# Patient Record
Sex: FEMALE | Race: BLACK OR AFRICAN AMERICAN | NOT HISPANIC OR LATINO | Employment: FULL TIME | URBAN - METROPOLITAN AREA
[De-identification: names, ages, dates, MRNs, and addresses within clinical notes are randomized per-mention and may not be internally consistent; named-entity substitution may affect disease eponyms.]

---

## 2018-10-04 ENCOUNTER — APPOINTMENT (EMERGENCY)
Dept: CT IMAGING | Facility: HOSPITAL | Age: 53
End: 2018-10-04
Payer: MEDICARE

## 2018-10-04 ENCOUNTER — HOSPITAL ENCOUNTER (EMERGENCY)
Facility: HOSPITAL | Age: 53
Discharge: HOME | End: 2018-10-04
Attending: EMERGENCY MEDICINE | Admitting: EMERGENCY MEDICINE
Payer: MEDICARE

## 2018-10-04 VITALS
OXYGEN SATURATION: 98 % | HEART RATE: 68 BPM | BODY MASS INDEX: 33.24 KG/M2 | DIASTOLIC BLOOD PRESSURE: 70 MMHG | SYSTOLIC BLOOD PRESSURE: 136 MMHG | HEIGHT: 59 IN | WEIGHT: 164.9 LBS | RESPIRATION RATE: 18 BRPM | TEMPERATURE: 97.8 F

## 2018-10-04 DIAGNOSIS — D86.9 SARCOIDOSIS: Primary | ICD-10-CM

## 2018-10-04 LAB
ALBUMIN SERPL BCP-MCNC: 3.6 G/DL (ref 3.5–5)
ALP SERPL-CCNC: 127 U/L (ref 46–116)
ALT SERPL W P-5'-P-CCNC: 53 U/L (ref 12–78)
ANION GAP SERPL CALCULATED.3IONS-SCNC: 6 MMOL/L (ref 4–13)
AST SERPL W P-5'-P-CCNC: 47 U/L (ref 5–45)
ATRIAL RATE: 65 BPM
BASOPHILS # BLD AUTO: 0.02 THOUSANDS/ΜL (ref 0–0.1)
BASOPHILS NFR BLD AUTO: 0 % (ref 0–1)
BILIRUB SERPL-MCNC: 0.4 MG/DL (ref 0.2–1)
BUN SERPL-MCNC: 13 MG/DL (ref 5–25)
CALCIUM SERPL-MCNC: 9.3 MG/DL (ref 8.3–10.1)
CHLORIDE SERPL-SCNC: 105 MMOL/L (ref 100–108)
CO2 SERPL-SCNC: 28 MMOL/L (ref 21–32)
CREAT SERPL-MCNC: 0.83 MG/DL (ref 0.6–1.3)
EOSINOPHIL # BLD AUTO: 0.26 THOUSAND/ΜL (ref 0–0.61)
EOSINOPHIL NFR BLD AUTO: 5 % (ref 0–6)
ERYTHROCYTE [DISTWIDTH] IN BLOOD BY AUTOMATED COUNT: 14.9 % (ref 11.6–15.1)
GFR SERPL CREATININE-BSD FRML MDRD: 93 ML/MIN/1.73SQ M
GLUCOSE SERPL-MCNC: 102 MG/DL (ref 65–140)
HCT VFR BLD AUTO: 37.8 % (ref 34.8–46.1)
HGB BLD-MCNC: 12.1 G/DL (ref 11.5–15.4)
IMM GRANULOCYTES # BLD AUTO: 0.01 THOUSAND/UL (ref 0–0.2)
IMM GRANULOCYTES NFR BLD AUTO: 0 % (ref 0–2)
LYMPHOCYTES # BLD AUTO: 1.78 THOUSANDS/ΜL (ref 0.6–4.47)
LYMPHOCYTES NFR BLD AUTO: 32 % (ref 14–44)
MCH RBC QN AUTO: 29.9 PG (ref 26.8–34.3)
MCHC RBC AUTO-ENTMCNC: 32 G/DL (ref 31.4–37.4)
MCV RBC AUTO: 93 FL (ref 82–98)
MONOCYTES # BLD AUTO: 0.55 THOUSAND/ΜL (ref 0.17–1.22)
MONOCYTES NFR BLD AUTO: 10 % (ref 4–12)
NEUTROPHILS # BLD AUTO: 2.9 THOUSANDS/ΜL (ref 1.85–7.62)
NEUTS SEG NFR BLD AUTO: 53 % (ref 43–75)
NRBC BLD AUTO-RTO: 0 /100 WBCS
P AXIS: 67 DEGREES
PLATELET # BLD AUTO: 314 THOUSANDS/UL (ref 149–390)
PMV BLD AUTO: 10.1 FL (ref 8.9–12.7)
POTASSIUM SERPL-SCNC: 4 MMOL/L (ref 3.5–5.3)
PR INTERVAL: 158 MS
PROT SERPL-MCNC: 7.7 G/DL (ref 6.4–8.2)
QRS AXIS: 49 DEGREES
QRSD INTERVAL: 76 MS
QT INTERVAL: 414 MS
QTC INTERVAL: 430 MS
RBC # BLD AUTO: 4.05 MILLION/UL (ref 3.81–5.12)
SODIUM SERPL-SCNC: 139 MMOL/L (ref 136–145)
T WAVE AXIS: 44 DEGREES
TROPONIN I SERPL-MCNC: <0.02 NG/ML
VENTRICULAR RATE: 65 BPM
WBC # BLD AUTO: 5.52 THOUSAND/UL (ref 4.31–10.16)

## 2018-10-04 PROCEDURE — 71260 CT THORAX DX C+: CPT

## 2018-10-04 PROCEDURE — 85025 COMPLETE CBC W/AUTO DIFF WBC: CPT | Performed by: EMERGENCY MEDICINE

## 2018-10-04 PROCEDURE — 74177 CT ABD & PELVIS W/CONTRAST: CPT

## 2018-10-04 PROCEDURE — 84484 ASSAY OF TROPONIN QUANT: CPT | Performed by: EMERGENCY MEDICINE

## 2018-10-04 PROCEDURE — 80053 COMPREHEN METABOLIC PANEL: CPT | Performed by: EMERGENCY MEDICINE

## 2018-10-04 PROCEDURE — 93005 ELECTROCARDIOGRAM TRACING: CPT

## 2018-10-04 PROCEDURE — 99285 EMERGENCY DEPT VISIT HI MDM: CPT

## 2018-10-04 PROCEDURE — 36415 COLL VENOUS BLD VENIPUNCTURE: CPT | Performed by: EMERGENCY MEDICINE

## 2018-10-04 PROCEDURE — 93010 ELECTROCARDIOGRAM REPORT: CPT | Performed by: INTERNAL MEDICINE

## 2018-10-04 RX ORDER — CETIRIZINE HYDROCHLORIDE 10 MG/1
10 TABLET ORAL DAILY
COMMUNITY
End: 2020-06-02 | Stop reason: SDUPTHER

## 2018-10-04 RX ORDER — ALBUTEROL SULFATE 90 UG/1
2 AEROSOL, METERED RESPIRATORY (INHALATION) DAILY
COMMUNITY
End: 2020-04-09

## 2018-10-04 RX ADMIN — IOHEXOL 100 ML: 350 INJECTION, SOLUTION INTRAVENOUS at 05:41

## 2018-10-04 NOTE — ED PROVIDER NOTES
Pt Name: Madelin Stevenson  MRN: [id number]  [place] 1965  Age/Sex: 48 y o  female  Date of evaluation: 10/4/2018  PCP: No primary care provider on file  CHIEF COMPLAINT    Chief Complaint   Patient presents with    Chest Pain     Pt states she has scarcoidsis  Pt states she was being treated at [facility] but since the tx stopped her complications have flared up  Pt c/o increased chest pain and yellow sclera  HPI    Stefanie Paulson presents to the Emergency Department complaining of chest pain and swollen lymph nodes  She was recently in [facility] and during that time was seeing a doctor for her sarcoid  She had been on prednisone several times to control her symptoms  Now she has not been able to see a PCP or a rheumatologist and feels that she is getting worse  HPI      Past Medical and Surgical History    Past Medical History:   Diagnosis Date    Sarcoidosis     Vertigo        History reviewed  No pertinent surgical history  History reviewed  No pertinent family history  Social History   Substance Use Topics    Smoking status: Never Smoker    Smokeless tobacco: Never Used    Alcohol use Yes      Comment: social              Allergies    Allergies   Allergen Reactions    Tegretol [Carbamazepine] Rash       Home Medications    Prior to Admission medications    Medication Sig Start Date End Date Taking? Authorizing Provider   albuterol (PROVENTIL HFA,VENTOLIN HFA) 90 mcg/act inhaler Inhale 2 puffs daily   Yes Historical Provider, MD   cetirizine (ZyrTEC) 10 mg tablet Take 10 mg by mouth daily   Yes Historical Provider, MD   MECLIZINE HCL PO Take 1 tablet by mouth daily   Yes Historical Provider, MD           Review of Systems    Review of Systems   Constitutional: Negative for activity change, appetite change, chills, diaphoresis, fatigue and fever  HENT: Negative for congestion, postnasal drip, rhinorrhea, sinus pressure, sneezing and sore throat      Eyes: Negative for pain and visual disturbance  Respiratory: Negative for cough, chest tightness and shortness of breath  Cardiovascular: Negative for chest pain, palpitations and leg swelling  Gastrointestinal: Negative for abdominal distention, abdominal pain, constipation, diarrhea, nausea and vomiting  Endocrine: Negative for polydipsia, polyphagia and polyuria  Genitourinary: Negative for decreased urine volume, difficulty urinating, dysuria, flank pain, frequency and hematuria  Musculoskeletal: Negative for arthralgias, gait problem, joint swelling and neck pain  Skin: Negative for pallor and rash  Allergic/Immunologic: Negative for immunocompromised state  Neurological: Negative for syncope, speech difficulty, weakness, light-headedness, numbness and headaches  All other systems reviewed and are negative  Physical Exam      ED Triage Vitals [10/04/18 0427]   Temperature Pulse Respirations Blood Pressure SpO2   97 8 °F (36 6 °C) 69 18 163/83 100 %      Temp Source Heart Rate Source Patient Position - Orthostatic VS BP Location FiO2 (%)   Oral Monitor Sitting Right arm --      Pain Score       Worst Possible Pain               Physical Exam   Constitutional: She is oriented to person, place, and time  She appears well-developed and well-nourished  No distress  HENT:   Head: Normocephalic and atraumatic  Nose: Nose normal    Mouth/Throat: Oropharynx is clear and moist    Eyes: Pupils are equal, round, and reactive to light  Conjunctivae, EOM and lids are normal    Neck: Normal range of motion  Neck supple  Cardiovascular: Normal rate, regular rhythm and normal heart sounds  Exam reveals no gallop and no friction rub  No murmur heard  Pulmonary/Chest: Effort normal and breath sounds normal  No accessory muscle usage  No respiratory distress  She has no wheezes  She has no rales  Abdominal: Soft  She exhibits no distension  There is no tenderness  There is no rebound and no guarding     Neurological: She is alert and oriented to person, place, and time  No cranial nerve deficit or sensory deficit  Skin: Skin is warm and dry  No rash noted  She is not diaphoretic  No erythema  Psychiatric: She has a normal mood and affect  Her speech is normal and behavior is normal  Judgment and thought content normal    Nursing note and vitals reviewed  Assessment and Plan    Alfred Gutierrez is a 48 y o  female who presents with concerns about her sarcoid  Physical examination unremarkable  Plan will be to perform diagnostic testing and treat symptomatically        MDM    Diagnostic Results        Labs:    Results for orders placed or performed during the hospital encounter of 10/04/18   CBC and differential   Result Value Ref Range    WBC 5 52 4 31 - 10 16 Thousand/uL    RBC 4 05 3 81 - 5 12 Million/uL    Hemoglobin 12 1 11 5 - 15 4 g/dL    Hematocrit 37 8 34 8 - 46 1 %    MCV 93 82 - 98 fL    MCH 29 9 26 8 - 34 3 pg    MCHC 32 0 31 4 - 37 4 g/dL    RDW 14 9 11 6 - 15 1 %    MPV 10 1 8 9 - 12 7 fL    Platelets 150 468 - 290 Thousands/uL    nRBC 0 /100 WBCs    Neutrophils Relative 53 43 - 75 %    Immat GRANS % 0 0 - 2 %    Lymphocytes Relative 32 14 - 44 %    Monocytes Relative 10 4 - 12 %    Eosinophils Relative 5 0 - 6 %    Basophils Relative 0 0 - 1 %    Neutrophils Absolute 2 90 1 85 - 7 62 Thousands/µL    Immature Grans Absolute 0 01 0 00 - 0 20 Thousand/uL    Lymphocytes Absolute 1 78 0 60 - 4 47 Thousands/µL    Monocytes Absolute 0 55 0 17 - 1 22 Thousand/µL    Eosinophils Absolute 0 26 0 00 - 0 61 Thousand/µL    Basophils Absolute 0 02 0 00 - 0 10 Thousands/µL   Comprehensive metabolic panel   Result Value Ref Range    Sodium 139 136 - 145 mmol/L    Potassium 4 0 3 5 - 5 3 mmol/L    Chloride 105 100 - 108 mmol/L    CO2 28 21 - 32 mmol/L    ANION GAP 6 4 - 13 mmol/L    BUN 13 5 - 25 mg/dL    Creatinine 0 83 0 60 - 1 30 mg/dL    Glucose 102 65 - 140 mg/dL    Calcium 9 3 8 3 - 10 1 mg/dL    AST 47 (H) 5 - 45 U/L    ALT 53 12 - 78 U/L    Alkaline Phosphatase 127 (H) 46 - 116 U/L    Total Protein 7 7 6 4 - 8 2 g/dL    Albumin 3 6 3 5 - 5 0 g/dL    Total Bilirubin 0 40 0 20 - 1 00 mg/dL    eGFR 93 ml/min/1 73sq m   Troponin I   Result Value Ref Range    Troponin I <0 02 <=0 04 ng/mL       All labs reviewed and utilized in the medical decision making process    Radiology:    CT chest abdomen pelvis w contrast   Final Result         1  Bilateral hilar adenopathy and subcarinal adenopathy compatible with the submitted diagnosis of sarcoidosis  2   Subpleural blebs and emphysematous changes  3   Minimal atelectasis posteriorly in the upper lobes  4   No acute findings in the abdomen or pelvis  Workstation performed: [id number]             All radiology studies independently viewed by me and interpreted by the radiologist     Procedure    Procedures    CritCare Time      ED Course of Care and Re-Assessments      Medications   iohexol (OMNIPAQUE) 350 MG/ML injection (MULTI-DOSE) 100 mL (100 mL Intravenous Given 10/4/18 0541)           FINAL IMPRESSION    Final diagnoses:   Sarcoidosis         DISPOSITION/PLAN    Time reflects when diagnosis was documented in both MDM as applicable and the Disposition within this note     Time User Action Codes Description Comment    10/4/2018  6:41 AM Bushra Thomas Add [D86 9] Sarcoidosis       ED Disposition     ED Disposition Condition Comment    Discharge  Sharon Flores discharge to home/self care      Condition at discharge: Good        Follow-up Information     Follow up With Specialties Details Why Contact Info Additional [address] Emergency Department Emergency Medicine Go to If symptoms worsen [address] [facility] [address],  [address], [place], [place] [address]    [phone]               PATIENT REFERRED TO:    [facility] [address] [facility]  [phone]  Go to  If symptoms worsen    [employer]  [phone]            DISCHARGE MEDICATIONS:    Discharge Medication List as of 10/4/2018  6:41 AM      CONTINUE these medications which have NOT CHANGED    Details   albuterol (PROVENTIL HFA,VENTOLIN HFA) 90 mcg/act inhaler Inhale 2 puffs daily, Historical Med      cetirizine (ZyrTEC) 10 mg tablet Take 10 mg by mouth daily, Historical Med      MECLIZINE HCL PO Take 1 tablet by mouth daily, Historical Med             No discharge procedures on file           Nicole Cardenas, [address] [place] [address],   10/04/18 7317

## 2018-10-04 NOTE — DISCHARGE INSTRUCTIONS
Sarcoidosis   WHAT YOU NEED TO KNOW:   What is sarcoidosis? Sarcoidosis is a condition in which inflammatory cells collect in tissues and organs  These cells form granulomas (lumps) in the lungs, skin, lymph nodes, or eyes  What increases my risk for sarcoidosis? The cause of sarcoidosis is not known  Sarcoidosis may be caused by an autoimmune disease  An autoimmune disease happens when immune cells produce antibodies that attack your own body's cells  The following may increase your risk of sarcoidosis:  · Being female    · Age 21to 36years old    · Frequent exposure to chemicals, metals, and substances like caitlin, pine tree, pollen, or aluminum  · Germs such as bacteria, viruses, and fungi  · Have family members or close relatives who have sarcoidosis or autoimmune diseases  What are the signs and symptoms of sarcoidosis? Signs and symptoms include weight loss, weakness, fever, and fatigue  Granulomas may cause other signs and symptoms when they appear in any of the following:  · Brain:  You may have problems thinking, remembering things, or controlling your feelings and actions  You may also have trouble hearing, headaches, and seizures  · Eyes:  Granulomas in the eyes can cause pain, swelling, and vision changes  · Heart: You may have chest pain, abnormal heartbeats, or your heart may stop beating  · Lungs: You may have a cough, trouble breathing, and hemoptysis (coughing up blood)  · Muscles, bones, and joints:  Your joints, muscles, and bones may be painful, swollen, red, and warm  · Skin:  Granulomas may appear as flat or raised lumps in your skin  You may also get red lumps on the front of your legs (erythema nodosum)  · Other organs:  Granulomas in the liver may cause your skin to turn yellow and may cause kidney stones when found in the kidneys  Granulomas in your intestines may block the passage of food or blood and cause pain and bleeding   You may have swollen, painful lymph nodes in your neck, armpits, or groin  How is sarcoidosis diagnosed? Your healthcare provider will examine you and ask you about other health conditions you may have  He may ask if you have family members with sarcoidosis or autoimmune diseases  He may also do an eye exam  You may have any of the following tests:  · Blood tests: These are done to look for signs of inflammation  They may also check for liver and kidney function  · Urine test:  These are done to check for blood in your urine  This is a sign of the condition affecting your kidneys  · Diascopy: Your healthcare provider will press on skin lesions using a small glass plate  This allows your healthcare provider to examine any color changes of the lesion  · Chest x-ray: This is a picture of your lungs and heart  It may show granulomas, fluid, and other problems  · CT scan: This test is also called a CAT scan  An x-ray machine uses a computer to take pictures of your brain, lungs, muscles, and bones  You may be given a dye before the pictures are taken to help healthcare providers see the pictures better  Tell the healthcare provider if you have ever had an allergic reaction to contrast dye     · MRI:  This scan uses powerful magnets and a computer to take pictures of your brain, lungs, muscles, and bones  You may be given dye to help the pictures show up better  Tell the healthcare provider if you have ever had an allergic reaction to contrast dye  Do not enter the MRI room with anything metal  Metal can cause serious injury  Tell the healthcare provider if you have any metal in or on your body  · Biopsy:  A small amount of tissue will be removed from your lungs or other affected areas and tested  This will show if the granulomas are caused by sarcoidosis  How is sarcoidosis treated? Many people with sarcoidosis get better without treatment  You may also have any of the following:  · Medicines:      ¨ Steroids:   This medicine is given to help slow down your immune system and reduce the symptoms of sarcoidosis  ¨ Cytotoxic medicines: These decrease redness, pain, and swelling, and help slow down your immune system  ¨ NSAIDs:  These medicines decrease swelling and pain  You can buy NSAIDs without a doctor's order  Ask your healthcare provider which medicine is right for you and how much to take  Take as directed  NSAIDs can cause stomach bleeding or kidney problems if not taken correctly  ¨ Acetaminophen: This medicine decreases pain and fever  You can buy acetaminophen without a doctor's order  Ask how much to take and how often to take it  Follow directions  Acetaminophen can cause liver damage if not taken correctly  · Surgery: You may need surgery to remove granulomas that cause severe signs and symptoms  Healthcare providers may use lasers (light beams) or dermabrasion to remove or smooth skin lesions  What are the risks of sarcoidosis? Surgery may cause bleeding or an infection  If not treated, granulomas may cause further damage to your lungs  You may have trouble breathing and feel very tired most of the time  Granulomas in your brain may cause problems thinking, remembering things, and controlling your actions and feelings  You may have loss of vision or hearing or seizures  Your heart may be affected and this can be life-threatening  How can I manage my symptoms? · Eat a variety of healthy foods:  Healthy foods include fruits, vegetables, whole-grain breads, low-fat dairy products, beans, lean meats, and fish  Ask if you need to be on a special diet  · Get plenty of exercise:  Ask your healthcare provider about the best exercise plan for you  Exercise may help decrease fatigue and improve your symptoms  · Do not smoke: If you smoke, it is never too late to quit  Smoking increases your risk of further lung problems  Ask your healthcare provider for information if you need help quitting    When should I contact my healthcare provider? · You have a fever  · You have a severe headache and pain in your neck  · You have chills, a cough, or feel weak and achy  · You have pain, redness, and swelling in your muscles and joints  · Your skin is itchy, swollen, or has a rash  · You have questions or concerns about your condition or care  When should I seek immediate care or call 911? · You cannot feel your arms or legs, or they become weak  · You have seizures  · You have sudden trouble breathing  · You have trouble thinking and remembering things  · You have severe chest pain  CARE AGREEMENT:   You have the right to help plan your care  Learn about your health condition and how it may be treated  Discuss treatment options with your caregivers to decide what care you want to receive  You always have the right to refuse treatment  The above information is an  only  It is not intended as medical advice for individual conditions or treatments  Talk to your doctor, nurse or pharmacist before following any medical regimen to see if it is safe and effective for you  © 2017 [employer] Information is for End User's use only and may not be sold, redistributed or otherwise used for commercial purposes  All illustrations and images included in CareNotes® are the copyrighted property of [employer]  or [employer]

## 2020-04-08 ENCOUNTER — TELEPHONE (OUTPATIENT)
Dept: FAMILY MEDICINE CLINIC | Facility: CLINIC | Age: 55
End: 2020-04-08

## 2020-04-08 DIAGNOSIS — J45.20 MILD INTERMITTENT ASTHMA, UNSPECIFIED WHETHER COMPLICATED: Primary | ICD-10-CM

## 2020-04-09 RX ORDER — BUDESONIDE AND FORMOTEROL FUMARATE DIHYDRATE 160; 4.5 UG/1; UG/1
AEROSOL RESPIRATORY (INHALATION)
COMMUNITY
Start: 2020-04-08 | End: 2020-06-02 | Stop reason: SDUPTHER

## 2020-04-09 RX ORDER — CROMOLYN SODIUM 40 MG/ML
SOLUTION/ DROPS OPHTHALMIC
COMMUNITY
Start: 2020-03-27 | End: 2020-06-02 | Stop reason: SDUPTHER

## 2020-04-09 RX ORDER — FLUTICASONE PROPIONATE 50 MCG
SPRAY, SUSPENSION (ML) NASAL
COMMUNITY
Start: 2020-04-07 | End: 2020-06-02 | Stop reason: SDUPTHER

## 2020-04-09 RX ORDER — ALBUTEROL SULFATE 90 UG/1
2 AEROSOL, METERED RESPIRATORY (INHALATION) EVERY 6 HOURS PRN
Qty: 1 INHALER | Refills: 3 | Status: SHIPPED | OUTPATIENT
Start: 2020-04-09 | End: 2020-05-09

## 2020-04-09 RX ORDER — ATORVASTATIN CALCIUM 40 MG/1
TABLET, FILM COATED ORAL
COMMUNITY
Start: 2020-04-07 | End: 2020-06-02 | Stop reason: SDUPTHER

## 2020-04-09 RX ORDER — MONTELUKAST SODIUM 10 MG/1
TABLET ORAL
COMMUNITY
Start: 2020-04-07 | End: 2020-06-02 | Stop reason: SDUPTHER

## 2020-04-24 ENCOUNTER — TELEMEDICINE (OUTPATIENT)
Dept: FAMILY MEDICINE CLINIC | Facility: CLINIC | Age: 55
End: 2020-04-24
Payer: MEDICARE

## 2020-04-24 DIAGNOSIS — J45.40 MODERATE PERSISTENT ASTHMA WITHOUT COMPLICATION: ICD-10-CM

## 2020-04-24 DIAGNOSIS — J30.2 SEASONAL ALLERGIES: ICD-10-CM

## 2020-04-24 DIAGNOSIS — E78.00 HYPERCHOLESTEROLEMIA: ICD-10-CM

## 2020-04-24 DIAGNOSIS — D50.8 OTHER IRON DEFICIENCY ANEMIA: Primary | ICD-10-CM

## 2020-04-24 PROBLEM — D86.9 SARCOIDOSIS: Status: ACTIVE | Noted: 2020-04-24

## 2020-04-24 PROBLEM — F91.3: Status: ACTIVE | Noted: 2020-04-24

## 2020-04-24 PROBLEM — D50.9 IRON DEFICIENCY ANEMIA: Status: ACTIVE | Noted: 2020-04-24

## 2020-04-24 PROCEDURE — 99214 OFFICE O/P EST MOD 30 MIN: CPT | Performed by: NURSE PRACTITIONER

## 2020-04-24 RX ORDER — FERROUS SULFATE TAB EC 324 MG (65 MG FE EQUIVALENT) 324 (65 FE) MG
324 TABLET DELAYED RESPONSE ORAL
Qty: 180 TABLET | Refills: 1 | Status: SHIPPED | OUTPATIENT
Start: 2020-04-24 | End: 2021-06-04 | Stop reason: SDUPTHER

## 2020-04-28 ENCOUNTER — TELEMEDICINE (OUTPATIENT)
Dept: FAMILY MEDICINE CLINIC | Facility: CLINIC | Age: 55
End: 2020-04-28
Payer: MEDICARE

## 2020-04-28 VITALS — BODY MASS INDEX: 33.31 KG/M2 | HEIGHT: 59 IN

## 2020-04-28 DIAGNOSIS — L81.9 DISCOLORATION OF SKIN OF FACE: ICD-10-CM

## 2020-04-28 DIAGNOSIS — D86.9 SARCOIDOSIS: Primary | ICD-10-CM

## 2020-04-28 PROCEDURE — 99213 OFFICE O/P EST LOW 20 MIN: CPT | Performed by: NURSE PRACTITIONER

## 2020-04-28 RX ORDER — PETROLATUM,WHITE/LANOLIN
OINTMENT (GRAM) TOPICAL AS NEEDED
Qty: 45 G | Refills: 1 | Status: SHIPPED | OUTPATIENT
Start: 2020-04-28 | End: 2022-03-01 | Stop reason: SDUPTHER

## 2020-06-01 ENCOUNTER — TELEPHONE (OUTPATIENT)
Dept: FAMILY MEDICINE CLINIC | Facility: CLINIC | Age: 55
End: 2020-06-01

## 2020-06-01 DIAGNOSIS — E78.00 HYPERCHOLESTEREMIA: ICD-10-CM

## 2020-06-01 DIAGNOSIS — J30.2 SEASONAL ALLERGIES: Primary | ICD-10-CM

## 2020-06-01 DIAGNOSIS — J45.20 MILD INTERMITTENT ASTHMA, UNSPECIFIED WHETHER COMPLICATED: ICD-10-CM

## 2020-06-01 DIAGNOSIS — F41.9 ANXIETY: ICD-10-CM

## 2020-06-02 ENCOUNTER — TELEPHONE (OUTPATIENT)
Dept: FAMILY MEDICINE CLINIC | Facility: CLINIC | Age: 55
End: 2020-06-02

## 2020-06-02 RX ORDER — HYDROXYZINE PAMOATE 25 MG/1
25 CAPSULE ORAL 3 TIMES DAILY PRN
Qty: 30 CAPSULE | Refills: 0 | Status: SHIPPED | OUTPATIENT
Start: 2020-06-02 | End: 2020-12-29 | Stop reason: SDUPTHER

## 2020-06-02 RX ORDER — BUDESONIDE AND FORMOTEROL FUMARATE DIHYDRATE 160; 4.5 UG/1; UG/1
2 AEROSOL RESPIRATORY (INHALATION) 2 TIMES DAILY
Qty: 3 INHALER | Refills: 1 | Status: SHIPPED | OUTPATIENT
Start: 2020-06-02 | End: 2021-06-04 | Stop reason: ALTCHOICE

## 2020-06-02 RX ORDER — ALBUTEROL SULFATE 90 UG/1
1 AEROSOL, METERED RESPIRATORY (INHALATION) 3 TIMES DAILY PRN
Qty: 3 INHALER | Refills: 1 | Status: SHIPPED | OUTPATIENT
Start: 2020-06-02 | End: 2020-08-31

## 2020-06-02 RX ORDER — CROMOLYN SODIUM 40 MG/ML
1 SOLUTION/ DROPS OPHTHALMIC 4 TIMES DAILY
Qty: 10 ML | Refills: 1 | Status: SHIPPED | OUTPATIENT
Start: 2020-06-02 | End: 2020-11-19 | Stop reason: SDUPTHER

## 2020-06-02 RX ORDER — ATORVASTATIN CALCIUM 40 MG/1
1 TABLET, FILM COATED ORAL DAILY
COMMUNITY
Start: 2020-04-07 | End: 2020-06-02 | Stop reason: SDUPTHER

## 2020-06-02 RX ORDER — CETIRIZINE HYDROCHLORIDE 10 MG/1
10 TABLET ORAL DAILY
Qty: 90 TABLET | Refills: 1 | Status: SHIPPED | OUTPATIENT
Start: 2020-06-02 | End: 2021-06-09 | Stop reason: SDUPTHER

## 2020-06-02 RX ORDER — ALPRAZOLAM 0.25 MG/1
1 TABLET ORAL
COMMUNITY
End: 2021-05-03

## 2020-06-02 RX ORDER — ATORVASTATIN CALCIUM 40 MG/1
40 TABLET, FILM COATED ORAL DAILY
Qty: 90 TABLET | Refills: 1 | Status: SHIPPED | OUTPATIENT
Start: 2020-06-02 | End: 2021-06-04 | Stop reason: SDUPTHER

## 2020-06-02 RX ORDER — ALBUTEROL SULFATE 90 UG/1
1 AEROSOL, METERED RESPIRATORY (INHALATION) 3 TIMES DAILY PRN
COMMUNITY
Start: 2020-04-07 | End: 2020-06-02 | Stop reason: SDUPTHER

## 2020-06-02 RX ORDER — HYDROXYZINE HYDROCHLORIDE 25 MG/1
25 TABLET, FILM COATED ORAL 2 TIMES DAILY
COMMUNITY
End: 2020-12-29 | Stop reason: SDUPTHER

## 2020-06-02 RX ORDER — MECLIZINE HCL 12.5 MG/1
12.5 TABLET ORAL EVERY 8 HOURS PRN
Qty: 90 TABLET | Refills: 1 | Status: SHIPPED | OUTPATIENT
Start: 2020-06-02 | End: 2021-11-10 | Stop reason: SDUPTHER

## 2020-06-02 RX ORDER — MONTELUKAST SODIUM 10 MG/1
10 TABLET ORAL DAILY
Qty: 90 TABLET | Refills: 1 | Status: SHIPPED | OUTPATIENT
Start: 2020-06-02 | End: 2021-06-04 | Stop reason: SDUPTHER

## 2020-06-02 RX ORDER — FLUTICASONE PROPIONATE 50 MCG
1 SPRAY, SUSPENSION (ML) NASAL DAILY
Qty: 18.2 ML | Refills: 1 | Status: SHIPPED | OUTPATIENT
Start: 2020-06-02 | End: 2021-06-04 | Stop reason: SDUPTHER

## 2020-06-04 ENCOUNTER — TELEPHONE (OUTPATIENT)
Dept: FAMILY MEDICINE CLINIC | Facility: CLINIC | Age: 55
End: 2020-06-04

## 2020-11-19 ENCOUNTER — TELEPHONE (OUTPATIENT)
Dept: FAMILY MEDICINE CLINIC | Facility: CLINIC | Age: 55
End: 2020-11-19

## 2020-11-19 DIAGNOSIS — J30.2 SEASONAL ALLERGIES: ICD-10-CM

## 2020-11-19 RX ORDER — CROMOLYN SODIUM 40 MG/ML
1 SOLUTION/ DROPS OPHTHALMIC 4 TIMES DAILY
Qty: 10 ML | Refills: 1 | Status: SHIPPED | OUTPATIENT
Start: 2020-11-19 | End: 2020-11-23 | Stop reason: SDUPTHER

## 2020-11-23 DIAGNOSIS — J30.2 SEASONAL ALLERGIES: ICD-10-CM

## 2020-11-23 RX ORDER — CROMOLYN SODIUM 40 MG/ML
1 SOLUTION/ DROPS OPHTHALMIC 4 TIMES DAILY
Qty: 10 ML | Refills: 1 | Status: SHIPPED | OUTPATIENT
Start: 2020-11-23 | End: 2020-11-30

## 2020-11-24 ENCOUNTER — TELEPHONE (OUTPATIENT)
Dept: FAMILY MEDICINE CLINIC | Facility: CLINIC | Age: 55
End: 2020-11-24

## 2020-11-30 ENCOUNTER — TELEPHONE (OUTPATIENT)
Dept: FAMILY MEDICINE CLINIC | Facility: CLINIC | Age: 55
End: 2020-11-30

## 2020-11-30 DIAGNOSIS — J30.2 SEASONAL ALLERGIES: Primary | ICD-10-CM

## 2020-11-30 RX ORDER — KETOTIFEN FUMARATE 0.35 MG/ML
1 SOLUTION/ DROPS OPHTHALMIC 2 TIMES DAILY
Qty: 1 ML | Refills: 0 | Status: SHIPPED | OUTPATIENT
Start: 2020-11-30 | End: 2021-11-19 | Stop reason: SDUPTHER

## 2020-12-29 ENCOUNTER — TELEPHONE (OUTPATIENT)
Dept: FAMILY MEDICINE CLINIC | Facility: CLINIC | Age: 55
End: 2020-12-29

## 2020-12-29 ENCOUNTER — TELEMEDICINE (OUTPATIENT)
Dept: FAMILY MEDICINE CLINIC | Facility: CLINIC | Age: 55
End: 2020-12-29
Payer: MEDICARE

## 2020-12-29 VITALS — HEIGHT: 59 IN | BODY MASS INDEX: 28.02 KG/M2 | WEIGHT: 139 LBS

## 2020-12-29 DIAGNOSIS — L08.9 SKIN INFECTION: Primary | ICD-10-CM

## 2020-12-29 DIAGNOSIS — L29.9 PRURITUS: ICD-10-CM

## 2020-12-29 PROCEDURE — 99214 OFFICE O/P EST MOD 30 MIN: CPT | Performed by: NURSE PRACTITIONER

## 2020-12-29 RX ORDER — CEPHALEXIN 500 MG/1
500 CAPSULE ORAL EVERY 8 HOURS SCHEDULED
Qty: 21 CAPSULE | Refills: 0 | Status: SHIPPED | OUTPATIENT
Start: 2020-12-29 | End: 2021-01-05

## 2020-12-29 RX ORDER — HYDROXYZINE HYDROCHLORIDE 25 MG/1
25 TABLET, FILM COATED ORAL 2 TIMES DAILY
Qty: 60 TABLET | Refills: 0 | Status: SHIPPED | OUTPATIENT
Start: 2020-12-29 | End: 2021-06-09 | Stop reason: SDUPTHER

## 2020-12-29 RX ORDER — NYSTATIN AND TRIAMCINOLONE ACETONIDE 100000; 1 [USP'U]/G; MG/G
OINTMENT TOPICAL 2 TIMES DAILY
Qty: 120 G | Refills: 1 | Status: SHIPPED | OUTPATIENT
Start: 2020-12-29 | End: 2021-01-07

## 2020-12-31 ENCOUNTER — HOSPITAL ENCOUNTER (EMERGENCY)
Facility: HOSPITAL | Age: 55
Discharge: HOME | End: 2020-12-31
Attending: EMERGENCY MEDICINE | Admitting: EMERGENCY MEDICINE
Payer: MEDICARE

## 2020-12-31 VITALS — OXYGEN SATURATION: 100 % | TEMPERATURE: 97.6 F | HEART RATE: 81 BPM | RESPIRATION RATE: 18 BRPM

## 2020-12-31 DIAGNOSIS — B02.9 HERPES ZOSTER WITHOUT COMPLICATION: Primary | ICD-10-CM

## 2020-12-31 PROCEDURE — 99284 EMERGENCY DEPT VISIT MOD MDM: CPT | Performed by: PHYSICIAN ASSISTANT

## 2020-12-31 PROCEDURE — 99282 EMERGENCY DEPT VISIT SF MDM: CPT

## 2020-12-31 RX ORDER — VALACYCLOVIR HYDROCHLORIDE 1 G/1
1000 TABLET, FILM COATED ORAL 3 TIMES DAILY
Qty: 21 TABLET | Refills: 0 | Status: SHIPPED | OUTPATIENT
Start: 2020-12-31 | End: 2021-06-09

## 2020-12-31 NOTE — DISCHARGE INSTRUCTIONS
Use Tylenol every 4 hours or Motrin every 6 hours; you can alternate the 2 medications taking something every 3 hours for pain or fever  Take all oral antiviral medications until done  If no improvement follow-up with your doctor in next few days

## 2020-12-31 NOTE — ED ATTENDING ATTESTATION
I supervised the Advanced Practitioner  I was present in the ED at the time the patient was seen, and I reviewed the AP note, prescribed medications, orders placed, and was available for consultation      Colleen Moran DO

## 2020-12-31 NOTE — ED PROVIDER NOTES
History  Chief Complaint   Patient presents with    Rash     pt reports 2-3 days hx of rash on neck, behind R ear, and in scal, reports unknown drainage  Unknown source  Has taken no meds for rash  Denies airway involvement or difficulty breathing     Pt with Past Medical History: Anxiety, Asthma, Back pain, Sarcoidosis, Vertigo, hyperlipidemia  No pertinent PSH  Presents to ED c/o few day h/o pain along right side of head/neck, then 2-3 days ago developed rash to neck, behind R ear, and in scalp, concerned for possible insect bites "something that someone in her building may have given her"  Has taken no meds for rash  Denies airway involvement or difficulty breathing, no fever, nocp, no sob          Prior to Admission Medications   Prescriptions Last Dose Informant Patient Reported? Taking?    ALPRAZolam (XANAX) 0 25 mg tablet Not Taking at Unknown time  Yes No   Sig: Take 1 tablet by mouth daily at bedtime as needed   SYMBICORT 160-4 5 MCG/ACT inhaler   No No   Sig: Inhale 2 puffs 2 (two) times a day   Vitamins A & D (VITAMIN A & D) ointment   No No   Sig: Apply topically as needed for dry skin (to face)   atorvastatin (LIPITOR) 40 mg tablet   No No   Sig: Take 1 tablet (40 mg total) by mouth daily   cephalexin (KEFLEX) 500 mg capsule Not Taking at Unknown time  No No   Sig: Take 1 capsule (500 mg total) by mouth every 8 (eight) hours for 7 days   Patient not taking: Reported on 2020   cetirizine (ZyrTEC) 10 mg tablet   No No   Sig: Take 1 tablet (10 mg total) by mouth daily   ferrous sulfate 324 (65 Fe) mg   No No   Sig: Take 1 tablet (324 mg total) by mouth 2 (two) times a day before meals   fluticasone (FLONASE) 50 mcg/act nasal spray   No No   Si spray into each nostril daily   fluticasone-salmeterol (ADVAIR, WIXELA) 250-50 mcg/dose inhaler   No No   Sig: Inhale 1 puff 2 (two) times a day Rinse mouth after use    hydrOXYzine HCL (ATARAX) 25 mg tablet Not Taking at Unknown time  No No   Sig: Take 1 tablet (25 mg total) by mouth 2 (two) times a day   Patient not taking: Reported on 12/31/2020   ketotifen (ZADITOR) 0 025 % ophthalmic solution   No No   Sig: Administer 1 drop to both eyes 2 (two) times a day for 10 days   meclizine (ANTIVERT) 12 5 MG tablet   No No   Sig: Take 1 tablet (12 5 mg total) by mouth every 8 (eight) hours as needed for dizziness   montelukast (SINGULAIR) 10 mg tablet   No No   Sig: Take 1 tablet (10 mg total) by mouth daily   nystatin-triamcinolone (MYCOLOG-II) ointment Not Taking at Unknown time  No No   Sig: Apply topically 2 (two) times a day for 7 days Apply to skin for itching/rash   Patient not taking: Reported on 12/31/2020      Facility-Administered Medications: None       Past Medical History:   Diagnosis Date    Anxiety     Asthma     Back pain     Sarcoidosis     Vertigo        Past Surgical History:   Procedure Laterality Date    MAMMO (HISTORICAL)  05/02/2018       Family History   Problem Relation Age of Onset    Hypertension Mother     Mental illness Mother     Asthma Mother      I have reviewed and agree with the history as documented  E-Cigarette/Vaping    E-Cigarette Use Never User      E-Cigarette/Vaping Substances    Nicotine No     THC No     CBD No     Flavoring No     Other No     Unknown No      Social History     Tobacco Use    Smoking status: Never Smoker    Smokeless tobacco: Never Used   Substance Use Topics    Alcohol use: Not Currently     Comment: social    Drug use: Yes     Types: Marijuana       Review of Systems   Constitutional: Negative for chills and fever  HENT: Positive for congestion  Negative for ear pain, hearing loss, mouth sores, sore throat and trouble swallowing  Eyes: Negative for discharge and visual disturbance  Respiratory: Negative for cough and shortness of breath  Cardiovascular: Negative for chest pain and leg swelling  Gastrointestinal: Negative for abdominal pain, nausea and vomiting  Genitourinary: Negative for dysuria and frequency  Musculoskeletal: Negative for arthralgias and myalgias  Skin: Positive for rash  Negative for color change and pallor  Neurological: Negative for dizziness, weakness, light-headedness, numbness and headaches  Psychiatric/Behavioral: Negative for behavioral problems and self-injury  All other systems reviewed and are negative  Physical Exam  Physical Exam  Vitals signs and nursing note reviewed  Constitutional:       General: She is not in acute distress  Appearance: She is well-developed  She is not ill-appearing  HENT:      Head: Normocephalic and atraumatic  Left Ear: External ear normal       Nose: Nose normal       Mouth/Throat:      Mouth: Mucous membranes are moist       Pharynx: Oropharynx is clear  Eyes:      Conjunctiva/sclera: Conjunctivae normal    Neck:      Musculoskeletal: Normal range of motion  Pulmonary:      Effort: Pulmonary effort is normal  No respiratory distress  Breath sounds: Normal breath sounds  Abdominal:      General: Abdomen is flat  Bowel sounds are normal    Musculoskeletal: Normal range of motion  General: No signs of injury  Lymphadenopathy:      Cervical: No cervical adenopathy  Skin:     General: Skin is warm and dry  Capillary Refill: Capillary refill takes less than 2 seconds  Findings: Rash present  Comments: Scattered patches of erythematous base with scabbed papules/vesicles in clusters noted along right C3 dermatome, right anterior neck, right posterior auricular region/scalp c/w shingles   Neurological:      General: No focal deficit present  Mental Status: She is alert and oriented to person, place, and time  Motor: No weakness     Psychiatric:         Mood and Affect: Mood normal          Behavior: Behavior normal          Vital Signs  ED Triage Vitals [12/31/20 1028]   Temperature Pulse Respirations BP SpO2   97 6 °F (36 4 °C) 81 18 -- 100 % Temp Source Heart Rate Source Patient Position - Orthostatic VS BP Location FiO2 (%)   Tympanic Monitor Lying Left arm --      Pain Score       --           Vitals:    12/31/20 1028   Pulse: 81   Patient Position - Orthostatic VS: Lying         Visual Acuity      ED Medications  Medications - No data to display    Diagnostic Studies  Results Reviewed     None                 No orders to display              Procedures  Procedures         ED Course                             SBIRT 20yo+      Most Recent Value   SBIRT (22 yo +)   In order to provide better care to our patients, we are screening all of our patients for alcohol and drug use  Would it be okay to ask you these screening questions? Unable to answer at this time [DAVID at [facility]] Filed at: 12/31/2020 1034                    MDM    Disposition  Final diagnoses:   Herpes zoster without complication     Time reflects when diagnosis was documented in both MDM as applicable and the Disposition within this note     Time User Action Codes Description Comment    12/31/2020 10:34 AM Felisa Kumar Add [B02 9] Herpes zoster without complication       ED Disposition     ED Disposition Condition Date/Time Comment    Discharge Stable Thu Dec 31, 2020 10:34 AM Deidre Ortiz discharge to home/self care              Follow-up Information     Follow up With Specialties Details Why Contact Info    FRED Avilez Nurse Practitioner, Family Medicine  As needed [address] [place] [address]   [phone]            Discharge Medication List as of 12/31/2020 10:36 AM      START taking these medications    Details   valACYclovir (VALTREX) 1,000 mg tablet Take 1 tablet (1,000 mg total) by mouth 3 (three) times a day for 7 days, Starting Thu 12/31/2020, Until Thu 1/7/2021, Normal         CONTINUE these medications which have NOT CHANGED    Details   ALPRAZolam (XANAX) 0 25 mg tablet Take 1 tablet by mouth daily at bedtime as needed, Historical Med atorvastatin (LIPITOR) 40 mg tablet Take 1 tablet (40 mg total) by mouth daily, Starting Tue 6/2/2020, Until Tue 12/29/2020, Normal      cephalexin (KEFLEX) 500 mg capsule Take 1 capsule (500 mg total) by mouth every 8 (eight) hours for 7 days, Starting Tue 12/29/2020, Until Tue 1/5/2021, Normal      cetirizine (ZyrTEC) 10 mg tablet Take 1 tablet (10 mg total) by mouth daily, Starting Tue 6/2/2020, Until Tue 12/29/2020, Normal      ferrous sulfate 324 (65 Fe) mg Take 1 tablet (324 mg total) by mouth 2 (two) times a day before meals, Starting Fri 4/24/2020, Until Tue 12/29/2020, Normal      fluticasone (FLONASE) 50 mcg/act nasal spray 1 spray into each nostril daily, Starting Tue 6/2/2020, Until Tue 12/29/2020, Normal      fluticasone-salmeterol (ADVAIR, WIXELA) 250-50 mcg/dose inhaler Inhale 1 puff 2 (two) times a day Rinse mouth after use , Starting Tue 6/2/2020, Until Tue 12/29/2020, Normal      hydrOXYzine HCL (ATARAX) 25 mg tablet Take 1 tablet (25 mg total) by mouth 2 (two) times a day, Starting Tue 12/29/2020, Until Thu 1/28/2021, Normal      ketotifen (ZADITOR) 0 025 % ophthalmic solution Administer 1 drop to both eyes 2 (two) times a day for 10 days, Starting Mon 11/30/2020, Until Tue 12/29/2020, Normal      meclizine (ANTIVERT) 12 5 MG tablet Take 1 tablet (12 5 mg total) by mouth every 8 (eight) hours as needed for dizziness, Starting Tue 6/2/2020, Until Tue 12/29/2020, Normal      montelukast (SINGULAIR) 10 mg tablet Take 1 tablet (10 mg total) by mouth daily, Starting Tue 6/2/2020, Until Tue 12/29/2020, Normal      nystatin-triamcinolone (MYCOLOG-II) ointment Apply topically 2 (two) times a day for 7 days Apply to skin for itching/rash, Starting Tue 12/29/2020, Until Tue 1/5/2021, Normal      SYMBICORT 160-4 5 MCG/ACT inhaler Inhale 2 puffs 2 (two) times a day, Starting Tue 6/2/2020, Until Tue 12/29/2020, Normal      Vitamins A & D (VITAMIN A & D) ointment Apply topically as needed for dry skin (to face), Starting Tue 4/28/2020, Until Tue 12/29/2020, Normal           No discharge procedures on file      PDMP Review     None          ED Provider  Electronically Signed by           Ernie Packer PA-C  12/31/20 1049

## 2021-01-05 DIAGNOSIS — L08.9 SKIN INFECTION: Primary | ICD-10-CM

## 2021-01-05 NOTE — TELEPHONE ENCOUNTER
Please have them provide separate ingredients instead of combo   1 nystatin ointment/ cream   One triamcinolone ointment / cream

## 2021-01-06 RX ORDER — NYSTATIN 100000 U/G
CREAM TOPICAL 2 TIMES DAILY
COMMUNITY
End: 2021-01-06 | Stop reason: SDUPTHER

## 2021-01-06 RX ORDER — TRIAMCINOLONE ACETONIDE 1 MG/G
CREAM TOPICAL 2 TIMES DAILY
COMMUNITY
End: 2021-01-06 | Stop reason: SDUPTHER

## 2021-01-06 NOTE — TELEPHONE ENCOUNTER
Pended separate medications to be ordered to pharmacy instead: nystatin cream and triamcinolone 0 1% cream  Let me know if you would prefer her use a higher percentage of triamcinolone cream

## 2021-01-07 RX ORDER — NYSTATIN 100000 U/G
CREAM TOPICAL 2 TIMES DAILY
Qty: 30 G | Refills: 0 | Status: SHIPPED | OUTPATIENT
Start: 2021-01-07 | End: 2021-06-09

## 2021-01-07 RX ORDER — NYSTATIN 100000 U/G
CREAM TOPICAL 2 TIMES DAILY
Qty: 30 G | Refills: 0 | Status: SHIPPED | OUTPATIENT
Start: 2021-01-07 | End: 2021-01-07 | Stop reason: SDUPTHER

## 2021-01-07 RX ORDER — TRIAMCINOLONE ACETONIDE 1 MG/G
CREAM TOPICAL 2 TIMES DAILY
Qty: 30 G | Refills: 0 | Status: SHIPPED | OUTPATIENT
Start: 2021-01-07 | End: 2021-06-09

## 2021-01-07 RX ORDER — TRIAMCINOLONE ACETONIDE 1 MG/G
CREAM TOPICAL 2 TIMES DAILY
Qty: 30 G | Refills: 0 | Status: SHIPPED | OUTPATIENT
Start: 2021-01-07 | End: 2021-01-07 | Stop reason: SDUPTHER

## 2021-03-02 ENCOUNTER — TELEPHONE (OUTPATIENT)
Dept: PULMONOLOGY | Facility: CLINIC | Age: 56
End: 2021-03-02

## 2021-03-02 NOTE — TELEPHONE ENCOUNTER
Note to chart:  LM for former Dr Hinds Head pt to call office and make f/u appt w/Dr Polk/[facility]  Pt was orig scheduled for a f/u on 8/4/2020

## 2021-04-05 ENCOUNTER — TELEPHONE (OUTPATIENT)
Dept: PULMONOLOGY | Facility: CLINIC | Age: 56
End: 2021-04-05

## 2021-05-03 ENCOUNTER — HOSPITAL ENCOUNTER (EMERGENCY)
Facility: HOSPITAL | Age: 56
Discharge: HOME | End: 2021-05-03
Attending: EMERGENCY MEDICINE
Payer: MEDICARE

## 2021-05-03 VITALS
SYSTOLIC BLOOD PRESSURE: 99 MMHG | HEIGHT: 59 IN | OXYGEN SATURATION: 97 % | RESPIRATION RATE: 18 BRPM | WEIGHT: 140 LBS | TEMPERATURE: 99.5 F | HEART RATE: 98 BPM | DIASTOLIC BLOOD PRESSURE: 66 MMHG | BODY MASS INDEX: 28.22 KG/M2

## 2021-05-03 DIAGNOSIS — J06.9 VIRAL URI WITH COUGH: Primary | ICD-10-CM

## 2021-05-03 PROCEDURE — 99283 EMERGENCY DEPT VISIT LOW MDM: CPT

## 2021-05-03 PROCEDURE — 96372 THER/PROPH/DIAG INJ SC/IM: CPT

## 2021-05-03 PROCEDURE — 99284 EMERGENCY DEPT VISIT MOD MDM: CPT | Performed by: EMERGENCY MEDICINE

## 2021-05-03 RX ORDER — IBUPROFEN 600 MG/1
600 TABLET ORAL EVERY 6 HOURS PRN
Qty: 60 TABLET | Refills: 0 | Status: SHIPPED | OUTPATIENT
Start: 2021-05-03 | End: 2021-06-09

## 2021-05-03 RX ORDER — BENZONATATE 100 MG/1
100 CAPSULE ORAL EVERY 8 HOURS
Qty: 21 CAPSULE | Refills: 0 | Status: SHIPPED | OUTPATIENT
Start: 2021-05-03 | End: 2021-06-09

## 2021-05-03 RX ORDER — KETOROLAC TROMETHAMINE 30 MG/ML
30 INJECTION, SOLUTION INTRAMUSCULAR; INTRAVENOUS ONCE
Status: COMPLETED | OUTPATIENT
Start: 2021-05-03 | End: 2021-05-03

## 2021-05-03 RX ORDER — BENZONATATE 100 MG/1
100 CAPSULE ORAL ONCE
Status: COMPLETED | OUTPATIENT
Start: 2021-05-03 | End: 2021-05-03

## 2021-05-03 RX ADMIN — BENZONATATE 100 MG: 100 CAPSULE ORAL at 22:56

## 2021-05-03 RX ADMIN — KETOROLAC TROMETHAMINE 30 MG: 30 INJECTION, SOLUTION INTRAMUSCULAR at 22:57

## 2021-05-06 NOTE — ED PROVIDER NOTES
History  Chief Complaint   Patient presents with    Cough     states fever, chills, cough and congestion x 2 days  unknown COVID contact     This is a 54year old female that ambulated to the ED reporting that she has had intermittent fever, chills, non-productive cough and nasal congestion for the past 2 days  Has not been taking any OTC medications  Has not had COVID nor has she been immunized  Unaware of any COVID contacts    No aggravating or alleviating factors    T Max 101 6          Prior to Admission Medications   Prescriptions Last Dose Informant Patient Reported? Taking?    SYMBICORT 160-4 5 MCG/ACT inhaler 5/3/2021 at Unknown time  No Yes   Sig: Inhale 2 puffs 2 (two) times a day   Vitamins A & D (VITAMIN A & D) ointment 5/3/2021 at Unknown time  No Yes   Sig: Apply topically as needed for dry skin (to face)   atorvastatin (LIPITOR) 40 mg tablet 5/3/2021 at Unknown time  No Yes   Sig: Take 1 tablet (40 mg total) by mouth daily   cetirizine (ZyrTEC) 10 mg tablet Past Week at Unknown time  No Yes   Sig: Take 1 tablet (10 mg total) by mouth daily   ferrous sulfate 324 (65 Fe) mg More than a month at Unknown time  No No   Sig: Take 1 tablet (324 mg total) by mouth 2 (two) times a day before meals   fluticasone (FLONASE) 50 mcg/act nasal spray 5/3/2021 at Unknown time  No Yes   Si spray into each nostril daily   fluticasone-salmeterol (ADVAIR, WIXELA) 250-50 mcg/dose inhaler 5/3/2021 at Unknown time  No Yes   Sig: Inhale 1 puff 2 (two) times a day Rinse mouth after use    hydrOXYzine HCL (ATARAX) 25 mg tablet 5/3/2021 at Unknown time  No Yes   Sig: Take 1 tablet (25 mg total) by mouth 2 (two) times a day   ketotifen (ZADITOR) 0 025 % ophthalmic solution 5/3/2021 at Unknown time  No Yes   Sig: Administer 1 drop to both eyes 2 (two) times a day for 10 days   meclizine (ANTIVERT) 12 5 MG tablet 5/3/2021 at Unknown time  No Yes   Sig: Take 1 tablet (12 5 mg total) by mouth every 8 (eight) hours as needed for dizziness   montelukast (SINGULAIR) 10 mg tablet 5/3/2021 at Unknown time  No Yes   Sig: Take 1 tablet (10 mg total) by mouth daily   nystatin (MYCOSTATIN) cream Not Taking at Unknown time  No No   Sig: Apply topically 2 (two) times a day   Patient not taking: Reported on 5/3/2021   triamcinolone (KENALOG) 0 1 % cream Not Taking at Unknown time  No No   Sig: Apply topically 2 (two) times a day   Patient not taking: Reported on 5/3/2021   valACYclovir (VALTREX) 1,000 mg tablet Not Taking at Unknown time  No No   Sig: Take 1 tablet (1,000 mg total) by mouth 3 (three) times a day for 7 days   Patient not taking: Reported on 5/3/2021      Facility-Administered Medications: None       Past Medical History:   Diagnosis Date    Anxiety     Asthma     Back pain     Sarcoidosis     Vertigo        Past Surgical History:   Procedure Laterality Date    MAMMO (HISTORICAL)  05/02/2018       Family History   Problem Relation Age of Onset    Hypertension Mother     Mental illness Mother     Asthma Mother      I have reviewed and agree with the history as documented  E-Cigarette/Vaping    E-Cigarette Use Never User      E-Cigarette/Vaping Substances    Nicotine No     THC No     CBD No     Flavoring No     Other No     Unknown No      Social History     Tobacco Use    Smoking status: Never Smoker    Smokeless tobacco: Never Used   Substance Use Topics    Alcohol use: Not Currently     Comment: social    Drug use: Not Currently     Types: Marijuana       Review of Systems   Constitutional: Positive for chills, fatigue and fever  Negative for activity change, appetite change, diaphoresis and unexpected weight change  HENT: Positive for congestion  Negative for ear discharge, ear pain, mouth sores, sinus pressure, sinus pain, sneezing, sore throat, trouble swallowing and voice change  Eyes: Negative for photophobia, pain, discharge, redness, itching and visual disturbance     Respiratory: Positive for cough  Negative for chest tightness and shortness of breath  Cardiovascular: Negative for chest pain, palpitations and leg swelling  Gastrointestinal: Negative for abdominal pain, constipation, nausea and vomiting  Endocrine: Negative for cold intolerance, heat intolerance, polydipsia, polyphagia and polyuria  Genitourinary: Negative for decreased urine volume, difficulty urinating, dysuria, frequency, hematuria and urgency  Musculoskeletal: Positive for myalgias  Negative for arthralgias, back pain, gait problem, joint swelling, neck pain and neck stiffness  Skin: Negative for color change and rash  Allergic/Immunologic: Negative for immunocompromised state  Neurological: Negative for dizziness, tremors, seizures, syncope, speech difficulty, weakness, light-headedness, numbness and headaches  Hematological: Does not bruise/bleed easily  Psychiatric/Behavioral: Negative for behavioral problems and suicidal ideas  Physical Exam  Physical Exam  Vitals signs and nursing note reviewed  Constitutional:       General: She is not in acute distress  Appearance: Normal appearance  She is well-developed and normal weight  She is not ill-appearing, toxic-appearing or diaphoretic  HENT:      Head: Normocephalic and atraumatic  Right Ear: Tympanic membrane, ear canal and external ear normal  There is no impacted cerumen  Left Ear: Tympanic membrane, ear canal and external ear normal  There is no impacted cerumen  Nose: Congestion present  No rhinorrhea  Mouth/Throat:      Mouth: Mucous membranes are moist       Pharynx: Oropharynx is clear  No oropharyngeal exudate or posterior oropharyngeal erythema  Eyes:      General: No scleral icterus  Right eye: No discharge  Left eye: No discharge  Extraocular Movements: Extraocular movements intact  Conjunctiva/sclera: Conjunctivae normal       Pupils: Pupils are equal, round, and reactive to light     Neck: Musculoskeletal: Normal range of motion and neck supple  No neck rigidity or muscular tenderness  Thyroid: No thyromegaly  Vascular: No hepatojugular reflux or JVD  Trachea: No tracheal deviation  Cardiovascular:      Rate and Rhythm: Normal rate and regular rhythm  Pulses: Normal pulses  Carotid pulses are 2+ on the right side and 2+ on the left side  Radial pulses are 2+ on the right side and 2+ on the left side  Dorsalis pedis pulses are 2+ on the right side and 2+ on the left side  Posterior tibial pulses are 2+ on the right side and 2+ on the left side  Heart sounds: Normal heart sounds  No murmur  Pulmonary:      Effort: Pulmonary effort is normal  No accessory muscle usage or respiratory distress  Breath sounds: Normal breath sounds  No wheezing or rales  Chest:      Chest wall: No mass, deformity, tenderness, crepitus or edema  Abdominal:      General: Bowel sounds are normal  There is no distension or abdominal bruit  Palpations: Abdomen is soft  There is no hepatomegaly, splenomegaly or mass  Tenderness: There is no abdominal tenderness  There is no right CVA tenderness, left CVA tenderness, guarding or rebound  Hernia: No hernia is present  Musculoskeletal: Normal range of motion  General: No tenderness  Right lower leg: She exhibits no tenderness  No edema  Left lower leg: She exhibits no tenderness  No edema  Lymphadenopathy:      Cervical: No cervical adenopathy  Skin:     General: Skin is warm and dry  Capillary Refill: Capillary refill takes less than 2 seconds  Findings: No ecchymosis or rash  Neurological:      General: No focal deficit present  Mental Status: She is alert and oriented to person, place, and time  Cranial Nerves: No cranial nerve deficit  Sensory: No sensory deficit  Motor: No weakness or abnormal muscle tone        Deep Tendon Reflexes: Reflexes normal    Psychiatric:         Mood and Affect: Mood normal          Behavior: Behavior normal          Thought Content: Thought content normal          Judgment: Judgment normal          Vital Signs  ED Triage Vitals   Temperature Pulse Respirations Blood Pressure SpO2   05/03/21 2234 05/03/21 2234 05/03/21 2234 05/03/21 2234 05/03/21 2234   99 5 °F (37 5 °C) 98 18 99/66 97 %      Temp Source Heart Rate Source Patient Position - Orthostatic VS BP Location FiO2 (%)   05/03/21 2234 05/03/21 2234 -- -- --   Oral Monitor         Pain Score       05/03/21 2257       5           Vitals:    05/03/21 2234   BP: 99/66   Pulse: 98         Visual Acuity      ED Medications  Medications   benzonatate (TESSALON PERLES) capsule 100 mg (100 mg Oral Given 5/3/21 2256)   ketorolac (TORADOL) injection 30 mg (30 mg Intramuscular Given 5/3/21 2257)       Diagnostic Studies  Results Reviewed     None                 No orders to display              Procedures  Procedures         ED Course  ED Course as of May 06 1651   Thu May 06, 2021   1645 This is a 60-year-old female who ambulates emergency department with      [address]  This is a this 60-year-old female that ambulates emergency department with history of 2 days of a nonproductive cough, intermittent fever and nasal congestion  She denies chest pain shortness of breath  She is unaware of any COVID contacts  Patient refuses to have a covid test        1647 Medicated with Toradol IM and Tessalon Perles  - reported improvement in myalgias  Ordered ibuprofen and Tessalon Perles for symptoms  Patient was reexamined at this time and informed of laboratory and/or imaging results and was found to be stable for discharge  Return to emergency department criteria was reviewed with the patient who verbalized understanding and was agreeable to discharge and the treatment plan at this time  Portions of the record may have been created with voice recognition software   Occasional wrong word or "sound a like" substitutions may have occurred due to the inherent limitations of voice recognition software  Read the chart carefully and recognize, using context, where substitutions have occurred  SBIRT 20yo+      Most Recent Value   SBIRT (22 yo +)   In order to provide better care to our patients, we are screening all of our patients for alcohol and drug use  Would it be okay to ask you these screening questions? No Filed at: 05/03/2021 6041                    [facility]  Number of Diagnoses or Management Options  Viral URI with cough:      Amount and/or Complexity of Data Reviewed  Clinical lab tests: ordered and reviewed        Disposition  Final diagnoses:   Viral URI with cough     Time reflects when diagnosis was documented in both MDM as applicable and the Disposition within this note     Time User Action Codes Description Comment    5/3/2021 10:53 PM Gurdeep Arana [J06 9] Viral URI with cough       ED Disposition     ED Disposition Condition Date/Time Comment    Discharge Stable Mon May 3, 2021 10:51 PM Don Later discharge to home/self care              Follow-up Information     Follow up With Specialties Details Why Contact Info    FRED Infante Nurse Practitioner, Family Medicine In 3 days  [address]  [phone]            Discharge Medication List as of 5/3/2021 10:53 PM      START taking these medications    Details   benzonatate (TESSALON PERLES) 100 mg capsule Take 1 capsule (100 mg total) by mouth every 8 (eight) hours, Starting Mon 5/3/2021, Normal      ibuprofen (MOTRIN) 600 mg tablet Take 1 tablet (600 mg total) by mouth every 6 (six) hours as needed for mild pain or moderate pain, Starting Mon 5/3/2021, Normal         CONTINUE these medications which have NOT CHANGED    Details   atorvastatin (LIPITOR) 40 mg tablet Take 1 tablet (40 mg total) by mouth daily, Starting Tue 6/2/2020, Until Mon 5/3/2021, Normal cetirizine (ZyrTEC) 10 mg tablet Take 1 tablet (10 mg total) by mouth daily, Starting Tue 6/2/2020, Until Mon 5/3/2021, Normal      fluticasone (FLONASE) 50 mcg/act nasal spray 1 spray into each nostril daily, Starting Tue 6/2/2020, Until Mon 5/3/2021, Normal      fluticasone-salmeterol (ADVAIR, WIXELA) 250-50 mcg/dose inhaler Inhale 1 puff 2 (two) times a day Rinse mouth after use , Starting Tue 6/2/2020, Until Mon 5/3/2021, Normal      hydrOXYzine HCL (ATARAX) 25 mg tablet Take 1 tablet (25 mg total) by mouth 2 (two) times a day, Starting Tue 12/29/2020, Until Mon 5/3/2021, Normal      ketotifen (ZADITOR) 0 025 % ophthalmic solution Administer 1 drop to both eyes 2 (two) times a day for 10 days, Starting Mon 11/30/2020, Until Mon 5/3/2021, Normal      meclizine (ANTIVERT) 12 5 MG tablet Take 1 tablet (12 5 mg total) by mouth every 8 (eight) hours as needed for dizziness, Starting Tue 6/2/2020, Until Mon 5/3/2021, Normal      montelukast (SINGULAIR) 10 mg tablet Take 1 tablet (10 mg total) by mouth daily, Starting Tue 6/2/2020, Until Mon 5/3/2021, Normal      SYMBICORT 160-4 5 MCG/ACT inhaler Inhale 2 puffs 2 (two) times a day, Starting Tue 6/2/2020, Until Mon 5/3/2021, Normal      Vitamins A & D (VITAMIN A & D) ointment Apply topically as needed for dry skin (to face), Starting Tue 4/28/2020, Until Mon 5/3/2021, Normal      ferrous sulfate 324 (65 Fe) mg Take 1 tablet (324 mg total) by mouth 2 (two) times a day before meals, Starting Fri 4/24/2020, Until Tue 12/29/2020, Normal      nystatin (MYCOSTATIN) cream Apply topically 2 (two) times a day, Starting Thu 1/7/2021, Normal      triamcinolone (KENALOG) 0 1 % cream Apply topically 2 (two) times a day, Starting Thu 1/7/2021, Normal      valACYclovir (VALTREX) 1,000 mg tablet Take 1 tablet (1,000 mg total) by mouth 3 (three) times a day for 7 days, Starting Thu 12/31/2020, Until Thu 1/7/2021, Normal           No discharge procedures on file      PDMP Review None          ED Provider  Electronically Signed by           Shavonne Grace MD  05/06/21 8782

## 2021-05-08 ENCOUNTER — APPOINTMENT (EMERGENCY)
Dept: RADIOLOGY | Facility: HOSPITAL | Age: 56
DRG: 137 | End: 2021-05-08
Payer: MEDICARE

## 2021-05-08 ENCOUNTER — HOSPITAL ENCOUNTER (INPATIENT)
Facility: HOSPITAL | Age: 56
LOS: 7 days | DRG: 137 | End: 2021-05-15
Attending: EMERGENCY MEDICINE | Admitting: INTERNAL MEDICINE
Payer: MEDICARE

## 2021-05-08 DIAGNOSIS — U07.1 COVID-19: Primary | ICD-10-CM

## 2021-05-08 DIAGNOSIS — R09.02 HYPOXIA: ICD-10-CM

## 2021-05-08 PROBLEM — J45.909 ASTHMA: Status: RESOLVED | Noted: 2021-05-08 | Resolved: 2021-05-08

## 2021-05-08 PROBLEM — J12.82 PNEUMONIA DUE TO COVID-19 VIRUS: Status: ACTIVE | Noted: 2021-05-08

## 2021-05-08 PROBLEM — J96.01 ACUTE RESPIRATORY FAILURE WITH HYPOXIA (HCC): Status: ACTIVE | Noted: 2021-05-08

## 2021-05-08 PROBLEM — J45.909 ASTHMA: Status: ACTIVE | Noted: 2021-05-08

## 2021-05-08 LAB
ALBUMIN SERPL BCP-MCNC: 3.9 G/DL (ref 3.4–4.8)
ALP SERPL-CCNC: 58.3 U/L (ref 35–140)
ALT SERPL W P-5'-P-CCNC: 57 U/L (ref 5–54)
ANION GAP SERPL CALCULATED.3IONS-SCNC: 10 MMOL/L (ref 4–13)
AST SERPL W P-5'-P-CCNC: 75 U/L (ref 15–41)
BASOPHILS # BLD MANUAL: 0 THOUSAND/UL (ref 0–0.1)
BASOPHILS NFR MAR MANUAL: 0 % (ref 0–1)
BILIRUB SERPL-MCNC: 0.45 MG/DL (ref 0.3–1.2)
BNP SERPL-MCNC: 21.9 PG/ML (ref 1–100)
BUN SERPL-MCNC: 52 MG/DL (ref 6–20)
CA-I BLD-SCNC: 1.21 MMOL/L (ref 1.12–1.32)
CALCIUM SERPL-MCNC: 10 MG/DL (ref 8.4–10.2)
CHLORIDE SERPL-SCNC: 104 MMOL/L (ref 96–108)
CK MB SERPL-MCNC: 7.5 NG/ML (ref 0.1–5.9)
CK MB SERPL-MCNC: <1 % (ref 0–2.5)
CK SERPL-CCNC: 831 U/L (ref 38–234)
CO2 SERPL-SCNC: 27 MMOL/L (ref 22–33)
CREAT SERPL-MCNC: 1.53 MG/DL (ref 0.4–1.1)
CRP SERPL QL: 11.5 MG/L (ref 0–1)
D DIMER PPP FEU-MCNC: 1 MG/L FEU (ref 0.19–0.49)
EOSINOPHIL # BLD MANUAL: 0 THOUSAND/UL (ref 0–0.4)
EOSINOPHIL NFR BLD MANUAL: 0 % (ref 0–6)
ERYTHROCYTE [DISTWIDTH] IN BLOOD BY AUTOMATED COUNT: 14.8 % (ref 11.6–15.1)
FERRITIN SERPL-MCNC: 2079 NG/ML (ref 8–388)
FLUAV RNA NPH QL NAA+PROBE: NORMAL
FLUBV RNA NPH QL NAA+PROBE: NORMAL
GFR SERPL CREATININE-BSD FRML MDRD: 44 ML/MIN/1.73SQ M
GLUCOSE SERPL-MCNC: 111 MG/DL (ref 65–140)
HCT VFR BLD AUTO: 42.4 % (ref 34.8–46.1)
HGB BLD-MCNC: 13.5 G/DL (ref 11.5–15.4)
INR PPP: 0.91 (ref 0.9–1.1)
LACTATE SERPL-SCNC: 1 MMOL/L (ref 0–2)
LYMPHOCYTES # BLD AUTO: 1.05 THOUSAND/UL (ref 0.6–4.47)
LYMPHOCYTES # BLD AUTO: 12 % (ref 14–44)
MAGNESIUM SERPL-MCNC: 3.2 MG/DL (ref 1.6–2.6)
MCH RBC QN AUTO: 29 PG (ref 26.8–34.3)
MCHC RBC AUTO-ENTMCNC: 31.8 G/DL (ref 31.4–37.4)
MCV RBC AUTO: 91 FL (ref 82–98)
MONOCYTES # BLD AUTO: 1.49 THOUSAND/UL (ref 0–1.22)
MONOCYTES NFR BLD: 17 % (ref 4–12)
NEUTROPHILS # BLD MANUAL: 6.23 THOUSAND/UL (ref 1.85–7.62)
NEUTS BAND NFR BLD MANUAL: 3 % (ref 0–8)
NEUTS SEG NFR BLD AUTO: 68 % (ref 43–75)
PLATELET # BLD AUTO: 351 THOUSANDS/UL (ref 149–390)
PLATELET BLD QL SMEAR: ADEQUATE
PMV BLD AUTO: 9.8 FL (ref 8.9–12.7)
POTASSIUM SERPL-SCNC: 5 MMOL/L (ref 3.5–5)
PROCALCITONIN SERPL-MCNC: 0.09 NG/ML
PROT SERPL-MCNC: 8 G/DL (ref 6.4–8.3)
PROTHROMBIN TIME: 10.3 SECONDS (ref 9.5–12.1)
RBC # BLD AUTO: 4.66 MILLION/UL (ref 3.81–5.12)
RBC MORPH BLD: NORMAL
RSV RNA NPH QL NAA+PROBE: NORMAL
SARS-COV-2 RNA RESP QL NAA+PROBE: POSITIVE
SODIUM SERPL-SCNC: 141 MMOL/L (ref 133–145)
TOTAL CELLS COUNTED SPEC: 100
TRIGL SERPL-MCNC: 196.9 MG/DL
TROPONIN I SERPL-MCNC: <0.03 NG/ML (ref 0–0.07)
WBC # BLD AUTO: 8.77 THOUSAND/UL (ref 4.31–10.16)

## 2021-05-08 PROCEDURE — 83605 ASSAY OF LACTIC ACID: CPT | Performed by: PHYSICIAN ASSISTANT

## 2021-05-08 PROCEDURE — 94760 N-INVAS EAR/PLS OXIMETRY 1: CPT

## 2021-05-08 PROCEDURE — 83880 ASSAY OF NATRIURETIC PEPTIDE: CPT | Performed by: PHYSICIAN ASSISTANT

## 2021-05-08 PROCEDURE — XW033E5 INTRODUCTION OF REMDESIVIR ANTI-INFECTIVE INTO PERIPHERAL VEIN, PERCUTANEOUS APPROACH, NEW TECHNOLOGY GROUP 5: ICD-10-PCS | Performed by: PHYSICIAN ASSISTANT

## 2021-05-08 PROCEDURE — 94664 DEMO&/EVAL PT USE INHALER: CPT

## 2021-05-08 PROCEDURE — 87635 SARS-COV-2 COVID-19 AMP PRB: CPT | Performed by: PHYSICIAN ASSISTANT

## 2021-05-08 PROCEDURE — 71045 X-RAY EXAM CHEST 1 VIEW: CPT

## 2021-05-08 PROCEDURE — 85027 COMPLETE CBC AUTOMATED: CPT | Performed by: PHYSICIAN ASSISTANT

## 2021-05-08 PROCEDURE — 84478 ASSAY OF TRIGLYCERIDES: CPT | Performed by: PHYSICIAN ASSISTANT

## 2021-05-08 PROCEDURE — 94640 AIRWAY INHALATION TREATMENT: CPT

## 2021-05-08 PROCEDURE — 82553 CREATINE MB FRACTION: CPT | Performed by: PHYSICIAN ASSISTANT

## 2021-05-08 PROCEDURE — 87040 BLOOD CULTURE FOR BACTERIA: CPT | Performed by: PHYSICIAN ASSISTANT

## 2021-05-08 PROCEDURE — 99284 EMERGENCY DEPT VISIT MOD MDM: CPT

## 2021-05-08 PROCEDURE — 85007 BL SMEAR W/DIFF WBC COUNT: CPT | Performed by: PHYSICIAN ASSISTANT

## 2021-05-08 PROCEDURE — 84145 PROCALCITONIN (PCT): CPT | Performed by: PHYSICIAN ASSISTANT

## 2021-05-08 PROCEDURE — 36415 COLL VENOUS BLD VENIPUNCTURE: CPT | Performed by: PHYSICIAN ASSISTANT

## 2021-05-08 PROCEDURE — 82550 ASSAY OF CK (CPK): CPT | Performed by: PHYSICIAN ASSISTANT

## 2021-05-08 PROCEDURE — 94002 VENT MGMT INPAT INIT DAY: CPT

## 2021-05-08 PROCEDURE — 85379 FIBRIN DEGRADATION QUANT: CPT | Performed by: PHYSICIAN ASSISTANT

## 2021-05-08 PROCEDURE — 87631 RESP VIRUS 3-5 TARGETS: CPT | Performed by: PHYSICIAN ASSISTANT

## 2021-05-08 PROCEDURE — 82955 ASSAY OF G6PD ENZYME: CPT | Performed by: PHYSICIAN ASSISTANT

## 2021-05-08 PROCEDURE — 82728 ASSAY OF FERRITIN: CPT | Performed by: PHYSICIAN ASSISTANT

## 2021-05-08 PROCEDURE — 96365 THER/PROPH/DIAG IV INF INIT: CPT

## 2021-05-08 PROCEDURE — 83735 ASSAY OF MAGNESIUM: CPT | Performed by: PHYSICIAN ASSISTANT

## 2021-05-08 PROCEDURE — 93005 ELECTROCARDIOGRAM TRACING: CPT

## 2021-05-08 PROCEDURE — 99285 EMERGENCY DEPT VISIT HI MDM: CPT | Performed by: PHYSICIAN ASSISTANT

## 2021-05-08 PROCEDURE — 86140 C-REACTIVE PROTEIN: CPT | Performed by: PHYSICIAN ASSISTANT

## 2021-05-08 PROCEDURE — 85610 PROTHROMBIN TIME: CPT | Performed by: PHYSICIAN ASSISTANT

## 2021-05-08 PROCEDURE — 82330 ASSAY OF CALCIUM: CPT | Performed by: PHYSICIAN ASSISTANT

## 2021-05-08 PROCEDURE — 84484 ASSAY OF TROPONIN QUANT: CPT | Performed by: PHYSICIAN ASSISTANT

## 2021-05-08 PROCEDURE — 80053 COMPREHEN METABOLIC PANEL: CPT | Performed by: PHYSICIAN ASSISTANT

## 2021-05-08 PROCEDURE — 99223 1ST HOSP IP/OBS HIGH 75: CPT | Performed by: INTERNAL MEDICINE

## 2021-05-08 RX ORDER — ATORVASTATIN CALCIUM 40 MG/1
40 TABLET, FILM COATED ORAL DAILY
Status: DISCONTINUED | OUTPATIENT
Start: 2021-05-08 | End: 2021-05-15 | Stop reason: HOSPADM

## 2021-05-08 RX ORDER — MECLIZINE HCL 12.5 MG/1
12.5 TABLET ORAL EVERY 8 HOURS PRN
Status: DISCONTINUED | OUTPATIENT
Start: 2021-05-08 | End: 2021-05-08

## 2021-05-08 RX ORDER — ONDANSETRON 2 MG/ML
4 INJECTION INTRAMUSCULAR; INTRAVENOUS EVERY 6 HOURS PRN
Status: DISCONTINUED | OUTPATIENT
Start: 2021-05-08 | End: 2021-05-15 | Stop reason: HOSPADM

## 2021-05-08 RX ORDER — SODIUM CHLORIDE, SODIUM LACTATE, POTASSIUM CHLORIDE, CALCIUM CHLORIDE 600; 310; 30; 20 MG/100ML; MG/100ML; MG/100ML; MG/100ML
100 INJECTION, SOLUTION INTRAVENOUS CONTINUOUS
Status: DISCONTINUED | OUTPATIENT
Start: 2021-05-08 | End: 2021-05-09

## 2021-05-08 RX ORDER — BUDESONIDE AND FORMOTEROL FUMARATE DIHYDRATE 160; 4.5 UG/1; UG/1
2 AEROSOL RESPIRATORY (INHALATION) 2 TIMES DAILY
Status: DISCONTINUED | OUTPATIENT
Start: 2021-05-08 | End: 2021-05-08

## 2021-05-08 RX ORDER — CEFTRIAXONE 1 G/50ML
1000 INJECTION, SOLUTION INTRAVENOUS EVERY 24 HOURS
Status: DISCONTINUED | OUTPATIENT
Start: 2021-05-08 | End: 2021-05-10

## 2021-05-08 RX ORDER — MULTIVITAMIN/IRON/FOLIC ACID 18MG-0.4MG
1 TABLET ORAL DAILY
Status: DISCONTINUED | OUTPATIENT
Start: 2021-05-15 | End: 2021-05-15 | Stop reason: HOSPADM

## 2021-05-08 RX ORDER — ACETAMINOPHEN 325 MG/1
650 TABLET ORAL EVERY 6 HOURS PRN
Status: DISCONTINUED | OUTPATIENT
Start: 2021-05-08 | End: 2021-05-15 | Stop reason: HOSPADM

## 2021-05-08 RX ORDER — HYDROXYZINE HYDROCHLORIDE 25 MG/1
25 TABLET, FILM COATED ORAL 2 TIMES DAILY
Status: DISCONTINUED | OUTPATIENT
Start: 2021-05-08 | End: 2021-05-08

## 2021-05-08 RX ORDER — DEXAMETHASONE SODIUM PHOSPHATE 4 MG/ML
6 INJECTION, SOLUTION INTRA-ARTICULAR; INTRALESIONAL; INTRAMUSCULAR; INTRAVENOUS; SOFT TISSUE EVERY 24 HOURS
Status: DISCONTINUED | OUTPATIENT
Start: 2021-05-08 | End: 2021-05-15 | Stop reason: HOSPADM

## 2021-05-08 RX ORDER — DOXYCYCLINE HYCLATE 100 MG/1
100 CAPSULE ORAL EVERY 12 HOURS SCHEDULED
Status: DISCONTINUED | OUTPATIENT
Start: 2021-05-08 | End: 2021-05-10

## 2021-05-08 RX ORDER — MELATONIN
2000 DAILY
Status: DISCONTINUED | OUTPATIENT
Start: 2021-05-08 | End: 2021-05-15 | Stop reason: HOSPADM

## 2021-05-08 RX ORDER — FLUTICASONE PROPIONATE 50 MCG
1 SPRAY, SUSPENSION (ML) NASAL DAILY
Status: DISCONTINUED | OUTPATIENT
Start: 2021-05-08 | End: 2021-05-08

## 2021-05-08 RX ORDER — LORATADINE 10 MG/1
10 TABLET ORAL DAILY
Status: DISCONTINUED | OUTPATIENT
Start: 2021-05-08 | End: 2021-05-08

## 2021-05-08 RX ORDER — ASCORBIC ACID 500 MG
1000 TABLET ORAL EVERY 12 HOURS SCHEDULED
Status: COMPLETED | OUTPATIENT
Start: 2021-05-08 | End: 2021-05-15

## 2021-05-08 RX ORDER — SODIUM CHLORIDE, SODIUM GLUCONATE, SODIUM ACETATE, POTASSIUM CHLORIDE, MAGNESIUM CHLORIDE, SODIUM PHOSPHATE, DIBASIC, AND POTASSIUM PHOSPHATE .53; .5; .37; .037; .03; .012; .00082 G/100ML; G/100ML; G/100ML; G/100ML; G/100ML; G/100ML; G/100ML
1000 INJECTION, SOLUTION INTRAVENOUS ONCE
Status: COMPLETED | OUTPATIENT
Start: 2021-05-08 | End: 2021-05-09

## 2021-05-08 RX ORDER — ZINC SULFATE 50(220)MG
220 CAPSULE ORAL DAILY
Status: COMPLETED | OUTPATIENT
Start: 2021-05-08 | End: 2021-05-14

## 2021-05-08 RX ORDER — MONTELUKAST SODIUM 10 MG/1
10 TABLET ORAL
Status: DISCONTINUED | OUTPATIENT
Start: 2021-05-08 | End: 2021-05-08

## 2021-05-08 RX ORDER — FAMOTIDINE 20 MG/1
20 TABLET, FILM COATED ORAL DAILY
Status: DISCONTINUED | OUTPATIENT
Start: 2021-05-08 | End: 2021-05-15 | Stop reason: HOSPADM

## 2021-05-08 RX ORDER — BENZONATATE 100 MG/1
100 CAPSULE ORAL EVERY 8 HOURS
Status: DISCONTINUED | OUTPATIENT
Start: 2021-05-08 | End: 2021-05-15 | Stop reason: HOSPADM

## 2021-05-08 RX ORDER — ALBUTEROL SULFATE 90 UG/1
2 AEROSOL, METERED RESPIRATORY (INHALATION) EVERY 4 HOURS PRN
Status: DISCONTINUED | OUTPATIENT
Start: 2021-05-08 | End: 2021-05-09

## 2021-05-08 RX ADMIN — SODIUM CHLORIDE, SODIUM GLUCONATE, SODIUM ACETATE, POTASSIUM CHLORIDE, MAGNESIUM CHLORIDE, SODIUM PHOSPHATE, DIBASIC, AND POTASSIUM PHOSPHATE 1000 ML: .53; .5; .37; .037; .03; .012; .00082 INJECTION, SOLUTION INTRAVENOUS at 13:08

## 2021-05-08 RX ADMIN — OXYCODONE HYDROCHLORIDE AND ACETAMINOPHEN 1000 MG: 500 TABLET ORAL at 20:09

## 2021-05-08 RX ADMIN — ATORVASTATIN CALCIUM 40 MG: 40 TABLET, FILM COATED ORAL at 16:17

## 2021-05-08 RX ADMIN — Medication 2000 UNITS: at 16:14

## 2021-05-08 RX ADMIN — CEFTRIAXONE 1000 MG: 1 INJECTION, SOLUTION INTRAVENOUS at 16:14

## 2021-05-08 RX ADMIN — FAMOTIDINE 20 MG: 20 TABLET ORAL at 16:17

## 2021-05-08 RX ADMIN — ENOXAPARIN SODIUM 30 MG: 30 INJECTION SUBCUTANEOUS at 20:09

## 2021-05-08 RX ADMIN — DEXAMETHASONE SODIUM PHOSPHATE 6 MG: 4 INJECTION INTRA-ARTICULAR; INTRALESIONAL; INTRAMUSCULAR; INTRAVENOUS; SOFT TISSUE at 16:17

## 2021-05-08 RX ADMIN — BENZONATATE 100 MG: 100 CAPSULE ORAL at 16:14

## 2021-05-08 RX ADMIN — ZINC SULFATE 220 MG (50 MG) CAPSULE 220 MG: CAPSULE at 16:18

## 2021-05-08 RX ADMIN — SODIUM CHLORIDE, SODIUM LACTATE, POTASSIUM CHLORIDE, AND CALCIUM CHLORIDE 50 ML/HR: .6; .31; .03; .02 INJECTION, SOLUTION INTRAVENOUS at 18:29

## 2021-05-08 RX ADMIN — REMDESIVIR 200 MG: 100 INJECTION, POWDER, LYOPHILIZED, FOR SOLUTION INTRAVENOUS at 17:11

## 2021-05-08 RX ADMIN — DOXYCYCLINE 100 MG: 100 CAPSULE ORAL at 16:17

## 2021-05-08 NOTE — PLAN OF CARE
Problem: INFECTION - ADULT  Goal: Absence or prevention of progression during hospitalization  Description: INTERVENTIONS:  - Assess and monitor for signs and symptoms of infection  - Monitor lab/diagnostic results  - Monitor all insertion sites, i e  indwelling lines, tubes, and drains  - Monitor endotracheal if appropriate and nasal secretions for changes in amount and color  - [place] appropriate cooling/warming therapies per order  - Administer medications as ordered  - Instruct and encourage patient and family to use good hand hygiene technique  - Identify and instruct in appropriate isolation precautions for identified infection/condition  Outcome: Progressing     Problem: SAFETY ADULT  Goal: Patient will remain free of falls  Description: INTERVENTIONS:  - Assess patient frequently for physical needs  -  Identify cognitive and physical deficits and behaviors that affect risk of falls    -  [place] fall precautions as indicated by assessment   - Educate patient/family on patient safety including physical limitations  - Instruct patient to call for assistance with activity based on assessment  - Modify environment to reduce risk of injury  - Consider OT/PT consult to assist with strengthening/mobility  Outcome: Progressing  Goal: Maintain or return to baseline ADL function  Description: INTERVENTIONS:  -  Assess patient's ability to carry out ADLs; assess patient's baseline for ADL function and identify physical deficits which impact ability to perform ADLs (bathing, care of mouth/teeth, toileting, grooming, dressing, etc )  - Assess/evaluate cause of self-care deficits   - Assess range of motion  - Assess patient's mobility; develop plan if impaired  - Assess patient's need for assistive devices and provide as appropriate  - Encourage maximum independence but intervene and supervise when necessary  - Involve family in performance of ADLs  - Assess for home care needs following discharge   - Consider OT consult to assist with ADL evaluation and planning for discharge  - Provide patient education as appropriate  Outcome: Progressing  Goal: Maintain or return mobility status to optimal level  Description: INTERVENTIONS:  - Assess patient's baseline mobility status (ambulation, transfers, stairs, etc )    - Identify cognitive and physical deficits and behaviors that affect mobility  - Identify mobility aids required to assist with transfers and/or ambulation (gait belt, sit-to-stand, lift, walker, cane, etc )  - [place] fall precautions as indicated by assessment  - Record patient progress and toleration of activity level on Mobility SBAR; progress patient to next Phase/Stage  - Instruct patient to call for assistance with activity based on assessment  - Consider rehabilitation consult to assist with strengthening/weightbearing, etc   Outcome: Progressing     Problem: DISCHARGE PLANNING  Goal: Discharge to home or other facility with appropriate resources  Description: INTERVENTIONS:  - Identify barriers to discharge w/patient and caregiver  - Arrange for needed discharge resources and transportation as appropriate  - Identify discharge learning needs (meds, wound care, etc )  - Arrange for interpretive services to assist at discharge as needed  - Refer to Case Management Department for coordinating discharge planning if the patient needs post-hospital services based on physician/advanced practitioner order or complex needs related to functional status, cognitive ability, or social support system  Outcome: Progressing     Problem: RESPIRATORY - ADULT  Goal: Achieves optimal ventilation and oxygenation  Description: INTERVENTIONS:  - Assess for changes in respiratory status  - Assess for changes in mentation and behavior  - Position to facilitate oxygenation and minimize respiratory effort  - Oxygen administered by appropriate delivery if ordered  - Initiate smoking cessation education as indicated  - Encourage broncho-pulmonary hygiene including cough, deep breathe, Incentive Spirometry  - Assess the need for suctioning and aspirate as needed  - Assess and instruct to report SOB or any respiratory difficulty  - Respiratory Therapy support as indicated  Outcome: Progressing

## 2021-05-08 NOTE — ED PROVIDER NOTES
History  Chief Complaint   Patient presents with    Flu Symptoms     pt presents to ed for bodyaches, fever, cough, nausea and vomiting, states " everything hurts"     This is a 66-year-old female patient presents with "flu-like symptoms" body aches, subjective fever, course productive cough  States that going on approximately 2 weeks  She has had several bouts of vomiting but they sound more post-tussive vomiting  Was seen here 3 days ago and refused all testing  Patient states she is unaware if she has been around anybody with COVID she has never been tested for COVID and she does not have her COVID vaccine  Denies any headache blurred vision double vision does have productive cough without hemoptysis no sore throat no nasal congestion  Denies any shortness of breath became in 87% on room air but not tachypneic or tachycardic  Patient was placed on nasal cannula came to 90% Vapotherm was ordered  No abdominal pain no diarrhea no urinary symptoms  Nothing makes it better or worse she has tried nothing over-the-counter  She was given prescriptions a  her last visit which she did not take  Differential diagnosis includes not limited to COVID-19, pneumonia, viral syndrome besides having a low oxygen sats she is in no acute distress          Prior to Admission Medications   Prescriptions Last Dose Informant Patient Reported? Taking?    SYMBICORT 160-4 5 MCG/ACT inhaler   No No   Sig: Inhale 2 puffs 2 (two) times a day   Vitamins A & D (VITAMIN A & D) ointment   No No   Sig: Apply topically as needed for dry skin (to face)   atorvastatin (LIPITOR) 40 mg tablet   No No   Sig: Take 1 tablet (40 mg total) by mouth daily   benzonatate (TESSALON PERLES) 100 mg capsule   No No   Sig: Take 1 capsule (100 mg total) by mouth every 8 (eight) hours   cetirizine (ZyrTEC) 10 mg tablet   No No   Sig: Take 1 tablet (10 mg total) by mouth daily   ferrous sulfate 324 (65 Fe) mg   No No   Sig: Take 1 tablet (324 mg total) by mouth 2 (two) times a day before meals   fluticasone (FLONASE) 50 mcg/act nasal spray   No No   Si spray into each nostril daily   fluticasone-salmeterol (ADVAIR, WIXELA) 250-50 mcg/dose inhaler   No No   Sig: Inhale 1 puff 2 (two) times a day Rinse mouth after use    hydrOXYzine HCL (ATARAX) 25 mg tablet   No No   Sig: Take 1 tablet (25 mg total) by mouth 2 (two) times a day   ibuprofen (MOTRIN) 600 mg tablet   No No   Sig: Take 1 tablet (600 mg total) by mouth every 6 (six) hours as needed for mild pain or moderate pain   ketotifen (ZADITOR) 0 025 % ophthalmic solution   No No   Sig: Administer 1 drop to both eyes 2 (two) times a day for 10 days   meclizine (ANTIVERT) 12 5 MG tablet   No No   Sig: Take 1 tablet (12 5 mg total) by mouth every 8 (eight) hours as needed for dizziness   montelukast (SINGULAIR) 10 mg tablet   No No   Sig: Take 1 tablet (10 mg total) by mouth daily   nystatin (MYCOSTATIN) cream   No No   Sig: Apply topically 2 (two) times a day   Patient not taking: Reported on 5/3/2021   triamcinolone (KENALOG) 0 1 % cream   No No   Sig: Apply topically 2 (two) times a day   Patient not taking: Reported on 5/3/2021   valACYclovir (VALTREX) 1,000 mg tablet   No No   Sig: Take 1 tablet (1,000 mg total) by mouth 3 (three) times a day for 7 days   Patient not taking: Reported on 5/3/2021      Facility-Administered Medications: None       Past Medical History:   Diagnosis Date    Anxiety     Asthma     Back pain     Sarcoidosis     Vertigo        Past Surgical History:   Procedure Laterality Date    MAMMO (HISTORICAL)  2018       Family History   Problem Relation Age of Onset    Hypertension Mother     Mental illness Mother     Asthma Mother      I have reviewed and agree with the history as documented      E-Cigarette/Vaping    E-Cigarette Use Never User      E-Cigarette/Vaping Substances    Nicotine No     THC No     CBD No     Flavoring No     Other No     Unknown No      Social History     Tobacco Use    Smoking status: Never Smoker    Smokeless tobacco: Never Used   Substance Use Topics    Alcohol use: Not Currently     Comment: social    Drug use: Not Currently     Types: Marijuana       Review of Systems   Constitutional: Positive for chills and fever  Negative for fatigue  HENT: Negative for congestion and hearing loss  Eyes: Negative for photophobia and pain  Respiratory: Positive for cough  Negative for choking, chest tightness, shortness of breath, wheezing and stridor  Cardiovascular: Negative for chest pain and leg swelling  Gastrointestinal: Positive for nausea  Negative for abdominal pain and diarrhea  Endocrine: Negative for polydipsia and polyphagia  Genitourinary: Negative for dysuria and frequency  Musculoskeletal: Positive for arthralgias  Negative for gait problem  Skin: Negative for pallor and rash  Allergic/Immunologic: Negative for environmental allergies and food allergies  Neurological: Negative for dizziness and headaches  Psychiatric/Behavioral: Negative for agitation and confusion  Physical Exam  Physical Exam  Vitals signs and nursing note reviewed  Constitutional:       General: She is not in acute distress  Appearance: She is well-developed  She is not ill-appearing, toxic-appearing or diaphoretic  HENT:      Head: Normocephalic and atraumatic  Right Ear: Tympanic membrane, ear canal and external ear normal       Left Ear: Tympanic membrane, ear canal and external ear normal       Nose: Nose normal       Mouth/Throat:      Mouth: Mucous membranes are moist       Pharynx: Oropharynx is clear  No oropharyngeal exudate  Eyes:      Conjunctiva/sclera: Conjunctivae normal       Pupils: Pupils are equal, round, and reactive to light  Neck:      Musculoskeletal: Normal range of motion and neck supple  Cardiovascular:      Rate and Rhythm: Normal rate and regular rhythm     Pulmonary:      Effort: Pulmonary effort is normal       Breath sounds: Normal breath sounds  Abdominal:      General: Bowel sounds are normal       Palpations: Abdomen is soft  Tenderness: There is no abdominal tenderness  Musculoskeletal: Normal range of motion  Right lower leg: No edema  Left lower leg: No edema  Skin:     General: Skin is warm  Capillary Refill: Capillary refill takes less than 2 seconds  Neurological:      General: No focal deficit present  Mental Status: She is alert and oriented to person, place, and time  Mental status is at baseline  Psychiatric:         Behavior: Behavior normal          Vital Signs  ED Triage Vitals [05/08/21 1212]   Temperature Pulse Respirations Blood Pressure SpO2   98 5 °F (36 9 °C) 90 17 139/90 (!) 87 %      Temp Source Heart Rate Source Patient Position - Orthostatic VS BP Location FiO2 (%)   Oral Monitor -- Right arm --      Pain Score       No Pain           Vitals:    05/08/21 1212   BP: 139/90   Pulse: 90         Visual Acuity      ED Medications  Medications   multi-electrolyte (ISOLYTE-S PH 7 4) bolus 1,000 mL (1,000 mL Intravenous New Bag 5/8/21 1308)       Diagnostic Studies  Results Reviewed     Procedure Component Value Units Date/Time    Novel Coronavirus (Covid-19),PCR [facility] - 2 hour stat [[id number]]  (Abnormal) Collected: 05/08/21 1246    Lab Status: Final result Specimen: Nares from Nose Updated: 05/08/21 1347     SARS-CoV-2 Positive    Narrative: The specimen collection materials, transport medium, and/or testing methodology utilized in the production of these test results have been proven to be reliable in a limited validation with an abbreviated program under the Emergency Utilization Authorization provided by the FDA  Testing reported as "Presumptive positive" will be confirmed with secondary testing to ensure result accuracy    Clinical caution and judgement should be used with the interpretation of these results with consideration of the clinical impression and other laboratory testing  Testing reported as "Positive" or "Negative" has been proven to be accurate according to standard laboratory validation requirements  All testing is performed with control materials showing appropriate reactivity at standard intervals        Influenza A/B and RSV PCR - 2 Hour Stat [[id number]]  (Normal) Collected: 05/08/21 1247    Lab Status: Final result Specimen: Nares from Nasopharyngeal Swab Updated: 05/08/21 1339     INFLUENZA A PCR None Detected     INFLUENZA B PCR None Detected     RSV PCR None Detected    CKMB [[id number]]  (Abnormal) Collected: 05/08/21 1234    Lab Status: Final result Specimen: Blood from Arm, Right Updated: 05/08/21 1324     CK-MB Index <1 0 %      CK-MB 7 5 ng/mL     Calcium, ionized [[id number]]  (Normal) Collected: 05/08/21 1234    Lab Status: Final result Specimen: Blood from Arm, Right Updated: 05/08/21 1308     Calcium, Ionized 1 21 mmol/L     B-Type Natriuretic Peptide ([address]) [[id number]]  (Normal) Collected: 05/08/21 1234    Lab Status: Final result Specimen: Blood from Arm, Right Updated: 05/08/21 1306     BNP 21 9 pg/mL     Troponin I [[id number]]  (Normal) Collected: 05/08/21 1234    Lab Status: Final result Specimen: Blood from Arm, Right Updated: 05/08/21 1303     Troponin I <0 03 ng/mL     Comprehensive metabolic panel [[id number]]  (Abnormal) Collected: 05/08/21 1234    Lab Status: Final result Specimen: Blood from Arm, Right Updated: 05/08/21 1303     Sodium 141 mmol/L      Potassium 5 0 mmol/L      Chloride 104 mmol/L      CO2 27 mmol/L      ANION GAP 10 mmol/L      BUN 52 mg/dL      Creatinine 1 53 mg/dL      Glucose 111 mg/dL      Calcium 10 0 mg/dL      AST 75 U/L      ALT 57 U/L      Alkaline Phosphatase 58 3 U/L      Total Protein 8 0 g/dL      Albumin 3 9 g/dL      Total Bilirubin 0 45 mg/dL      eGFR 44 ml/min/1 73sq m     Narrative:      [place] guidelines for Chronic Kidney Disease (CKD):   Stage 1 with normal or high GFR (GFR > 90 mL/min/1 73 square meters)    Stage 2 Mild CKD (GFR = 60-89 mL/min/1 73 square meters)    Stage 3A Moderate CKD (GFR = 45-59 mL/min/1 73 square meters)    Stage 3B Moderate CKD (GFR = 30-44 mL/min/1 73 square meters)    Stage 4 Severe CKD (GFR = 15-29 mL/min/1 73 square meters)    Stage 5 End Stage CKD (GFR <15 mL/min/1 73 square meters)  Note: GFR calculation is accurate only with a steady state creatinine    Lactic acid, plasma [[id number]]  (Normal) Collected: 05/08/21 1234    Lab Status: Final result Specimen: Blood from Arm, Right Updated: 05/08/21 1303     LACTIC ACID 1 0 mmol/L     Narrative:      Result may be elevated if tourniquet was used during collection      Magnesium [[id number]]  (Abnormal) Collected: 05/08/21 1234    Lab Status: Final result Specimen: Blood from Arm, Right Updated: 05/08/21 1303     Magnesium 3 2 mg/dL     C-reactive protein [[id number]]  (Abnormal) Collected: 05/08/21 1234    Lab Status: Final result Specimen: Blood from Arm, Right Updated: 05/08/21 1303     CRP 11 5 mg/L     Triglycerides [[id number]]  (Abnormal) Collected: 05/08/21 1234    Lab Status: Final result Specimen: Blood from Arm, Right Updated: 05/08/21 1303     Triglycerides 196 9 mg/dL     CK (with reflex to MB) [[id number]]  (Abnormal) Collected: 05/08/21 1234    Lab Status: Final result Specimen: Blood from Arm, Right Updated: 05/08/21 1303     Total  0 U/L     Protime-INR [[id number]]  (Normal) Collected: 05/08/21 1234    Lab Status: Final result Specimen: Blood from Arm, Right Updated: 05/08/21 1259     Protime 10 3 seconds      INR 0 91    Narrative:      INR Reference Ranges:  No Anticoagulant, Normal:           0 9-1 1  Standard Dose, Oral Anticoagulant:  2 0-3 0  High Dose, Oral Anticoagulant:      2 5-3 5    D-dimer, quantitative [[id number]]  (Abnormal) Collected: 05/08/21 1234    Lab Status: Final result Specimen: Blood from Arm, Right Updated: 05/08/21 1258     D-Dimer, Quant  1 00 mg/L FEU     Blood culture #2 [[id number]] Collected: 05/08/21 1246    Lab Status: In process Specimen: Blood from Arm, Left Updated: 05/08/21 1256    Procalcitonin with AM Reflex [[id number]] Collected: 05/08/21 1234    Lab Status: In process Specimen: Blood from Arm, Right Updated: 05/08/21 1253    Glucose 6 phosphate dehydrogenase [[id number]] Collected: 05/08/21 1234    Lab Status: In process Specimen: Blood from Arm, Right Updated: 05/08/21 1253    Blood culture #1 [[id number]] Collected: 05/08/21 1234    Lab Status: In process Specimen: Blood from Arm, Right Updated: 05/08/21 1246    CBC and differential [[id number]]  (Normal) Collected: 05/08/21 1234    Lab Status: Preliminary result Specimen: Blood from Arm, Right Updated: 05/08/21 1245     WBC 8 77 Thousand/uL      RBC 4 66 Million/uL      Hemoglobin 13 5 g/dL      Hematocrit 42 4 %      MCV 91 fL      MCH 29 0 pg      MCHC 31 8 g/dL      RDW 14 8 %      MPV 9 8 fL      Platelets 599 Thousands/uL     Ferritin [[id number]] Collected: 05/08/21 1234    Lab Status: In process Specimen: Blood from Arm, Right Updated: 05/08/21 1241                 XR chest 1 view portable   ED Interpretation by Antionette Lenz PA-C (05/08 1304)   Bilateral infiltrates consistent with COVID-19                 Procedures  Procedures         ED Course                             SBIRT 20yo+      Most Recent Value   SBIRT (25 yo +)   In order to provide better care to our patients, we are screening all of our patients for alcohol and drug use  Would it be okay to ask you these screening questions? Yes Filed at: 05/08/2021 1250   Initial Alcohol Screen: US AUDIT-C    1  How often do you have a drink containing alcohol?  0 Filed at: 05/08/2021 1250   2  How many drinks containing alcohol do you have on a typical day you are drinking? 0 Filed at: 05/08/2021 1250   3a  Male UNDER 65:  How often do you have five or more drinks on one occasion?  0 Filed at: 05/08/2021 1250   3b  FEMALE Any Age, or MALE 65+: How often do you have 4 or more drinks on one occassion? 0 Filed at: 05/08/2021 1250   Audit-C Score  0 Filed at: 05/08/2021 1250   SON: How many times in the past year have you    Used an illegal drug or used a prescription medication for non-medical reasons? Never Filed at: 05/08/2021 1250                    MDM    Disposition  Final diagnoses:   COVID-19   Hypoxia     Time reflects when diagnosis was documented in both MDM as applicable and the Disposition within this note     Time User Action Codes Description Comment    5/8/2021  1:58 PM Abrahan Son [U07 1] COVID-19     5/8/2021  1:58 PM Adelaida [address] [place] [address] [R09 02] Hypoxia       ED Disposition     ED Disposition Condition Date/Time Comment    Admit Stable Sat May 8, 2021  1:58 PM Case was discussed with Dr Erin Mensah and the patient's admission status was agreed to be Admission Status: inpatient status to the service of Dr Erin Mensah   Follow-up Information    None         Patient's Medications   Discharge Prescriptions    No medications on file     No discharge procedures on file      PDMP Review     None          ED Provider  Electronically Signed by           Pedro Pablo Mills PA-C  05/08/21 4277

## 2021-05-08 NOTE — ASSESSMENT & PLAN NOTE
· Presented with URI symptoms for 5-7 days  · She visited ED on 05/03/2021 with similar symptoms  At that time she refused to get COVID-19 test    · COVID-19 test positive on 05/08/2021  · CBC unremarkable  CMP revealed elevated BUN and creatinine, mild transaminitis    · Troponin negative, BNP negative, CK-MB 7 5, CRP 11 5, D-dimer 1  · Chest x-ray consistent with COVID-19 pneumonia  · Currently O2 sat 91-93% on 6 L of oxygen via nasal cannula  Plan:  · Moderate COVID-19 protocol pathway  · Initiated remdesivir and dexamethasone  · Will give ceftriaxone and doxycycline, consider to discontinue if procalcitonin negative x2  · Incentive spirometry  · Supportive measures, encourage self proning, encourage mobilizations  · Will trend inflammatory markers  · Oxygen supplementation to maintain O2 sat more than 90%

## 2021-05-08 NOTE — ASSESSMENT & PLAN NOTE
· Oxygen saturation 87% on room air on admission  · Currently on O2 sat 91-93% on 6 L of oxygen via nasal cannula  · Oxygen supplementation to maintain O2 sat more than 90%  · Wean oxygen as tolerated

## 2021-05-08 NOTE — H&P
Sherine GARSIA  66   H&P- Ty Spencer 1965, 54 y o  female MRN: [id number]  Unit/Bed#: -01 Encounter: [id number]  Primary Care Provider: FRED Rincon   Date and time admitted to hospital: 5/8/2021 12:08 PM    * Acute respiratory failure with hypoxia [facility])  Assessment & Plan  · Oxygen saturation 87% on room air on admission  · Currently on O2 sat 91-93% on 6 L of oxygen via nasal cannula  · Oxygen supplementation to maintain O2 sat more than 90%  · Wean oxygen as tolerated    Pneumonia due to COVID-19 virus  Assessment & Plan  · Presented with URI symptoms for 5-7 days  · She visited ED on 05/03/2021 with similar symptoms  At that time she refused to get COVID-19 test    · COVID-19 test positive on 05/08/2021  · CBC unremarkable  CMP revealed elevated BUN and creatinine, mild transaminitis  · Troponin negative, BNP negative, CK-MB 7 5, CRP 11 5, D-dimer 1  · Chest x-ray consistent with COVID-19 pneumonia  · Currently O2 sat 91-93% on 6 L of oxygen via nasal cannula  Plan:  · Moderate COVID-19 protocol pathway  · Initiated remdesivir and dexamethasone  · Will give ceftriaxone and doxycycline, consider to discontinue if procalcitonin negative x2  · Incentive spirometry  · Supportive measures, encourage self proning, encourage mobilizations  · Will trend inflammatory markers  · Oxygen supplementation to maintain O2 sat more than 90%    Sarcoidosis  Assessment & Plan  · Stable  Not on any medications  · She is not compliant with pulmonology follow-up  · Currently on steroids given COVID-19 pneumonia    Hypercholesterolemia  Assessment & Plan  · Resume home medication atorvastatin given COVID-19 protocol  She reports she is not taking it  · Follow-up with lipid panel    Moderate persistent asthma without complication  Assessment & Plan  · Apparently she is on albuterol and Symbicort at home  · Not on asthma exacerbation     · Will give dexamethasone given COVID-19 pneumonia  · Continue albuterol as needed      VTE Prophylaxis: Enoxaparin (Lovenox)  / sequential compression device   Code Status: Full code  POLST: POLST form is not discussed and not completed at this time  Discussion with family: Discussed with the patient  She is agreeable to it  Anticipated Length of Stay:  Patient will be admitted on an Inpatient basis with an anticipated length of stay of  > 2 midnights  Justification for Hospital Stay:  Acute hypoxic respiratory failure secondary to COVID-19 pneumonia    Total Time for Visit, including Counseling / Coordination of Care: 45 minutes  Greater than 50% of this total time spent on direct patient counseling and coordination of care  Chief Complaint:   URI symptoms    History of Present Illness:    Yamini Rawls is a 54 y o  female with past medical history significant for asthma and sarcoidosis who presents with shortness of breath, chest discomfort with cough, subjective fever, chills, nausea, vomiting, myalgia, fatigue and loss of appetite for 5-7 days  She visited ED on 05/03/2021 with similar symptoms, she declined COVID-19 test at that time  She reports her symptoms are not improving  She takes only two inhalers at home, denies taking any medications  She lives alone and works at [employer], and she is unaware of contact with COVID-19 patients  She did not get COVID-19 vaccination  She denies any chest pain, palpitation, lightheadedness, dizziness, loss of taste, loss of smell, syncope or presyncopal episodes, dysuria, urgency, diarrhea or constipation  In the ED, she is afebrile, initial vital signs are stable except her O2 sat 87% on room air  She was put on midflow at 6 L and O2 sat 97%  CBC unremarkable, CMP consistent with elevated BUN, creatinine, mild transaminitis  Troponin and BNP are negative  CK-MB 7 5, CRP 11 5, D-dimer 1  Chest x-ray showed bilateral patchy opacities consistent with COVID-19 pneumonia    She was given 1 L IV bolus in ED  She was admitted to the hospital for further management of acute hypoxic respiratory failure in the setting of COVID-19 pneumonia  Review of Systems:    Review of Systems   Constitutional: Positive for activity change, appetite change, chills, fatigue and fever  Negative for diaphoresis and unexpected weight change  HENT: Negative for congestion, ear pain, rhinorrhea, sinus pressure, sinus pain and sore throat  Eyes: Negative for pain and visual disturbance  Respiratory: Positive for cough, chest tightness and shortness of breath  Negative for choking, wheezing and stridor  Cardiovascular: Negative for chest pain, palpitations and leg swelling  Gastrointestinal: Positive for nausea and vomiting  Negative for abdominal distention, abdominal pain, constipation and diarrhea  Genitourinary: Negative for difficulty urinating, dysuria, flank pain, frequency and hematuria  Musculoskeletal: Positive for myalgias  Negative for arthralgias and back pain  Skin: Negative for color change and rash  Neurological: Negative for dizziness, seizures, syncope, weakness, light-headedness and headaches  Psychiatric/Behavioral: Negative for sleep disturbance  The patient is not nervous/anxious  All other systems reviewed and are negative  Past Medical and Surgical History:     Past Medical History:   Diagnosis Date    Anxiety     Asthma     Back pain     Sarcoidosis     Vertigo        Past Surgical History:   Procedure Laterality Date    MAMMO (HISTORICAL)  05/02/2018       Meds/Allergies:    Prior to Admission medications    Medication Sig Start Date End Date Taking?  Authorizing Provider   atorvastatin (LIPITOR) 40 mg tablet Take 1 tablet (40 mg total) by mouth daily 6/2/20 5/3/21  FRED Garcia   benzonatate (TESSALON PERLES) 100 mg capsule Take 1 capsule (100 mg total) by mouth every 8 (eight) hours 5/3/21   Karthik Stern MD   cetirizine (ZyrTEC) 10 mg tablet Take 1 tablet (10 mg total) by mouth daily 6/2/20 5/3/21  Odella Sin, FRED   ferrous sulfate 324 (65 Fe) mg Take 1 tablet (324 mg total) by mouth 2 (two) times a day before meals 4/24/20 12/29/20  Odella Sin, CASENP   fluticasone [facility]) 50 mcg/act nasal spray 1 spray into each nostril daily 6/2/20 5/3/21  Odella SinFRED   fluticasone-salmeterol (ADVAIR, WIXELA) 250-50 mcg/dose inhaler Inhale 1 puff 2 (two) times a day Rinse mouth after use   6/2/20 5/3/21  Odella SinFRED   hydrOXYzine HCL (ATARAX) 25 mg tablet Take 1 tablet (25 mg total) by mouth 2 (two) times a day 12/29/20 5/3/21  Odella SinFRED   ibuprofen (MOTRIN) 600 mg tablet Take 1 tablet (600 mg total) by mouth every 6 (six) hours as needed for mild pain or moderate pain 5/3/21   Alannah De La O MD   ketotifen (ZADITOR) 0 025 % ophthalmic solution Administer 1 drop to both eyes 2 (two) times a day for 10 days 11/30/20 5/3/21  Odella SinFRED   meclizine (ANTIVERT) 12 5 MG tablet Take 1 tablet (12 5 mg total) by mouth every 8 (eight) hours as needed for dizziness 6/2/20 5/3/21  Odella SinFRED   montelukast (SINGULAIR) 10 mg tablet Take 1 tablet (10 mg total) by mouth daily 6/2/20 5/3/21  Odella SinFRED   nystatin (MYCOSTATIN) cream Apply topically 2 (two) times a day  Patient not taking: Reported on 5/3/2021 1/7/21   Odella SinFRED   SYMBICORT 160-4 5 MCG/ACT inhaler Inhale 2 puffs 2 (two) times a day 6/2/20 5/3/21  Odella Sin, CRBAILEY   triamcinolone (KENALOG) 0 1 % cream Apply topically 2 (two) times a day  Patient not taking: Reported on 5/3/2021 1/7/21   FRED Infante   valACYclovir (VALTREX) 1,000 mg tablet Take 1 tablet (1,000 mg total) by mouth 3 (three) times a day for 7 days  Patient not taking: Reported on 5/3/2021 12/31/20 1/7/21  Gold Whitaker PA-C   Vitamins A & D (VITAMIN A & D) ointment Apply topically as needed for dry skin (to face) 4/28/20 5/3/21  FRED Inafnte I have reviewed home medications with patient personally  Allergies: Allergies   Allergen Reactions    Tegretol [Carbamazepine] Rash       Social History:     Marital Status: Single   Occupation: Works at [employer]  Patient Pre-hospital Living Situation: Lives alone  Patient Pre-hospital Level of Mobility: Independent  Patient Pre-hospital Diet Restrictions: None  Substance Use History:   Social History     Substance and Sexual Activity   Alcohol Use Not Currently    Comment: social     Social History     Tobacco Use   Smoking Status Never Smoker   Smokeless Tobacco Never Used     Social History     Substance and Sexual Activity   Drug Use Not Currently    Types: Marijuana       Family History:    Family History   Problem Relation Age of Onset    Hypertension Mother     Mental illness Mother     Asthma Mother        Physical Exam:     Vitals:   Blood Pressure: (!) 86/59 (05/08/21 1448)  Pulse: 81 (05/08/21 1448)  Temperature: 98 5 °F (36 9 °C) (05/08/21 1448)  Temp Source: Oral (05/08/21 1448)  Respirations: 18 (05/08/21 1448)  SpO2: 91 % (05/08/21 1527)    Physical Exam  Vitals signs and nursing note reviewed  Constitutional:       General: She is not in acute distress  Appearance: Normal appearance  She is well-developed  She is not ill-appearing  HENT:      Head: Normocephalic and atraumatic  Eyes:      General: No scleral icterus  Conjunctiva/sclera: Conjunctivae normal    Neck:      Musculoskeletal: Neck supple  Cardiovascular:      Rate and Rhythm: Normal rate and regular rhythm  Heart sounds: No murmur  Pulmonary:      Effort: Pulmonary effort is normal  No respiratory distress  Breath sounds: No wheezing  Comments: Bilateral breath sound diminished  Coarse crackles present on right lower lung  Abdominal:      General: Bowel sounds are normal  There is no distension  Palpations: Abdomen is soft  Tenderness: There is no abdominal tenderness   There is no guarding  Musculoskeletal:      Right lower leg: No edema  Left lower leg: No edema  Skin:     General: Skin is warm and dry  Neurological:      General: No focal deficit present  Mental Status: She is alert  Psychiatric:         Mood and Affect: Mood normal          Behavior: Behavior normal          Additional Data:     Lab Results: I have personally reviewed pertinent reports  Results from last 7 days   Lab Units 05/08/21  1234   WBC Thousand/uL 8 77   HEMOGLOBIN g/dL 13 5   HEMATOCRIT % 42 4   PLATELETS Thousands/uL 351   BANDS PCT % 3   LYMPHO PCT % 12*   MONO PCT % 17*   EOS PCT % 0     Results from last 7 days   Lab Units 05/08/21  1234   SODIUM mmol/L 141   POTASSIUM mmol/L 5 0   CHLORIDE mmol/L 104   CO2 mmol/L 27   BUN mg/dL 52*   CREATININE mg/dL 1 53*   ANION GAP mmol/L 10   CALCIUM mg/dL 10 0   ALBUMIN g/dL 3 9   TOTAL BILIRUBIN mg/dL 0 45   ALK PHOS U/L 58 3   ALT U/L 57*   AST U/L 75*   GLUCOSE RANDOM mg/dL 111     Results from last 7 days   Lab Units 05/08/21  1234   INR  0 91             Results from last 7 days   Lab Units 05/08/21  1234   LACTIC ACID mmol/L 1 0       Imaging: I have personally reviewed pertinent reports  XR chest 1 view portable   ED Interpretation by Rickey Colin PA-C (05/08 1304)   Bilateral infiltrates consistent with COVID-19          EKG, Pathology, and Other Studies Reviewed on Admission:   · EKG: pending    Allscripts / Epic Records Reviewed: Yes     ** Please Note: This note has been constructed using a voice recognition system   **

## 2021-05-08 NOTE — ASSESSMENT & PLAN NOTE
· Resume home medication atorvastatin given COVID-19 protocol  She reports she is not taking it    · Follow-up with lipid panel

## 2021-05-08 NOTE — RESPIRATORY THERAPY NOTE
RT Protocol Note  Pola Pena 54 y o  female MRN: [id number]  Unit/Bed#: -01 Encounter: [id number]    Assessment    Principal Problem:    Acute respiratory failure with hypoxia [facility])  Active Problems:     Moderate persistent asthma without complication    Hypercholesterolemia    Sarcoidosis    Pneumonia due to COVID-19 virus      Home Pulmonary Medications:  Symbicort 160/4 5 mcg/act 2P BID  Advair 250/50 mcg/act 1P BID       Past Medical History:   Diagnosis Date    Anxiety     Asthma     Back pain     Sarcoidosis     Vertigo      Social History     Socioeconomic History    Marital status: Single     Spouse name: None    Number of children: None    Years of education: None    Highest education level: None   Occupational History    None   Social Needs    Financial resource strain: None    Food insecurity     Worry: None     Inability: None    Transportation needs     Medical: None     Non-medical: None   Tobacco Use    Smoking status: Never Smoker    Smokeless tobacco: Never Used   Substance and Sexual Activity    Alcohol use: Not Currently     Comment: social    Drug use: Not Currently     Types: Marijuana    Sexual activity: Not Currently   Lifestyle    Physical activity     Days per week: None     Minutes per session: None    Stress: None   Relationships    Social connections     Talks on phone: None     Gets together: None     Attends Jain service: None     Active member of club or organization: None     Attends meetings of clubs or organizations: None     Relationship status: None    Intimate partner violence     Fear of current or ex partner: None     Emotionally abused: None     Physically abused: None     Forced sexual activity: None   Other Topics Concern    None   Social History Narrative    · Most recent tobacco use screenin2019      · Do you currently or have you served in the [employer]:   No      · Were you activated, into active duty, as a member of the [employer] or as a Reservist:   No        Subjective         Objective    Physical Exam:   Assessment Type: Assess only  General Appearance: Alert, Awake  Respiratory Pattern: Dyspnea at rest  Chest Assessment: Chest expansion symmetrical  Bilateral Breath Sounds: Diminished    Vitals:  Blood pressure (!) 86/59, pulse 81, temperature 98 5 °F (36 9 °C), temperature source Oral, resp  rate 18, SpO2 91 %  Imaging and other studies: I have personally reviewed pertinent reports  Plan    Respiratory Plan: Mild Distress pathway        Resp Comments: Patient assessed per Respiratory Protocol  Patient was admitted due to Acute Respiratory Failure with Hypoxia  She has a history of Sarcoidosis and is Covid positive  Home medications reviewed with patient  Patient is currently on [address] at 6L with an oxygen saturation of 93%  Current lung sounds are diminished with no signs of distress  MDI order by physician and Respiratory to follow

## 2021-05-08 NOTE — ASSESSMENT & PLAN NOTE
· Stable  Not on any medications  · She is not compliant with pulmonology follow-up    · Currently on steroids given COVID-19 pneumonia

## 2021-05-08 NOTE — ASSESSMENT & PLAN NOTE
· Apparently she is on albuterol and Symbicort at home  · Not on asthma exacerbation     · Will give dexamethasone given COVID-19 pneumonia  · Continue albuterol as needed

## 2021-05-09 PROBLEM — N17.9 AKI (ACUTE KIDNEY INJURY) (HCC): Status: ACTIVE | Noted: 2021-05-09

## 2021-05-09 LAB
ALBUMIN SERPL BCP-MCNC: 3.5 G/DL (ref 3.4–4.8)
ALP SERPL-CCNC: 51.3 U/L (ref 35–140)
ALT SERPL W P-5'-P-CCNC: 44 U/L (ref 5–54)
ANION GAP SERPL CALCULATED.3IONS-SCNC: 11 MMOL/L (ref 4–13)
AST SERPL W P-5'-P-CCNC: 55 U/L (ref 15–41)
ATRIAL RATE: 85 BPM
BILIRUB SERPL-MCNC: 0.31 MG/DL (ref 0.3–1.2)
BUN SERPL-MCNC: 59 MG/DL (ref 6–20)
CALCIUM SERPL-MCNC: 9.4 MG/DL (ref 8.4–10.2)
CHLORIDE SERPL-SCNC: 105 MMOL/L (ref 96–108)
CHOLEST SERPL-MCNC: 175 MG/DL
CO2 SERPL-SCNC: 24 MMOL/L (ref 22–33)
CREAT SERPL-MCNC: 1.25 MG/DL (ref 0.4–1.1)
CRP SERPL QL: 8.8 MG/L (ref 0–1)
D DIMER PPP FEU-MCNC: 0.73 MG/L FEU (ref 0.19–0.49)
ERYTHROCYTE [DISTWIDTH] IN BLOOD BY AUTOMATED COUNT: 14.8 % (ref 11.6–15.1)
FERRITIN SERPL-MCNC: 1842 NG/ML (ref 8–388)
GFR SERPL CREATININE-BSD FRML MDRD: 56 ML/MIN/1.73SQ M
GLUCOSE SERPL-MCNC: 119 MG/DL (ref 65–140)
HBV CORE AB SER QL: NORMAL
HBV CORE IGM SER QL: NORMAL
HBV SURFACE AG SER QL: NORMAL
HCT VFR BLD AUTO: 37.7 % (ref 34.8–46.1)
HCV AB SER QL: NORMAL
HDLC SERPL-MCNC: 42 MG/DL
HGB BLD-MCNC: 12 G/DL (ref 11.5–15.4)
LDLC SERPL CALC-MCNC: 105 MG/DL (ref 0–100)
MCH RBC QN AUTO: 29.2 PG (ref 26.8–34.3)
MCHC RBC AUTO-ENTMCNC: 31.8 G/DL (ref 31.4–37.4)
MCV RBC AUTO: 92 FL (ref 82–98)
NONHDLC SERPL-MCNC: 133 MG/DL
P AXIS: 48 DEGREES
PLATELET # BLD AUTO: 376 THOUSANDS/UL (ref 149–390)
PMV BLD AUTO: 10.4 FL (ref 8.9–12.7)
POTASSIUM SERPL-SCNC: 4.8 MMOL/L (ref 3.5–5)
PR INTERVAL: 155 MS
PROCALCITONIN SERPL-MCNC: <0.05 NG/ML
PROT SERPL-MCNC: 6.9 G/DL (ref 6.4–8.3)
QRS AXIS: 19 DEGREES
QRSD INTERVAL: 74 MS
QT INTERVAL: 357 MS
QTC INTERVAL: 425 MS
RBC # BLD AUTO: 4.11 MILLION/UL (ref 3.81–5.12)
SODIUM SERPL-SCNC: 140 MMOL/L (ref 133–145)
T WAVE AXIS: 29 DEGREES
TRIGL SERPL-MCNC: 142 MG/DL
VENTRICULAR RATE: 85 BPM
WBC # BLD AUTO: 5.69 THOUSAND/UL (ref 4.31–10.16)

## 2021-05-09 PROCEDURE — 99232 SBSQ HOSP IP/OBS MODERATE 35: CPT | Performed by: INTERNAL MEDICINE

## 2021-05-09 PROCEDURE — 87340 HEPATITIS B SURFACE AG IA: CPT | Performed by: INTERNAL MEDICINE

## 2021-05-09 PROCEDURE — 94760 N-INVAS EAR/PLS OXIMETRY 1: CPT

## 2021-05-09 PROCEDURE — 85027 COMPLETE CBC AUTOMATED: CPT | Performed by: INTERNAL MEDICINE

## 2021-05-09 PROCEDURE — 86140 C-REACTIVE PROTEIN: CPT | Performed by: INTERNAL MEDICINE

## 2021-05-09 PROCEDURE — 94003 VENT MGMT INPAT SUBQ DAY: CPT

## 2021-05-09 PROCEDURE — 86705 HEP B CORE ANTIBODY IGM: CPT | Performed by: INTERNAL MEDICINE

## 2021-05-09 PROCEDURE — 80053 COMPREHEN METABOLIC PANEL: CPT | Performed by: INTERNAL MEDICINE

## 2021-05-09 PROCEDURE — 94640 AIRWAY INHALATION TREATMENT: CPT

## 2021-05-09 PROCEDURE — 93010 ELECTROCARDIOGRAM REPORT: CPT | Performed by: INTERNAL MEDICINE

## 2021-05-09 PROCEDURE — 86704 HEP B CORE ANTIBODY TOTAL: CPT | Performed by: INTERNAL MEDICINE

## 2021-05-09 PROCEDURE — 83520 IMMUNOASSAY QUANT NOS NONAB: CPT | Performed by: INTERNAL MEDICINE

## 2021-05-09 PROCEDURE — 84145 PROCALCITONIN (PCT): CPT | Performed by: PHYSICIAN ASSISTANT

## 2021-05-09 PROCEDURE — 80061 LIPID PANEL: CPT | Performed by: INTERNAL MEDICINE

## 2021-05-09 PROCEDURE — 86803 HEPATITIS C AB TEST: CPT | Performed by: INTERNAL MEDICINE

## 2021-05-09 PROCEDURE — 82728 ASSAY OF FERRITIN: CPT | Performed by: INTERNAL MEDICINE

## 2021-05-09 PROCEDURE — 87389 HIV-1 AG W/HIV-1&-2 AB AG IA: CPT | Performed by: INTERNAL MEDICINE

## 2021-05-09 PROCEDURE — 85379 FIBRIN DEGRADATION QUANT: CPT | Performed by: INTERNAL MEDICINE

## 2021-05-09 RX ORDER — ALBUTEROL SULFATE 90 UG/1
2 AEROSOL, METERED RESPIRATORY (INHALATION) 4 TIMES DAILY
Status: DISCONTINUED | OUTPATIENT
Start: 2021-05-09 | End: 2021-05-09

## 2021-05-09 RX ORDER — SODIUM CHLORIDE 9 MG/ML
100 INJECTION, SOLUTION INTRAVENOUS CONTINUOUS
Status: DISCONTINUED | OUTPATIENT
Start: 2021-05-09 | End: 2021-05-10

## 2021-05-09 RX ORDER — BUDESONIDE AND FORMOTEROL FUMARATE DIHYDRATE 160; 4.5 UG/1; UG/1
2 AEROSOL RESPIRATORY (INHALATION) 2 TIMES DAILY
Status: DISCONTINUED | OUTPATIENT
Start: 2021-05-09 | End: 2021-05-09

## 2021-05-09 RX ORDER — ALBUTEROL SULFATE 90 UG/1
2 AEROSOL, METERED RESPIRATORY (INHALATION)
Status: DISCONTINUED | OUTPATIENT
Start: 2021-05-09 | End: 2021-05-14

## 2021-05-09 RX ADMIN — SODIUM CHLORIDE 100 ML/HR: 0.9 INJECTION, SOLUTION INTRAVENOUS at 23:48

## 2021-05-09 RX ADMIN — CEFTRIAXONE 1000 MG: 1 INJECTION, SOLUTION INTRAVENOUS at 15:34

## 2021-05-09 RX ADMIN — ALBUTEROL SULFATE 2 PUFF: 90 AEROSOL, METERED RESPIRATORY (INHALATION) at 11:59

## 2021-05-09 RX ADMIN — SODIUM CHLORIDE 100 ML/HR: 0.9 INJECTION, SOLUTION INTRAVENOUS at 11:59

## 2021-05-09 RX ADMIN — FAMOTIDINE 20 MG: 20 TABLET ORAL at 09:16

## 2021-05-09 RX ADMIN — REMDESIVIR 100 MG: 100 INJECTION, POWDER, LYOPHILIZED, FOR SOLUTION INTRAVENOUS at 16:09

## 2021-05-09 RX ADMIN — BENZONATATE 100 MG: 100 CAPSULE ORAL at 15:34

## 2021-05-09 RX ADMIN — ENOXAPARIN SODIUM 30 MG: 30 INJECTION SUBCUTANEOUS at 09:17

## 2021-05-09 RX ADMIN — DOXYCYCLINE 100 MG: 100 CAPSULE ORAL at 09:18

## 2021-05-09 RX ADMIN — ZINC SULFATE 220 MG (50 MG) CAPSULE 220 MG: CAPSULE at 09:17

## 2021-05-09 RX ADMIN — DOXYCYCLINE 100 MG: 100 CAPSULE ORAL at 20:21

## 2021-05-09 RX ADMIN — BENZONATATE 100 MG: 100 CAPSULE ORAL at 04:36

## 2021-05-09 RX ADMIN — ALBUTEROL SULFATE 2 PUFF: 90 AEROSOL, METERED RESPIRATORY (INHALATION) at 20:44

## 2021-05-09 RX ADMIN — ALBUTEROL SULFATE 2 PUFF: 90 AEROSOL, METERED RESPIRATORY (INHALATION) at 16:58

## 2021-05-09 RX ADMIN — OXYCODONE HYDROCHLORIDE AND ACETAMINOPHEN 1000 MG: 500 TABLET ORAL at 09:16

## 2021-05-09 RX ADMIN — OXYCODONE HYDROCHLORIDE AND ACETAMINOPHEN 1000 MG: 500 TABLET ORAL at 20:21

## 2021-05-09 RX ADMIN — DEXAMETHASONE SODIUM PHOSPHATE 6 MG: 4 INJECTION INTRA-ARTICULAR; INTRALESIONAL; INTRAMUSCULAR; INTRAVENOUS; SOFT TISSUE at 15:35

## 2021-05-09 RX ADMIN — ATORVASTATIN CALCIUM 40 MG: 40 TABLET, FILM COATED ORAL at 09:16

## 2021-05-09 RX ADMIN — ENOXAPARIN SODIUM 30 MG: 30 INJECTION SUBCUTANEOUS at 20:21

## 2021-05-09 RX ADMIN — Medication 2000 UNITS: at 09:16

## 2021-05-09 NOTE — ASSESSMENT & PLAN NOTE
· Presented with URI symptoms for 5-7 days  · Visited ED on 05/03/2021 with similar symptoms at that time refused to get COVID-19 test  · COVID-19 test +ve 01/05/2021  · Labs unremarkable except presence of elevated BUN Cr and mild transaminitis possibly 2/2 COVID and decreased p o  Intake  · Inflammatory markers were mildly elevated  D-dimer 1   · CXR showing B/L ground-glass opacities  · Worsening hypoxia needing 10 L via NC mid flow overnight      · COVID-19 moderate pathway  · Continue ceftriaxone and doxycycline, consider to discontinue if procalcitonin x2-ve  · Continue remdesivir and steroids on day 2  · Incentive spirometry, self proning  · Trend inflammatory markers  · Continue oxygen supplementation to maintain O2 sat > 90  · Will increase IV fluid to 100 cc  · Start albuterol inhaler 4 times daily  · Follow-up HIV, [place] 6, chronic hepatitis panel, QuantiFERON  · Respiratory protocol  · Trend D-dimer

## 2021-05-09 NOTE — PROGRESS NOTES
Sherine U  66   Progress Note - Siobhan Quinn 1965, 54 y o  female MRN: [id number]  Unit/Bed#: -01 Encounter: [id number]  Primary Care Provider: FRED Weber   Date and time admitted to hospital: 5/8/2021 12:08 PM    * Acute respiratory failure with hypoxia [facility])  Assessment & Plan  · Presented with URI symptoms for 5-7 days  · Visited ED on 05/03/2021 with similar symptoms at that time refused to get COVID-19 test  · COVID-19 test +ve 01/05/2021  · Labs unremarkable except presence of elevated BUN Cr and mild transaminitis possibly 2/2 COVID and decreased p o  Intake  · Inflammatory markers were mildly elevated  D-dimer 1   · CXR showing B/L ground-glass opacities  · Worsening hypoxia needing 10 L via NC mid flow overnight  · COVID-19 moderate pathway  · Continue ceftriaxone and doxycycline, consider to discontinue if procalcitonin x2-ve  · Continue remdesivir and steroids on day 2  · Incentive spirometry, self proning  · Trend inflammatory markers  · Continue oxygen supplementation to maintain O2 sat > 90  · Will increase IV fluid to 100 cc  · Start albuterol inhaler 4 times daily  · Follow-up HIV, IL 6, chronic hepatitis panel, QuantiFERON  · Respiratory protocol  · Trend D-dimer    MYRTLE (acute kidney injury) ([facility] [address] )  Assessment & Plan  Cr 1 5 on admission  Unknown baseline  Improving Cr with IV fluid  Elevated Cr most likely 2/2 COVID-19 infection  Continue IV fluid 100 cc/hour    Pneumonia due to COVID-19 virus  Assessment & Plan  See plan for COVID-19 acute hypoxic respiratory failure  Sarcoidosis  Assessment & Plan  · Stable  Not on any medications  · She is not compliant with pulmonology follow-up  · Currently on steroids given COVID-19 pneumonia    Hypercholesterolemia  Assessment & Plan  · Lipid panel showing elevated LDL  · Not taking atorvastatin at home  · Continue statin as per COVID-19 protocol, will need to continue statin at home      Moderate persistent asthma without complication  Assessment & Plan  · Takes albuterol and Symbicort at home  · Lungs clear on examination with no wheezing  · Continue steroids as above along with albuterol inhaler scheduled        VTE Pharmacologic Prophylaxis:   Pharmacologic: Enoxaparin (Lovenox)  Mechanical VTE Prophylaxis in Place: Yes    Discussions with Specialists or Other Care Team Provider:  None    Education and Discussions with Family / Patient:  Yes    Current Length of Stay: 1 day(s)    Current Patient Status: Inpatient     Discharge Plan / Estimated Discharge Date:  Needs further stay due to hypoxic respiratory failure from COVID-19 and on mid flow oxygen    Code Status: Level 1 - Full Code      Subjective:   Patient was seen and examined by me at bedside  Communicated clearly  No particular overnight event reported  Hemodynamically stable and afebrile  Pt still having fatigue, myalgia  Overnight Pt needing 10 L mid flow oxygen  Objective:     Vitals:   Temp (24hrs), Av 3 °F (36 8 °C), Min:98 1 °F (36 7 °C), Max:98 5 °F (36 9 °C)    Temp:  [98 1 °F (36 7 °C)-98 5 °F (36 9 °C)] 98 1 °F (36 7 °C)  HR:  [77-82] 77  Resp:  [17-18] 17  BP: ()/(56-69) 86/56  SpO2:  [89 %-96 %] 89 %  Body mass index is 28 28 kg/m²  Input and Output Summary (last 24 hours):     No intake or output data in the 24 hours ending 21 1307    Physical Exam:     Physical Exam  Vitals signs reviewed  Constitutional:       General: She is in acute distress  Appearance: She is well-developed  She is ill-appearing and toxic-appearing  Cardiovascular:      Rate and Rhythm: Normal rate and regular rhythm  Pulses: Normal pulses  Heart sounds: Normal heart sounds  Pulmonary:      Effort: Respiratory distress present  Breath sounds: Normal breath sounds  No wheezing  Chest:      Chest wall: No tenderness  Abdominal:      General: Bowel sounds are normal  There is no distension  Palpations: Abdomen is soft  Tenderness: There is no abdominal tenderness  Musculoskeletal:      Right lower leg: No edema  Left lower leg: No edema  Skin:     General: Skin is warm  Coloration: Skin is not pale  Neurological:      General: No focal deficit present  Mental Status: She is alert and oriented to person, place, and time  Mental status is at baseline  Psychiatric:         Mood and Affect: Mood normal          Behavior: Behavior normal            Additional Data:     Labs:    Results from last 7 days   Lab Units 05/09/21  0454 05/08/21  1234   WBC Thousand/uL 5 69 8 77   HEMOGLOBIN g/dL 12 0 13 5   HEMATOCRIT % 37 7 42 4   PLATELETS Thousands/uL 376 351   LYMPHO PCT %  --  12*   MONO PCT %  --  17*   EOS PCT %  --  0     Results from last 7 days   Lab Units 05/09/21  0454   POTASSIUM mmol/L 4 8   CHLORIDE mmol/L 105   CO2 mmol/L 24   BUN mg/dL 59*   CREATININE mg/dL 1 25*   CALCIUM mg/dL 9 4   ALK PHOS U/L 51 3   ALT U/L 44   AST U/L 55*     Results from last 7 days   Lab Units 05/08/21  1234   INR  0 91       * I Have Reviewed All Lab Data Listed Above  * Additional Pertinent Lab Tests Reviewed: [address] Admission Reviewed    Imaging:  XR chest 1 view portable   ED Interpretation by Corie Gottron, PA-C (05/08 1304)   Bilateral infiltrates consistent with COVID-19      Final Result by Julia Hodges MD (05/09 1116)      Patchy opacification in both lower lobes, most likely bilateral pneumonia  Workstation performed: [id number]            Imaging Reports Reviewed Today Include:  CXR  Imaging Personally Reviewed by Myself Includes:  Same as above    Recent Cultures (last 7 days):     Results from last 7 days   Lab Units 05/08/21  1246 05/08/21  1234   BLOOD CULTURE  Received in Microbiology Lab  Culture in Progress  Received in Microbiology Lab  Culture in Progress         Last 24 Hours Medication List:   Current Facility-Administered Medications   Medication Dose Route Frequency Provider Last Rate    acetaminophen  650 mg Oral Q6H PRN Ha Crawford MD      albuterol  2 puff Inhalation 4x Daily Awilda Ko MD      ascorbic acid  1,000 mg Oral Q12H [facility] Ha Crawford MD      atorvastatin  40 mg Oral Daily Ha Crawford MD      benzonatate  100 mg Oral Q8H Malvin Meneses MD      cefTRIAXone  1,000 mg Intravenous Q24H Ha Crawford MD 1,000 mg (05/08/21 1614)    cholecalciferol  2,000 Units Oral Daily Ha Crawford MD      dexamethasone  6 mg Intravenous Q24H Ha Crawford MD      doxycycline hyclate  100 mg Oral Q12H [facility] Malvin Meneses MD      enoxaparin  30 mg Subcutaneous Q12H [facility] Ha Crawford MD      famotidine  20 mg Oral Daily Ha Crawford MD      zinc sulfate  220 mg Oral Daily Ha Crawford MD      Followed by   Kandy Potts ON 5/15/2021] multivitamin-minerals  1 tablet Oral Daily Malvin Meneses MD      ondansetron  4 mg Intravenous Q6H PRN Ha Crawford MD      remdesivir  100 mg Intravenous Q24H Ha Crawford MD      sodium chloride  100 mL/hr Intravenous Continuous Awilda Ko  mL/hr (05/09/21 1159)        Today, Patient Was Seen By: Awilda Ko MD    ** Please Note: This note has been constructed using a voice recognition system   **

## 2021-05-09 NOTE — ASSESSMENT & PLAN NOTE
· 2/2 COVID infection  · Presented with URI symptoms for 5-7 days  · Visited ED on 05/03/2021 with similar symptoms at that time refused to get COVID-19 test  · COVID-19 test +ve 01/05/2021  · Elevated BUN Cr and transaminitis possibly 2/2 COVID which has improved  · Inflammatory markers improving  D-dimer elevated to 4    · CXR showing B/L ground-glass opacities  · Improving hypoxia on 8 L via NC mid flow today  Not compliant with proning  · COVID-19 moderate pathway  · Will check CTA chest to rule out PE and check lung involvement  · Continue steroids day 7  · Received tocilizumab on 05/11/2021  · Completed remdesivir 5 day course    · Incentive spirometry, self proning  · Trend inflammatory markers  · Start albuterol inhaler 4 times daily

## 2021-05-09 NOTE — ASSESSMENT & PLAN NOTE
· Lipid panel showing elevated LDL  · Not taking atorvastatin at home  · Continue statin as per COVID-19 protocol, will need to continue statin at home

## 2021-05-09 NOTE — PLAN OF CARE
Problem: INFECTION - ADULT  Goal: Absence or prevention of progression during hospitalization  Description: INTERVENTIONS:  - Assess and monitor for signs and symptoms of infection  - Monitor lab/diagnostic results  - Monitor all insertion sites, i e  indwelling lines, tubes, and drains  - Monitor endotracheal if appropriate and nasal secretions for changes in amount and color  - [place] appropriate cooling/warming therapies per order  - Administer medications as ordered  - Instruct and encourage patient and family to use good hand hygiene technique  - Identify and instruct in appropriate isolation precautions for identified infection/condition  Outcome: Progressing     Problem: SAFETY ADULT  Goal: Patient will remain free of falls  Description: INTERVENTIONS:  - Assess patient frequently for physical needs  -  Identify cognitive and physical deficits and behaviors that affect risk of falls    -  [place] fall precautions as indicated by assessment   - Educate patient/family on patient safety including physical limitations  - Instruct patient to call for assistance with activity based on assessment  - Modify environment to reduce risk of injury  - Consider OT/PT consult to assist with strengthening/mobility  Outcome: Progressing  Goal: Maintain or return to baseline ADL function  Description: INTERVENTIONS:  -  Assess patient's ability to carry out ADLs; assess patient's baseline for ADL function and identify physical deficits which impact ability to perform ADLs (bathing, care of mouth/teeth, toileting, grooming, dressing, etc )  - Assess/evaluate cause of self-care deficits   - Assess range of motion  - Assess patient's mobility; develop plan if impaired  - Assess patient's need for assistive devices and provide as appropriate  - Encourage maximum independence but intervene and supervise when necessary  - Involve family in performance of ADLs  - Assess for home care needs following discharge   - Consider OT consult to assist with ADL evaluation and planning for discharge  - Provide patient education as appropriate  Outcome: Progressing  Goal: Maintain or return mobility status to optimal level  Description: INTERVENTIONS:  - Assess patient's baseline mobility status (ambulation, transfers, stairs, etc )    - Identify cognitive and physical deficits and behaviors that affect mobility  - Identify mobility aids required to assist with transfers and/or ambulation (gait belt, sit-to-stand, lift, walker, cane, etc )  - [place] fall precautions as indicated by assessment  - Record patient progress and toleration of activity level on Mobility SBAR; progress patient to next Phase/Stage  - Instruct patient to call for assistance with activity based on assessment  - Consider rehabilitation consult to assist with strengthening/weightbearing, etc   Outcome: Progressing     Problem: DISCHARGE PLANNING  Goal: Discharge to home or other facility with appropriate resources  Description: INTERVENTIONS:  - Identify barriers to discharge w/patient and caregiver  - Arrange for needed discharge resources and transportation as appropriate  - Identify discharge learning needs (meds, wound care, etc )  - Arrange for interpretive services to assist at discharge as needed  - Refer to Case Management Department for coordinating discharge planning if the patient needs post-hospital services based on physician/advanced practitioner order or complex needs related to functional status, cognitive ability, or social support system  Outcome: Progressing     Problem: RESPIRATORY - ADULT  Goal: Achieves optimal ventilation and oxygenation  Description: INTERVENTIONS:  - Assess for changes in respiratory status  - Assess for changes in mentation and behavior  - Position to facilitate oxygenation and minimize respiratory effort  - Oxygen administered by appropriate delivery if ordered  - Initiate smoking cessation education as indicated  - Encourage broncho-pulmonary hygiene including cough, deep breathe, Incentive Spirometry  - Assess the need for suctioning and aspirate as needed  - Assess and instruct to report SOB or any respiratory difficulty  - Respiratory Therapy support as indicated  Outcome: Progressing     Problem: Potential for Falls  Goal: Patient will remain free of falls  Description: INTERVENTIONS:  - Assess patient frequently for physical needs  -  Identify cognitive and physical deficits and behaviors that affect risk of falls    -  [place] fall precautions as indicated by assessment   - Educate patient/family on patient safety including physical limitations  - Instruct patient to call for assistance with activity based on assessment  - Modify environment to reduce risk of injury  - Consider OT/PT consult to assist with strengthening/mobility  Outcome: Progressing

## 2021-05-09 NOTE — UTILIZATION REVIEW
Initial Clinical Review    Admission: Date/Time/Statement:   Admission Orders (From admission, onward)     Ordered        05/08/21 1359  Inpatient Admission  Once                   Orders Placed This Encounter   Procedures    Inpatient Admission     Standing Status:   Standing     Number of Occurrences:   1     Order Specific Question:   Level of Care     Answer:   Med Surg [16]     Order Specific Question:   Estimated length of stay     Answer:   More than 2 Midnights     Order Specific Question:   Certification     Answer:   I certify that inpatient services are medically necessary for this patient for a duration of greater than two midnights  See H&P and MD Progress Notes for additional information about the patient's course of treatment  ED Arrival Information     Expected Arrival Acuity Means of Arrival Escorted By Service Admission Type    - 5/8/2021 12:03 Urgent Walk-In Self General Medicine Urgent    Arrival Complaint    flu like symptoms        Chief Complaint   Patient presents with    Flu Symptoms     pt presents to ed for bodyaches, fever, cough, nausea and vomiting, states " everything hurts"       Initial Presentation:       54year old female presents to ed from home for evaluation and treatment flu like symptoms  Patient reports onset 2 weeks ago  Clinical assessment significant for  COIVID +, , BUN 52, CR 1 53, CRP 11 5, D DIMER 1 0, FERRITIN 2079  Imaging shows bilateral infiltrates consistent with COVID 19   O2 sats 87%  Treated in ed with iv fluids and 6 L O2 via midflow nasal cannula  Admit to inpatient for acute hypoxic respiratory failure related to COVID 19  Pneumonia  Plan includes : isolation, supplemental oxygen, iv remdesivir,  lovenox, iv decadron, iv ceftriaxone  Date: 5-9   Day 2: inpatient   O2 sats declined overnight and patient has persistent hypotension  Iv fluids initiated and continue today   Patient requires increased oxygen from 6L to 10 L midflow nasal cannula to maintain O2 sats at least 91%  Continue isolation, iv remdesivir, iv decadron and iv ceftriaxone              ED Triage Vitals   05/08/21 1212 05/08/21 1212 05/08/21 1212 05/08/21 1212 05/08/21 1212   98 5 °F (36 9 °C) 90 17 139/90 (!) 87 %      Oral Monitor         No Pain          05/08/21 63 5 kg (140 lb)     Additional Vital Signs:       Date/Time  Temp  Pulse  Resp  BP  MAP (mmHg)  SpO2   O2 Flow Rate (L/min)  Nasal Cannula O2 Flow Rate (L/min)  O2 Device  O2 Interface Device    05/09/21 0814  --  --  --  --  --  92 %   9 L/min  9 L/min  Mid flow nasal cannula  NC prongs    05/09/21 0742  98 1 °F (36 7 °C)  77  17  86/56  Abnormal   66  93 %   --  10 L/min  Mid flow nasal cannula  --    05/09/21 0313  --  --  --  --  --  91 %   10 L/min  --  Mid flow nasal cannula  --    05/09/21 0020  98 2 °F (36 8 °C)  82  18  112/63  80  96 %   10 L/min  --  Mid flow nasal cannula  --    05/08/21 2214  --  --  --  --  --  --   10 L/min  --  Mid flow nasal cannula  --    05/08/21 2213  --  --  --  --  --  92 %   --  --  --  --    05/08/21 2023  98 5 °F (36 9 °C)  82  18  103/69  79  92 %   6 L/min  --  Mid flow nasal cannula  --    05/08/21 2019  --  --  --  --  --  96 %   6 L/min  --  Mid flow nasal cannula  --    05/08/21 1527  --  --  --  --  --  91 %   6 L/min  --  Mid flow nasal cannula  MFNC prongs    05/08/21 1448  98 5 °F (36 9 °C)  81  18  86/59  Abnormal   --  94 %   10 L/min  --  Mid flow nasal cannula  --    05/08/21 1249  --  --  --  --  --  95 %   6 L/min  --  Mid flow nasal cannula  MFNC prongs            Pertinent Labs/Diagnostic Test Results:     XR chest 1 view portable    (05/08 1304)   Bilateral infiltrates consistent with COVID-19        Results from last 7 days   Lab Units 05/08/21  1246   SARS-COV-2  Positive*     Results from last 7 days   Lab Units 05/09/21  0454 05/08/21  1234   WBC Thousand/uL 5 69 8 77   HEMOGLOBIN g/dL 12 0 13 5   HEMATOCRIT % 37 7 42 4   PLATELETS Thousands/uL 376 351   BANDS PCT %  --  3         Results from last 7 days   Lab Units 05/09/21  0454 05/08/21  1234   SODIUM mmol/L 140 141   POTASSIUM mmol/L 4 8 5 0   CHLORIDE mmol/L 105 104   CO2 mmol/L 24 27   ANION GAP mmol/L 11 10   BUN mg/dL 59* 52*   CREATININE mg/dL 1 25* 1 53*   EGFR ml/min/1 73sq m 56 44   CALCIUM mg/dL 9 4 10 0   CALCIUM, IONIZED mmol/L  --  1 21   MAGNESIUM mg/dL  --  3 2*     Results from last 7 days   Lab Units 05/09/21  0454 05/08/21  1234   AST U/L 55* 75*   ALT U/L 44 57*   ALK PHOS U/L 51 3 58 3   TOTAL PROTEIN g/dL 6 9 8 0   ALBUMIN g/dL 3 5 3 9   TOTAL BILIRUBIN mg/dL 0 31 0 45         Results from last 7 days   Lab Units 05/09/21  0454 05/08/21  1234   GLUCOSE RANDOM mg/dL 119 111       Results from last 7 days   Lab Units 05/08/21  1234   CK TOTAL U/L 831 0*   CK MB INDEX % <1 0   CK MB ng/mL 7 5*     Results from last 7 days   Lab Units 05/08/21  1234   TROPONIN I ng/mL <0 03     Results from last 7 days   Lab Units 05/09/21  0454 05/08/21  1234   D-DIMER QUANTITATIVE mg/L FEU 0 73* 1 00*     Results from last 7 days   Lab Units 05/08/21  1234   PROTIME seconds 10 3   INR  0 91         Results from last 7 days   Lab Units 05/08/21  1234   PROCALCITONIN ng/ml 0 09     Results from last 7 days   Lab Units 05/08/21  1234   LACTIC ACID mmol/L 1 0       Results from last 7 days   Lab Units 05/08/21  1234   BNP pg/mL 21 9     Results from last 7 days   Lab Units 05/08/21  1234   FERRITIN ng/mL 2,079*       Results from last 7 days   Lab Units 05/09/21  0454 05/08/21  1234   CRP mg/L 8 8* 11 5*       Results from last 7 days   Lab Units 05/08/21  1247   INFLUENZA A PCR  None Detected   INFLUENZA B PCR  None Detected   RSV PCR  None Detected       Results from last 7 days   Lab Units 05/08/21  1246 05/08/21  1234   BLOOD CULTURE  Received in Microbiology Lab  Culture in Progress  Received in Microbiology Lab  Culture in Progress         ED Treatment:   Medication Administration from 05/08/2021 1203 to 05/08/2021 1437       Date/Time Order Dose Route     05/08/2021 1308 multi-electrolyte (ISOLYTE-S PH 7 4) bolus 1,000 mL 1,000 mL Intravenous        Past Medical History:   Diagnosis Date    Anxiety     Asthma     Back pain     Sarcoidosis     Vertigo      Present on Admission:   Sarcoidosis   Moderate persistent asthma without complication   Hypercholesterolemia      Admitting Diagnosis:     Hypoxia [R09 02]  Flu-like symptoms [R68 89]  COVID-19 [U07 1]    Age/Sex: 54 y o  female     Admission Orders:      ascorbic acid, 1,000 mg, Oral, Q12H JOSE A  atorvastatin, 40 mg, Oral, Daily  benzonatate, 100 mg, Oral, Q8H  cefTRIAXone, 1,000 mg, Intravenous, Q24H  cholecalciferol, 2,000 Units, Oral, Daily  dexamethasone, 6 mg, Intravenous, Q24H  doxycycline hyclate, 100 mg, Oral, Q12H JOSE A  enoxaparin, 30 mg, Subcutaneous, Q12H JOSE A  famotidine, 20 mg, Oral, Daily  zinc sulfate, 220 mg, Oral, Daily    Followed by  Andree Rdoriguez ON 5/15/2021] multivitamin-minerals, 1 tablet, Oral, Daily  remdesivir, 100 mg, Intravenous, Q24H      Continuous IV Infusions:  lactated ringers, 100 mL/hr, Intravenous, Continuous      PRN Meds:  acetaminophen, 650 mg, Oral, Q6H PRN  albuterol, 2 puff, Inhalation, Q4H PRN  ondansetron, 4 mg, Intravenous, Q6H PRN        None    Network Utilization Review Department  ATTENTION: Please call with any questions or concerns to [phone] and carefully listen to the prompts so that you are directed to the right person  All voicemails are confidential   Moises Sharma all requests for admission clinical reviews, approved or denied determinations and any other requests to dedicated fax number below belonging to the campus where the patient is receiving treatment   List of dedicated fax numbers for the Facilities:  [address] DENIALS (Administrative/Medical Necessity) [phone]   [address] (Maternity/NICU/Pediatrics) [phone]   [address] [place] [address] Xavier Aase [phone]   [address] [place] [facility] [id number] [address] [place] [address] Konstantin Ibarra [address] [phone] 4601 [facility] [phone]

## 2021-05-09 NOTE — ASSESSMENT & PLAN NOTE
Cr 1 5 on admission  Unknown baseline  Improving Cr with IV fluid  Elevated Cr most likely 2/2 COVID-19 infection      Continue IV fluid 100 cc/hour

## 2021-05-09 NOTE — ASSESSMENT & PLAN NOTE
· Takes albuterol and Symbicort at home  · Lungs clear on examination with no wheezing    · Continue steroids as above along with albuterol inhaler scheduled

## 2021-05-10 PROBLEM — U07.1 ACUTE HYPOXEMIC RESPIRATORY FAILURE DUE TO COVID-19 (HCC): Status: ACTIVE | Noted: 2021-05-08

## 2021-05-10 LAB
ANION GAP SERPL CALCULATED.3IONS-SCNC: 7 MMOL/L (ref 4–13)
BASE EX.OXY STD BLDV CALC-SCNC: 88.6 % (ref 60–80)
BASE EXCESS BLDV CALC-SCNC: -5.8 MMOL/L
BASOPHILS # BLD MANUAL: 0 THOUSAND/UL (ref 0–0.1)
BASOPHILS NFR MAR MANUAL: 0 % (ref 0–1)
BUN SERPL-MCNC: 45 MG/DL (ref 6–20)
CALCIUM SERPL-MCNC: 9.1 MG/DL (ref 8.4–10.2)
CHLORIDE SERPL-SCNC: 110 MMOL/L (ref 96–108)
CO2 SERPL-SCNC: 24 MMOL/L (ref 22–33)
CREAT SERPL-MCNC: 0.83 MG/DL (ref 0.4–1.1)
D DIMER PPP FEU-MCNC: 0.91 MG/L FEU (ref 0.19–0.49)
EOSINOPHIL # BLD MANUAL: 0 THOUSAND/UL (ref 0–0.4)
EOSINOPHIL NFR BLD MANUAL: 0 % (ref 0–6)
ERYTHROCYTE [DISTWIDTH] IN BLOOD BY AUTOMATED COUNT: 14.8 % (ref 11.6–15.1)
G6PD BLD QN: 205 U/10E12 RBC (ref 127–427)
GFR SERPL CREATININE-BSD FRML MDRD: 92 ML/MIN/1.73SQ M
GLUCOSE SERPL-MCNC: 144 MG/DL (ref 65–140)
GLUCOSE SERPL-MCNC: 92 MG/DL (ref 65–140)
HCO3 BLDV-SCNC: 19.5 MMOL/L (ref 24–30)
HCT VFR BLD AUTO: 36.5 % (ref 34.8–46.1)
HGB BLD-MCNC: 11.6 G/DL (ref 11.5–15.4)
HIV 1+2 AB+HIV1 P24 AG SERPL QL IA: NORMAL
LYMPHOCYTES # BLD AUTO: 1.33 THOUSAND/UL (ref 0.6–4.47)
LYMPHOCYTES # BLD AUTO: 17 % (ref 14–44)
MCH RBC QN AUTO: 29.1 PG (ref 26.8–34.3)
MCHC RBC AUTO-ENTMCNC: 31.8 G/DL (ref 31.4–37.4)
MCV RBC AUTO: 92 FL (ref 82–98)
MONOCYTES # BLD AUTO: 0.94 THOUSAND/UL (ref 0–1.22)
MONOCYTES NFR BLD: 12 % (ref 4–12)
NEUTROPHILS # BLD MANUAL: 5.4 THOUSAND/UL (ref 1.85–7.62)
NEUTS BAND NFR BLD MANUAL: 6 % (ref 0–8)
NEUTS SEG NFR BLD AUTO: 63 % (ref 43–75)
O2 CT BLDV-SCNC: 15.6 ML/DL
PCO2 BLDV: 37.5 MM HG (ref 42–50)
PH BLDV: 7.33 [PH] (ref 7.3–7.4)
PLATELET # BLD AUTO: 440 THOUSANDS/UL (ref 149–390)
PLATELET BLD QL SMEAR: ABNORMAL
PMV BLD AUTO: 10.3 FL (ref 8.9–12.7)
PO2 BLDV: 61.9 MM HG (ref 35–45)
POLYCHROMASIA BLD QL SMEAR: PRESENT
POTASSIUM SERPL-SCNC: 4.9 MMOL/L (ref 3.5–5)
RBC # BLD AUTO: 3.98 MILLION/UL (ref 3.81–5.12)
RBC # BLD AUTO: 4.48 X10E6/UL (ref 3.77–5.28)
RBC MORPH BLD: PRESENT
SODIUM SERPL-SCNC: 141 MMOL/L (ref 133–145)
TOTAL CELLS COUNTED SPEC: 100
VARIANT LYMPHS # BLD AUTO: 2 %
WBC # BLD AUTO: 7.82 THOUSAND/UL (ref 4.31–10.16)

## 2021-05-10 PROCEDURE — 94760 N-INVAS EAR/PLS OXIMETRY 1: CPT

## 2021-05-10 PROCEDURE — 94640 AIRWAY INHALATION TREATMENT: CPT

## 2021-05-10 PROCEDURE — 99232 SBSQ HOSP IP/OBS MODERATE 35: CPT | Performed by: INTERNAL MEDICINE

## 2021-05-10 PROCEDURE — 80048 BASIC METABOLIC PNL TOTAL CA: CPT | Performed by: INTERNAL MEDICINE

## 2021-05-10 PROCEDURE — 85007 BL SMEAR W/DIFF WBC COUNT: CPT | Performed by: INTERNAL MEDICINE

## 2021-05-10 PROCEDURE — 85379 FIBRIN DEGRADATION QUANT: CPT | Performed by: INTERNAL MEDICINE

## 2021-05-10 PROCEDURE — 82948 REAGENT STRIP/BLOOD GLUCOSE: CPT

## 2021-05-10 PROCEDURE — 85027 COMPLETE CBC AUTOMATED: CPT | Performed by: INTERNAL MEDICINE

## 2021-05-10 PROCEDURE — 83520 IMMUNOASSAY QUANT NOS NONAB: CPT | Performed by: INTERNAL MEDICINE

## 2021-05-10 PROCEDURE — 82805 BLOOD GASES W/O2 SATURATION: CPT | Performed by: INTERNAL MEDICINE

## 2021-05-10 PROCEDURE — 94003 VENT MGMT INPAT SUBQ DAY: CPT

## 2021-05-10 RX ADMIN — ALBUTEROL SULFATE 2 PUFF: 90 AEROSOL, METERED RESPIRATORY (INHALATION) at 16:57

## 2021-05-10 RX ADMIN — OXYCODONE HYDROCHLORIDE AND ACETAMINOPHEN 1000 MG: 500 TABLET ORAL at 08:43

## 2021-05-10 RX ADMIN — BENZONATATE 100 MG: 100 CAPSULE ORAL at 05:02

## 2021-05-10 RX ADMIN — BENZONATATE 100 MG: 100 CAPSULE ORAL at 20:23

## 2021-05-10 RX ADMIN — Medication 2000 UNITS: at 08:43

## 2021-05-10 RX ADMIN — SODIUM CHLORIDE 100 ML/HR: 0.9 INJECTION, SOLUTION INTRAVENOUS at 10:09

## 2021-05-10 RX ADMIN — ALBUTEROL SULFATE 2 PUFF: 90 AEROSOL, METERED RESPIRATORY (INHALATION) at 08:11

## 2021-05-10 RX ADMIN — ATORVASTATIN CALCIUM 40 MG: 40 TABLET, FILM COATED ORAL at 08:43

## 2021-05-10 RX ADMIN — REMDESIVIR 100 MG: 100 INJECTION, POWDER, LYOPHILIZED, FOR SOLUTION INTRAVENOUS at 14:49

## 2021-05-10 RX ADMIN — ENOXAPARIN SODIUM 30 MG: 30 INJECTION SUBCUTANEOUS at 08:43

## 2021-05-10 RX ADMIN — ALBUTEROL SULFATE 2 PUFF: 90 AEROSOL, METERED RESPIRATORY (INHALATION) at 20:02

## 2021-05-10 RX ADMIN — BENZONATATE 100 MG: 100 CAPSULE ORAL at 13:41

## 2021-05-10 RX ADMIN — FAMOTIDINE 20 MG: 20 TABLET ORAL at 08:43

## 2021-05-10 RX ADMIN — ZINC SULFATE 220 MG (50 MG) CAPSULE 220 MG: CAPSULE at 08:43

## 2021-05-10 RX ADMIN — ALBUTEROL SULFATE 2 PUFF: 90 AEROSOL, METERED RESPIRATORY (INHALATION) at 11:44

## 2021-05-10 RX ADMIN — DEXAMETHASONE SODIUM PHOSPHATE 6 MG: 4 INJECTION INTRA-ARTICULAR; INTRALESIONAL; INTRAMUSCULAR; INTRAVENOUS; SOFT TISSUE at 14:49

## 2021-05-10 RX ADMIN — ENOXAPARIN SODIUM 30 MG: 30 INJECTION SUBCUTANEOUS at 20:23

## 2021-05-10 RX ADMIN — OXYCODONE HYDROCHLORIDE AND ACETAMINOPHEN 1000 MG: 500 TABLET ORAL at 20:23

## 2021-05-10 NOTE — PROGRESS NOTES
Sherine U  66   Progress Note - Aaron Gan 1965, 54 y o  female MRN: [id number]  Unit/Bed#: -01 Encounter: [id number]  Primary Care Provider: FRED Neri   Date and time admitted to hospital: 5/8/2021 12:08 PM    * Acute hypoxemic respiratory failure due to COVID-19 [facility])  Assessment & Plan  · Presented with URI symptoms for 5-7 days  · Visited ED on 05/03/2021 with similar symptoms at that time refused to get COVID-19 test  · COVID-19 test +ve 01/05/2021  · Labs unremarkable except presence of elevated BUN Cr and mild transaminitis possibly 2/2 COVID and decreased p o  Intake  · Inflammatory markers were mildly elevated  D-dimer 1   · CXR showing B/L ground-glass opacities  · Worsening hypoxia needing 10 L via NC mid flow overnight  · COVID-19 moderate pathway  · Will discontinue antibiotic considering procalcitonin x2-ve  · Continue remdesivir and steroids on day 3  · Incentive spirometry, self proning  · Trend inflammatory markers  · Continue oxygen supplementation to maintain O2 sat > 90  · Will hold IV fluid as Pt can tolerate p o  Diet  · Start albuterol inhaler 4 times daily  · Follow-up HIV, IL 6, QuantiFERON  · Respiratory protocol  · Trend D-dimer  · Will talk to ID regarding giving Actemra    MYRTLE (acute kidney injury) ([facility] [address] )  Assessment & Plan  Cr 1 5 on admission  Baseline unknown  Resolved with IV fluid  Prerenal most likely 2/2 poor oral intake from COVID-19 infection  Will hold further IVF  Pneumonia due to COVID-19 virus  Assessment & Plan  See plan for COVID-19 acute hypoxic respiratory failure  Sarcoidosis  Assessment & Plan  · Stable  Not on any medications  · She is not compliant with pulmonology follow-up  · Currently on steroids given COVID-19 pneumonia    Hypercholesterolemia  Assessment & Plan  · Lipid panel showing elevated LDL  · Not taking atorvastatin at home    · Continue statin as per COVID-19 protocol, will need to continue statin at home  Moderate persistent asthma without complication  Assessment & Plan  · Takes albuterol and Symbicort at home  · Lungs clear on examination with no wheezing  · Continue steroids as above along with albuterol inhaler scheduled        VTE Pharmacologic Prophylaxis:   Pharmacologic: Enoxaparin (Lovenox)  Mechanical VTE Prophylaxis in Place: Yes    Discussions with Specialists or Other Care Team Provider:  Id regarding giving Tocilizumab    Education and Discussions with Family / Patient:  Try to call brother Dale Licea however was unsuccessful  Current Length of Stay: 2 day(s)    Current Patient Status: Inpatient     Discharge Plan / Estimated Discharge Date:  Needs further stay due to hypoxic respiratory failure from COVID-19 and on mid flow oxygen    Code Status: Level 1 - Full Code      Subjective:   Patient was seen and examined by me at bedside  Communicated clearly  No particular overnight event reported  Hemodynamically stable and afebrile  States improvement in fatigue, myalgia  Does have chest pain worse on cough and breathing in and out  Denies any radiation to arms neck  No chest pain worse on exertion  Chest pain worse with cough suggesting pleuritic in nature  Troponins initially have been-ve  Saturating well on 10 L on my encounter and overnight also  Objective:     Vitals:   Temp (24hrs), Av °F (36 7 °C), Min:98 °F (36 7 °C), Max:98 °F (36 7 °C)    Temp:  [98 °F (36 7 °C)] 98 °F (36 7 °C)  HR:  [78-80] 79  Resp:  [16-18] 16  BP: ()/(66-74) 110/73  SpO2:  [90 %-97 %] 92 %  Body mass index is 28 28 kg/m²  Input and Output Summary (last 24 hours):     No intake or output data in the 24 hours ending 05/10/21 1331    Physical Exam:     Physical Exam  Vitals signs reviewed  Constitutional:       General: She is in acute distress  Appearance: She is well-developed  She is ill-appearing and toxic-appearing     Cardiovascular:      Rate and Rhythm: Normal rate and regular rhythm  Pulses: Normal pulses  Heart sounds: Normal heart sounds  Pulmonary:      Effort: Respiratory distress present  Breath sounds: Normal breath sounds  No wheezing  Chest:      Chest wall: No tenderness  Abdominal:      General: Bowel sounds are normal  There is no distension  Palpations: Abdomen is soft  Tenderness: There is no abdominal tenderness  Musculoskeletal:      Right lower leg: No edema  Left lower leg: No edema  Skin:     General: Skin is warm  Coloration: Skin is not pale  Neurological:      General: No focal deficit present  Mental Status: She is alert and oriented to person, place, and time  Mental status is at baseline  Psychiatric:         Mood and Affect: Mood normal          Behavior: Behavior normal            Additional Data:     Labs:    Results from last 7 days   Lab Units 05/10/21  0541   WBC Thousand/uL 7 82   HEMOGLOBIN g/dL 11 6   HEMATOCRIT % 36 5   PLATELETS Thousands/uL 440*   LYMPHO PCT % 17   MONO PCT % 12   EOS PCT % 0     Results from last 7 days   Lab Units 05/10/21  0943 05/09/21  0454   POTASSIUM mmol/L 4 9 4 8   CHLORIDE mmol/L 110* 105   CO2 mmol/L 24 24   BUN mg/dL 45* 59*   CREATININE mg/dL 0 83 1 25*   CALCIUM mg/dL 9 1 9 4   ALK PHOS U/L  --  51 3   ALT U/L  --  44   AST U/L  --  55*     Results from last 7 days   Lab Units 05/08/21  1234   INR  0 91       * I Have Reviewed All Lab Data Listed Above  * Additional Pertinent Lab Tests Reviewed: [address] 66 Admission Reviewed    Imaging:  XR chest 1 view portable   ED Interpretation by Patrick Moarn PA-C (05/08 1304)   Bilateral infiltrates consistent with COVID-19      Final Result by Bonita Norman MD (05/09 1116)      Patchy opacification in both lower lobes, most likely bilateral pneumonia                    Workstation performed: [id number]            Imaging Reports Reviewed Today Include:  CXR  Imaging Personally Reviewed by Myself Includes:  Same as above    Recent Cultures (last 7 days):     Results from last 7 days   Lab Units 05/08/21  1246 05/08/21  1234   BLOOD CULTURE  No Growth at 24 hrs  No Growth at 24 hrs  Last 24 Hours Medication List:   Current Facility-Administered Medications   Medication Dose Route Frequency Provider Last Rate    acetaminophen  650 mg Oral Q6H PRN Lisbet Jerry MD      albuterol  2 puff Inhalation 4x Daily [place] MD Karely      ascorbic acid  1,000 mg Oral Q12H [address] Lisbet Jerry MD      atorvastatin  40 mg Oral Daily Lisbet Jerry MD      benzonatate  100 mg Oral Q8H Lisbet Jerry MD      cholecalciferol  2,000 Units Oral Daily Lisbet Jerry MD      dexamethasone  6 mg Intravenous Q24H Lisbet Jerry MD      enoxaparin  30 mg Subcutaneous Q12H [address] Lisbet Jerry MD      famotidine  20 mg Oral Daily Lisbet Jerry MD      insulin lispro  1-5 Units Subcutaneous TID AC Nestor Castaneda MD      zinc sulfate  220 mg Oral Daily Lisbet Jerry MD      Followed by   Arlette Rodriguez ON 5/15/2021] multivitamin-minerals  1 tablet Oral Daily Lisbet Jerry MD      ondansetron  4 mg Intravenous Q6H PRN Lisbet Jerry MD      remdesivir  100 mg Intravenous Q24H Lisbet Jerry MD          Today, Patient Was Seen By: Nestor Castaneda MD    ** Please Note: This note has been constructed using a voice recognition system   **

## 2021-05-10 NOTE — UTILIZATION REVIEW
Inpatient Admission Authorization Request   NOTIFICATION OF INPATIENT ADMISSION/INPATIENT AUTHORIZATION REQUEST   SERVICING FACILITY:   [place] [address]   [address] [place] [address], [id number] [place] [address]  Tax ID: [id number]  NPI: [id number]  Place of Service: Inpatient [address]  Place of Service Code: 24     ATTENDING PROVIDER:  Attending Name and NPI#: Manny Thomas, Christopher Neely [[id number]]  Address: [address] [place] [address], [address] [place] [address]  Phone:  [phone]     UTILIZATION REVIEW CONTACT:  Juma Espino Utilization   Network Utilization Review Department  Phone: [phone]  Fax: [phone]  Email: [email or web address]     PHYSICIAN ADVISORY SERVICES:  FOR AUQO-EB-OXEY REVIEW - MEDICAL NECESSITY DENIAL  Phone: [phone]  Fax: [phone]  Email: [email or web address]     TYPE OF REQUEST:  Inpatient Status     ADMISSION INFORMATION:  ADMISSION DATE/TIME: 5/8/21 1359  PATIENT DIAGNOSIS CODE/DESCRIPTION:  Hypoxia [R09 02]  Flu-like symptoms [R68 89]  COVID-19 [U07 1]  DISCHARGE DATE/TIME: No discharge date for patient encounter  DISCHARGE DISPOSITION (IF DISCHARGED): Home/Self Care     IMPORTANT INFORMATION:  Please contact the Juma Espino directly with any questions or concerns regarding this request  Department voicemails are confidential     Send requests for admission clinical reviews, concurrent reviews, approvals, and administrative denials due to lack of clinical to fax [phone]

## 2021-05-10 NOTE — PLAN OF CARE
Problem: INFECTION - ADULT  Goal: Absence or prevention of progression during hospitalization  Description: INTERVENTIONS:  - Assess and monitor for signs and symptoms of infection  - Monitor lab/diagnostic results  - Monitor all insertion sites, i e  indwelling lines, tubes, and drains  - Monitor endotracheal if appropriate and nasal secretions for changes in amount and color  - [place] appropriate cooling/warming therapies per order  - Administer medications as ordered  - Instruct and encourage patient and family to use good hand hygiene technique  - Identify and instruct in appropriate isolation precautions for identified infection/condition  Outcome: Progressing     Problem: SAFETY ADULT  Goal: Patient will remain free of falls  Description: INTERVENTIONS:  - Assess patient frequently for physical needs  -  Identify cognitive and physical deficits and behaviors that affect risk of falls    -  [place] fall precautions as indicated by assessment   - Educate patient/family on patient safety including physical limitations  - Instruct patient to call for assistance with activity based on assessment  - Modify environment to reduce risk of injury  - Consider OT/PT consult to assist with strengthening/mobility  Outcome: Progressing  Goal: Maintain or return to baseline ADL function  Description: INTERVENTIONS:  -  Assess patient's ability to carry out ADLs; assess patient's baseline for ADL function and identify physical deficits which impact ability to perform ADLs (bathing, care of mouth/teeth, toileting, grooming, dressing, etc )  - Assess/evaluate cause of self-care deficits   - Assess range of motion  - Assess patient's mobility; develop plan if impaired  - Assess patient's need for assistive devices and provide as appropriate  - Encourage maximum independence but intervene and supervise when necessary  - Involve family in performance of ADLs  - Assess for home care needs following discharge   - Consider OT consult to assist with ADL evaluation and planning for discharge  - Provide patient education as appropriate  Outcome: Progressing  Goal: Maintain or return mobility status to optimal level  Description: INTERVENTIONS:  - Assess patient's baseline mobility status (ambulation, transfers, stairs, etc )    - Identify cognitive and physical deficits and behaviors that affect mobility  - Identify mobility aids required to assist with transfers and/or ambulation (gait belt, sit-to-stand, lift, walker, cane, etc )  - [place] fall precautions as indicated by assessment  - Record patient progress and toleration of activity level on Mobility SBAR; progress patient to next Phase/Stage  - Instruct patient to call for assistance with activity based on assessment  - Consider rehabilitation consult to assist with strengthening/weightbearing, etc   Outcome: Progressing     Problem: DISCHARGE PLANNING  Goal: Discharge to home or other facility with appropriate resources  Description: INTERVENTIONS:  - Identify barriers to discharge w/patient and caregiver  - Arrange for needed discharge resources and transportation as appropriate  - Identify discharge learning needs (meds, wound care, etc )  - Arrange for interpretive services to assist at discharge as needed  - Refer to Case Management Department for coordinating discharge planning if the patient needs post-hospital services based on physician/advanced practitioner order or complex needs related to functional status, cognitive ability, or social support system  Outcome: Progressing     Problem: RESPIRATORY - ADULT  Goal: Achieves optimal ventilation and oxygenation  Description: INTERVENTIONS:  - Assess for changes in respiratory status  - Assess for changes in mentation and behavior  - Position to facilitate oxygenation and minimize respiratory effort  - Oxygen administered by appropriate delivery if ordered  - Initiate smoking cessation education as indicated  - Encourage broncho-pulmonary hygiene including cough, deep breathe, Incentive Spirometry  - Assess the need for suctioning and aspirate as needed  - Assess and instruct to report SOB or any respiratory difficulty  - Respiratory Therapy support as indicated  Outcome: Progressing     Problem: Potential for Falls  Goal: Patient will remain free of falls  Description: INTERVENTIONS:  - Assess patient frequently for physical needs  -  Identify cognitive and physical deficits and behaviors that affect risk of falls    -  [place] fall precautions as indicated by assessment   - Educate patient/family on patient safety including physical limitations  - Instruct patient to call for assistance with activity based on assessment  - Modify environment to reduce risk of injury  - Consider OT/PT consult to assist with strengthening/mobility  Outcome: Progressing

## 2021-05-11 LAB
ANION GAP SERPL CALCULATED.3IONS-SCNC: 8 MMOL/L (ref 4–13)
BUN SERPL-MCNC: 33 MG/DL (ref 6–20)
CALCIUM SERPL-MCNC: 9.4 MG/DL (ref 8.4–10.2)
CHLORIDE SERPL-SCNC: 110 MMOL/L (ref 96–108)
CO2 SERPL-SCNC: 24 MMOL/L (ref 22–33)
CREAT SERPL-MCNC: 0.68 MG/DL (ref 0.4–1.1)
CRP SERPL QL: 2.6 MG/L (ref 0–1)
D DIMER PPP FEU-MCNC: 1.02 MG/L FEU (ref 0.19–0.49)
FERRITIN SERPL-MCNC: 1364 NG/ML (ref 8–388)
GFR SERPL CREATININE-BSD FRML MDRD: 114 ML/MIN/1.73SQ M
GLUCOSE SERPL-MCNC: 102 MG/DL (ref 65–140)
GLUCOSE SERPL-MCNC: 119 MG/DL (ref 65–140)
GLUCOSE SERPL-MCNC: 147 MG/DL (ref 65–140)
GLUCOSE SERPL-MCNC: 99 MG/DL (ref 65–140)
POTASSIUM SERPL-SCNC: 4.9 MMOL/L (ref 3.5–5)
SODIUM SERPL-SCNC: 142 MMOL/L (ref 133–145)

## 2021-05-11 PROCEDURE — 82728 ASSAY OF FERRITIN: CPT | Performed by: INTERNAL MEDICINE

## 2021-05-11 PROCEDURE — 80048 BASIC METABOLIC PNL TOTAL CA: CPT | Performed by: INTERNAL MEDICINE

## 2021-05-11 PROCEDURE — 94640 AIRWAY INHALATION TREATMENT: CPT

## 2021-05-11 PROCEDURE — 86140 C-REACTIVE PROTEIN: CPT | Performed by: INTERNAL MEDICINE

## 2021-05-11 PROCEDURE — 82948 REAGENT STRIP/BLOOD GLUCOSE: CPT

## 2021-05-11 PROCEDURE — 86480 TB TEST CELL IMMUN MEASURE: CPT | Performed by: INTERNAL MEDICINE

## 2021-05-11 PROCEDURE — 94760 N-INVAS EAR/PLS OXIMETRY 1: CPT

## 2021-05-11 PROCEDURE — 85379 FIBRIN DEGRADATION QUANT: CPT | Performed by: INTERNAL MEDICINE

## 2021-05-11 PROCEDURE — 99233 SBSQ HOSP IP/OBS HIGH 50: CPT | Performed by: INTERNAL MEDICINE

## 2021-05-11 PROCEDURE — NC001 PR NO CHARGE: Performed by: INTERNAL MEDICINE

## 2021-05-11 PROCEDURE — 94003 VENT MGMT INPAT SUBQ DAY: CPT

## 2021-05-11 RX ORDER — GUAIFENESIN 600 MG
600 TABLET, EXTENDED RELEASE 12 HR ORAL EVERY 12 HOURS SCHEDULED
Status: DISCONTINUED | OUTPATIENT
Start: 2021-05-11 | End: 2021-05-15 | Stop reason: HOSPADM

## 2021-05-11 RX ORDER — ECHINACEA PURPUREA EXTRACT 125 MG
1 TABLET ORAL
Status: DISCONTINUED | OUTPATIENT
Start: 2021-05-11 | End: 2021-05-15 | Stop reason: HOSPADM

## 2021-05-11 RX ORDER — POLYETHYLENE GLYCOL 3350 17 G/17G
17 POWDER, FOR SOLUTION ORAL DAILY PRN
Status: DISCONTINUED | OUTPATIENT
Start: 2021-05-11 | End: 2021-05-15 | Stop reason: HOSPADM

## 2021-05-11 RX ORDER — AMOXICILLIN 250 MG
1 CAPSULE ORAL
Status: DISCONTINUED | OUTPATIENT
Start: 2021-05-11 | End: 2021-05-15 | Stop reason: HOSPADM

## 2021-05-11 RX ADMIN — ACETAMINOPHEN 650 MG: 325 TABLET, FILM COATED ORAL at 23:41

## 2021-05-11 RX ADMIN — REMDESIVIR 100 MG: 100 INJECTION, POWDER, LYOPHILIZED, FOR SOLUTION INTRAVENOUS at 14:32

## 2021-05-11 RX ADMIN — BENZONATATE 100 MG: 100 CAPSULE ORAL at 04:55

## 2021-05-11 RX ADMIN — ATORVASTATIN CALCIUM 40 MG: 40 TABLET, FILM COATED ORAL at 11:08

## 2021-05-11 RX ADMIN — ENOXAPARIN SODIUM 30 MG: 30 INJECTION SUBCUTANEOUS at 20:36

## 2021-05-11 RX ADMIN — BENZONATATE 100 MG: 100 CAPSULE ORAL at 20:36

## 2021-05-11 RX ADMIN — ALBUTEROL SULFATE 2 PUFF: 90 AEROSOL, METERED RESPIRATORY (INHALATION) at 08:20

## 2021-05-11 RX ADMIN — OXYCODONE HYDROCHLORIDE AND ACETAMINOPHEN 1000 MG: 500 TABLET ORAL at 11:08

## 2021-05-11 RX ADMIN — ZINC SULFATE 220 MG (50 MG) CAPSULE 220 MG: CAPSULE at 11:08

## 2021-05-11 RX ADMIN — OXYCODONE HYDROCHLORIDE AND ACETAMINOPHEN 1000 MG: 500 TABLET ORAL at 20:36

## 2021-05-11 RX ADMIN — GUAIFENESIN 600 MG: 600 TABLET ORAL at 12:28

## 2021-05-11 RX ADMIN — ALBUTEROL SULFATE 2 PUFF: 90 AEROSOL, METERED RESPIRATORY (INHALATION) at 15:45

## 2021-05-11 RX ADMIN — ENOXAPARIN SODIUM 30 MG: 30 INJECTION SUBCUTANEOUS at 11:05

## 2021-05-11 RX ADMIN — BENZONATATE 100 MG: 100 CAPSULE ORAL at 12:28

## 2021-05-11 RX ADMIN — ALBUTEROL SULFATE 2 PUFF: 90 AEROSOL, METERED RESPIRATORY (INHALATION) at 11:52

## 2021-05-11 RX ADMIN — FAMOTIDINE 20 MG: 20 TABLET ORAL at 11:08

## 2021-05-11 RX ADMIN — TOCILIZUMAB 400 MG: 20 INJECTION, SOLUTION, CONCENTRATE INTRAVENOUS at 17:14

## 2021-05-11 RX ADMIN — SODIUM CHLORIDE 500 ML: 0.9 INJECTION, SOLUTION INTRAVENOUS at 18:22

## 2021-05-11 RX ADMIN — ALBUTEROL SULFATE 2 PUFF: 90 AEROSOL, METERED RESPIRATORY (INHALATION) at 20:21

## 2021-05-11 RX ADMIN — GUAIFENESIN 600 MG: 600 TABLET ORAL at 22:06

## 2021-05-11 RX ADMIN — DEXAMETHASONE SODIUM PHOSPHATE 6 MG: 4 INJECTION INTRA-ARTICULAR; INTRALESIONAL; INTRAMUSCULAR; INTRAVENOUS; SOFT TISSUE at 14:32

## 2021-05-11 RX ADMIN — Medication 2000 UNITS: at 11:08

## 2021-05-11 NOTE — CONSULTS
Consultation - Infectious Disease   Trever Mao 54 y o  female MRN: [id number]  Unit/Bed#: -01 Encounter: [id number]      IMPRESSION & RECOMMENDATIONS:   1  Acute hypoxic respiratory failure-appears to be secondary to the bilateral pneumonia  patient with increased oxygen requirements to 10 L mid flow  -monitor O2 needs closely  -close follow-up  -continue escalation of COVID-19 protocol as below   -Will give Tocilizumab 400 mg X 1 today  -recheck CBC with diff, CMP, ferritin level, CRP  -supportive care  compliance with proning and wearing NC O2 encouraged     2  Bilateral COVID-19 pneumonia-appears to be viral   In setting of worsening hypoxia with high inflammatory markers  procalcitonin level remains normal x2 and a chest x-ray revealing areas of bilateral consolidation consistent with viral COVID-19 pneumonia  She is quite ill  Ferritin 2,079 > 1,364  CRP 11 5 > 2 6  D-dimer 1 < 1 02  -concur with antibiotics discontinued  -continue IV Decadron D4  -continues Remdesivir D4 of 5  -with progressive hypoxia with increased oxygen needs and high inflammatory markers, patient meets criteria for Tocilizumab     -continue vitamin-C and D, zinc, and atorvastatin   -monitor CBC with diff, CMP, ferritin level, CRP daily  -close respiratory follow-up    3  MYRTLE  Creatinine 1 5 on admission  Creatinine 0 68 now  -volume management per primary care team  -monitor creatinine    4    sarcoidosis and asthma  Was not on any antibiotics prior to hospitalization for this  -follow-up with Pulmonary outpatient recommended    COVID Regimen:  Decadron D4  Remdesivir D4 of 5  Tocilizumab 400 mg IV x 1 5/11/21    I have discussed the above management plan in detail with patient, RN, and the primary service    Extensive review of the medical records in epic including review of the notes, radiographs, and laboratory results     HISTORY OF PRESENT ILLNESS:  Reason for Consult: 1  COVID-19 infection 2   Hypoxia    HPI: Myrna Edmond is a 54y o  year old female with asthma and sarcoidosis who [facility] with shortness of breath, chest discomfort with cough, subjective fever, chills, nausea, vomiting, myalgia, fatigue and loss of appetite for 5-7 days  She visited ED on 05/03/2021 with similar symptoms, but she declined COVID-19 test at that time  Her symptoms were not improving and came back for reevaluation  In the ED, she was afebrile, initial vital signs are stable except her O2 sat 87% on room air  She was put on midflow at 6 L and O2 sat 97%  CRP 11 5, D-dimer 1  Chest x-ray showed bilateral patchy opacities consistent with COVID-19 pneumonia and her 5/8/21 SARS-COV-2 came back +  She was given 1 L IV bolus in ED  She was admitted to the hospital for further management of acute hypoxic respiratory failure in the setting of COVID-19 pneumonia on moderate pathway including Remdesivir and Decadron  However now the patient is requiring increased oxygen requirements of 10-12 L mid flow nasal cannula to maintain saturation in the 90s  Subsequently infectious Disease now being consulted regarding evaluation and management of COVID-19 infection with progressive hypoxia  Patient just went to the bathroom and destats to the 70s  She is back in bed on my arrival but still does not have the nasal cannula O2 back in her nose  She is sluggish, no appetite, full lunch tray bedside table, no nausea, vomiting, diarrhea, no sputum production  REVIEW OF SYSTEMS:  A complete review of systems is negative other than that noted in the HPI      PAST MEDICAL HISTORY:  Past Medical History:   Diagnosis Date    Anxiety     Asthma     Back pain     Sarcoidosis     Vertigo      Past Surgical History:   Procedure Laterality Date    MAMMO (HISTORICAL)  05/02/2018       FAMILY HISTORY:  Non-contributory    SOCIAL HISTORY:  Social History   Social History     Substance and Sexual Activity   Alcohol Use Not Currently    Comment: social     Social History     Substance and Sexual Activity   Drug Use Not Currently    Types: Marijuana     Social History     Tobacco Use   Smoking Status Never Smoker   Smokeless Tobacco Never Used       ALLERGIES:  Allergies   Allergen Reactions    Tegretol [Carbamazepine] Rash       MEDICATIONS:  All current active medications have been reviewed  PHYSICAL EXAM:  Temp:  [98 °F (36 7 °C)-98 7 °F (37 1 °C)] 98 7 °F (37 1 °C)  HR:  [65-89] 89  Resp:  [16-20] 20  BP: ()/(61-67) 97/61  SpO2:  [62 %-99 %] 96 %  Temp (24hrs), Av 4 °F (36 9 °C), Min:98 °F (36 7 °C), Max:98 7 °F (37 1 °C)  Current: Temperature: 98 7 °F (37 1 °C)  No intake or output data in the 24 hours ending 21 1248    General Appearance:  54year old AA female, sluggish and unwell appearing, + work of breathing without NCO2 in nares upon entering room, Replaced NC O2 and stats went back up to 88%   Head:  Normocephalic, without obvious abnormality, atraumatic   Eyes:  Conjunctiva pink and sclera anicteric, both eyes   Nose: Nares normal, mucosa normal, no drainage   Throat: Oropharynx moist without lesions   Neck: Supple, symmetrical, no adenopathy, no tenderness/mass/nodules   Back:   Symmetric, no curvature, ROM normal, no CVA tenderness   Lungs:   Decreased breath sounds throughout fairly clear to auscultation bilaterally, respirations labored without NC, then became easier with return of NC to nares   Chest Wall:  No tenderness or deformity   Heart:  RRR; no murmur, rub or gallop   Abdomen:   Soft, non-tender, non-distended, positive bowel sounds    Extremities: No cyanosis, clubbing or edema, IV site nontender   Skin: No rashes or lesions  No draining wounds noted  Lymph nodes: Cervical, supraclavicular nodes normal   Neurologic: Alert and oriented times 3, extremity strength 5/5 and symmetric       LABS, IMAGING, & OTHER STUDIES:  Lab Results:  I have personally reviewed pertinent labs    Results from last 7 days   Lab Units 05/10/21  0541 05/09/21  0454 05/08/21  1234   WBC Thousand/uL 7 82 5 69 8 77   HEMOGLOBIN g/dL 11 6 12 0 13 5   PLATELETS Thousands/uL 440* 376 351     Results from last 7 days   Lab Units 05/11/21  0455 05/10/21  0943 05/09/21  0454 05/08/21  1234   SODIUM mmol/L 142 141 140 141   POTASSIUM mmol/L 4 9 4 9 4 8 5 0   CHLORIDE mmol/L 110* 110* 105 104   CO2 mmol/L 24 24 24 27   BUN mg/dL 33* 45* 59* 52*   CREATININE mg/dL 0 68 0 83 1 25* 1 53*   EGFR ml/min/1 73sq m 114 92 56 44   CALCIUM mg/dL 9 4 9 1 9 4 10 0   AST U/L  --   --  55* 75*   ALT U/L  --   --  44 57*   ALK PHOS U/L  --   --  51 3 58 3     Results from last 7 days   Lab Units 05/08/21  1246 05/08/21  1234   BLOOD CULTURE  No Growth at 48 hrs  No Growth at 48 hrs  Results from last 7 days   Lab Units 05/09/21  0454 05/08/21  1234   PROCALCITONIN ng/ml <0 05 0 09     Results from last 7 days   Lab Units 05/11/21  0455 05/09/21  0454 05/08/21  1234   CRP mg/L 2 6* 8 8* 11 5*     Results from last 7 days   Lab Units 05/11/21  0455 05/09/21  0454 05/08/21  1234   FERRITIN ng/mL 1,364* 1,842* 2,079*     Results from last 7 days   Lab Units 05/11/21  0455 05/10/21  0541 05/09/21  0454 05/08/21  1234   D-DIMER QUANTITATIVE mg/L FEU 1 02* 0 91* 0 73* 1 00*       Imaging Studies:   05/09/2021 portable chest x-ray:  Patchy opacification in both lower lobes    Other Studies:   I have personally reviewed pertinent reports

## 2021-05-11 NOTE — PROGRESS NOTES
Sherine U  66   Progress Note - Lux Furlong 1965, 54 y o  female MRN: [id number]  Unit/Bed#: -01 Encounter: [id number]  Primary Care Provider: FRED Carranza   Date and time admitted to hospital: 5/8/2021 12:08 PM    * Acute hypoxemic respiratory failure due to COVID-19 [facility])  Assessment & Plan  · Hypoxemic respiratory failure evidenced by COVID infection and VBG showing decreased oxygenation and normal CO2  · Presented with URI symptoms for 5-7 days  · Visited ED on 05/03/2021 with similar symptoms at that time refused to get COVID-19 test  · COVID-19 test +ve 01/05/2021  · Elevated BUN Cr and transaminitis possibly 2/2 COVID which has improved  · Inflammatory markers improving  D-dimer of 1    · CXR showing B/L ground-glass opacities  · Worsening hypoxia needing 11 L via NC mid flow overnight  · COVID-19 moderate pathway  · Will discontinue antibiotic considering procalcitonin x2-ve  · Continue remdesivir and steroids on day 4  · Incentive spirometry, self proning  · Trend inflammatory markers  · Continue oxygen supplementation to maintain O2 sat > 90  · Start albuterol inhaler 4 times daily  · Follow-up QuantiFERON TB gold test  · Respiratory protocol  · Trend D-dimer  · Will talk to ID regarding giving Actemra    MYRTLE (acute kidney injury) ([facility] [address] )  Assessment & Plan  Cr 1 5 on admission  Baseline unknown  Resolved with IV fluid  Prerenal most likely 2/2 poor oral intake from COVID-19 infection  Will hold further IVF  Pneumonia due to COVID-19 virus  Assessment & Plan  See plan for COVID-19 acute hypoxic respiratory failure  Sarcoidosis  Assessment & Plan  · Stable  Not on any medications  · She is not compliant with pulmonology follow-up  · Currently on steroids given COVID-19 pneumonia    Hypercholesterolemia  Assessment & Plan  · Lipid panel showing elevated LDL  · Not taking atorvastatin at home    · Continue statin as per COVID-19 protocol, will need to continue statin at home  Moderate persistent asthma without complication  Assessment & Plan  · Takes albuterol and Symbicort at home  · Lungs clear on examination with no wheezing  · Continue steroids as above along with albuterol inhaler scheduled        VTE Pharmacologic Prophylaxis:   Pharmacologic: Enoxaparin (Lovenox)  Mechanical VTE Prophylaxis in Place: Yes    Discussions with Specialists or Other Care Team Provider:  Id regarding giving Tocilizumab    Education and Discussions with Family / Patient:  Rizwana Dorado to call brother Kirk Lopez  Unable to connect  Will try again later    Current Length of Stay: 3 day(s)    Current Patient Status: Inpatient     Discharge Plan / Estimated Discharge Date:  Needs further stay due to hypoxic respiratory failure from COVID-19 and on mid flow oxygen    Code Status: Level 1 - Full Code      Subjective:   Patient was seen and examined by me at bedside  Communicated clearly  No particular overnight event reported  Hemodynamically stable and afebrile  States improvement in fatigue, myalgia  Did complain of 1 episode of crampy abdominal pain which relieved on its own  Otherwise feeling overall well  States improvement  Still needing 11 L mid flow oxygen  Objective:     Vitals:   Temp (24hrs), Av 4 °F (36 9 °C), Min:98 °F (36 7 °C), Max:98 7 °F (37 1 °C)    Temp:  [98 °F (36 7 °C)-98 7 °F (37 1 °C)] 98 7 °F (37 1 °C)  HR:  [65-89] 89  Resp:  [16-20] 20  BP: ()/(61-67) 97/61  SpO2:  [62 %-99 %] 91 %  Body mass index is 28 28 kg/m²  Input and Output Summary (last 24 hours):     No intake or output data in the 24 hours ending 21 1134    Physical Exam:     Physical Exam  Vitals signs reviewed  Constitutional:       General: She is in acute distress  Appearance: She is well-developed  She is ill-appearing  She is not toxic-appearing  Cardiovascular:      Rate and Rhythm: Normal rate and regular rhythm  Pulses: Normal pulses        Heart sounds: Normal heart sounds  Pulmonary:      Effort: Respiratory distress present  Breath sounds: Normal breath sounds  No wheezing  Comments: Diminished breath sound bilaterally present  Chest:      Chest wall: No tenderness  Abdominal:      General: Bowel sounds are normal  There is no distension  Palpations: Abdomen is soft  Tenderness: There is no abdominal tenderness  Musculoskeletal:      Right lower leg: No edema  Left lower leg: No edema  Skin:     General: Skin is warm  Coloration: Skin is not pale  Neurological:      General: No focal deficit present  Mental Status: She is alert and oriented to person, place, and time  Mental status is at baseline  Psychiatric:         Mood and Affect: Mood normal          Behavior: Behavior normal        Additional Data:     Labs:    Results from last 7 days   Lab Units 05/10/21  0541   WBC Thousand/uL 7 82   HEMOGLOBIN g/dL 11 6   HEMATOCRIT % 36 5   PLATELETS Thousands/uL 440*   LYMPHO PCT % 17   MONO PCT % 12   EOS PCT % 0     Results from last 7 days   Lab Units 05/11/21  0455  05/09/21  0454   POTASSIUM mmol/L 4 9   < > 4 8   CHLORIDE mmol/L 110*   < > 105   CO2 mmol/L 24   < > 24   BUN mg/dL 33*   < > 59*   CREATININE mg/dL 0 68   < > 1 25*   CALCIUM mg/dL 9 4   < > 9 4   ALK PHOS U/L  --   --  51 3   ALT U/L  --   --  44   AST U/L  --   --  55*    < > = values in this interval not displayed  Results from last 7 days   Lab Units 05/08/21  1234   INR  0 91       * I Have Reviewed All Lab Data Listed Above  * Additional Pertinent Lab Tests Reviewed: [facility]    Imaging:  XR chest 1 view portable   ED Interpretation by Eric Sutherland PA-C (05/08 1304)   Bilateral infiltrates consistent with COVID-19      Final Result by Tee Benito MD (05/09 1116)      Patchy opacification in both lower lobes, most likely bilateral pneumonia                    Workstation performed: [id number]            Imaging Reports Reviewed Today Include:  CXR  Imaging Personally Reviewed by Myself Includes:  Same as above    Recent Cultures (last 7 days):     Results from last 7 days   Lab Units 05/08/21  1246 05/08/21  1234   BLOOD CULTURE  No Growth at 48 hrs  No Growth at 48 hrs  Last 24 Hours Medication List:   Current Facility-Administered Medications   Medication Dose Route Frequency Provider Last Rate    acetaminophen  650 mg Oral Q6H PRN Ethan Corbin MD      albuterol  2 puff Inhalation 4x Daily Chapo Sapp MD      ascorbic acid  1,000 mg Oral Q12H [address] Ethan Corbin MD      atorvastatin  40 mg Oral Daily Ethan Corbin MD      benzonatate  100 mg Oral Q8H Ethan Corbin MD      cholecalciferol  2,000 Units Oral Daily Ethan Corbin MD      dexamethasone  6 mg Intravenous Q24H Ethan Corbin MD      enoxaparin  30 mg Subcutaneous Q12H [address] Ethan Corbin MD      famotidine  20 mg Oral Daily Ethan Corbin MD      guaiFENesin  600 mg Oral Q12H [address] Analia Mann MD      insulin lispro  1-5 Units Subcutaneous TID AC Analia Mann MD      zinc sulfate  220 mg Oral Daily Ethan Corbin MD      Followed by   Maricela Harada ON 5/15/2021] multivitamin-minerals  1 tablet Oral Daily Ethan Corbin MD      ondansetron  4 mg Intravenous Q6H PRN Ethan Corbin MD      remdesivir  100 mg Intravenous Q24H Ethan Corbin MD          Today, Patient Was Seen By: Analia Mann MD    ** Please Note: This note has been constructed using a voice recognition system   **

## 2021-05-11 NOTE — PLAN OF CARE
Problem: INFECTION - ADULT  Goal: Absence or prevention of progression during hospitalization  Description: INTERVENTIONS:  - Assess and monitor for signs and symptoms of infection  - Monitor lab/diagnostic results  - Monitor all insertion sites, i e  indwelling lines, tubes, and drains  - Administer medications as ordered  - Instruct and encourage patient and family to use good hand hygiene technique  - Identify and instruct in appropriate isolation precautions for identified infection/condition  Outcome: Progressing     Problem: SAFETY ADULT  Goal: Patient will remain free of falls  Description: INTERVENTIONS:  - Assess patient frequently for physical needs  -  Identify cognitive and physical deficits and behaviors that affect risk of falls    -  [place] fall precautions as indicated by assessment   - Educate patient/family on patient safety including physical limitations  - Instruct patient to call for assistance with activity based on assessment  - Modify environment to reduce risk of injury  - Consider OT/PT consult to assist with strengthening/mobility  Outcome: Progressing  Goal: Maintain or return to baseline ADL function  Description: INTERVENTIONS:  -  Assess patient's ability to carry out ADLs; assess patient's baseline for ADL function and identify physical deficits which impact ability to perform ADLs (bathing, care of mouth/teeth, toileting, grooming, dressing, etc )  - Assess/evaluate cause of self-care deficits   - Assess range of motion  - Assess patient's mobility; develop plan if impaired  - Assess patient's need for assistive devices and provide as appropriate  - Encourage maximum independence but intervene and supervise when necessary  - Involve family in performance of ADLs  - Assess for home care needs following discharge   - Consider OT consult to assist with ADL evaluation and planning for discharge  - Provide patient education as appropriate  Outcome: Progressing  Goal: Maintain or return mobility status to optimal level  Description: INTERVENTIONS:  - Assess patient's baseline mobility status (ambulation, transfers, stairs, etc )    - Identify cognitive and physical deficits and behaviors that affect mobility  - Identify mobility aids required to assist with transfers and/or ambulation (gait belt, sit-to-stand, lift, walker, cane, etc )  - [place] fall precautions as indicated by assessment  - Record patient progress and toleration of activity level on Mobility SBAR; progress patient to next Phase/Stage  - Instruct patient to call for assistance with activity based on assessment  - Consider rehabilitation consult to assist with strengthening/weightbearing, etc   Outcome: Progressing     Problem: DISCHARGE PLANNING  Goal: Discharge to home or other facility with appropriate resources  Description: INTERVENTIONS:  - Identify barriers to discharge w/patient and caregiver  - Arrange for needed discharge resources and transportation as appropriate  - Identify discharge learning needs (meds, wound care, etc )  - Arrange for interpretive services to assist at discharge as needed  - Refer to Case Management Department for coordinating discharge planning if the patient needs post-hospital services based on physician/advanced practitioner order or complex needs related to functional status, cognitive ability, or social support system  Outcome: Progressing     Problem: RESPIRATORY - ADULT  Goal: Achieves optimal ventilation and oxygenation  Description: INTERVENTIONS:  - Assess for changes in respiratory status  - Assess for changes in mentation and behavior  - Position to facilitate oxygenation and minimize respiratory effort  - Oxygen administered by appropriate delivery if ordered  - Initiate smoking cessation education as indicated  - Encourage broncho-pulmonary hygiene including cough, deep breathe, Incentive Spirometry  - Assess the need for suctioning and aspirate as needed  - Assess and instruct to report SOB or any respiratory difficulty  - Respiratory Therapy support as indicated  Outcome: Progressing     Problem: Potential for Falls  Goal: Patient will remain free of falls  Description: INTERVENTIONS:  - Assess patient frequently for physical needs  -  Identify cognitive and physical deficits and behaviors that affect risk of falls    -  [place] fall precautions as indicated by assessment   - Educate patient/family on patient safety including physical limitations  - Instruct patient to call for assistance with activity based on assessment  - Modify environment to reduce risk of injury  - Consider OT/PT consult to assist with strengthening/mobility  Outcome: Progressing

## 2021-05-11 NOTE — CASE MANAGEMENT
CM attempted to open with pt however pt requested CM call back at a later time  CM to call pt back to do open assessment

## 2021-05-11 NOTE — CASE MANAGEMENT
CM attempted to do intake assessment with pt however pt did not answer the her room phone or cell phone  [place] unable to leave message due to full mailbox  CM to attempt to open pt another time

## 2021-05-12 LAB
ANION GAP SERPL CALCULATED.3IONS-SCNC: 8 MMOL/L (ref 4–13)
BUN SERPL-MCNC: 26 MG/DL (ref 6–20)
CALCIUM SERPL-MCNC: 9.3 MG/DL (ref 8.4–10.2)
CHLORIDE SERPL-SCNC: 109 MMOL/L (ref 96–108)
CO2 SERPL-SCNC: 24 MMOL/L (ref 22–33)
CREAT SERPL-MCNC: 0.65 MG/DL (ref 0.4–1.1)
GFR SERPL CREATININE-BSD FRML MDRD: 116 ML/MIN/1.73SQ M
GLUCOSE SERPL-MCNC: 104 MG/DL (ref 65–140)
GLUCOSE SERPL-MCNC: 122 MG/DL (ref 65–140)
GLUCOSE SERPL-MCNC: 129 MG/DL (ref 65–140)
GLUCOSE SERPL-MCNC: 87 MG/DL (ref 65–140)
IL6 SERPL-MCNC: 61.3 PG/ML (ref 0–13)
IL6 SERPL-MCNC: 8.2 PG/ML (ref 0–13)
POTASSIUM SERPL-SCNC: 4.4 MMOL/L (ref 3.5–5)
SODIUM SERPL-SCNC: 141 MMOL/L (ref 133–145)

## 2021-05-12 PROCEDURE — 80048 BASIC METABOLIC PNL TOTAL CA: CPT | Performed by: INTERNAL MEDICINE

## 2021-05-12 PROCEDURE — 94760 N-INVAS EAR/PLS OXIMETRY 1: CPT

## 2021-05-12 PROCEDURE — 99232 SBSQ HOSP IP/OBS MODERATE 35: CPT | Performed by: INTERNAL MEDICINE

## 2021-05-12 PROCEDURE — 82948 REAGENT STRIP/BLOOD GLUCOSE: CPT

## 2021-05-12 PROCEDURE — 94640 AIRWAY INHALATION TREATMENT: CPT

## 2021-05-12 PROCEDURE — 99233 SBSQ HOSP IP/OBS HIGH 50: CPT | Performed by: INTERNAL MEDICINE

## 2021-05-12 RX ORDER — MECLIZINE HCL 12.5 MG/1
12.5 TABLET ORAL EVERY 8 HOURS PRN
Status: DISCONTINUED | OUTPATIENT
Start: 2021-05-12 | End: 2021-05-15 | Stop reason: HOSPADM

## 2021-05-12 RX ORDER — ONDANSETRON 2 MG/ML
4 INJECTION INTRAMUSCULAR; INTRAVENOUS ONCE
Status: COMPLETED | OUTPATIENT
Start: 2021-05-12 | End: 2021-05-12

## 2021-05-12 RX ORDER — MECLIZINE HYDROCHLORIDE 25 MG/1
25 TABLET ORAL ONCE
Status: COMPLETED | OUTPATIENT
Start: 2021-05-12 | End: 2021-05-12

## 2021-05-12 RX ORDER — IBUPROFEN 600 MG/1
600 TABLET ORAL ONCE
Status: COMPLETED | OUTPATIENT
Start: 2021-05-12 | End: 2021-05-12

## 2021-05-12 RX ADMIN — MECLIZINE HYDROCHLORIDE 25 MG: 25 TABLET ORAL at 06:57

## 2021-05-12 RX ADMIN — ALBUTEROL SULFATE 2 PUFF: 90 AEROSOL, METERED RESPIRATORY (INHALATION) at 20:21

## 2021-05-12 RX ADMIN — IBUPROFEN 600 MG: 600 TABLET ORAL at 07:07

## 2021-05-12 RX ADMIN — BENZONATATE 100 MG: 100 CAPSULE ORAL at 21:52

## 2021-05-12 RX ADMIN — REMDESIVIR 100 MG: 100 INJECTION, POWDER, LYOPHILIZED, FOR SOLUTION INTRAVENOUS at 16:26

## 2021-05-12 RX ADMIN — GUAIFENESIN 600 MG: 600 TABLET ORAL at 08:13

## 2021-05-12 RX ADMIN — Medication 2000 UNITS: at 08:13

## 2021-05-12 RX ADMIN — OXYCODONE HYDROCHLORIDE AND ACETAMINOPHEN 1000 MG: 500 TABLET ORAL at 08:13

## 2021-05-12 RX ADMIN — ALBUTEROL SULFATE 2 PUFF: 90 AEROSOL, METERED RESPIRATORY (INHALATION) at 12:34

## 2021-05-12 RX ADMIN — FAMOTIDINE 20 MG: 20 TABLET ORAL at 08:13

## 2021-05-12 RX ADMIN — OXYCODONE HYDROCHLORIDE AND ACETAMINOPHEN 1000 MG: 500 TABLET ORAL at 21:52

## 2021-05-12 RX ADMIN — ATORVASTATIN CALCIUM 40 MG: 40 TABLET, FILM COATED ORAL at 08:13

## 2021-05-12 RX ADMIN — GUAIFENESIN 600 MG: 600 TABLET ORAL at 21:52

## 2021-05-12 RX ADMIN — ENOXAPARIN SODIUM 30 MG: 30 INJECTION SUBCUTANEOUS at 08:13

## 2021-05-12 RX ADMIN — ZINC SULFATE 220 MG (50 MG) CAPSULE 220 MG: CAPSULE at 08:13

## 2021-05-12 RX ADMIN — ONDANSETRON 4 MG: 2 INJECTION INTRAMUSCULAR; INTRAVENOUS at 06:58

## 2021-05-12 RX ADMIN — ENOXAPARIN SODIUM 30 MG: 30 INJECTION SUBCUTANEOUS at 21:52

## 2021-05-12 RX ADMIN — BENZONATATE 100 MG: 100 CAPSULE ORAL at 04:34

## 2021-05-12 RX ADMIN — ALBUTEROL SULFATE 2 PUFF: 90 AEROSOL, METERED RESPIRATORY (INHALATION) at 08:20

## 2021-05-12 RX ADMIN — DOCUSATE SODIUM AND SENNOSIDES 1 TABLET: 8.6; 5 TABLET, FILM COATED ORAL at 21:52

## 2021-05-12 RX ADMIN — ACETAMINOPHEN 650 MG: 325 TABLET, FILM COATED ORAL at 05:23

## 2021-05-12 RX ADMIN — ALBUTEROL SULFATE 2 PUFF: 90 AEROSOL, METERED RESPIRATORY (INHALATION) at 16:08

## 2021-05-12 RX ADMIN — DEXAMETHASONE SODIUM PHOSPHATE 6 MG: 4 INJECTION INTRA-ARTICULAR; INTRALESIONAL; INTRAMUSCULAR; INTRAVENOUS; SOFT TISSUE at 14:26

## 2021-05-12 RX ADMIN — BENZONATATE 100 MG: 100 CAPSULE ORAL at 14:26

## 2021-05-12 NOTE — PLAN OF CARE
Problem: INFECTION - ADULT  Goal: Absence or prevention of progression during hospitalization  Description: INTERVENTIONS:  - Assess and monitor for signs and symptoms of infection  - Monitor lab/diagnostic results  - Monitor all insertion sites, i e  indwelling lines, tubes, and drains  - Monitor endotracheal if appropriate and nasal secretions for changes in amount and color  - [place] appropriate cooling/warming therapies per order  - Administer medications as ordered  - Instruct and encourage patient and family to use good hand hygiene technique  - Identify and instruct in appropriate isolation precautions for identified infection/condition  Outcome: Progressing     Problem: SAFETY ADULT  Goal: Patient will remain free of falls  Description: INTERVENTIONS:  - Assess patient frequently for physical needs  -  Identify cognitive and physical deficits and behaviors that affect risk of falls    -  [place] fall precautions as indicated by assessment   - Educate patient/family on patient safety including physical limitations  - Instruct patient to call for assistance with activity based on assessment  - Modify environment to reduce risk of injury  - Consider OT/PT consult to assist with strengthening/mobility  Outcome: Progressing  Goal: Maintain or return to baseline ADL function  Description: INTERVENTIONS:  -  Assess patient's ability to carry out ADLs; assess patient's baseline for ADL function and identify physical deficits which impact ability to perform ADLs (bathing, care of mouth/teeth, toileting, grooming, dressing, etc )  - Assess/evaluate cause of self-care deficits   - Assess range of motion  - Assess patient's mobility; develop plan if impaired  - Assess patient's need for assistive devices and provide as appropriate  - Encourage maximum independence but intervene and supervise when necessary  - Involve family in performance of ADLs  - Assess for home care needs following discharge   - Consider OT consult to assist with ADL evaluation and planning for discharge  - Provide patient education as appropriate  Outcome: Progressing  Goal: Maintain or return mobility status to optimal level  Description: INTERVENTIONS:  - Assess patient's baseline mobility status (ambulation, transfers, stairs, etc )    - Identify cognitive and physical deficits and behaviors that affect mobility  - Identify mobility aids required to assist with transfers and/or ambulation (gait belt, sit-to-stand, lift, walker, cane, etc )  - [place] fall precautions as indicated by assessment  - Record patient progress and toleration of activity level on Mobility SBAR; progress patient to next Phase/Stage  - Instruct patient to call for assistance with activity based on assessment  - Consider rehabilitation consult to assist with strengthening/weightbearing, etc   Outcome: Progressing     Problem: DISCHARGE PLANNING  Goal: Discharge to home or other facility with appropriate resources  Description: INTERVENTIONS:  - Identify barriers to discharge w/patient and caregiver  - Arrange for needed discharge resources and transportation as appropriate  - Identify discharge learning needs (meds, wound care, etc )  - Arrange for interpretive services to assist at discharge as needed  - Refer to Case Management Department for coordinating discharge planning if the patient needs post-hospital services based on physician/advanced practitioner order or complex needs related to functional status, cognitive ability, or social support system  Outcome: Progressing     Problem: RESPIRATORY - ADULT  Goal: Achieves optimal ventilation and oxygenation  Description: INTERVENTIONS:  - Assess for changes in respiratory status  - Assess for changes in mentation and behavior  - Position to facilitate oxygenation and minimize respiratory effort  - Oxygen administered by appropriate delivery if ordered  - Initiate smoking cessation education as indicated  - Encourage broncho-pulmonary hygiene including cough, deep breathe, Incentive Spirometry  - Assess the need for suctioning and aspirate as needed  - Assess and instruct to report SOB or any respiratory difficulty  - Respiratory Therapy support as indicated  Outcome: Progressing

## 2021-05-12 NOTE — PROGRESS NOTES
Progress Note - Infectious Disease   Don Later 54 y o  female MRN: [id number]  Unit/Bed#: -01 Encounter: [id number]      Impression/Plan:  1  Acute hypoxic respiratory failure-appears to be secondary to the bilateral pneumonia    patient with increased oxygen requirements to 10 L mid flow  -monitor O2 needs closely  -close follow-up  -continue severe COVID-19 protocol pathway as below   -s/pTocilizumab 400 mg X 1 5/11/21  -monitor CBC with diff, CMP, ferritin level, CRP  -supportive care  -compliance with proning and wearing NC O2 encouraged     2  Bilateral COVID-19 pneumonia-appears to be viral   In setting of worsening hypoxia with high inflammatory markers  procalcitonin level remains normal x2 and a chest x-ray revealing areas of bilateral consolidation consistent with viral COVID-19 pneumonia  She is quite ill  Ferritin 2,079 > 1,364  CRP 11 5 > 2 6  D-dimer 1 < 1 02  -concur with monitoring off antibiotics   -continue IV Decadron D5  -continue Remdesivir D5 of 5  -with progressive hypoxia with increased oxygen needs and high inflammatory markers, patient met criteria for Tocilizumab; 1 dose given 5/11/21  -continue vitamin-C and D, zinc, and atorvastatin   -monitor CBC with diff, CMP, ferritin level, CRP daily  -close respiratory follow-up     3   MYRTLE  Creatinine 1 5 on admission  Creatinine 0 65 now  -volume management per primary care team  -monitor creatinine     4    sarcoidosis and asthma  Was not on any antibiotics prior to hospitalization for this  -follow-up with Pulmonary outpatient recommended     COVID Regimen:  Decadron D5  Remdesivir D5 of 5  Tocilizumab 400 mg IV x 1 5/11/21     I have discussed the above management plan in detail with patient, RN, and Dr Aurora Marquez  Subjective:  Patient has no fever spike, chills, sweats overnight; no nausea, vomiting, diarrhea; no increased cough, shortness of breath  No appetite   Doesn't offer much conversation  Objective:  Vitals:  Temp:  [97 5 °F (36 4 °C)-99 3 °F (37 4 °C)] 98 °F (36 7 °C)  HR:  [56-98] 62  Resp:  [16-33] 16  BP: ()/(47-86) 133/75  SpO2:  [90 %-100 %] 95 %  Temp (24hrs), Av 4 °F (36 9 °C), Min:97 5 °F (36 4 °C), Max:99 3 °F (37 4 °C)  Current: Temperature: 98 °F (36 7 °C)    Physical Exam:   General Appearance:  Alert, sluggish, nontoxic, no acute distress resting in bed with HBO elevated 60 degrees and supine at moment statting 97% on 8L without obvious work of breath  Throat: Oropharynx moist without lesions  Lungs:   Fairly clear decreased breath sounds to auscultation bilaterally; respirations unlabored at rest and with short answers   Heart:  RRR; no murmur   Abdomen:   Soft, non-tender, non-distended, positive bowel sounds  Extremities: No clubbing, cyanosis or edema   : No holly, no SPT   Skin: No new rashes or lesions  IV site nontender  No draining wounds noted  Labs, Imaging, & Other studies:   All pertinent labs and imaging studies were personally reviewed  Results from last 7 days   Lab Units 05/10/21  0541 21  0454 21  1234   WBC Thousand/uL 7 82 5 69 8 77   HEMOGLOBIN g/dL 11 6 12 0 13 5   PLATELETS Thousands/uL 440* 376 351     Results from last 7 days   Lab Units 21  0443 21  0455 05/10/21  0943 21  0454 21  1234   SODIUM mmol/L 141 142 141 140 141   POTASSIUM mmol/L 4 4 4 9 4 9 4 8 5 0   CHLORIDE mmol/L 109* 110* 110* 105 104   CO2 mmol/L 24 24 24 24 27   BUN mg/dL 26* 33* 45* 59* 52*   CREATININE mg/dL 0 65 0 68 0 83 1 25* 1 53*   EGFR ml/min/1 73sq m 116 114 92 56 44   CALCIUM mg/dL 9 3 9 4 9 1 9 4 10 0   AST U/L  --   --   --  55* 75*   ALT U/L  --   --   --  44 57*   ALK PHOS U/L  --   --   --  51 3 58 3     Results from last 7 days   Lab Units 21  1246 21  1234   BLOOD CULTURE  No Growth at 72 hrs  No Growth at 72 hrs       Results from last 7 days   Lab Units 21  0454 21  1234 PROCALCITONIN ng/ml <0 05 0 09     Results from last 7 days   Lab Units 05/11/21  0455 05/09/21  0454 05/08/21  1234   CRP mg/L 2 6* 8 8* 11 5*     Results from last 7 days   Lab Units 05/11/21  0455 05/09/21  0454 05/08/21  1234   FERRITIN ng/mL 1,364* 1,842* 2,079*     Results from last 7 days   Lab Units 05/11/21  0455 05/10/21  0541 05/09/21  0454 05/08/21  1234   D-DIMER QUANTITATIVE mg/L FEU 1 02* 0 91* 0 73* 1 00*

## 2021-05-12 NOTE — PROGRESS NOTES
Sherine U  66   Progress Note - Alfred Gutierrez 1965, 54 y o  female MRN: [id number]  Unit/Bed#: -01 Encounter: [id number]  Primary Care Provider: FRED Matson   Date and time admitted to hospital: 5/8/2021 12:08 PM    * Acute hypoxemic respiratory failure due to COVID-19 [facility])  Assessment & Plan  · 2/2 COVID infection  · Presented with URI symptoms for 5-7 days  · Visited ED on 05/03/2021 with similar symptoms at that time refused to get COVID-19 test  · COVID-19 test +ve 01/05/2021  · Elevated BUN Cr and transaminitis possibly 2/2 COVID which has improved  · Inflammatory markers improving  D-dimer of 1    · CXR showing B/L ground-glass opacities  · Improving hypoxia on 8 L via NC mid flow today  · COVID-19 moderate pathway  · Will discontinue antibiotic considering procalcitonin x2-ve  · Continue steroids day 5  · Discontinue remdesivir as Pt received tocilizumab yesterday  · On bowel regimen  · Incentive spirometry, self proning  · Trend inflammatory markers  · Continue oxygen supplementation to maintain O2 sat > 90  · Start albuterol inhaler 4 times daily  · Follow-up QuantiFERON TB gold test  · Respiratory protocol  · Trend D-dimer    MYRTLE (acute kidney injury) ([facility] [address] )  Assessment & Plan  Cr 1 5 on admission  Baseline unknown  Resolved with IV fluid  Prerenal most likely 2/2 poor oral intake from COVID-19 infection  Will hold further IVF  Pneumonia due to COVID-19 virus  Assessment & Plan  See plan for COVID-19 acute hypoxic respiratory failure  Sarcoidosis  Assessment & Plan  · Stable  Not on any medications  · She is not compliant with pulmonology follow-up  · Currently on steroids given COVID-19 pneumonia    Hypercholesterolemia  Assessment & Plan  · Lipid panel showing elevated LDL  · Not taking atorvastatin at home  · Continue statin as per COVID-19 protocol, will need to continue statin at home      Moderate persistent asthma without complication  Assessment & Plan  · Takes albuterol and Symbicort at home  · Lungs clear on examination with no wheezing  · Continue steroids as above along with albuterol inhaler scheduled        VTE Pharmacologic Prophylaxis:   Pharmacologic: Enoxaparin (Lovenox)  Mechanical VTE Prophylaxis in Place: Yes    Discussions with Specialists or Other Care Team Provider:  Id regarding giving Tocilizumab    Education and Discussions with Family / Patient:  Tried to call pressure care in however was not able to connect  Try to leave was pain but was full  Current Length of Stay: 4 day(s)    Current Patient Status: Inpatient     Discharge Plan / Estimated Discharge Date:  Needs further stay due to hypoxic respiratory failure from COVID-19 and on mid flow oxygen    Code Status: Level 1 - Full Code      Subjective:   Patient was seen and examined by me at bedside  Communicated clearly  No particular overnight event reported  Hemodynamically stable and afebrile  Complained of dizziness and nausea last night  States having lightheadedness along with room spinning sensation  H/o vertigo  Was given meclizine and Zofran which improved her symptoms  Objective:     Vitals:   Temp (24hrs), Av 4 °F (36 9 °C), Min:97 5 °F (36 4 °C), Max:99 3 °F (37 4 °C)    Temp:  [97 5 °F (36 4 °C)-99 3 °F (37 4 °C)] 97 5 °F (36 4 °C)  HR:  [56-98] 56  Resp:  [16-33] 16  BP: ()/(47-86) 143/64  SpO2:  [90 %-100 %] 96 %  Body mass index is 28 28 kg/m²  Input and Output Summary (last 24 hours): Intake/Output Summary (Last 24 hours) at 2021 1114  Last data filed at 2021 0447  Gross per 24 hour   Intake 700 ml   Output 960 ml   Net -260 ml       Physical Exam:     Physical Exam  Vitals signs reviewed  Constitutional:       General: She is in acute distress  Appearance: She is well-developed  She is ill-appearing  She is not toxic-appearing     Cardiovascular:      Rate and Rhythm: Normal rate and regular rhythm  Pulses: Normal pulses  Heart sounds: Normal heart sounds  Pulmonary:      Effort: Respiratory distress present  Breath sounds: Normal breath sounds  No wheezing  Comments: Diminished breath sound bilaterally present  Chest:      Chest wall: No tenderness  Abdominal:      General: Bowel sounds are normal  There is no distension  Palpations: Abdomen is soft  Tenderness: There is no abdominal tenderness  Musculoskeletal:      Right lower leg: No edema  Left lower leg: No edema  Skin:     General: Skin is warm  Coloration: Skin is not pale  Neurological:      General: No focal deficit present  Mental Status: She is alert and oriented to person, place, and time  Mental status is at baseline  Psychiatric:         Mood and Affect: Mood normal          Behavior: Behavior normal        Additional Data:     Labs:    Results from last 7 days   Lab Units 05/10/21  0541   WBC Thousand/uL 7 82   HEMOGLOBIN g/dL 11 6   HEMATOCRIT % 36 5   PLATELETS Thousands/uL 440*   LYMPHO PCT % 17   MONO PCT % 12   EOS PCT % 0     Results from last 7 days   Lab Units 05/12/21  0443  05/09/21  0454   POTASSIUM mmol/L 4 4   < > 4 8   CHLORIDE mmol/L 109*   < > 105   CO2 mmol/L 24   < > 24   BUN mg/dL 26*   < > 59*   CREATININE mg/dL 0 65   < > 1 25*   CALCIUM mg/dL 9 3   < > 9 4   ALK PHOS U/L  --   --  51 3   ALT U/L  --   --  44   AST U/L  --   --  55*    < > = values in this interval not displayed  Results from last 7 days   Lab Units 05/08/21  1234   INR  0 91       * I Have Reviewed All Lab Data Listed Above  * Additional Pertinent Lab Tests Reviewed:  [facility]    Imaging:  XR chest 1 view portable   ED Interpretation by Lawanda Martinez PA-C (05/08 1304)   Bilateral infiltrates consistent with COVID-19      Final Result by Leonila Viera MD (05/09 1116)      Patchy opacification in both lower lobes, most likely bilateral pneumonia  Workstation performed: [id number]            Imaging Reports Reviewed Today Include:  CXR  Imaging Personally Reviewed by Myself Includes:  Same as above    Recent Cultures (last 7 days):     Results from last 7 days   Lab Units 05/08/21  1246 05/08/21  1234   BLOOD CULTURE  No Growth at 72 hrs  No Growth at 72 hrs  Last 24 Hours Medication List:   Current Facility-Administered Medications   Medication Dose Route Frequency Provider Last Rate    acetaminophen  650 mg Oral Q6H PRN Angel Middleton MD      albuterol  2 puff Inhalation 4x Daily Endy Joshi MD      ascorbic acid  1,000 mg Oral Q12H [facility] Angel Middleton MD      atorvastatin  40 mg Oral Daily Angel Middleton MD      benzonatate  100 mg Oral Q8H Angel Middleton MD      cholecalciferol  2,000 Units Oral Daily Angel Middleton MD      dexamethasone  6 mg Intravenous Q24H Angel Middleton MD      enoxaparin  30 mg Subcutaneous Q12H [facility] Angel Middleton MD      famotidine  20 mg Oral Daily Agnel Middleton MD      guaiFENesin  600 mg Oral Q12H [facility] Jose Juan Silveira MD      insulin lispro  1-5 Units Subcutaneous TID AC Jose Juan Silveira MD      zinc sulfate  220 mg Oral Daily Angel Middleton MD      Followed by   Tramaine Mercado ON 5/15/2021] multivitamin-minerals  1 tablet Oral Daily Ei MD Gideon      ondansetron  4 mg Intravenous Q6H PRN Angel Middleton MD      polyethylene glycol  17 g Oral Daily PRN Jose Juan Silveira MD      senna-docusate sodium  1 tablet Oral HS Jose Juan Silveira MD      sodium chloride  1 spray Each Nare Q1H PRN Danika Saunders MD          Today, Patient Was Seen By: Jose Juan Silveira MD    ** Please Note: This note has been constructed using a voice recognition system   **

## 2021-05-12 NOTE — RESPIRATORY THERAPY NOTE
Respiratory Care      05/12/21 1613   Inhalation Therapy Tx   $ Inhalation Therapy Performed Yes   $ Pulse Oximetry Spot Check Charge Completed   SpO2 94 %   Pre-Treatment Pulse 63   Pre-Treatment Respirations 23   Breath Sounds Pre-Treatment Bilateral Diminished   Breath Sounds Post-Treatment Bilateral Diminished   Post-Treatment Pulse 64   Delivery Source MDI   Position High Hammer's   Treatment Tolerance Tolerated well   Resp Comments Pt  in no respiratory distress  Water added to Midflow chamber at this time  Pt  asked to prone  Pt  rolled onto her right side  Pt  encouraged to prone  Pt  did not prone at this time  Pt  encouraged to use incentive spirometry Q1wa  She stated she has been using the incentive spirometry  Respiratory will continue to follow pt  with MDI QID  Respiratory will continue to titrate FiO2  Respiratory will continue to assess pt  as needed  05/12/21 1613   Inhalation Therapy Tx   $ Inhalation Therapy Performed Yes   $ Pulse Oximetry Spot Check Charge Completed   SpO2 94 %   Pre-Treatment Pulse 63   Pre-Treatment Respirations 23   Breath Sounds Pre-Treatment Bilateral Diminished   Breath Sounds Post-Treatment Bilateral Diminished   Post-Treatment Pulse 64   Delivery Source MDI   Position High Hmamer's   Treatment Tolerance Tolerated well   Resp Comments Pt  in no respiratory distress  Water added to Midflow chamber at this time  Pt  asked to prone  Pt  rolled onto her right side  Pt  encouraged to prone  Pt  did not prone at this time  Pt  encouraged to use incentive spirometry Q1wa  She stated she has been using the incentive spirometry  Respiratory will continue to follow pt  with MDI QID  Respiratory will continue to titrate FiO2  Respiratory will continue to assess pt  as needed

## 2021-05-12 NOTE — PLAN OF CARE
Problem: SAFETY ADULT  Goal: Patient will remain free of falls  Description: INTERVENTIONS:  - Assess patient frequently for physical needs  -  Identify cognitive and physical deficits and behaviors that affect risk of falls  -  [place] fall precautions as indicated by assessment   - Educate patient/family on patient safety including physical limitations  - Instruct patient to call for assistance with activity based on assessment  - Modify environment to reduce risk of injury  - Consider OT/PT consult to assist with strengthening/mobility  Outcome: Progressing     Problem: Potential for Falls  Goal: Patient will remain free of falls  Description: INTERVENTIONS:  - Assess patient frequently for physical needs  -  Identify cognitive and physical deficits and behaviors that affect risk of falls    -  [place] fall precautions as indicated by assessment   - Educate patient/family on patient safety including physical limitations  - Instruct patient to call for assistance with activity based on assessment  - Modify environment to reduce risk of injury  - Consider OT/PT consult to assist with strengthening/mobility  Outcome: Progressing

## 2021-05-12 NOTE — NURSING NOTE
Sister Mikaela Vazquez wanted to leave her number as a contact  Please ask patient before calling her at [phone]

## 2021-05-13 PROBLEM — N17.9 AKI (ACUTE KIDNEY INJURY) (HCC): Status: RESOLVED | Noted: 2021-05-09 | Resolved: 2021-05-13

## 2021-05-13 LAB
BACTERIA BLD CULT: NORMAL
BACTERIA BLD CULT: NORMAL
GAMMA INTERFERON BACKGROUND BLD IA-ACNC: 0.03 IU/ML
GLUCOSE SERPL-MCNC: 101 MG/DL (ref 65–140)
GLUCOSE SERPL-MCNC: 144 MG/DL (ref 65–140)
GLUCOSE SERPL-MCNC: 165 MG/DL (ref 65–140)
M TB IFN-G BLD-IMP: ABNORMAL
M TB IFN-G CD4+ BCKGRND COR BLD-ACNC: -0.01 IU/ML
M TB IFN-G CD4+ BCKGRND COR BLD-ACNC: 0 IU/ML
MITOGEN IGNF BCKGRD COR BLD-ACNC: 0.11 IU/ML

## 2021-05-13 PROCEDURE — 97167 OT EVAL HIGH COMPLEX 60 MIN: CPT

## 2021-05-13 PROCEDURE — 94640 AIRWAY INHALATION TREATMENT: CPT

## 2021-05-13 PROCEDURE — 99232 SBSQ HOSP IP/OBS MODERATE 35: CPT | Performed by: INTERNAL MEDICINE

## 2021-05-13 PROCEDURE — 94760 N-INVAS EAR/PLS OXIMETRY 1: CPT

## 2021-05-13 PROCEDURE — 99233 SBSQ HOSP IP/OBS HIGH 50: CPT | Performed by: INTERNAL MEDICINE

## 2021-05-13 PROCEDURE — 82948 REAGENT STRIP/BLOOD GLUCOSE: CPT

## 2021-05-13 PROCEDURE — 97163 PT EVAL HIGH COMPLEX 45 MIN: CPT

## 2021-05-13 RX ADMIN — BENZONATATE 100 MG: 100 CAPSULE ORAL at 12:15

## 2021-05-13 RX ADMIN — GUAIFENESIN 600 MG: 600 TABLET ORAL at 08:41

## 2021-05-13 RX ADMIN — ENOXAPARIN SODIUM 30 MG: 30 INJECTION SUBCUTANEOUS at 21:55

## 2021-05-13 RX ADMIN — Medication 2000 UNITS: at 08:41

## 2021-05-13 RX ADMIN — ALBUTEROL SULFATE 2 PUFF: 90 AEROSOL, METERED RESPIRATORY (INHALATION) at 08:33

## 2021-05-13 RX ADMIN — DEXAMETHASONE SODIUM PHOSPHATE 6 MG: 4 INJECTION INTRA-ARTICULAR; INTRALESIONAL; INTRAMUSCULAR; INTRAVENOUS; SOFT TISSUE at 14:23

## 2021-05-13 RX ADMIN — GUAIFENESIN 600 MG: 600 TABLET ORAL at 21:55

## 2021-05-13 RX ADMIN — ALBUTEROL SULFATE 2 PUFF: 90 AEROSOL, METERED RESPIRATORY (INHALATION) at 17:31

## 2021-05-13 RX ADMIN — BENZONATATE 100 MG: 100 CAPSULE ORAL at 21:55

## 2021-05-13 RX ADMIN — ALBUTEROL SULFATE 2 PUFF: 90 AEROSOL, METERED RESPIRATORY (INHALATION) at 20:24

## 2021-05-13 RX ADMIN — BENZONATATE 100 MG: 100 CAPSULE ORAL at 05:10

## 2021-05-13 RX ADMIN — OXYCODONE HYDROCHLORIDE AND ACETAMINOPHEN 1000 MG: 500 TABLET ORAL at 21:55

## 2021-05-13 RX ADMIN — ENOXAPARIN SODIUM 30 MG: 30 INJECTION SUBCUTANEOUS at 08:41

## 2021-05-13 RX ADMIN — ALBUTEROL SULFATE 2 PUFF: 90 AEROSOL, METERED RESPIRATORY (INHALATION) at 12:16

## 2021-05-13 RX ADMIN — ATORVASTATIN CALCIUM 40 MG: 40 TABLET, FILM COATED ORAL at 08:41

## 2021-05-13 RX ADMIN — OXYCODONE HYDROCHLORIDE AND ACETAMINOPHEN 1000 MG: 500 TABLET ORAL at 08:41

## 2021-05-13 RX ADMIN — ZINC SULFATE 220 MG (50 MG) CAPSULE 220 MG: CAPSULE at 08:41

## 2021-05-13 RX ADMIN — FAMOTIDINE 20 MG: 20 TABLET ORAL at 08:41

## 2021-05-13 NOTE — PLAN OF CARE
Problem: INFECTION - ADULT  Goal: Absence or prevention of progression during hospitalization  Description: INTERVENTIONS:  - Assess and monitor for signs and symptoms of infection  - Monitor lab/diagnostic results  - Monitor all insertion sites, i e  indwelling lines, tubes, and drains  - Administer medications as ordered  - Instruct and encourage patient and family to use good hand hygiene technique  - Identify and instruct in appropriate isolation precautions for identified infection/condition  Outcome: Progressing     Problem: SAFETY ADULT  Goal: Patient will remain free of falls  Description: INTERVENTIONS:  - Assess patient frequently for physical needs  -  Identify cognitive and physical deficits and behaviors that affect risk of falls    -  [place] fall precautions as indicated by assessment   - Educate patient/family on patient safety including physical limitations  - Instruct patient to call for assistance with activity based on assessment  - Modify environment to reduce risk of injury  - Consider OT/PT consult to assist with strengthening/mobility  Outcome: Progressing  Goal: Maintain or return to baseline ADL function  Description: INTERVENTIONS:  -  Assess patient's ability to carry out ADLs; assess patient's baseline for ADL function and identify physical deficits which impact ability to perform ADLs (bathing, care of mouth/teeth, toileting, grooming, dressing, etc )  - Assess/evaluate cause of self-care deficits   - Assess range of motion  - Assess patient's mobility; develop plan if impaired  - Assess patient's need for assistive devices and provide as appropriate  - Encourage maximum independence but intervene and supervise when necessary  - Involve family in performance of ADLs  - Assess for home care needs following discharge   - Consider OT consult to assist with ADL evaluation and planning for discharge  - Provide patient education as appropriate  Outcome: Progressing  Goal: Maintain or return mobility status to optimal level  Description: INTERVENTIONS:  - Assess patient's baseline mobility status (ambulation, transfers, stairs, etc )    - Identify cognitive and physical deficits and behaviors that affect mobility  - Identify mobility aids required to assist with transfers and/or ambulation (gait belt, sit-to-stand, lift, walker, cane, etc )  - [place] fall precautions as indicated by assessment  - Record patient progress and toleration of activity level on Mobility SBAR; progress patient to next Phase/Stage  - Instruct patient to call for assistance with activity based on assessment  - Consider rehabilitation consult to assist with strengthening/weightbearing, etc   Outcome: Progressing     Problem: DISCHARGE PLANNING  Goal: Discharge to home or other facility with appropriate resources  Description: INTERVENTIONS:  - Identify barriers to discharge w/patient and caregiver  - Arrange for needed discharge resources and transportation as appropriate  - Identify discharge learning needs (meds, wound care, etc )  - Arrange for interpretive services to assist at discharge as needed  - Refer to Case Management Department for coordinating discharge planning if the patient needs post-hospital services based on physician/advanced practitioner order or complex needs related to functional status, cognitive ability, or social support system  Outcome: Progressing     Problem: RESPIRATORY - ADULT  Goal: Achieves optimal ventilation and oxygenation  Description: INTERVENTIONS:  - Assess for changes in respiratory status  - Assess for changes in mentation and behavior  - Position to facilitate oxygenation and minimize respiratory effort  - Oxygen administered by appropriate delivery if ordered  - Initiate smoking cessation education as indicated  - Encourage broncho-pulmonary hygiene including cough, deep breathe, Incentive Spirometry  - Assess the need for suctioning and aspirate as needed  - Assess and instruct to report SOB or any respiratory difficulty  - Respiratory Therapy support as indicated  Outcome: Progressing     Problem: Potential for Falls  Goal: Patient will remain free of falls  Description: INTERVENTIONS:  - Assess patient frequently for physical needs  -  Identify cognitive and physical deficits and behaviors that affect risk of falls    -  [place] fall precautions as indicated by assessment   - Educate patient/family on patient safety including physical limitations  - Instruct patient to call for assistance with activity based on assessment  - Modify environment to reduce risk of injury  - Consider OT/PT consult to assist with strengthening/mobility  Outcome: Progressing

## 2021-05-13 NOTE — CASE MANAGEMENT
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  No Hx VNA, STR, MH, or SA identified  PCP: Bev Molina    Preferred Pharmacy: [address] [place] [address] ([address] [place] [address]), no barriers identified to obtaining Rx from that location  No POA/AD/LW identified, patient declined information at this time  CM reviewed name, role, discharge planning process, and encouraged follow-up with all recommended appointments and providers after discharge  CM offered HHC to patient, patient adamantly refusing at this time  Cm offered to have records reviewed for pulmonary rehab at [facility], patient refusing at this time stating she was considering pulmonary rehab at an outpatient center  Patient adamantly refusing INPT pulmonary rehab at this time  Patient unable to recall name of facility/location for pulmonary rehab  CM encouraged patient to contact this CM for any assistance scheduling appointments with pulmonary rehab  Discharge Plan: Return home at discharge  Sister or cousin to provide transportation  At this time, patient is currently on midflow O2 (8L)

## 2021-05-13 NOTE — OCCUPATIONAL THERAPY NOTE
Occupational Therapy Evaluation     Patient Name: Madelin Stevenson  DGQTR'B Date: 5/13/2021  Problem List  Principal Problem:    Acute hypoxemic respiratory failure due to COVID-19 [facility])  Active Problems: Moderate persistent asthma without complication    Hypercholesterolemia    Sarcoidosis    Pneumonia due to COVID-19 virus    Past Medical History  Past Medical History:   Diagnosis Date    Anxiety     Asthma     Back pain     Sarcoidosis     Vertigo      Past Surgical History  Past Surgical History:   Procedure Laterality Date    MAMMO (HISTORICAL)  05/02/2018 05/13/21 1525   OT Last Visit   OT Visit Date 05/13/21  (Thursday)   Note Type   Note type Evaluation   Restrictions/Precautions   Weight Bearing Precautions Per Order No   Other Precautions Contact/isolation; Airborne/isolation;Droplet precautions;O2  (8L midflow O2)   Pain Assessment   Pain Assessment Tool 0-10   Pain Score No Pain  (upon arrival)   Pain Location/Orientation   (discomfort w/ coughing)   Home Living   Type of [address] [place] [address] One level;Performs ADLs on one level;Stairs to enter with rails  (full flight of stairs to access apt)   Bathroom Shower/Tub Tub/shower unit   Bathroom Toilet Standard   Bathroom Equipment Grab bars in shower   [address] Other (Comment);Grab bars  (no DME or AD)   Additional Comments Pt reports living alone in 2nd floor apt in [place] PTA   Prior Function   Level of [place] Independent with ADLs and functional mobility   Lives With Alone   ADL Assistance Independent   IADLs Independent   Falls in the last 6 months 0   Vocational Full time employment   Comments Pt reports I w/ ADL/ IADL PTA w/ out use of AD or O2   Lifestyle   Autonomy Pt reports I w/ ADL/IADL PTA w/ out use of AD or O2   Reciprocal Relationships Pt reports living alone added that her mother passed away   Service to Others Pt reports working full time in [employer] Pt reports enjoying being outside   ADL   Where Assessed Edge of bed  (vs commode)   Eating Assistance 7  Independent   Eating Deficit Setup   Grooming Assistance 6  Modified Independent   Grooming Deficit Setup; Increased time to complete;Supervision/safety  (decreased O2 sats w/ minimal exertion)   UB Bathing Assistance Unable to assess  (due to decreased activity tolerance)   LB Bathing Assistance Unable to assess  (due to decreased activity tolerance, SPO2)   UB Dressing Assistance 5  Supervision/Setup   UB Dressing Deficit Setup; Increased time to complete; Fasteners;Supervision/safety   LB Dressing Assistance 5  Supervision/Setup   LB Dressing Deficit Increased time to complete;Setup;Supervision/safety   Toileting Assistance  5  Supervision/Setup   Toileting Deficit Setup; Bedside commode;Supervison/safety; Increased time to complete   Additional Comments on 8L midflow O2 sats dropped to 78-81% w/ minimal exertion  ~ 3-4 minutes to recover w/ cues for breathing    Bed Mobility   Rolling R 7  Independent   Rolling L 7  Independent   Supine to Sit 6  Modified independent   Additional items HOB elevated; Increased time required   Sit to Supine 6  Modified independent   Additional items Increased time required   Additional Comments Pt supine in bed upon arrival and post eval w/ needs met, call bell in reach and needs met  Bed alarm activated   Transfers   Sit to Stand 6  Modified independent   Additional items Increased time required   Stand to Sit 6  Modified independent   Additional items Increased time required   Toilet transfer 5  Supervision   Additional items Commode; Increased time required   Additional Comments Pt performed sit <> stand 2X during eval  Care coordination w/ PT, Jere Bailey for portion of session due to decresaed activity tolerance   Functional Mobility   Functional Mobility 5  Supervision   Additional items   (no AD short distances w/ in room)   Balance   Static Sitting Good   Static Standing Fair   Ambulatory Fair   Activity Tolerance   Activity Tolerance Patient limited by fatigue; Other (Comment)  (decresaed O2 sats on 8L midflow)   Medical Staff Made Aware care coordination w/ PTJordin [place] and [facility] [place]   Nurse Made Aware per RN, Aristeo Gurrola appropriate to see pt   RUE Assessment   RUE Assessment WFL   RUE Strength   RUE Overall Strength Within Functional Limits - able to perform ADL tasks with strength   LUE Assessment   LUE Assessment WFL   LUE Strength   LUE Overall Strength Within Functional Limits - able to perform ADL tasks with strength   Hand Function   Gross Motor Coordination Functional   Fine Motor Coordination Functional   Sensation   Light Touch Not tested   Sharp/Dull Not tested   Cognition   Overall Cognitive Status   (flat affect, withdrawn)   Arousal/Participation Alert; Cooperative   Attention Within functional limits   Orientation Level Oriented to person;Oriented to place;Oriented to situation   Memory Within functional limits   Following Commands Follows multistep commands with increased time or repetition   Comments Identified pt by full name and birthdate  Pt alert and generally oriented  Able to participate in conversation w/ flat affect and provide social history  Encouragement to challenge activity tolerance  Assessment   Limitation Decreased ADL status; Decreased endurance;Decreased high-level ADLs; Decreased self-care trans   Assessment Pt is a 51yo female [facility] on 5/8/2021  Pt presents w/ acute hypoxemic respiratory failure due to COVID-19 and significant PMH impacting her occupational performance including anxiety, back pain, vertigo, sarcoidosis  Personal factors impacting performance include living alone, difficulty managing steps  Pt w/ active OT orders and activity orders  Pt reports living alone in apt w/ flight of stairs to enter PTA  Pt reports I w/ ADL/ IADL at baseline w/ out O2 or use of AD  Upon eval, pt alert and oriented   Able to follow directions and participate in conversation w/ flat affect  Pt completed bed mobility w/ mod I supine <> sit for + time and HOB elevated  Pt required S to complete UBD/LBD after set- up w/ + time and cues for pacing  O2 sats dropped to 78-81% on 8L midflow w/ minimal exertion  Pt completed sit <> stand w/ mod I and engaged in functional mobility short distances w/ out use of AD w/ S  Pt required S to complete toilet transfer to commode  Pt presents w/ decreased activity tolerance, decreased endurance, decreased standing tolerance impacting her I w/ dressing, bathing, oral hygiene, functional mobility, functional transfers, food prep / clean up, clothing mgmt, community mobility  Pt completing ADL below baseline level of I and would benefit from OT while in acute care to address deficits  Recommend discharge home to PLOF w/ OPPT pending progress, activity tolerance, and O2 sats during ADL tasks in acute care  Recommend shower chair to max I w/ bathing  Will continue to follow    Goals   Patient Goals Pt stated that she would like to get stronger and go home   Plan   Treatment Interventions ADL retraining;Functional transfer training; Endurance training;Energy conservation; Activityengagement; Compensatory technique education;Equipment evaluation/education   Goal Expiration Date 05/25/21   OT Frequency 1-2x/wk   Recommendation   OT Discharge Recommendation   (home w/ OP PT pend progress, O2 sats during ADL tasks)   Equipment Recommended Shower/Tub chair with back ($)   AM-PAC Daily Activity Inpatient   Lower Body Dressing 3   Bathing 3   Toileting 4   Upper Body Dressing 4   Grooming 4   Eating 4   Daily Activity Raw Score 22   Daily Activity Standardized Score (Calc for Raw Score >=11) 47  1   AM-PAC Applied Cognition Inpatient   Following a Speech/Presentation 3   Understanding Ordinary Conversation 4   Taking Medications 4   Remembering Where Things Are Placed or Put Away 4   Remembering List of 4-5 Errands 4   Taking Care of Complicated Tasks 4   Applied Cognition Raw Score 23   Applied Cognition Standardized Score 53 08   Barthel Index   Feeding 10   Bathing 0   Grooming Score 5   Dressing Score 5   Bladder Score 10   Bowels Score 10   Toilet Use Score 5   Transfers (Bed/Chair) Score 15   Mobility (Level Surface) Score 0   Stairs Score 0   Barthel Index Score 60     The patient's raw score on the AM-PAC Daily Activity inpatient short form is 22, standardized score is 47 1, greater than 39 4  Patients at this level are likely to benefit from discharge to home  Please refer to the recommendation of the Occupational Therapist for safe discharge planning      Pt goals to be met by 5/25/2021:  -Pt will demonstrate improved activity tolerance to participate in ADL tasks for at least 10 minutes w/ no more than 1 rest break or sign/ symptoms of fatigue to improve activity engagement    -Pt will complete functional transfers to all surfaces w/ mod I using AD, DME as needed to max I w/ ADL performance    -Pt will consistently engage in functional mobility using AD as needed to / from bathroom w/ mod I to max I to return home alone    -Pt will complete LBD / UBD w/ mod I after set- up w/ out rest break or sign / symptoms of fatigue to max I and improve engagement    -Pt will demonstrate improved functional standing tolerance for at least 5 minutes using AD as needed w/ at least fair + balance to max I w/ ADL Performance    -Pt will demonstrate good attention and understanding of EC tech to max I and improve activity engagement    -Pt will demonstrate good attention and understanding pacing / breathing during ADL tasks to max I w/ ADL Performance    Chikis Louise, OTR/L

## 2021-05-13 NOTE — PROGRESS NOTES
Progress Note - Infectious Disease   Danyell Araujo 54 y o  female MRN: [id number]  Unit/Bed#: -01 Encounter: [id number]      Impression/Plan:  1  Acute hypoxic respiratory failure-appears to be secondary to the bilateral pneumonia    patient with peak oxygen requirements to 12 L mid flow; now trending down to 8 L mid flow   -monitor O2 needs closely  -close follow-up  -continue severe COVID-19 protocol pathway as below   -s/pTocilizumab 400 mg x 1 5/11/21  -monitor CBC with diff, CMP, ferritin level, CRP  -supportive care  -compliance with proning and wearing NC O2 encouraged     2  Bilateral COVID-19 pneumonia-appears to be viral   In setting of worsening hypoxia with high inflammatory markers  procalcitonin level remains normal x 2 and a chest x-ray revealing areas of bilateral consolidation consistent with viral COVID-19 pneumonia  She is acutely ill     Ferritin 2,079 > 1,364  CRP 11 5 > 2 6  D-dimer 1 < 1 02  -concur with monitoring off antibiotics   -continues IV Decadron D6  -completed Remdesivir 5 day course 5/12/21  -receivedTocilizumab 400 mg dose given 5/11/21  -continue vitamin-C and D, zinc, and atorvastatin   -monitor CBC with diff, CMP, ferritin level, CRP daily  -close respiratory follow-up     3   MYRTLE  Creatinine 1 5 on admission  Creatinine 0 65 now  -volume management per primary care team  -monitor creatinine     4    sarcoidosis and asthma   Was not on any antibiotics prior to hospitalization for this  -follow-up with Pulmonary outpatient recommended     COVID Regimen:  Decadron D6  Remdesivir 5 day course completed 5/12/21  Tocilizumab 400 mg IV x 1 5/11/21     I have discussed the above management plan in detail with patient, RN, and Dr Hannah Thornton      Subjective:  Patient has no fever spike, chills, sweats overnight; no nausea, vomiting, diarrhea; + white sputum production with cough, no increased shortness of breath  Poor appetite       Objective:  Vitals:  Temp:  [97 8 °F (36 6 °C)-98 4 °F (36 9 °C)] 97 8 °F (36 6 °C)  HR:  [62-84] 62  Resp:  [16-20] 16  BP: (108-115)/(59-67) 115/59  SpO2:  [90 %-96 %] 90 %  Temp (24hrs), Av °F (36 7 °C), Min:97 8 °F (36 6 °C), Max:98 4 °F (36 9 °C)  Current: Temperature: 97 8 °F (36 6 °C)    Physical Exam:   General Appearance:  Alert, sluggish, feeling unwell, no acute distress resting in bed with HBO elevated and supine statting 86%, when patient turns to right lateral decub position, O2 sats jose to 92% immediately  Throat: Oropharynx moist without lesions  Lungs:   Decreased breath sounds to auscultation bilaterally with some coarse breath sounds right posterior lower lobe; respirations unlabored at rest, statting 92% on 7L midflow, + harsh cough on exam without production    Heart:  RRR; no murmur   Abdomen:   Soft, non-tender, non-distended, positive bowel sounds  Extremities: No clubbing, cyanosis or edema   : No holly, no SPT   Skin: No new rashes or lesions  IV site nontender  No draining wounds noted  Labs, Imaging, & Other studies:   All pertinent labs and imaging studies were personally reviewed  Results from last 7 days   Lab Units 05/10/21  0541 21  0454 21  1234   WBC [place]/uL 7 82 5 69 8 77   HEMOGLOBIN g/dL 11 6 12 0 13 5   PLATELETS Thousands/uL 440* 376 351     Results from last 7 days   Lab Units 21  0443 21  0455 05/10/21  0943 21  0454 21  1234   SODIUM mmol/L 141 142 141 140 141   POTASSIUM mmol/L 4 4 4 9 4 9 4 8 5 0   CHLORIDE mmol/L 109* 110* 110* 105 104   CO2 mmol/L 24 24 24 24 27   BUN mg/dL 26* 33* 45* 59* 52*   CREATININE mg/dL 0 65 0 68 0 83 1 25* 1 53*   EGFR ml/min/1 73sq m 116 114 92 56 44   CALCIUM mg/dL 9 3 9 4 9 1 9 4 10 0   AST U/L  --   --   --  55* 75*   ALT U/L  --   --   --  44 57*   ALK PHOS U/L  --   --   --  51 3 58 3     Results from last 7 days   Lab Units 21  1246 21  1234   BLOOD CULTURE  No Growth After 4 Days  No Growth After 4 Days  Results from last 7 days   Lab Units 05/09/21  0454 05/08/21  1234   PROCALCITONIN ng/ml <0 05 0 09     Results from last 7 days   Lab Units 05/11/21  0455 05/09/21  0454 05/08/21  1234   CRP mg/L 2 6* 8 8* 11 5*     Results from last 7 days   Lab Units 05/11/21  0455 05/09/21  0454 05/08/21  1234   FERRITIN ng/mL 1,364* 1,842* 2,079*     Results from last 7 days   Lab Units 05/11/21  0455 05/10/21  0541 05/09/21  0454 05/08/21  1234   D-DIMER QUANTITATIVE mg/L FEU 1 02* 0 91* 0 73* 1 00*

## 2021-05-13 NOTE — PHYSICAL THERAPY NOTE
PHYSICAL THERAPY EVALUATION  Time: 1275-1897    NAME:  Dipti Ac  DATE: 05/13/21    AGE:   54 y o  Mrn:   [id number]  Length Of Stay: 5    ADMIT DX:  Hypoxia [R09 02]  Flu-like symptoms [R68 89]  COVID-19 [U07 1]    Past Medical History:   Diagnosis Date    Anxiety     Asthma     Back pain     Sarcoidosis     Vertigo      Past Surgical History:   Procedure Laterality Date    MAMMO (HISTORICAL)  05/02/2018       Performed at least 2 patient identifiers during session: Name, Jag Gutierrez and ID chanellecelet        05/13/21 1529   PT Last Visit   PT Visit Date 05/13/21   Note Type   Note type Evaluation   Pain Assessment   Pain Assessment Tool 0-10   Pain Score No Pain   Home Living   Type of Home Apartment  (2nd floor apartment)   Home Layout Stairs to enter with rails; One level  (full flight of steps to enter)   Bathroom Shower/Tub Tub/shower unit   Bathroom Toilet Standard   Bathroom Equipment Grab bars in shower   [address] Other (Comment)  (NONE)   Additional Comments Pt lives at home alone in a 2nd floor apartment  Is indep with all ADLs/IADLs and HH & community mobility  +employed full time in factory work  No home O2 at baseline  Prior Function   Level of [place] Independent with ADLs and functional mobility   Lives With Alone   ADL Assistance Independent   IADLs Independent   Falls in the last 6 months 0   Vocational Full time employment   Restrictions/Precautions   Weight Bearing Precautions Per Order No   Other Precautions Contact/isolation; Airborne/isolation;Droplet precautions;Multiple lines;Telemetry;O2;Fall Risk  (8L O2 via MF NC; +COVID-19)   General   Additional Pertinent History Pt admitted 5/8/2021 with c/o flu like symptoms, hypoxia  [id number]     Family/Caregiver Present No   Cognition   Overall Cognitive Status   (flat affect, increased engagement w/ progression of session)   Arousal/Participation Cooperative   Attention Within functional limits   Orientation Level Oriented to person;Oriented to place;Oriented to situation   Memory Within functional limits   Following Commands Follows multistep commands with increased time or repetition   RUE Assessment   RUE Assessment WFL   RUE Strength   RUE Overall Strength Within Functional Limits - able to perform ADL tasks with strength   LUE Assessment   LUE Assessment WFL   LUE Strength   LUE Overall Strength Within Functional Limits - able to perform ADL tasks with strength   RLE Assessment   RLE Assessment WFL   LLE Assessment   LLE Assessment WFL   Coordination   Movements are Fluid and Coordinated 1   Bed Mobility   Rolling R 7  Independent   Rolling L 7  Independent   Supine to Sit 6  Modified independent   Additional items HOB elevated; Increased time required   Sit to Supine 6  Modified independent   Additional items Increased time required   Additional Comments Therapist provided increased education re: self proning and importance of it for improved respiratory condition in setting of COVID-19  Pt verbalizes inability to self prone, therapist then recommending sidelying/fetal positioning, pt verbalizes understanding however questionable carry over anticipated  Transfers   Sit to Stand 6  Modified independent   Additional items Increased time required   Stand to Sit 6  Modified independent   Additional items Increased time required   Stand pivot 6  Modified independent   Additional items Increased time required   Additional Comments Increased cues from therapist for continued PLB for improved respiratory status with transfers, pt with poor application  Partial session co-tx with OT services due to severely impaired endurance - OT focusing on ADLs while PT focusing on balance and functional mobility  Ambulation/Elevation   Gait pattern Forward Flexion;Decreased foot clearance; Short stride   Gait Assistance 5  Supervision   Additional items Assist x 1;Verbal cues   Assistive Device Rolling walker  (not used at baseline, provided for improve endurance)   Distance 28ft x1   Stair Management Assistance Not tested   Balance   Static Sitting Good   Dynamic Sitting Good   Static Standing Fair   Dynamic Standing Fair   Ambulatory Fair   Endurance Deficit   Endurance Deficit Yes   Endurance Deficit Description Baseline wears no supplemental O2  Currently on 8L O2 via MF NC  SPO2 with wide range  At rest SPO2 92% 8L  With activity, SPO2 decreased to lowest of 70% 8L  Pt with poor application of PLB, limited insight to energy conservation and task modification for improved endurance  Activity Tolerance   Activity Tolerance Patient limited by fatigue; Other (Comment)  (decreased O2 saturations with mobility)   Medical Staff Made Aware Spoke with CALIXTO BARTLETT OT   Nurse Made Aware Spoke with RN pre/post session   Assessment   Prognosis Fair   Problem List Decreased endurance;Decreased mobility; Decreased safety awareness; Impaired balance   Assessment Pt seen for PT evaluation, cleared by RN Juan Carlos Birmingham, activity orders of "pt self proning"  Pt admitted 5/8/2021 with flu-like symptoms, hypoxia, dx Acute hypoxemic respiratory failure due to COVID-19 [facility])  Comorbidities affecting pt's fnxl performance include: anxiety, asthma, chronic back pain and vertigo, sarcoidosis  Personal factors affecting pt at time of PT IE include: lives in 1 story house, stairs to enter home, inability to ambulate household distances, inability to navigate community distances, limited insight into impairments, inability to perform IADLS  and inability to live alone  PTA, pt was independent with functional mobility without assistive device, independent with ADLS, independent with IADLS, ambulating community distances and works full time  Pt scores 21/24 on AM-PAC objective tool w/ a standardized score of 45 55, indicating pt demonstrates functional capacity for return to home self care vs with increased supports upon d/c   The Barthel Index outcome tool was used & pt scores 60 indicating severe limitations of fnxl mobility/ADLS  Pt is at risk of falls d/t multiple comorbidities, impaired balance, impaired insight/safety awareness, use of assistive device, acuity of medical illness, ongoing medical treatment of primary diagnosis and abnormal lab values  Pt's clinical presentation is currently unstable/unpredictable seen in pt's presentation of vital sign response, changing level of pain, increased fall risk, new onset of impairment of functional mobility, decreased endurance and new onset of weakness  This PT IE requires high complexity clinical decision making, based on multiple medical/physiological/fnxl/social factors which affect pt's performance w/ evaluation & therapist judgement for disposition recommendations  Pt will benefit from continued PT treatment in order to address impairments, decrease risk of falls, maximize independence w/ fnxl mobility, & ensure safety w/ mobility for transition to next level of care  Based on pt presentation & impairments, pt would most appropriately benefit from pulmonary rehab services (pt interested), OPPT pending progress with stair negotiation  Barriers to Discharge Inaccessible home environment;Decreased caregiver support   Goals   Patient Goals "to breathe better"   STG Expiration Date 05/23/21   Short Term Goal #1 Patient PT goals established in order to address patient self reported goal of "to breathe better"  1  Pt will demonstrate ability to ambulate >100ft with LRAD at JANE level and self management of O2 tubing, in order to increase safety with household and short community distance functional mobility  2  Pt will negotiate 10 stairs with HR assist and S in order to be able to access her home   3  Pt will demonstrate understanding and appropriate application of self proning in order to maximize respiratory status in the setting of COVID-19  4  Pt will demonstrate good application of PLB without cues from therapist, in order to improve respiratory status with mobility, and improve overall endurance  4  Pt will improve AM-PAC score to >/= 24/24 in order to increase independence with mobility and decrease burden of care  5  Pt will improve Barthel Index score to >/= 70/100 in order to increase independence and decrease risk of falls  6  Pt will improve ambulatory balance to >/= GOOD grade in order to promote safety and increased independence with mobility  7  Pt will tolerate >3hrs OOB in upright position, in order to improve muscular endurance and respiratory status  PT Treatment Day 0   Plan   Treatment/Interventions Elevations; Therapeutic exercise; Endurance training;Gait training; Compensatory technique education;Spoke to MD;Spoke to nursing;Spoke to case management  (energy conservation techniques)   PT Frequency Other (Comment)  (3-5x/wk )   Recommendation   PT Discharge Recommendation Home with outpatient rehabilitation  (?? pulmonary rehab - pending progress)   Equipment Recommended   (TBD pending progress, likely RW)   Additional Comments Pt with full flight of steps to enter her home  Currently having significant difficulty ambulating short household distances due to severe impairment in respiratory status and steep decline in SPO2 with minimal activity  Dispo recommendations TBD pending progress with stair negotiation  Pt verbalizes desire to return to home, pt also interested in pulmonary rehab      AM-PAC Basic Mobility Inpatient   Turning in Bed Without Bedrails 4   Lying on Back to Sitting on Edge of Flat Bed 4   Moving Bed to Chair 4   Standing Up From Chair 4   Walk in Room 3   Climb 3-5 Stairs 2   Basic Mobility Inpatient Raw Score 21   Basic Mobility Standardized Score 45 55   Modified Elda Scale   Modified Elda Scale 3   Barthel Index   Feeding 10   Bathing 0   Grooming Score 5   Dressing Score 5   Bladder Score 10   Bowels Score 10   Toilet Use Score 5   Transfers (Bed/Chair) Score 15 Mobility (Level Surface) Score 0   Stairs Score 0   Barthel Index Score 60       The patient's AM-PAC Basic Mobility Inpatient Short Form Raw Score is 21, Standardized Score is 45 55  A standardized score greater than 42 9 suggests the patient may benefit from discharge to home  Please also refer to the recommendation of the Physical Therapist for safe discharge planning  This session, pt required and most appropriately benefited from partial or full skilled PT/OT co-eval due to severely decreased endurance to where pt is unable to tolerate two seperate sessions  PT and OT goals were addressed separately as seen in documentation  This session: skilled PT addressed transfers/ambulation/self proning, and skilled OT addressed ADLs and energy conservation (see documentation for reference)        Maged Man, PT, DPT   Available via [employer]  [facility] # [id number]  [place] License - [id number]  [place] License - [id number]  5/13/2021

## 2021-05-13 NOTE — PROGRESS NOTES
Sherine U  66   Progress Note - Gretchen Dunn 1965, 54 y o  female MRN: [id number]  Unit/Bed#: -01 Encounter: [id number]  Primary Care Provider: FRED Morris   Date and time admitted to hospital: 5/8/2021 12:08 PM    * Acute hypoxemic respiratory failure due to COVID-19 [facility])  Assessment & Plan  · 2/2 COVID infection  · Presented with URI symptoms for 5-7 days  · Visited ED on 05/03/2021 with similar symptoms at that time refused to get COVID-19 test  · COVID-19 test +ve 01/05/2021  · Elevated BUN Cr and transaminitis possibly 2/2 COVID which has improved  · Inflammatory markers improving  D-dimer of 1    · CXR showing B/L ground-glass opacities  · Improving hypoxia on 8 L via NC mid flow today  · COVID-19 moderate pathway  · Will discontinue antibiotic considering procalcitonin x2-ve  · Continue steroids day 5  · Discontinue remdesivir as Pt received tocilizumab yesterday  · On bowel regimen  · Incentive spirometry, self proning  · Trend inflammatory markers  · Continue oxygen supplementation to maintain O2 sat > 90  · Start albuterol inhaler 4 times daily  · Follow-up QuantiFERON TB gold test  · Respiratory protocol  · Trend D-dimer    MYRTLE (acute kidney injury) ([facility] [address] )  Assessment & Plan  Cr 1 5 on admission  Baseline unknown  Resolved with IV fluid  Prerenal most likely 2/2 poor oral intake from COVID-19 infection  Will hold further IVF  Pneumonia due to COVID-19 virus  Assessment & Plan  See plan for COVID-19 acute hypoxic respiratory failure  Sarcoidosis  Assessment & Plan  · Stable  Not on any medications  · She is not compliant with pulmonology follow-up  · Currently on steroids given COVID-19 pneumonia    Hypercholesterolemia  Assessment & Plan  · Lipid panel showing elevated LDL  · Not taking atorvastatin at home  · Continue statin as per COVID-19 protocol, will need to continue statin at home      Moderate persistent asthma without complication  Assessment & Plan  · Takes albuterol and Symbicort at home  · Lungs clear on examination with no wheezing  · Continue steroids as above along with albuterol inhaler scheduled        VTE Pharmacologic Prophylaxis:   Pharmacologic: Enoxaparin (Lovenox)  Mechanical VTE Prophylaxis in Place: Yes    Discussions with Specialists or Other Care Team Provider: none    Education and Discussions with Family / Patient:  Tried to call pressure care in however was not able to connect  Try to leave was pain but was full  Current Length of Stay: 5 day(s)    Current Patient Status: Inpatient     Discharge Plan / Estimated Discharge Date:  Needs further stay due to hypoxic respiratory failure from COVID-19 and on mid flow oxygen    Code Status: Level 1 - Full Code      Subjective:   Patient was seen and examined by me at bedside  Communicated clearly  No particular overnight event reported  Hemodynamically stable and afebrile  Saturating well on 8 L oxygen  Denied any complaints  Feeling overall well  Objective:     Vitals:   Temp (24hrs), Av °F (36 7 °C), Min:97 8 °F (36 6 °C), Max:98 4 °F (36 9 °C)    Temp:  [97 8 °F (36 6 °C)-98 4 °F (36 9 °C)] 97 8 °F (36 6 °C)  HR:  [62-84] 62  Resp:  [16-20] 16  BP: (108-133)/(59-75) 115/59  SpO2:  [91 %-96 %] 91 %  Body mass index is 28 28 kg/m²  Input and Output Summary (last 24 hours):     No intake or output data in the 24 hours ending 21 1343    Physical Exam:     Physical Exam  Vitals signs reviewed  Constitutional:       General: She is in acute distress  Appearance: She is well-developed  She is ill-appearing  She is not toxic-appearing  Cardiovascular:      Rate and Rhythm: Normal rate and regular rhythm  Pulses: Normal pulses  Heart sounds: Normal heart sounds  Pulmonary:      Effort: Respiratory distress present  Breath sounds: Normal breath sounds  No wheezing or rales  Chest:      Chest wall: No tenderness  Abdominal:      General: Bowel sounds are normal  There is no distension  Palpations: Abdomen is soft  Tenderness: There is no abdominal tenderness  Musculoskeletal:      Right lower leg: No edema  Left lower leg: No edema  Skin:     General: Skin is warm  Coloration: Skin is not pale  Neurological:      General: No focal deficit present  Mental Status: She is alert and oriented to person, place, and time  Mental status is at baseline  Psychiatric:         Mood and Affect: Mood normal          Behavior: Behavior normal        Additional Data:     Labs:    Results from last 7 days   Lab Units 05/10/21  0541   WBC Thousand/uL 7 82   HEMOGLOBIN g/dL 11 6   HEMATOCRIT % 36 5   PLATELETS Thousands/uL 440*   LYMPHO PCT % 17   MONO PCT % 12   EOS PCT % 0     Results from last 7 days   Lab Units 05/12/21  0443  05/09/21  0454   POTASSIUM mmol/L 4 4   < > 4 8   CHLORIDE mmol/L 109*   < > 105   CO2 mmol/L 24   < > 24   BUN mg/dL 26*   < > 59*   CREATININE mg/dL 0 65   < > 1 25*   CALCIUM mg/dL 9 3   < > 9 4   ALK PHOS U/L  --   --  51 3   ALT U/L  --   --  44   AST U/L  --   --  55*    < > = values in this interval not displayed  Results from last 7 days   Lab Units 05/08/21  1234   INR  0 91       * I Have Reviewed All Lab Data Listed Above  * Additional Pertinent Lab Tests Reviewed: [address] Admission Reviewed    Imaging:  XR chest 1 view portable   ED Interpretation by Zahida Garcia PA-C (05/08 1304)   Bilateral infiltrates consistent with COVID-19      Final Result by Alpheus Dance, MD (05/09 1116)      Patchy opacification in both lower lobes, most likely bilateral pneumonia                    Workstation performed: [id number]            Imaging Reports Reviewed Today Include:  CXR  Imaging Personally Reviewed by Myself Includes:  Same as above    Recent Cultures (last 7 days):     Results from last 7 days   Lab Units 05/08/21  1246 05/08/21  1234 BLOOD CULTURE  No Growth After 4 Days  No Growth After 4 Days  Last 24 Hours Medication List:   Current Facility-Administered Medications   Medication Dose Route Frequency Provider Last Rate    acetaminophen  650 mg Oral Q6H PRN Romario Hernandez MD      albuterol  2 puff Inhalation 4x Daily Elex MD Heriberto      ascorbic acid  1,000 mg Oral Q12H [address] Romario Hernandez MD      atorvastatin  40 mg Oral Daily Romario Hernandez MD      benzonatate  100 mg Oral Q8H Romario Hernandez MD      cholecalciferol  2,000 Units Oral Daily Romario Hernandez MD      dexamethasone  6 mg Intravenous Q24H Romario Hernandez MD      enoxaparin  30 mg Subcutaneous Q12H [address] Romario Hernandez MD      famotidine  20 mg Oral Daily Romario Hernandez MD      guaiFENesin  600 mg Oral Q12H [address] Marli Cardoso MD      insulin lispro  1-5 Units Subcutaneous TID AC Marli Cardoso MD      meclizine  12 5 mg Oral Q8H PRN Marli Cardoso MD      zinc sulfate  220 mg Oral Daily Romario Hernandez MD      Followed by   Authur Sever ON 5/15/2021] multivitamin-minerals  1 tablet Oral Daily Ei MD Gideon      ondansetron  4 mg Intravenous Q6H PRN Romario Hernandez MD      polyethylene glycol  17 g Oral Daily PRN Marli Cardoso MD      senna-docusate sodium  1 tablet Oral HS Marli Cardoso MD      sodium chloride  1 spray Each Nare Q1H PRN Jimmy Brush MD          Today, Patient Was Seen By: Marli Cardoso MD    ** Please Note: This note has been constructed using a voice recognition system   **

## 2021-05-13 NOTE — PLAN OF CARE
Problem: INFECTION - ADULT  Goal: Absence or prevention of progression during hospitalization  Description: INTERVENTIONS:  - Assess and monitor for signs and symptoms of infection  - Monitor lab/diagnostic results  - Monitor all insertion sites, i e  indwelling lines, tubes, and drains  - Monitor endotracheal if appropriate and nasal secretions for changes in amount and color  - [place] appropriate cooling/warming therapies per order  - Administer medications as ordered  - Instruct and encourage patient and family to use good hand hygiene technique  - Identify and instruct in appropriate isolation precautions for identified infection/condition  Outcome: Progressing     Problem: RESPIRATORY - ADULT  Goal: Achieves optimal ventilation and oxygenation  Description: INTERVENTIONS:  - Assess for changes in respiratory status  - Assess for changes in mentation and behavior  - Position to facilitate oxygenation and minimize respiratory effort  - Oxygen administered by appropriate delivery if ordered  - Initiate smoking cessation education as indicated  - Encourage broncho-pulmonary hygiene including cough, deep breathe, Incentive Spirometry  - Assess the need for suctioning and aspirate as needed  - Assess and instruct to report SOB or any respiratory difficulty  - Respiratory Therapy support as indicated  Outcome: Progressing

## 2021-05-13 NOTE — PLAN OF CARE
Problem: OCCUPATIONAL THERAPY ADULT  Goal: Performs self-care activities at highest level of function for planned discharge setting  See evaluation for individualized goals  Description: Treatment Interventions: ADL retraining, Functional transfer training, Endurance training, Energy conservation, Activityengagement, Compensatory technique education, Equipment evaluation/education  Equipment Recommended: Shower/Tub chair with back ($)       See flowsheet documentation for full assessment, interventions and recommendations  Note: Limitation: Decreased ADL status, Decreased endurance, Decreased high-level ADLs, Decreased self-care trans     Assessment: Pt is a 51yo female [facility]  Pt presents w/ acute hypoxemic respiratory failure due to COVID-19 and significant PMH impacting her occupational performance including anxiety, back pain, vertigo, sarcoidosis  Personal factors impacting performance include living alone, difficulty managing steps  Pt w/ active OT orders and activity orders  Pt reports living alone in apt w/ flight of stairs to enter PTA  Pt reports I w/ ADL/ IADL at baseline w/ out O2 or use of AD  Upon eval, pt alert and oriented  Able to follow directions and participate in conversation w/ flat affect  Pt completed bed mobility w/ mod I supine <> sit for + time and HOB elevated  Pt required S to complete UBD/LBD after set- up w/ + time and cues for pacing  O2 sats dropped to 78-81% on 8L midflow w/ minimal exertion  Pt completed sit <> stand w/ mod I and engaged in functional mobility short distances w/ out use of AD w/ S  Pt required S to complete toilet transfer to commode  Pt presents w/ decreased activity tolerance, decreased endurance, decreased standing tolerance impacting her I w/ dressing, bathing, oral hygiene, functional mobility, functional transfers, food prep / clean up, clothing mgmt, community mobility   Pt completing ADL below baseline level of I and would benefit from OT while in acute care to address deficits  Recommend discharge home to PLOF w/ OPPT pending progress, activity tolerance, and O2 sats during ADL tasks in acute care  Recommend shower chair to max I w/ bathing   Will continue to follow      OT Discharge Recommendation: (home w/ OP PT pend progress, O2 sats during ADL tasks)

## 2021-05-14 ENCOUNTER — TELEPHONE (OUTPATIENT)
Dept: CT IMAGING | Facility: HOSPITAL | Age: 56
End: 2021-05-14

## 2021-05-14 ENCOUNTER — APPOINTMENT (INPATIENT)
Dept: VASCULAR ULTRASOUND | Facility: HOSPITAL | Age: 56
DRG: 137 | End: 2021-05-14
Payer: MEDICARE

## 2021-05-14 ENCOUNTER — APPOINTMENT (INPATIENT)
Dept: NON INVASIVE DIAGNOSTICS | Facility: HOSPITAL | Age: 56
DRG: 137 | End: 2021-05-14
Payer: MEDICARE

## 2021-05-14 ENCOUNTER — APPOINTMENT (INPATIENT)
Dept: CT IMAGING | Facility: HOSPITAL | Age: 56
DRG: 137 | End: 2021-05-14
Payer: MEDICARE

## 2021-05-14 LAB
ANION GAP SERPL CALCULATED.3IONS-SCNC: 9 MMOL/L (ref 4–13)
BUN SERPL-MCNC: 20 MG/DL (ref 6–20)
CALCIUM SERPL-MCNC: 9.4 MG/DL (ref 8.4–10.2)
CHLORIDE SERPL-SCNC: 103 MMOL/L (ref 96–108)
CO2 SERPL-SCNC: 21 MMOL/L (ref 22–33)
CREAT SERPL-MCNC: 0.58 MG/DL (ref 0.4–1.1)
CRP SERPL QL: 2.5 MG/L (ref 0–1)
D DIMER PPP FEU-MCNC: 3.99 MG/L FEU (ref 0.19–0.49)
FERRITIN SERPL-MCNC: 924 NG/ML (ref 8–388)
GFR SERPL CREATININE-BSD FRML MDRD: 120 ML/MIN/1.73SQ M
GLUCOSE SERPL-MCNC: 194 MG/DL (ref 65–140)
GLUCOSE SERPL-MCNC: 236 MG/DL (ref 65–140)
GLUCOSE SERPL-MCNC: 82 MG/DL (ref 65–140)
GLUCOSE SERPL-MCNC: 98 MG/DL (ref 65–140)
POTASSIUM SERPL-SCNC: 5.2 MMOL/L (ref 3.5–5)
SODIUM SERPL-SCNC: 133 MMOL/L (ref 133–145)

## 2021-05-14 PROCEDURE — 94003 VENT MGMT INPAT SUBQ DAY: CPT

## 2021-05-14 PROCEDURE — 93970 EXTREMITY STUDY: CPT

## 2021-05-14 PROCEDURE — 86140 C-REACTIVE PROTEIN: CPT | Performed by: INTERNAL MEDICINE

## 2021-05-14 PROCEDURE — 94640 AIRWAY INHALATION TREATMENT: CPT

## 2021-05-14 PROCEDURE — 99233 SBSQ HOSP IP/OBS HIGH 50: CPT | Performed by: INTERNAL MEDICINE

## 2021-05-14 PROCEDURE — 94760 N-INVAS EAR/PLS OXIMETRY 1: CPT

## 2021-05-14 PROCEDURE — 99232 SBSQ HOSP IP/OBS MODERATE 35: CPT | Performed by: INTERNAL MEDICINE

## 2021-05-14 PROCEDURE — 93308 TTE F-UP OR LMTD: CPT

## 2021-05-14 PROCEDURE — 85379 FIBRIN DEGRADATION QUANT: CPT | Performed by: INTERNAL MEDICINE

## 2021-05-14 PROCEDURE — 82728 ASSAY OF FERRITIN: CPT | Performed by: INTERNAL MEDICINE

## 2021-05-14 PROCEDURE — 80048 BASIC METABOLIC PNL TOTAL CA: CPT | Performed by: INTERNAL MEDICINE

## 2021-05-14 PROCEDURE — 93970 EXTREMITY STUDY: CPT | Performed by: SURGERY

## 2021-05-14 PROCEDURE — G1004 CDSM NDSC: HCPCS

## 2021-05-14 PROCEDURE — 99254 IP/OBS CNSLTJ NEW/EST MOD 60: CPT | Performed by: INTERNAL MEDICINE

## 2021-05-14 PROCEDURE — 82948 REAGENT STRIP/BLOOD GLUCOSE: CPT

## 2021-05-14 PROCEDURE — 71275 CT ANGIOGRAPHY CHEST: CPT

## 2021-05-14 PROCEDURE — 93306 TTE W/DOPPLER COMPLETE: CPT | Performed by: INTERNAL MEDICINE

## 2021-05-14 RX ORDER — ALBUTEROL SULFATE 90 UG/1
2 AEROSOL, METERED RESPIRATORY (INHALATION)
Status: DISCONTINUED | OUTPATIENT
Start: 2021-05-14 | End: 2021-05-15 | Stop reason: HOSPADM

## 2021-05-14 RX ORDER — ALBUTEROL SULFATE 90 UG/1
2 AEROSOL, METERED RESPIRATORY (INHALATION)
Status: DISCONTINUED | OUTPATIENT
Start: 2021-05-14 | End: 2021-05-14

## 2021-05-14 RX ORDER — FUROSEMIDE 10 MG/ML
40 INJECTION INTRAMUSCULAR; INTRAVENOUS ONCE
Status: COMPLETED | OUTPATIENT
Start: 2021-05-14 | End: 2021-05-14

## 2021-05-14 RX ORDER — IPRATROPIUM BROMIDE AND ALBUTEROL SULFATE 2.5; .5 MG/3ML; MG/3ML
3 SOLUTION RESPIRATORY (INHALATION)
Status: DISCONTINUED | OUTPATIENT
Start: 2021-05-14 | End: 2021-05-14

## 2021-05-14 RX ADMIN — ALBUTEROL SULFATE 2 PUFF: 90 AEROSOL, METERED RESPIRATORY (INHALATION) at 15:09

## 2021-05-14 RX ADMIN — FUROSEMIDE 40 MG: 10 INJECTION, SOLUTION INTRAMUSCULAR; INTRAVENOUS at 18:49

## 2021-05-14 RX ADMIN — IOHEXOL 85 ML: 350 INJECTION, SOLUTION INTRAVENOUS at 14:03

## 2021-05-14 RX ADMIN — ENOXAPARIN SODIUM 60 MG: 60 INJECTION SUBCUTANEOUS at 17:00

## 2021-05-14 RX ADMIN — ALBUTEROL SULFATE 2 PUFF: 90 AEROSOL, METERED RESPIRATORY (INHALATION) at 12:54

## 2021-05-14 RX ADMIN — Medication 2000 UNITS: at 09:05

## 2021-05-14 RX ADMIN — ALBUTEROL SULFATE 2 PUFF: 90 AEROSOL, METERED RESPIRATORY (INHALATION) at 20:13

## 2021-05-14 RX ADMIN — INSULIN LISPRO 2 UNITS: 100 INJECTION, SOLUTION INTRAVENOUS; SUBCUTANEOUS at 17:05

## 2021-05-14 RX ADMIN — IPRATROPIUM BROMIDE 2 PUFF: 17 AEROSOL, METERED RESPIRATORY (INHALATION) at 20:13

## 2021-05-14 RX ADMIN — FAMOTIDINE 20 MG: 20 TABLET ORAL at 09:05

## 2021-05-14 RX ADMIN — ENOXAPARIN SODIUM 30 MG: 30 INJECTION SUBCUTANEOUS at 09:04

## 2021-05-14 RX ADMIN — BENZONATATE 100 MG: 100 CAPSULE ORAL at 04:50

## 2021-05-14 RX ADMIN — OXYCODONE HYDROCHLORIDE AND ACETAMINOPHEN 1000 MG: 500 TABLET ORAL at 09:05

## 2021-05-14 RX ADMIN — ALBUTEROL SULFATE 2 PUFF: 90 AEROSOL, METERED RESPIRATORY (INHALATION) at 08:29

## 2021-05-14 RX ADMIN — GUAIFENESIN 600 MG: 600 TABLET ORAL at 22:16

## 2021-05-14 RX ADMIN — INSULIN LISPRO 1 UNITS: 100 INJECTION, SOLUTION INTRAVENOUS; SUBCUTANEOUS at 12:17

## 2021-05-14 RX ADMIN — ATORVASTATIN CALCIUM 40 MG: 40 TABLET, FILM COATED ORAL at 09:05

## 2021-05-14 RX ADMIN — GUAIFENESIN 600 MG: 600 TABLET ORAL at 09:05

## 2021-05-14 RX ADMIN — ZINC SULFATE 220 MG (50 MG) CAPSULE 220 MG: CAPSULE at 09:05

## 2021-05-14 RX ADMIN — BENZONATATE 100 MG: 100 CAPSULE ORAL at 17:00

## 2021-05-14 RX ADMIN — DEXAMETHASONE SODIUM PHOSPHATE 6 MG: 4 INJECTION INTRA-ARTICULAR; INTRALESIONAL; INTRAMUSCULAR; INTRAVENOUS; SOFT TISSUE at 17:00

## 2021-05-14 RX ADMIN — OXYCODONE HYDROCHLORIDE AND ACETAMINOPHEN 1000 MG: 500 TABLET ORAL at 22:16

## 2021-05-14 NOTE — PROGRESS NOTES
Progress Note - Infectious Disease   Finesse Oquendo 54 y o  female MRN: [id number]  Unit/Bed#: -01 Encounter: [id number]      Impression/Plan:  1  Acute hypoxic respiratory failure-appears to be secondary to the bilateral pneumonia    patient with increased oxygen requirements to 10 L mid flow  -monitor O2 needs closely  -close follow-up  -continue severe COVID-19 protocol pathway as below   -s/pTocilizumab 400 mg X 1 5/11/21  -monitor CBC with diff  -supportive care  -compliance with proning and wearing NC O2 encouraged     2  Bilateral COVID-19 pneumonia-appears to be viral   In setting of worsening hypoxia with high inflammatory markers  procalcitonin level remains normal x2 and a chest x-ray revealing areas of bilateral consolidation consistent with viral COVID-19 pneumonia  She is quite ill     Ferritin 2,079 > 1,364  CRP 11 5 > 2 6 > 2 5  D-dimer 1 < 1 02 < 3 99  -concur with monitoring off antibiotics   -continue IV Decadron D7  -completed Remdesivir 5 day course 5/12/21  -receivedTocilizumab 400 mg dose given 5/11/21  -continue vitamin-C and D, zinc, and atorvastatin   -monitor CBC with diff  -close respiratory follow-up     3   MYRTLE  Creatinine 1 5 on admission  Creatinine 0 58 now  -volume management per primary care team  -monitor creatinine     4    sarcoidosis and asthma   Was not on any antibiotics prior to hospitalization for this  -follow-up with Pulmonary outpatient recommended     COVID Regimen:  Decadron D7  Remdesivir 5 day course completed 5/12/21  Tocilizumab 400 mg IV x 1 5/11/21     I have discussed the above management plan in detail with patient, RN, and Dr Jen Tuttle  We will see patient again 5/17/21 if here  Please call ID on call physician in meantime if questions      Subjective:  Patient has no fever spike, chills, sweats overnight; no nausea, vomiting, diarrhea; no increased cough, shortness of breath  No great appetite yet   Doesn't offer much conversation  Objective:  Vitals:  Temp:  [97 8 °F (36 6 °C)-98 6 °F (37 °C)] 98 6 °F (37 °C)  HR:  [78-85] 84  Resp:  [16-22] 20  BP: (100-107)/(61-63) 107/62  SpO2:  [90 %-96 %] 91 %  Temp (24hrs), Av 2 °F (36 8 °C), Min:97 8 °F (36 6 °C), Max:98 6 °F (37 °C)  Current: Temperature: 98 6 °F (37 °C)    Physical Exam:   General Appearance:  Patient is sleeping comfortably, arousable to exam, sluggish, nontoxic, no acute distress rest statting 95-97% on 8L  Throat: Oropharynx moist without lesions  Lungs:   Fairly clear decreased breath sounds to auscultation bilaterally; no wheezes, rhonchi or rales; respirations unlabored at rest, no active cough on exam   Heart:  RRR; no murmur   Abdomen:   Soft, non-tender, non-distended, positive bowel sounds  Extremities: No clubbing, cyanosis or edema   : No holly, no SPT   Skin: No new rashes or lesions  Right arm IV site nontender  No draining wounds noted  Labs, Imaging, & Other studies:   All pertinent labs and imaging studies were personally reviewed  Results from last 7 days   Lab Units 05/10/21  0541 21  0454 21  1234   WBC Thousand/uL 7 82 5 69 8 77   HEMOGLOBIN g/dL 11 6 12 0 13 5   PLATELETS Thousands/uL 440* 376 351     Results from last 7 days   Lab Units 21  0448 21  0443 21  0455  21  0454 21  1234   SODIUM mmol/L 133 141 142   < > 140 141   POTASSIUM mmol/L 5 2* 4 4 4 9   < > 4 8 5 0   CHLORIDE mmol/L 103 109* 110*   < > 105 104   CO2 mmol/L 21* 24 24   < > 24 27   BUN mg/dL 20 26* 33*   < > 59* 52*   CREATININE mg/dL 0 58 0 65 0 68   < > 1 25* 1 53*   EGFR ml/min/1 73sq m 120 116 114   < > 56 44   CALCIUM mg/dL 9 4 9 3 9 4   < > 9 4 10 0   AST U/L  --   --   --   --  55* 75*   ALT U/L  --   --   --   --  44 57*   ALK PHOS U/L  --   --   --   --  51 3 58 3    < > = values in this interval not displayed       Results from last 7 days   Lab Units 21  1246 21  1234   BLOOD CULTURE  No Growth After 5 Days  No Growth After 5 Days       Results from last 7 days   Lab Units 05/09/21  0454 05/08/21  1234   PROCALCITONIN ng/ml <0 05 0 09     Results from last 7 days   Lab Units 05/14/21  0448 05/11/21  0455 05/09/21  0454 05/08/21  1234   CRP mg/L 2 5* 2 6* 8 8* 11 5*     Results from last 7 days   Lab Units 05/11/21  0455 05/09/21  0454 05/08/21  1234   FERRITIN ng/mL 1,364* 1,842* 2,079*     Results from last 7 days   Lab Units 05/14/21  0448 05/11/21  0455 05/10/21  0541 05/09/21  0454 05/08/21  1234   D-DIMER QUANTITATIVE mg/L FEU 3 99* 1 02* 0 91* 0 73* 1 00*

## 2021-05-14 NOTE — NURSING NOTE
This nurse was given patient's [employer] card to give to her friend Lamine Mendietaman to pay her rent  Will put documentation in paper chart as well

## 2021-05-14 NOTE — PROGRESS NOTES
[facility]  66   Progress Note - Gretchen Dunn 1965, 54 y o  female MRN: [id number]  Unit/Bed#: -01 Encounter: [id number]  Primary Care Provider: FRED Morris   Date and time admitted to hospital: 5/8/2021 12:08 PM    * Acute hypoxemic respiratory failure due to COVID-19 [facility])  Assessment & Plan  · 2/2 COVID infection  · Presented with URI symptoms for 5-7 days  · Visited ED on 05/03/2021 with similar symptoms at that time refused to get COVID-19 test  · COVID-19 test +ve 01/05/2021  · Elevated BUN Cr and transaminitis possibly 2/2 COVID which has improved  · Inflammatory markers improving  D-dimer of 1    · CXR showing B/L ground-glass opacities  · Improving hypoxia on 8 L via NC mid flow today  · COVID-19 moderate pathway  · Will discontinue antibiotic considering procalcitonin x2-ve  · Continue steroids day 6  · Received tocilizumab on 05/11/2021  · Completed remdesivir 5 day course  · On bowel regimen  · Incentive spirometry, self proning  · Trend inflammatory markers  · Continue oxygen supplementation to maintain O2 sat > 90  · Start albuterol inhaler 4 times daily  · Respiratory protocol  · Trend D-dimer    MYRTLE (acute kidney injury) (HCC)-resolved as of 5/13/2021  Assessment & Plan  Cr 1 5 on admission  Baseline unknown  Treated with IVF  Pneumonia due to COVID-19 virus  Assessment & Plan  See plan for COVID-19 acute hypoxic respiratory failure  Sarcoidosis  Assessment & Plan  · Stable  Not on any medications  · She is not compliant with pulmonology follow-up  · Currently on steroids given COVID-19 pneumonia    Hypercholesterolemia  Assessment & Plan  · Lipid panel showing elevated LDL  · Not taking atorvastatin at home  · Continue statin as per COVID-19 protocol, will need to continue statin at home  Moderate persistent asthma without complication  Assessment & Plan  · Takes albuterol and Symbicort at home    · Lungs clear on examination with no wheezing  · Continue steroids as above along with albuterol inhaler scheduled        VTE Pharmacologic Prophylaxis:   Pharmacologic: Enoxaparin (Lovenox)  Mechanical VTE Prophylaxis in Place: Yes    Discussions with Specialists or Other Care Team Provider: none    Education and Discussions with Family / Patient:  None    Current Length of Stay: 6 day(s)    Current Patient Status: Inpatient     Discharge Plan / Estimated Discharge Date:  Needs further stay due to hypoxic respiratory failure from COVID-19 and on mid flow oxygen    Code Status: Level 1 - Full Code      Subjective:   Patient was seen and examined by me at bedside  Communicated clearly  No particular overnight event reported  Hemodynamically stable and afebrile  Saturating well on 8 L oxygen  Was not comliant with proning as per nursing staff  Objective:     Vitals:   Temp (24hrs), Av 2 °F (36 8 °C), Min:97 8 °F (36 6 °C), Max:98 6 °F (37 °C)    Temp:  [97 8 °F (36 6 °C)-98 6 °F (37 °C)] 98 6 °F (37 °C)  HR:  [78-85] 84  Resp:  [16-22] 20  BP: (100-107)/(61-63) 107/62  SpO2:  [90 %-96 %] 91 %  Body mass index is 28 28 kg/m²  Input and Output Summary (last 24 hours): Intake/Output Summary (Last 24 hours) at 2021 1204  Last data filed at 2021 0629  Gross per 24 hour   Intake 480 ml   Output 300 ml   Net 180 ml       Physical Exam:     Physical Exam  Vitals signs reviewed  Constitutional:       General: She is in acute distress  Appearance: She is well-developed  She is ill-appearing  She is not toxic-appearing  Cardiovascular:      Rate and Rhythm: Normal rate and regular rhythm  Pulses: Normal pulses  Heart sounds: Normal heart sounds  Pulmonary:      Effort: Respiratory distress present  Breath sounds: Normal breath sounds  No wheezing or rales  Chest:      Chest wall: No tenderness  Abdominal:      General: Bowel sounds are normal  There is no distension  Palpations: Abdomen is soft  Tenderness: There is no abdominal tenderness  Musculoskeletal:      Right lower leg: No edema  Left lower leg: No edema  Skin:     General: Skin is warm  Coloration: Skin is not pale  Neurological:      General: No focal deficit present  Mental Status: She is alert and oriented to person, place, and time  Mental status is at baseline  Psychiatric:         Mood and Affect: Mood normal          Behavior: Behavior normal      =  Additional Data:     Labs:    Results from last 7 days   Lab Units 05/10/21  0541   WBC Thousand/uL 7 82   HEMOGLOBIN g/dL 11 6   HEMATOCRIT % 36 5   PLATELETS Thousands/uL 440*   LYMPHO PCT % 17   MONO PCT % 12   EOS PCT % 0     Results from last 7 days   Lab Units 05/14/21  0448  05/09/21  0454   POTASSIUM mmol/L 5 2*   < > 4 8   CHLORIDE mmol/L 103   < > 105   CO2 mmol/L 21*   < > 24   BUN mg/dL 20   < > 59*   CREATININE mg/dL 0 58   < > 1 25*   CALCIUM mg/dL 9 4   < > 9 4   ALK PHOS U/L  --   --  51 3   ALT U/L  --   --  44   AST U/L  --   --  55*    < > = values in this interval not displayed  Results from last 7 days   Lab Units 05/08/21  1234   INR  0 91       * I Have Reviewed All Lab Data Listed Above  * Additional Pertinent Lab Tests Reviewed: [facility] Admission Reviewed    Imaging:  XR chest 1 view portable   ED Interpretation by Kaila Patiño PA-C (05/08 1304)   Bilateral infiltrates consistent with COVID-19      Final Result by Pedro Ruano MD (05/09 1116)      Patchy opacification in both lower lobes, most likely bilateral pneumonia  Workstation performed: [id number]         CTA chest pe study    (Results Pending)      Imaging Reports Reviewed Today Include:  CXR  Imaging Personally Reviewed by Myself Includes:  Same as above    Recent Cultures (last 7 days):     Results from last 7 days   Lab Units 05/08/21  1246 05/08/21  1234   BLOOD CULTURE  No Growth After 5 Days  No Growth After 5 Days         Last 24 Hours Medication List:   Current Facility-Administered Medications   Medication Dose Route Frequency Provider Last Rate    acetaminophen  650 mg Oral Q6H PRN Michael Franz MD      albuterol  2 puff Inhalation 4x Daily Dago Oconnell MD      ascorbic acid  1,000 mg Oral Q12H [address] Ei Alin Becerra MD      atorvastatin  40 mg Oral Daily Michael Franz MD      benzonatate  100 mg Oral Q8H Ei Alin Becerra MD      cholecalciferol  2,000 Units Oral Daily Ei Alin Becerra MD      dexamethasone  6 mg Intravenous Q24H Michael Franz MD      enoxaparin  30 mg Subcutaneous Q12H [address] Ei Alin Becerra MD      famotidine  20 mg Oral Daily Michael Franz MD      guaiFENesin  600 mg Oral Q12H [address] Kati Maddox MD      insulin lispro  1-5 Units Subcutaneous TID AC Kati Maddox MD      meclizine  12 5 mg Oral Q8H PRN Kati Maddox MD     [facility] [START ON 5/15/2021] multivitamin-minerals  1 tablet Oral Daily Ei Alin Becerra MD      ondansetron  4 mg Intravenous Q6H PRN Michael Franz MD      polyethylene glycol  17 g Oral Daily PRN Kati Maddox MD      senna-docusate sodium  1 tablet Oral HS Kati Maddox MD      sodium chloride  1 spray Each Nare Q1H PRN Eunice Bridges MD          Today, Patient Was Seen By: Kati Maddox MD    ** Please Note: This note has been constructed using a voice recognition system   **

## 2021-05-14 NOTE — CONSULTS
Consultation - Pulmonary Medicine   Danyell Araujo 54 y o  female MRN: [id number]  Unit/Bed#: -01 Encounter: [id number]      Assessment and Plan:    1  Acute hypoxic respiratory failure:  She has acute hypoxic respiratory failure secondary to COVID pneumonia  She may have underlying chronic lung disease  Currently she is on 9-10 L of oxygen and is saturating satisfactory  However she desaturates quickly  Monitor closely  2  COVID pneumonia:  She has bilateral multilobar pneumonia secondary to COVID infection  She has areas of consolidation  She is currently on Decadron  She received remdesivir and tocilizumab  Continue steroid therapy  She may benefit from bronchodilator therapy  3  Acute pulmonary embolism:  She was found to have acute pulmonary embolism involving left upper lobe segmental artery  Low clot burden  She needs full systemic anticoagulation  Check echocardiogram     4  History of Sarcoidosis:  She has  history of sarcoidosis diagnosed few years back and which was treated with prednisone for a year  Her current chest x-ray shows bullous changes bilaterally  She also has mild fibrotic changes  She is a never smoker  She has underlying chronic lung disease  She has no history of asthma  Observe for now  I had a long discussion with the patient and answered all her questions  I spoke to the nursing staff  I also discussed the case with Dr Hannah Thornton and the primary team     Thank you for allowing me to participate in the care of the patient  History of Present Illness      Physician Requesting Consult: Yenny Vance MD     Reason for Consult / Principal Problem:  Acute hypoxic respiratory failure COVID pneumonia    Hx and PE limited by:  None    HPI: Danyell Araujo is a 54y o  year old female who presents with worsening shortness of breath and was diagnosed with COVID pneumonia on 05/08/2021  She was treated with remdesivir and received toclizumab    She was also started on Decadron  She also has been on prophylactic Lovenox  She has been continuing to need oxygen supplementation up to 12 liters/minute  Currently she is needing only 9-10 liters/minute and she is saturating 89%  However she desaturates with talking or mild exertion  She feels that her shortness of breath is getting better  She has occasional nonproductive cough  Denies any wheezing or chest pain  No headache or dizziness  No fever or chills  She is a never smoker and denies any use of drugs  She states that however few years back while she was  incarcerated she was found to have abnormal chest x-ray  and was subsequently investigated and had underwent bronchoscopy and biopsy  She was told to have sarcoidosis and was subsequently treated with prednisone for almost any year  She is not to able to give further details at this time  She has not been on any oxygen or inhalers at home  She has no previous history of asthma  She could walk more than 2 blocks before and did not have any significant cough or wheeze  She has no family history of lung problems  Inpatient consult to Pulmonology  Consult performed by: Wilmer Deal MD  Consult ordered by: Brandi Bledsoe MD          Review of Systems   Constitutional: Negative for chills and fever  HENT: Negative for rhinorrhea, sneezing, trouble swallowing and voice change  Eyes: Negative for visual disturbance  Respiratory: Positive for cough and shortness of breath  Negative for chest tightness and wheezing  Cardiovascular: Negative for chest pain and palpitations  Gastrointestinal: Negative for abdominal pain, diarrhea, nausea and vomiting  Endocrine: Negative for polyuria  Genitourinary: Negative for dysuria and frequency  Musculoskeletal: Positive for myalgias  Negative for arthralgias  Allergic/Immunologic: Negative for environmental allergies     Neurological: Negative for dizziness, syncope, light-headedness and headaches  Psychiatric/Behavioral: Negative for sleep disturbance  The patient is nervous/anxious  Historical Information   Past Medical History:   Diagnosis Date    Anxiety     Asthma     Back pain     Sarcoidosis     Vertigo      Past Surgical History:   Procedure Laterality Date    MAMMO (HISTORICAL)  05/02/2018     Social History   Social History     Substance and Sexual Activity   Alcohol Use Not Currently    Comment: social     Social History     Substance and Sexual Activity   Drug Use Not Currently    Types: Marijuana     E-Cigarette/Vaping    E-Cigarette Use Never User      E-Cigarette/Vaping Substances    Nicotine No     THC No     CBD No     Flavoring No     Other No     Unknown No      Social History     Tobacco Use   Smoking Status Never Smoker   Smokeless Tobacco Never Used     Occupational History:  Noncontributory    Family History: non-contributory; asthma in her mother  Meds/Allergies   all current active meds have been reviewed    Allergies   Allergen Reactions    Tegretol [Carbamazepine] Rash       Objective   Vitals: Blood pressure 102/66, pulse 89, temperature 98 1 °F (36 7 °C), temperature source Tympanic, resp  rate 20, height 4' 11" (1 499 m), weight 63 5 kg (140 lb), SpO2 95 %  ,Body mass index is 28 28 kg/m²  Intake/Output Summary (Last 24 hours) at 5/14/2021 1641  Last data filed at 5/14/2021 5462  Gross per 24 hour   Intake 480 ml   Output 300 ml   Net 180 ml     Invasive Devices     Peripheral Intravenous Line            Peripheral IV 05/09/21 Right Antecubital 5 days    Peripheral IV 05/14/21 Left Antecubital less than 1 day                Physical Exam  Vitals signs reviewed  Constitutional:       General: She is not in acute distress  Appearance: She is ill-appearing  She is not toxic-appearing  HENT:      Head: Normocephalic  Eyes:      General: No scleral icterus       Conjunctiva/sclera: Conjunctivae normal    Neck: Musculoskeletal: No neck rigidity  Cardiovascular:      Rate and Rhythm: Normal rate and regular rhythm  Heart sounds: Normal heart sounds  No murmur  Pulmonary:      Effort: No respiratory distress  Breath sounds: No stridor  Rales (Bilateral occasional coarse crackles) present  No wheezing or rhonchi  Chest:      Chest wall: No tenderness  Abdominal:      General: Bowel sounds are normal       Palpations: Abdomen is soft  Tenderness: There is no abdominal tenderness  There is no guarding  Musculoskeletal:      Right lower leg: No edema  Left lower leg: No edema  Lymphadenopathy:      Cervical: No cervical adenopathy  Skin:     General: Skin is warm  Coloration: Skin is not jaundiced or pale  Neurological:      Mental Status: She is alert and oriented to person, place, and time  Psychiatric:         Mood and Affect: Mood normal          Behavior: Behavior normal          Thought Content: Thought content normal          Judgment: Judgment normal          Lab Results: I have personally reviewed pertinent lab results  Imaging Studies: I have personally reviewed pertinent reports  Her CT scan of the chest showed small left upper lobe pulmonary embolism with low clot burden  She also has multilobar pneumonia consolidation  She also has bullous areas bilaterally and mild fibrotic changes likely from her previous sarcoidosis  EKG, Pathology, and Other Studies: I have personally reviewed pertinent reports  VTE Prophylaxis: Enoxaparin (Lovenox)    Code Status: Level 1 - Full Code  Advance Directive and Living Will:      Power of :    POLST:      Counseling/Coordination of Care: Total floor / unit time spent today 50 minutes  Greater than 50% of total time was spent with the patient and / or family counseling and / or coordination of care   A description of the counseling / coordination of care: Discussion regarding COVID pneumonia and respiratory failure requiring oxygen supplementation  Pulmonary embolism    Discussion with the primary team; management issues

## 2021-05-14 NOTE — PLAN OF CARE
Problem: INFECTION - ADULT  Goal: Absence or prevention of progression during hospitalization  Description: INTERVENTIONS:  - Assess and monitor for signs and symptoms of infection  - Monitor lab/diagnostic results  - Monitor all insertion sites, i e  indwelling lines, tubes, and drains  - Monitor endotracheal if appropriate and nasal secretions for changes in amount and color  - [place] appropriate cooling/warming therapies per order  - Administer medications as ordered  - Instruct and encourage patient and family to use good hand hygiene technique  - Identify and instruct in appropriate isolation precautions for identified infection/condition  Outcome: Not Progressing     Problem: SAFETY ADULT  Goal: Patient will remain free of falls  Description: INTERVENTIONS:  - Assess patient frequently for physical needs  -  Identify cognitive and physical deficits and behaviors that affect risk of falls    -  [place] fall precautions as indicated by assessment   - Educate patient/family on patient safety including physical limitations  - Instruct patient to call for assistance with activity based on assessment  - Modify environment to reduce risk of injury  - Consider OT/PT consult to assist with strengthening/mobility  Outcome: Not Progressing  Goal: Maintain or return to baseline ADL function  Description: INTERVENTIONS:  -  Assess patient's ability to carry out ADLs; assess patient's baseline for ADL function and identify physical deficits which impact ability to perform ADLs (bathing, care of mouth/teeth, toileting, grooming, dressing, etc )  - Assess/evaluate cause of self-care deficits   - Assess range of motion  - Assess patient's mobility; develop plan if impaired  - Assess patient's need for assistive devices and provide as appropriate  - Encourage maximum independence but intervene and supervise when necessary  - Involve family in performance of ADLs  - Assess for home care needs following discharge   - Consider OT consult to assist with ADL evaluation and planning for discharge  - Provide patient education as appropriate  Outcome: Not Progressing  Goal: Maintain or return mobility status to optimal level  Description: INTERVENTIONS:  - Assess patient's baseline mobility status (ambulation, transfers, stairs, etc )    - Identify cognitive and physical deficits and behaviors that affect mobility  - Identify mobility aids required to assist with transfers and/or ambulation (gait belt, sit-to-stand, lift, walker, cane, etc )  - [place] fall precautions as indicated by assessment  - Record patient progress and toleration of activity level on Mobility SBAR; progress patient to next Phase/Stage  - Instruct patient to call for assistance with activity based on assessment  - Consider rehabilitation consult to assist with strengthening/weightbearing, etc   Outcome: Not Progressing     Problem: DISCHARGE PLANNING  Goal: Discharge to home or other facility with appropriate resources  Description: INTERVENTIONS:  - Identify barriers to discharge w/patient and caregiver  - Arrange for needed discharge resources and transportation as appropriate  - Identify discharge learning needs (meds, wound care, etc )  - Arrange for interpretive services to assist at discharge as needed  - Refer to Case Management Department for coordinating discharge planning if the patient needs post-hospital services based on physician/advanced practitioner order or complex needs related to functional status, cognitive ability, or social support system  Outcome: Not Progressing     Problem: RESPIRATORY - ADULT  Goal: Achieves optimal ventilation and oxygenation  Description: INTERVENTIONS:  - Assess for changes in respiratory status  - Assess for changes in mentation and behavior  - Position to facilitate oxygenation and minimize respiratory effort  - Oxygen administered by appropriate delivery if ordered  - Initiate smoking cessation education as indicated  - Encourage broncho-pulmonary hygiene including cough, deep breathe, Incentive Spirometry  - Assess the need for suctioning and aspirate as needed  - Assess and instruct to report SOB or any respiratory difficulty  - Respiratory Therapy support as indicated  Outcome: Not Progressing     Problem: Potential for Falls  Goal: Patient will remain free of falls  Description: INTERVENTIONS:  - Assess patient frequently for physical needs  -  Identify cognitive and physical deficits and behaviors that affect risk of falls    -  [place] fall precautions as indicated by assessment   - Educate patient/family on patient safety including physical limitations  - Instruct patient to call for assistance with activity based on assessment  - Modify environment to reduce risk of injury  - Consider OT/PT consult to assist with strengthening/mobility  Outcome: Not Progressing

## 2021-05-14 NOTE — QUICK NOTE
CTA chest showed Lt upper lobe PE and calculated ratio of right ventricular to left ventricular diameter (RV/LV ratio) is greater than 0 9, which is abnormal and could be indicative of mild right heart strain, however, this is not specific    There is no bowing of the intraventricular   Septum    Started therapeutic Lovenox  Telemetry monitoring  Atrovent and albuterol inhaler  Echo ordered  Doppler US LE ordered

## 2021-05-15 ENCOUNTER — HOSPITAL ENCOUNTER (INPATIENT)
Facility: HOSPITAL | Age: 56
LOS: 6 days | Discharge: NURSING, REHAB OR LONG-TERM CARE | DRG: 137 | End: 2021-05-21
Attending: INTERNAL MEDICINE | Admitting: INTERNAL MEDICINE
Payer: MEDICARE

## 2021-05-15 VITALS
HEART RATE: 79 BPM | WEIGHT: 140 LBS | DIASTOLIC BLOOD PRESSURE: 59 MMHG | HEIGHT: 59 IN | BODY MASS INDEX: 28.22 KG/M2 | RESPIRATION RATE: 20 BRPM | TEMPERATURE: 98.9 F | OXYGEN SATURATION: 91 % | SYSTOLIC BLOOD PRESSURE: 91 MMHG

## 2021-05-15 DIAGNOSIS — J06.9 VIRAL URI WITH COUGH: ICD-10-CM

## 2021-05-15 DIAGNOSIS — U07.1 PNEUMONIA DUE TO COVID-19 VIRUS: ICD-10-CM

## 2021-05-15 DIAGNOSIS — I26.99 PULMONARY EMBOLISM (HCC): ICD-10-CM

## 2021-05-15 DIAGNOSIS — D86.9 SARCOIDOSIS: ICD-10-CM

## 2021-05-15 DIAGNOSIS — J96.01 ACUTE HYPOXEMIC RESPIRATORY FAILURE DUE TO COVID-19 (HCC): Primary | ICD-10-CM

## 2021-05-15 DIAGNOSIS — J12.82 PNEUMONIA DUE TO COVID-19 VIRUS: ICD-10-CM

## 2021-05-15 DIAGNOSIS — U07.1 ACUTE HYPOXEMIC RESPIRATORY FAILURE DUE TO COVID-19 (HCC): Primary | ICD-10-CM

## 2021-05-15 LAB
ANION GAP SERPL CALCULATED.3IONS-SCNC: 9 MMOL/L (ref 4–13)
BASOPHILS # BLD AUTO: 0.01 THOUSANDS/ΜL (ref 0–0.1)
BASOPHILS NFR BLD AUTO: 0 % (ref 0–1)
BUN SERPL-MCNC: 21 MG/DL (ref 6–20)
CALCIUM SERPL-MCNC: 9.8 MG/DL (ref 8.4–10.2)
CHLORIDE SERPL-SCNC: 100 MMOL/L (ref 96–108)
CO2 SERPL-SCNC: 26 MMOL/L (ref 22–33)
CREAT SERPL-MCNC: 0.65 MG/DL (ref 0.4–1.1)
EOSINOPHIL # BLD AUTO: 0 THOUSAND/ΜL (ref 0–0.61)
EOSINOPHIL NFR BLD AUTO: 0 % (ref 0–6)
ERYTHROCYTE [DISTWIDTH] IN BLOOD BY AUTOMATED COUNT: 14.3 % (ref 11.6–15.1)
GFR SERPL CREATININE-BSD FRML MDRD: 116 ML/MIN/1.73SQ M
GLUCOSE SERPL-MCNC: 116 MG/DL (ref 65–140)
GLUCOSE SERPL-MCNC: 125 MG/DL (ref 65–140)
GLUCOSE SERPL-MCNC: 128 MG/DL (ref 65–140)
GLUCOSE SERPL-MCNC: 238 MG/DL (ref 65–140)
HCT VFR BLD AUTO: 42.8 % (ref 34.8–46.1)
HGB BLD-MCNC: 13.9 G/DL (ref 11.5–15.4)
IMM GRANULOCYTES # BLD AUTO: 0.15 THOUSAND/UL (ref 0–0.2)
IMM GRANULOCYTES NFR BLD AUTO: 1 % (ref 0–2)
LYMPHOCYTES # BLD AUTO: 0.96 THOUSANDS/ΜL (ref 0.6–4.47)
LYMPHOCYTES NFR BLD AUTO: 9 % (ref 14–44)
MCH RBC QN AUTO: 28.7 PG (ref 26.8–34.3)
MCHC RBC AUTO-ENTMCNC: 32.5 G/DL (ref 31.4–37.4)
MCV RBC AUTO: 88 FL (ref 82–98)
MONOCYTES # BLD AUTO: 0.5 THOUSAND/ΜL (ref 0.17–1.22)
MONOCYTES NFR BLD AUTO: 5 % (ref 4–12)
NEUTROPHILS # BLD AUTO: 8.77 THOUSANDS/ΜL (ref 1.85–7.62)
NEUTS SEG NFR BLD AUTO: 85 % (ref 43–75)
PLATELET # BLD AUTO: 646 THOUSANDS/UL (ref 149–390)
PMV BLD AUTO: 9.8 FL (ref 8.9–12.7)
POTASSIUM SERPL-SCNC: 4.3 MMOL/L (ref 3.5–5)
RBC # BLD AUTO: 4.84 MILLION/UL (ref 3.81–5.12)
SODIUM SERPL-SCNC: 135 MMOL/L (ref 133–145)
WBC # BLD AUTO: 10.39 THOUSAND/UL (ref 4.31–10.16)

## 2021-05-15 PROCEDURE — 94760 N-INVAS EAR/PLS OXIMETRY 1: CPT

## 2021-05-15 PROCEDURE — 80048 BASIC METABOLIC PNL TOTAL CA: CPT | Performed by: INTERNAL MEDICINE

## 2021-05-15 PROCEDURE — 99223 1ST HOSP IP/OBS HIGH 75: CPT | Performed by: INTERNAL MEDICINE

## 2021-05-15 PROCEDURE — 82948 REAGENT STRIP/BLOOD GLUCOSE: CPT

## 2021-05-15 PROCEDURE — 94640 AIRWAY INHALATION TREATMENT: CPT

## 2021-05-15 PROCEDURE — 99239 HOSP IP/OBS DSCHRG MGMT >30: CPT | Performed by: INTERNAL MEDICINE

## 2021-05-15 PROCEDURE — 94003 VENT MGMT INPAT SUBQ DAY: CPT

## 2021-05-15 PROCEDURE — 99255 IP/OBS CONSLTJ NEW/EST HI 80: CPT | Performed by: INTERNAL MEDICINE

## 2021-05-15 PROCEDURE — 85025 COMPLETE CBC W/AUTO DIFF WBC: CPT | Performed by: INTERNAL MEDICINE

## 2021-05-15 RX ORDER — MULTIVITAMIN/IRON/FOLIC ACID 18MG-0.4MG
1 TABLET ORAL DAILY
Status: CANCELLED | OUTPATIENT
Start: 2021-05-16

## 2021-05-15 RX ORDER — DEXAMETHASONE SODIUM PHOSPHATE 4 MG/ML
6 INJECTION, SOLUTION INTRA-ARTICULAR; INTRALESIONAL; INTRAMUSCULAR; INTRAVENOUS; SOFT TISSUE EVERY 24 HOURS
Status: CANCELLED | OUTPATIENT
Start: 2021-05-15 | End: 2021-05-18

## 2021-05-15 RX ORDER — POLYETHYLENE GLYCOL 3350 17 G/17G
17 POWDER, FOR SOLUTION ORAL DAILY PRN
Status: DISCONTINUED | OUTPATIENT
Start: 2021-05-15 | End: 2021-05-18

## 2021-05-15 RX ORDER — MECLIZINE HCL 12.5 MG/1
12.5 TABLET ORAL EVERY 8 HOURS PRN
Status: CANCELLED | OUTPATIENT
Start: 2021-05-15

## 2021-05-15 RX ORDER — ECHINACEA PURPUREA EXTRACT 125 MG
1 TABLET ORAL
Status: DISCONTINUED | OUTPATIENT
Start: 2021-05-15 | End: 2021-05-21 | Stop reason: HOSPADM

## 2021-05-15 RX ORDER — ONDANSETRON 2 MG/ML
4 INJECTION INTRAMUSCULAR; INTRAVENOUS EVERY 6 HOURS PRN
Status: DISCONTINUED | OUTPATIENT
Start: 2021-05-15 | End: 2021-05-21 | Stop reason: HOSPADM

## 2021-05-15 RX ORDER — GUAIFENESIN 600 MG
600 TABLET, EXTENDED RELEASE 12 HR ORAL EVERY 12 HOURS SCHEDULED
Status: DISCONTINUED | OUTPATIENT
Start: 2021-05-15 | End: 2021-05-20

## 2021-05-15 RX ORDER — BENZONATATE 100 MG/1
100 CAPSULE ORAL EVERY 8 HOURS
Status: DISCONTINUED | OUTPATIENT
Start: 2021-05-15 | End: 2021-05-20

## 2021-05-15 RX ORDER — MECLIZINE HCL 12.5 MG/1
12.5 TABLET ORAL EVERY 8 HOURS PRN
Status: DISCONTINUED | OUTPATIENT
Start: 2021-05-15 | End: 2021-05-21 | Stop reason: HOSPADM

## 2021-05-15 RX ORDER — AMOXICILLIN 250 MG
1 CAPSULE ORAL
Status: DISCONTINUED | OUTPATIENT
Start: 2021-05-15 | End: 2021-05-19

## 2021-05-15 RX ORDER — FAMOTIDINE 20 MG/1
20 TABLET, FILM COATED ORAL DAILY
Status: CANCELLED | OUTPATIENT
Start: 2021-05-16

## 2021-05-15 RX ORDER — ACETAMINOPHEN 325 MG/1
650 TABLET ORAL EVERY 6 HOURS PRN
Status: CANCELLED | OUTPATIENT
Start: 2021-05-15

## 2021-05-15 RX ORDER — AMOXICILLIN 250 MG
1 CAPSULE ORAL
Status: CANCELLED | OUTPATIENT
Start: 2021-05-15

## 2021-05-15 RX ORDER — POLYETHYLENE GLYCOL 3350 17 G/17G
17 POWDER, FOR SOLUTION ORAL DAILY PRN
Status: CANCELLED | OUTPATIENT
Start: 2021-05-15

## 2021-05-15 RX ORDER — MELATONIN
2000 DAILY
Status: CANCELLED | OUTPATIENT
Start: 2021-05-16

## 2021-05-15 RX ORDER — FAMOTIDINE 20 MG/1
20 TABLET, FILM COATED ORAL DAILY
Status: DISCONTINUED | OUTPATIENT
Start: 2021-05-16 | End: 2021-05-21 | Stop reason: HOSPADM

## 2021-05-15 RX ORDER — BENZONATATE 100 MG/1
100 CAPSULE ORAL EVERY 8 HOURS
Status: CANCELLED | OUTPATIENT
Start: 2021-05-15

## 2021-05-15 RX ORDER — ALBUTEROL SULFATE 90 UG/1
2 AEROSOL, METERED RESPIRATORY (INHALATION)
Status: CANCELLED | OUTPATIENT
Start: 2021-05-15

## 2021-05-15 RX ORDER — ATORVASTATIN CALCIUM 40 MG/1
40 TABLET, FILM COATED ORAL DAILY
Status: DISCONTINUED | OUTPATIENT
Start: 2021-05-16 | End: 2021-05-21 | Stop reason: HOSPADM

## 2021-05-15 RX ORDER — DEXAMETHASONE SODIUM PHOSPHATE 4 MG/ML
6 INJECTION, SOLUTION INTRA-ARTICULAR; INTRALESIONAL; INTRAMUSCULAR; INTRAVENOUS; SOFT TISSUE EVERY 24 HOURS
Status: DISCONTINUED | OUTPATIENT
Start: 2021-05-15 | End: 2021-05-16

## 2021-05-15 RX ORDER — ALBUTEROL SULFATE 90 UG/1
2 AEROSOL, METERED RESPIRATORY (INHALATION)
Status: DISCONTINUED | OUTPATIENT
Start: 2021-05-15 | End: 2021-05-21 | Stop reason: HOSPADM

## 2021-05-15 RX ORDER — ONDANSETRON 2 MG/ML
4 INJECTION INTRAMUSCULAR; INTRAVENOUS EVERY 6 HOURS PRN
Status: CANCELLED | OUTPATIENT
Start: 2021-05-15

## 2021-05-15 RX ORDER — ECHINACEA PURPUREA EXTRACT 125 MG
1 TABLET ORAL
Status: CANCELLED | OUTPATIENT
Start: 2021-05-15 | End: 2021-05-19

## 2021-05-15 RX ORDER — MELATONIN
2000 DAILY
Status: DISCONTINUED | OUTPATIENT
Start: 2021-05-16 | End: 2021-05-21 | Stop reason: HOSPADM

## 2021-05-15 RX ORDER — ATORVASTATIN CALCIUM 40 MG/1
40 TABLET, FILM COATED ORAL DAILY
Status: CANCELLED | OUTPATIENT
Start: 2021-05-16

## 2021-05-15 RX ORDER — MULTIVITAMIN/IRON/FOLIC ACID 18MG-0.4MG
1 TABLET ORAL DAILY
Status: DISCONTINUED | OUTPATIENT
Start: 2021-05-16 | End: 2021-05-21 | Stop reason: HOSPADM

## 2021-05-15 RX ORDER — GUAIFENESIN 600 MG
600 TABLET, EXTENDED RELEASE 12 HR ORAL EVERY 12 HOURS SCHEDULED
Status: CANCELLED | OUTPATIENT
Start: 2021-05-15

## 2021-05-15 RX ORDER — ACETAMINOPHEN 325 MG/1
650 TABLET ORAL EVERY 6 HOURS PRN
Status: DISCONTINUED | OUTPATIENT
Start: 2021-05-15 | End: 2021-05-21 | Stop reason: HOSPADM

## 2021-05-15 RX ADMIN — BENZONATATE 100 MG: 100 CAPSULE ORAL at 05:04

## 2021-05-15 RX ADMIN — OXYCODONE HYDROCHLORIDE AND ACETAMINOPHEN 1000 MG: 500 TABLET ORAL at 10:44

## 2021-05-15 RX ADMIN — DEXAMETHASONE SODIUM PHOSPHATE 6 MG: 4 INJECTION INTRA-ARTICULAR; INTRALESIONAL; INTRAMUSCULAR; INTRAVENOUS; SOFT TISSUE at 16:30

## 2021-05-15 RX ADMIN — IPRATROPIUM BROMIDE 2 PUFF: 17 AEROSOL, METERED RESPIRATORY (INHALATION) at 16:33

## 2021-05-15 RX ADMIN — ALBUTEROL SULFATE 2 PUFF: 90 AEROSOL, METERED RESPIRATORY (INHALATION) at 16:33

## 2021-05-15 RX ADMIN — FAMOTIDINE 20 MG: 20 TABLET ORAL at 10:44

## 2021-05-15 RX ADMIN — ENOXAPARIN SODIUM 60 MG: 60 INJECTION SUBCUTANEOUS at 20:53

## 2021-05-15 RX ADMIN — Medication 2000 UNITS: at 10:43

## 2021-05-15 RX ADMIN — ALBUTEROL SULFATE 2 PUFF: 90 AEROSOL, METERED RESPIRATORY (INHALATION) at 07:37

## 2021-05-15 RX ADMIN — BENZONATATE 100 MG: 100 CAPSULE ORAL at 20:53

## 2021-05-15 RX ADMIN — ENOXAPARIN SODIUM 60 MG: 60 INJECTION SUBCUTANEOUS at 10:43

## 2021-05-15 RX ADMIN — ATORVASTATIN CALCIUM 40 MG: 40 TABLET, FILM COATED ORAL at 10:44

## 2021-05-15 RX ADMIN — GUAIFENESIN 600 MG: 600 TABLET ORAL at 10:44

## 2021-05-15 RX ADMIN — IPRATROPIUM BROMIDE 2 PUFF: 17 AEROSOL, METERED RESPIRATORY (INHALATION) at 07:37

## 2021-05-15 RX ADMIN — GUAIFENESIN 600 MG: 600 TABLET, EXTENDED RELEASE ORAL at 20:53

## 2021-05-15 RX ADMIN — IPRATROPIUM BROMIDE 2 PUFF: 17 AEROSOL, METERED RESPIRATORY (INHALATION) at 20:54

## 2021-05-15 RX ADMIN — ALBUTEROL SULFATE 2 PUFF: 90 AEROSOL, METERED RESPIRATORY (INHALATION) at 20:54

## 2021-05-15 RX ADMIN — Medication 1 TABLET: at 10:43

## 2021-05-15 NOTE — ASSESSMENT & PLAN NOTE
Patient reports not being on any medications for her sarcoidosis  For now will treat her asthma and she remains on Decadron 6 mg IV Q 24 for her COVID infection  Continue with oxygen support  · Will review pulmonary consultation  · Monitor for worsening hypoxemia or distress

## 2021-05-15 NOTE — PLAN OF CARE
Problem: Potential for Falls  Goal: Patient will remain free of falls  Description: INTERVENTIONS:  - Assess patient frequently for physical needs  -  Identify cognitive and physical deficits and behaviors that affect risk of falls    -  [place] fall precautions as indicated by assessment   - Educate patient/family on patient safety including physical limitations  - Instruct patient to call for assistance with activity based on assessment  - Modify environment to reduce risk of injury  - Consider OT/PT consult to assist with strengthening/mobility  Outcome: Progressing

## 2021-05-15 NOTE — ASSESSMENT & PLAN NOTE
See above plan for acute respiratory failure, patient to be transferred due to increased oxygen requirements and need for higher level of care

## 2021-05-15 NOTE — ASSESSMENT & PLAN NOTE
Patient is a 69-year-old female with a known history of sarcoidosis on no steroids or medications chronically, and a history of asthma who presented the hospital with subjective fever cough shortness of breath and hypoxemia  The patient evidently had been in the emergency room on 05/03/2021 with similar symptoms but declined COVID testing at that time was sent home  Patient returned with worsening symptoms and was found to be positive for COVID  She was admitted to the hospital and started on a moderate treatment plan for COVID including remdesivir and steroids  Patient had worsening shortness of breath and increasing D-dimer was sent for a CT scan of the chest at which time she was found have a pulmonary embolism the  upper lobe, with evidence for RV strain  2D echo was completed and the results are pending  Patient currently is resting comfortably in the ICU  She was transferred from [facility] due to increasing O2 requirements  She was 8 L, mid flow, with a jumped to 13 L with mid to upper 80s with her saturations but appears in no acute distress at this time  · Consult to pulmonary  · Continue with vitamin D3, Decadron 6 mg IV Q 24 hours, Pepcid 20 mg daily albuterol and Atrovent inhalers  She completed her vitamin-C and zinc and is on a multivitamin  · Continue Lovenox 1 milligram/kilogram q 12, decide on transition to novel oral anticoagulant once more medically stable  · At this time the patient is focused on getting her dinner but did state that her ankles are not curative in nature therefore she would be a full code with full intubation if required  · Lasix has been on hold but was initiated in response to pulmonary vascular congestion

## 2021-05-15 NOTE — CONSULTS
Pulmonary Consultation   Zuleyka Theodore 54 y o  female MRN: [id number]  Unit/Bed#: ICU 07 Encounter: [id number]      Reason for consultation: [facility] [place] 2]     Requesting physician: Dr Sherita Saeed    Impressions:   1  Acute hypoxic respiratory failure  2  COVID19 pneumonia  3  Subsegmental NANCIE PE with RV strain per CT  4  History of sarcoidosis, previously well controlled off therapy    Recommendations:  1  Continue Decadron at current dose  If no improvement would switch to high dose Solumedrol  2  S/p actemra on 5/3/21  3  Completed Remdesivir 5 days  4  Await echo results  For now would just continue treatment dose Lovenox   5  Check ACE level to see if it is significant elevated  6  Continue Xopenex and Atrovent  7  Previously on Advair  Would consider addition of LABA/ICS at discharge  8  Continue Vitamin D and multivitamin   9  Continue Lipitor   10  Continue Pepcid      History of Present Illness   HPI:  Zuleyka Theodore is a 54 y o  female who comes in to [facility] for shrotness of breath, chest discomfort, cough and fever  She tested positive on 5/8/21 and was dewsaturating requiting 6-8 L  She would have intermittent desaturation requiring 8-13L  She was treated with Remdesivir, Actemra steroids and was ntoing some clinical improvement  However, yesterday was noting increase O2 requirement and dyspnea and she had a CT done which demonstrated subsegmental PE with RV strain  She was started on treatment dose Lovenox   Due to increasing oxygen requirement she was sent to [place] for higher level of care  Review of systems:  12 point review of systems was completed and was otherwise negative except as listed in HPI        Historical Information   Past Medical History:   Diagnosis Date    Anxiety     Asthma     Back pain     Sarcoidosis     Vertigo      Past Surgical History:   Procedure Laterality Date    MAMMO (HISTORICAL)  05/02/2018     Family History   Problem Relation Age of Onset    Hypertension Mother     Mental illness Mother     Asthma Mother        Occupational History: not currently employed    Social History     Socioeconomic History    Marital status: Single     Spouse name: Not on file    Number of children: Not on file    Years of education: Not on file    Highest education level: Not on file   Occupational History    Not on file   Social Needs    Financial resource strain: Not on file    Food insecurity     Worry: Not on file     Inability: Not on file   [employer] needs     Medical: Not on file     Non-medical: Not on file   Tobacco Use    Smoking status: Never Smoker    Smokeless tobacco: Never Used   Substance and Sexual Activity    Alcohol use: Not Currently     Comment: social    Drug use: Not Currently     Types: Marijuana    Sexual activity: Not Currently   Lifestyle    Physical activity     Days per week: Not on file     Minutes per session: Not on file    Stress: Not on file   Relationships    Social connections     Talks on phone: Not on file     Gets together: Not on file     Attends Cheondoism service: Not on file     Active member of club or organization: Not on file     Attends meetings of clubs or organizations: Not on file     Relationship status: Not on file    Intimate partner violence     Fear of current or ex partner: Not on file     Emotionally abused: Not on file     Physically abused: Not on file     Forced sexual activity: Not on file   Other Topics Concern    Not on file   Social History Narrative    · Most recent tobacco use screenin2019      · Do you currently or have you served in the [employer]:   No      · Were you activated, into active duty, as a member of the [employer] or as a Reservist:   No          Meds/Allergies   Current Facility-Administered Medications   Medication Dose Route Frequency    acetaminophen (TYLENOL) tablet 650 mg  650 mg Oral Q6H PRN    albuterol (Deatra Gambler HFA) inhaler 2 puff  2 puff Inhalation 4x Daily    [START ON 5/16/2021] atorvastatin (LIPITOR) tablet 40 mg  40 mg Oral Daily    benzonatate (TESSALON PERLES) capsule 100 mg  100 mg Oral Q8H    [START ON 5/16/2021] cholecalciferol (VITAMIN D3) tablet 2,000 Units  2,000 Units Oral Daily    dexamethasone (DECADRON) injection 6 mg  6 mg Intravenous Q24H    enoxaparin (LOVENOX) subcutaneous injection 60 mg  1 mg/kg Subcutaneous Q12H [address]    [START ON 5/16/2021] famotidine (PEPCID) tablet 20 mg  20 mg Oral Daily    guaiFENesin (MUCINEX) 12 hr tablet 600 mg  600 mg Oral Q12H [address]    insulin lispro (HumaLOG) 100 units/mL subcutaneous injection 1-5 Units  1-5 Units Subcutaneous TID AC    ipratropium (ATROVENT HFA) inhaler 2 puff  2 puff Inhalation 4x Daily    meclizine (ANTIVERT) tablet 12 5 mg  12 5 mg Oral Q8H PRN    [START ON 5/16/2021] multivitamin-minerals (CENTRUM ADULTS) tablet 1 tablet  1 tablet Oral Daily    ondansetron (ZOFRAN) injection 4 mg  4 mg Intravenous Q6H PRN    polyethylene glycol (MIRALAX) packet 17 g  17 g Oral Daily PRN    senna-docusate sodium (SENOKOT S) 8 6-50 mg per tablet 1 tablet  1 tablet Oral HS    sodium chloride (OCEAN) 0 65 % nasal spray 1 spray  1 spray Each Nare Q1H PRN     Medications Prior to Admission   Medication    atorvastatin (LIPITOR) 40 mg tablet    benzonatate (TESSALON PERLES) 100 mg capsule    cetirizine (ZyrTEC) 10 mg tablet    ferrous sulfate 324 (65 Fe) mg    fluticasone (FLONASE) 50 mcg/act nasal spray    fluticasone-salmeterol (ADVAIR, WIXELA) 250-50 mcg/dose inhaler    hydrOXYzine HCL (ATARAX) 25 mg tablet    ibuprofen (MOTRIN) 600 mg tablet    ketotifen (ZADITOR) 0 025 % ophthalmic solution    meclizine (ANTIVERT) 12 5 MG tablet    montelukast (SINGULAIR) 10 mg tablet    nystatin (MYCOSTATIN) cream    SYMBICORT 160-4 5 MCG/ACT inhaler    triamcinolone (KENALOG) 0 1 % cream    valACYclovir (VALTREX) 1,000 mg tablet    Vitamins A & D (VITAMIN A & D) ointment     Allergies   Allergen Reactions    Tegretol [Carbamazepine] Rash       Home medications:  Prior to Admission medications    Medication Sig Start Date End Date Taking? Authorizing Provider   atorvastatin (LIPITOR) 40 mg tablet Take 1 tablet (40 mg total) by mouth daily 6/2/20 5/3/21  FRED Sahni   benzonatate (TESSALON PERLES) 100 mg capsule Take 1 capsule (100 mg total) by mouth every 8 (eight) hours 5/3/21   Amanda Mcneil MD   cetirizine (ZyrTEC) 10 mg tablet Take 1 tablet (10 mg total) by mouth daily 6/2/20 5/3/21  FRED Sahni   ferrous sulfate 324 (65 Fe) mg Take 1 tablet (324 mg total) by mouth 2 (two) times a day before meals 4/24/20 12/29/20  FRED Sahni   fluticasone [facility]) 50 mcg/act nasal spray 1 spray into each nostril daily 6/2/20 5/3/21  FRED Sahni   fluticasone-salmeterol (ADVAIR, WIXELA) 250-50 mcg/dose inhaler Inhale 1 puff 2 (two) times a day Rinse mouth after use   6/2/20 5/3/21  FRED Sahni   hydrOXYzine HCL (ATARAX) 25 mg tablet Take 1 tablet (25 mg total) by mouth 2 (two) times a day 12/29/20 5/3/21  FRED Sahni   ibuprofen (MOTRIN) 600 mg tablet Take 1 tablet (600 mg total) by mouth every 6 (six) hours as needed for mild pain or moderate pain 5/3/21   Amanda Mcneil MD   ketotifen (ZADITOR) 0 025 % ophthalmic solution Administer 1 drop to both eyes 2 (two) times a day for 10 days 11/30/20 5/3/21  FRED Sahni   meclizine (ANTIVERT) 12 5 MG tablet Take 1 tablet (12 5 mg total) by mouth every 8 (eight) hours as needed for dizziness 6/2/20 5/3/21  FRED Sahni   montelukast (SINGULAIR) 10 mg tablet Take 1 tablet (10 mg total) by mouth daily 6/2/20 5/3/21  FRED Sahni   nystatin (MYCOSTATIN) cream Apply topically 2 (two) times a day  Patient not taking: Reported on 5/3/2021 1/7/21   FRED Sahni   SYMBICORT 160-4 5 MCG/ACT inhaler Inhale 2 puffs 2 (two) times a day 6/2/20 5/3/21  [employer]ui [employer], CRNP   triamcinolone (KENALOG) 0 1 % cream Apply topically 2 (two) times a day  Patient not taking: Reported on 5/3/2021 1/7/21   Hui ClubsFRED   valACYclovir (VALTREX) 1,000 mg tablet Take 1 tablet (1,000 mg total) by mouth 3 (three) times a day for 7 days  Patient not taking: Reported on 5/3/2021 12/31/20 1/7/21  Ban Harding PA-C   Vitamins A & D (VITAMIN A & D) ointment Apply topically as needed for dry skin (to face) 4/28/20 5/3/21  Hui ClubsFRED       Vitals: Tmax Temp (24hrs), Av 4 °F (36 9 °C), Min:97 9 °F (36 6 °C), Max:98 9 °F (37 2 °C)    Blood pressure 106/65, pulse 96, temperature 98 5 °F (36 9 °C), temperature source Oral, resp  rate 20, height 4' 11" (1 499 m), weight 62 6 kg (138 lb 0 1 oz), SpO2 92 %  , 12L, Body mass index is 27 87 kg/m²  Intake/Output Summary (Last 24 hours) at 5/15/2021 1722  Last data filed at 5/15/2021 0548  Gross per 24 hour   Intake 360 ml   Output --   Net 360 ml       Physical Exam  General: Pleasant, Awake alert and oriented x 3, conversant without conversational dyspnea, NAD, normal affect  HEENT:  PERRL, Sclera noninjected, nonicteric OU, Nares patent, no nasal flaring, no nasal drainage, Mucous membranes, moist, no oral lesions, normal dentition  NECK: Trachea midline, no accessory muscle use, no stridor, no cervical or supraclavicular adenopathy, JVP not elevated  CARDIAC: Reg, single s1/S2, no m/r/g  PULM: Crackles in lung bases, no wheezing or rhonchi  CHEST:  No gross deformities, equal chest expansion on inspiration bilaterally  ABD: Normoactive bowel sounds, soft nontender, nondistended, no rebound, no rigidity, no guarding  EXT: No cyanosis, no clubbing, no edema, normal capillary refill  SKIN:  No rashes, no lesions  NEURO: no focal neurologic deficits, AAOx3, moving all extremities appropriately    Labs: I have personally reviewed pertinent lab results    Results from last 7 days   Lab Units 05/15/21  1512 05/10/21  0541 05/09/21  0454   WBC Thousand/uL 10 39* 7 82 5 69   HEMOGLOBIN g/dL 13 9 11 6 12 0   HEMATOCRIT % 42 8 36 5 37 7   PLATELETS Thousands/uL 646* 440* 376   NEUTROS PCT % 85*  --   --    MONOS PCT % 5  --   --    MONO PCT %  --  12  --       Results from last 7 days   Lab Units 05/15/21  0514 05/14/21  0448 05/12/21 0443  05/09/21  0454   POTASSIUM mmol/L 4 3 5 2* 4 4   < > 4 8   CHLORIDE mmol/L 100 103 109*   < > 105   CO2 mmol/L 26 21* 24   < > 24   BUN mg/dL 21* 20 26*   < > 59*   CREATININE mg/dL 0 65 0 58 0 65   < > 1 25*   CALCIUM mg/dL 9 8 9 4 9 3   < > 9 4   ALK PHOS U/L  --   --   --   --  51 3   ALT U/L  --   --   --   --  44   AST U/L  --   --   --   --  55*    < > = values in this interval not displayed  0   Lab Value Date/Time    TROPONINI <0 03 05/08/2021 1234    TROPONINI <0 02 10/04/2018 0457       Micro:  Lab Results   Component Value Date    BLOODCX No Growth After 5 Days  05/08/2021    BLOODCX No Growth After 5 Days  05/08/2021       Imaging and other studies: I have personally reviewed pertinent reports  CTA chest -    IMPRESSION:  1  Acute left upper lobe segmental/subsegmental pulmonary embolus  The calculated ratio of right ventricular to left ventricular diameter (RV/LV ratio) is greater than 0 9, which is abnormal and could be indicative of mild right heart strain, however, this is not specific  There is no bowing of the intraventricular   septum  An abnormal RV/LV ratio has been shown to be associated with an increased risk of 30 day mortality in the setting of acute pulmonary embolism  Consider consultation with the medical critical care team   2  Diffuse bilateral groundglass and consolidation likely corresponding to progressive Covid-19 pneumonia as above  3  Coronary artery calcification      Pulmonary function testing: None    EKG, Pathology, and Other Studies: I have personally reviewed pertinent reports      Echo results pending    Code Status: Level 1 - Full Code      Jamal Collet, DO [facility]

## 2021-05-15 NOTE — ASSESSMENT & PLAN NOTE
Likely secondary to COVID infection, presented with URI symptoms for 5-7 days  Visited ED on 05/03/2021 with similar symptoms at that time refused to get COVID-19 test  Currently on 12-13 L [address], desaturates quickly on exertion  Pulmonology on board recommended to continue steroids and start bronchodilators  Received tocilizumab on 05/11/2021  Completed remdesivir 5 day course    Incentive spirometry, self proning  Trend inflammatory markers  Continue albuterol inhaler 4 times daily

## 2021-05-15 NOTE — PLAN OF CARE
Problem: INFECTION - ADULT  Goal: Absence or prevention of progression during hospitalization  Description: INTERVENTIONS:  - Assess and monitor for signs and symptoms of infection  - Monitor lab/diagnostic results  - Monitor all insertion sites, i e  indwelling lines, tubes, and drains  - Monitor endotracheal if appropriate and nasal secretions for changes in amount and color  - [place] appropriate cooling/warming therapies per order  - Administer medications as ordered  - Instruct and encourage patient and family to use good hand hygiene technique  - Identify and instruct in appropriate isolation precautions for identified infection/condition  Outcome: Not Progressing     Problem: SAFETY ADULT  Goal: Patient will remain free of falls  Description: INTERVENTIONS:  - Assess patient frequently for physical needs  -  Identify cognitive and physical deficits and behaviors that affect risk of falls    -  [place] fall precautions as indicated by assessment   - Educate patient/family on patient safety including physical limitations  - Instruct patient to call for assistance with activity based on assessment  - Modify environment to reduce risk of injury  - Consider OT/PT consult to assist with strengthening/mobility  Outcome: Not Progressing  Goal: Maintain or return to baseline ADL function  Description: INTERVENTIONS:  -  Assess patient's ability to carry out ADLs; assess patient's baseline for ADL function and identify physical deficits which impact ability to perform ADLs (bathing, care of mouth/teeth, toileting, grooming, dressing, etc )  - Assess/evaluate cause of self-care deficits   - Assess range of motion  - Assess patient's mobility; develop plan if impaired  - Assess patient's need for assistive devices and provide as appropriate  - Encourage maximum independence but intervene and supervise when necessary  - Involve family in performance of ADLs  - Assess for home care needs following discharge   - Consider OT consult to assist with ADL evaluation and planning for discharge  - Provide patient education as appropriate  Outcome: Not Progressing  Goal: Maintain or return mobility status to optimal level  Description: INTERVENTIONS:  - Assess patient's baseline mobility status (ambulation, transfers, stairs, etc )    - Identify cognitive and physical deficits and behaviors that affect mobility  - Identify mobility aids required to assist with transfers and/or ambulation (gait belt, sit-to-stand, lift, walker, cane, etc )  - [place] fall precautions as indicated by assessment  - Record patient progress and toleration of activity level on Mobility SBAR; progress patient to next Phase/Stage  - Instruct patient to call for assistance with activity based on assessment  - Consider rehabilitation consult to assist with strengthening/weightbearing, etc   Outcome: Not Progressing     Problem: DISCHARGE PLANNING  Goal: Discharge to home or other facility with appropriate resources  Description: INTERVENTIONS:  - Identify barriers to discharge w/patient and caregiver  - Arrange for needed discharge resources and transportation as appropriate  - Identify discharge learning needs (meds, wound care, etc )  - Arrange for interpretive services to assist at discharge as needed  - Refer to Case Management Department for coordinating discharge planning if the patient needs post-hospital services based on physician/advanced practitioner order or complex needs related to functional status, cognitive ability, or social support system  Outcome: Not Progressing     Problem: RESPIRATORY - ADULT  Goal: Achieves optimal ventilation and oxygenation  Description: INTERVENTIONS:  - Assess for changes in respiratory status  - Assess for changes in mentation and behavior  - Position to facilitate oxygenation and minimize respiratory effort  - Oxygen administered by appropriate delivery if ordered  - Initiate smoking cessation education as indicated  - Encourage broncho-pulmonary hygiene including cough, deep breathe, Incentive Spirometry  - Assess the need for suctioning and aspirate as needed  - Assess and instruct to report SOB or any respiratory difficulty  - Respiratory Therapy support as indicated  Outcome: Not Progressing     Problem: Potential for Falls  Goal: Patient will remain free of falls  Description: INTERVENTIONS:  - Assess patient frequently for physical needs  -  Identify cognitive and physical deficits and behaviors that affect risk of falls    -  [place] fall precautions as indicated by assessment   - Educate patient/family on patient safety including physical limitations  - Instruct patient to call for assistance with activity based on assessment  - Modify environment to reduce risk of injury  - Consider OT/PT consult to assist with strengthening/mobility  Outcome: Not Progressing

## 2021-05-15 NOTE — ASSESSMENT & PLAN NOTE
Lipid panel showing elevated LDL  Not taking atorvastatin at home  Continue statin as per COVID-19 protocol, will need to continue statin at home

## 2021-05-15 NOTE — ASSESSMENT & PLAN NOTE
Patient has a history of sarcoidosis, received treatment with prednisone in the past, does not follow with any pulmonologist as an outpatient

## 2021-05-15 NOTE — H&P
[facility]&P- Zuleyka Theodore 1965, 54 y o  female MRN: [id number]  Unit/Bed#: ICU 07 Encounter: [id number]  Primary Care Provider: FRED Mcclure   Date and time admitted to hospital: 5/15/2021  2:04 PM    * Acute hypoxemic respiratory failure due to COVID-19 [facility])  Assessment & Plan  Patient is a 66-year-old female with a known history of sarcoidosis on no steroids or medications chronically, and a history of asthma who presented the hospital with subjective fever cough shortness of breath and hypoxemia  The patient evidently had been in the emergency room on 05/03/2021 with similar symptoms but declined COVID testing at that time was sent home  Patient returned with worsening symptoms and was found to be positive for COVID  She was admitted to the hospital and started on a moderate treatment plan for COVID including remdesivir and steroids  Patient had worsening shortness of breath and increasing D-dimer was sent for a CT scan of the chest at which time she was found have a pulmonary embolism the  upper lobe, with evidence for RV strain  2D echo was completed and the results are pending  Patient currently is resting comfortably in the ICU  She was transferred from [facility] due to increasing O2 requirements  She was 8 L, mid flow, with a jumped to 13 L with mid to upper 80s with her saturations but appears in no acute distress at this time  · Consult to pulmonary  · Continue with vitamin D3, Decadron 6 mg IV Q 24 hours, Pepcid 20 mg daily albuterol and Atrovent inhalers  She completed her vitamin-C and zinc and is on a multivitamin  · Continue Lovenox 1 milligram/kilogram q 12, decide on transition to novel oral anticoagulant once more medically stable  · At this time the patient is focused on getting her dinner but did state that her ankles are not curative in nature therefore she would be a full code with full intubation if required    · Lasix has been on hold but was initiated in response to pulmonary vascular congestion  Pulmonary embolism ([address] )  Assessment & Plan  Currently on Lovenox 1 milligram/kilogram q 12 hours  Decide and transition to a novel oral anticoagulant once more medically stable  Reviewed Dopplers of lower extremity CTs are negative  Pneumonia due to COVID-19 virus  Assessment & Plan  Status post remdesivir, 1 dose of Actemra was utilized patient currently being treated for pulmonary embolism with Lovenox  Transferred to [facility] for higher level of care due to increasing O2 requirements  Sarcoidosis  Assessment & Plan  Patient reports not being on any medications for her sarcoidosis  For now will treat her asthma and she remains on Decadron 6 mg IV Q 24 for her COVID infection  Continue with oxygen support  · Will review pulmonary consultation  · Monitor for worsening hypoxemia or distress  Hypercholesterolemia  Assessment & Plan  Continue statin      VTE Prophylaxis: Enoxaparin (Lovenox)  / sequential compression device   Code Status:  Full code  POLST: POLST form is not discussed and not completed at this time  Discussion with family:  Asked if I could communicate with her family she stated no  She does list a brother as a patient contact Adarsh frederick  Anticipated Length of Stay:  Patient will be admitted on an Inpatient basis with an anticipated length of stay of  greater than 2 midnights  Justification for Hospital Stay:  Treatment for COVID pneumonia    Total Time for Visit, including Counseling / Coordination of Care: 70 minutes  Chief Complaint:   Shortness of breath diagnosis COVID now with increasing O2 requirement    History of Present Illness:    Dipti Ac is a [address] [place] y o  female who presents with increasing O2 requirements during course of treatment for COVID 19 pneumonia  Patient was diagnosed in 5/8/21 but had symptoms since at least 05/03/2021    Patient was admitted to [facility] treated with moderate protocol for COVID infection including remdesivir, Decadron vitamin C vitamin D and zinc   She completed the course of those vitamins and is now on multivitamin  She had increasing O2 requirements with a an 8 L to 13 L change in oxygen requirement and therefore a CTA was completed in response to an elevated D-dimer  She has found a left upper lobe pulmonary embolism and was started on Lovenox  Since the patient has had fragile oxygenation she was requested to transfer to [facility] for higher level of care  Review of Systems:    Review of Systems    Past Medical and Surgical History:     Past Medical History:   Diagnosis Date    Anxiety     Asthma     Back pain     Sarcoidosis     Vertigo        Past Surgical History:   Procedure Laterality Date    MAMMO (HISTORICAL)  05/02/2018       Meds/Allergies:    Prior to Admission medications    Medication Sig Start Date End Date Taking? Authorizing Provider   atorvastatin (LIPITOR) 40 mg tablet Take 1 tablet (40 mg total) by mouth daily 6/2/20 5/3/21  FRED Morris   benzonatate (TESSALON PERLES) 100 mg capsule Take 1 capsule (100 mg total) by mouth every 8 (eight) hours 5/3/21   Francisco Mendoza MD   cetirizine (ZyrTEC) 10 mg tablet Take 1 tablet (10 mg total) by mouth daily 6/2/20 5/3/21  FRED Morris   ferrous sulfate 324 (65 Fe) mg Take 1 tablet (324 mg total) by mouth 2 (two) times a day before meals 4/24/20 12/29/20  FRED Morris   fluticasone [facility]) 50 mcg/act nasal spray 1 spray into each nostril daily 6/2/20 5/3/21  FRED Morris   fluticasone-salmeterol (ADVAIR, WIXELA) 250-50 mcg/dose inhaler Inhale 1 puff 2 (two) times a day Rinse mouth after use   6/2/20 5/3/21  FRED Morris   hydrOXYzine HCL (ATARAX) 25 mg tablet Take 1 tablet (25 mg total) by mouth 2 (two) times a day 12/29/20 5/3/21  FRED Morris   ibuprofen (MOTRIN) 600 mg tablet Take 1 tablet (600 mg total) by mouth every 6 (six) hours as needed for mild pain or moderate pain 5/3/21   Anisa Worthington MD   ketotifen (ZADITOR) 0 025 % ophthalmic solution Administer 1 drop to both eyes 2 (two) times a day for 10 days 11/30/20 5/3/21  FRED Rincon   meclizine (ANTIVERT) 12 5 MG tablet Take 1 tablet (12 5 mg total) by mouth every 8 (eight) hours as needed for dizziness 6/2/20 5/3/21  FRED Rincon   montelukast (SINGULAIR) 10 mg tablet Take 1 tablet (10 mg total) by mouth daily 6/2/20 5/3/21  FRED Rincon   nystatin (MYCOSTATIN) cream Apply topically 2 (two) times a day  Patient not taking: Reported on 5/3/2021 1/7/21   FRED Rincon   SYMBICORT 160-4 5 MCG/ACT inhaler Inhale 2 puffs 2 (two) times a day 6/2/20 5/3/21  FRED Rincon   triamcinolone (KENALOG) 0 1 % cream Apply topically 2 (two) times a day  Patient not taking: Reported on 5/3/2021 1/7/21   FRED Rincon   valACYclovir (VALTREX) 1,000 mg tablet Take 1 tablet (1,000 mg total) by mouth 3 (three) times a day for 7 days  Patient not taking: Reported on 5/3/2021 12/31/20 1/7/21  Nelda Jerry PA-C   Vitamins A & D (VITAMIN A & D) ointment Apply topically as needed for dry skin (to face) 4/28/20 5/3/21  FRED Rincon     I have reviewed home medications with patient personally  Allergies:    Allergies   Allergen Reactions    Tegretol [Carbamazepine] Rash       Social History:     Marital Status: Single   Occupation:  Patient works in a factory  Patient Pre-hospital Living Situation:  Lives at home  Patient Pre-hospital Level of Mobility:  No previous restrictions  Patient Pre-hospital Diet Restrictions:  No previous restrictions  Substance Use History:   Social History     Substance and Sexual Activity   Alcohol Use Not Currently    Comment: social     Social History     Tobacco Use   Smoking Status Never Smoker   Smokeless Tobacco Never Used     Social History     Substance and Sexual Activity   Drug Use Not Currently    Types: Marijuana       Family History:    Family History   Problem Relation Age of Onset    Hypertension Mother     Mental illness Mother     Asthma Mother        Physical Exam:     Vitals:   Blood Pressure: 92/54 (05/15/21 1835)  Pulse: 92 (05/15/21 1835)  Temperature: 98 °F (36 7 °C) (05/15/21 1835)  Temp Source: Oral (05/15/21 1835)  Respirations: (!) 35 (05/15/21 1835)  Height: 4' 11" (149 9 cm) (05/15/21 1400)  Weight - Scale: 62 6 kg (138 lb 0 1 oz) (05/15/21 1400)  SpO2: 90 % (05/15/21 1835)    Physical Exam    Patient is a thin female no acute distress although she does have short staccato respirations that are not using accessory muscles  She has no conversational dyspnea  Her respiratory rate however is registering in the 30s  She is otherwise normocephalic atraumatic pupils equal round and reactive to light extraocular muscles intact mucous membranes are moist neck is supple there is no JVD no lymph nodes no carotid bruits chest is decreased but clear to auscultation is no rhonchi rales wheezes  Cardiovascular regular rate rhythm positive S1-S2 no S3-S4 no murmur rub or gallop  Abdomen is soft nontender nondistended with positive bowel sounds no hepatosplenomegaly no guarding or rebound  Neurologically the patient has a flat affect cranial nerves 2-12 however peer to be intact    Additional Data:     Lab Results: I have personally reviewed pertinent reports        Results from last 7 days   Lab Units 05/15/21  0514 05/10/21  0541   WBC Thousand/uL 10 39* 7 82   HEMOGLOBIN g/dL 13 9 11 6   HEMATOCRIT % 42 8 36 5   PLATELETS Thousands/uL 646* 440*   BANDS PCT %  --  6   NEUTROS PCT % 85*  --    LYMPHS PCT % 9*  --    LYMPHO PCT %  --  17   MONOS PCT % 5  --    MONO PCT %  --  12   EOS PCT % 0 0     Results from last 7 days   Lab Units 05/15/21  0514  05/09/21  0454   SODIUM mmol/L 135   < > 140   POTASSIUM mmol/L 4 3   < > 4 8   CHLORIDE mmol/L 100   < > 105   CO2 mmol/L 26   < > 24   BUN mg/dL 21*   < > 59*   CREATININE mg/dL 0 65   < > 1 25*   ANION GAP mmol/L 9   < > 11   CALCIUM mg/dL 9 8   < > 9 4   ALBUMIN g/dL  --   --  3 5   TOTAL BILIRUBIN mg/dL  --   --  0 31   ALK PHOS U/L  --   --  51 3   ALT U/L  --   --  44   AST U/L  --   --  55*   GLUCOSE RANDOM mg/dL 128   < > 119    < > = values in this interval not displayed  Results from last 7 days   Lab Units 05/15/21  1629 05/15/21  1210 05/14/21  1613 05/14/21  1137 05/14/21  0711 05/13/21  1631 05/13/21  1120 05/13/21  0717 05/12/21  1627 05/12/21  1118 05/12/21  0717 05/11/21  1613   POC GLUCOSE mg/dl 116 125 236* 194* 98 144* 165* 101 129 122 104 147*         Results from last 7 days   Lab Units 05/09/21  0454   PROCALCITONIN ng/ml <0 05       Imaging: I have personally reviewed pertinent reports  No orders to display       EKG, Pathology, and Other Studies Reviewed on Admission:   · EKG:  None completed sinus rhythm on the monitor    [employer] / [facility] Records Reviewed: Yes     ** Please Note: This note has been constructed using a voice recognition system   **

## 2021-05-15 NOTE — ASSESSMENT & PLAN NOTE
Likely secondary to COVID infection, presented with URI symptoms for 5-7 days  Visited ED on 05/03/2021 with similar symptoms at that time refused to get COVID-19 test  Currently on 8L [address], desaturates quickly on exertion  Pulmonology on board recommended to continue steroids and bronchodilators  Received tocilizumab on 05/11/2021  Completed remdesivir 5 day course    Incentive spirometry, self proning  Trend inflammatory markers  Continue albuterol inhaler 4 times daily

## 2021-05-15 NOTE — ASSESSMENT & PLAN NOTE
Currently on Lovenox 1 milligram/kilogram q 12 hours  Decide and transition to a novel oral anticoagulant once more medically stable  Reviewed Dopplers of lower extremity CTs are negative

## 2021-05-15 NOTE — DISCHARGE SUMMARY
Sherine U  66   Discharge- Antionette Belcher 1965, 54 y o  female MRN: [id number]  Unit/Bed#: -01 Encounter: [id number]  Primary Care Provider: FRED Garcia   Date and time admitted to hospital: 5/8/2021 12:08 PM    * Acute hypoxemic respiratory failure due to COVID-19 [facility])  Assessment & Plan  Likely secondary to COVID infection, presented with URI symptoms for 5-7 days  Visited ED on 05/03/2021 with similar symptoms at that time refused to get COVID-19 test  Currently on 12-13 L [address], desaturates quickly on exertion  Pulmonology on board recommended to continue steroids and start bronchodilators  Received tocilizumab on 05/11/2021  Completed remdesivir 5 day course  Incentive spirometry, self proning  Trend inflammatory markers  Continue albuterol inhaler 4 times daily        Pulmonary embolism (HCC)  Assessment & Plan  Acute left upper lobe segmental/subsegmental pulmonary embolus  The calculated ratio of right ventricular to left ventricular diameter (RV/LV ratio) is greater than 0 9, which is abnormal and could be indicative of mild right heart strain, however, this is not specific  There is no bowing of the intraventricular   septum  Patient was started on therapeutic Lovenox, will continue that  Echocardiogram was order, vascular studies negative for any DVT    Pneumonia due to COVID-19 virus  Assessment & Plan  See above plan for acute respiratory failure, patient to be transferred due to increased oxygen requirements and need for higher level of care    Sarcoidosis  Assessment & Plan  Patient has a history of sarcoidosis, received treatment with prednisone in the past, does not follow with any pulmonologist as an outpatient  Hypercholesterolemia  Assessment & Plan  Lipid panel showing elevated LDL  Not taking atorvastatin at home  Continue statin as per COVID-19 protocol, will need to continue statin at home        Discharging Physician / Practitioner: Karoline Gonzáles Dale Meraz MD  PCP: FRED Floyd  Admission Date:   Admission Orders (From admission, onward)     Ordered        05/08/21 1359  Inpatient Admission  Once                   Discharge Date: 05/15/21    Resolved Problems  Date Reviewed: 5/15/2021          Resolved    MYRTLE (acute kidney injury) ([facility] [address] ) 5/13/2021     Resolved by  Genie Young MD          Consultations During Hospital Stay:  · Pulmonologist  Procedures Performed:   · None    Significant Findings / Test Results:   1  Acute left upper lobe segmental/subsegmental pulmonary embolus      The calculated ratio of right ventricular to left ventricular diameter (RV/LV ratio) is greater than 0 9, which is abnormal and could be indicative of mild right heart strain, however, this is not specific  There is no bowing of the intraventricular   septum  An abnormal RV/LV ratio has been shown to be associated with an increased risk of 30 day mortality in the setting of acute pulmonary embolism  Consider consultation with the medical critical care team      2  Diffuse bilateral groundglass and consolidation likely corresponding to progressive Covid-19 pneumonia as above        3  Coronary artery calcification  Impression:  RIGHT LOWER LIMB: Limited  No gross evidence of acute deep vein thrombosis in the CFV, FV, Popliteal,  Posterior Tibial, and Peroneal Veins  LEFT LOWER LIMB: Limited  No gross evidence of acute deep vein thrombosis in the CFV, FV, Popliteal,  Posterior Tibial, and Peroneal Veins  Complications: Worsening respiratory failure    Reason for Admission:  Shortness of breath    Hospital Course:     Nathaly Pisano is a 54 y o  female patient who originally presented to the hospital on 5/8/2021 due to worsening shortness of breath , chest discomfort with cough, subjective fevers, chills, nausea, vomiting, me OG a, fatigue and loss of appetite    Patient previously visited ED on May 3rd with similar complaints but she declined COVID-19 test at that time  Her symptoms did not improve and came back for re-evaluation  In the ED she was afebrile, initial vital signs were stable except for O2 saturation and 87% on room air  She was put on mid flow at 6 L and oxygen improved to 97%  Initial CRP was 11 5, D-dimer 1  Chest x-ray showed bilateral patchy opacities consistent with COVID-19 pneumonia  COVID test came back positive  She was given 1 L of IV fluids in the ED  She was subsequently admitted for further management of acute hypoxic respiratory failure in the setting of COVID-19 pneumonia  She was started on moderate pathway including remdesivir and Decadron  However oxygen requirements increased during her hospital stay  Infectious Disease was consulted  Given persistently increased oxygen requirements, elevated acute inflammatory markers and the Santosh clinical response despite IV dexamethasone and remdesivir, patient received tocilizumab 400 mg x 1  However patient continued to require increasing oxygen supplementation, and she is currently using 13 L of mid flow nasal cannula and quickly desaturating with any exertion  CTA of the chest show a left upper lobe P, calculated radial of right ventricular to left ventricular diameter greater than 0 9 which was abnormal and could be indicative of mild right heart strain  Patient was started on therapeutic Lovenox, she was put on telemetry monitoring  Pulmonology also recommended to continue bronchodilators Atrovent and albuterol or as needed  Echocardiogram was ordered  Vascular studies rule out any DVT  Please see above list of diagnoses and related plan for additional information  Condition at Discharge: stable     Discharge Day Visit / Exam:     Subjective:    Patient was seen and examined this morning at bedside, she is saturating 93-94% on 12 L nasal cannula, she denies any pain, no nausea or vomiting      Vitals: Blood Pressure: 91/59 (05/15/21 0744)  Pulse: 79 (05/15/21 0744)  Temperature: 98 9 °F (37 2 °C) (05/15/21 0744)  Temp Source: Oral (05/15/21 0744)  Respirations: 20 (05/15/21 0744)  Height: 4' 11" (149 9 cm) (05/08/21 2111)  Weight - Scale: 63 5 kg (140 lb) (05/08/21 2111)  SpO2: 91 % (05/15/21 0900)  Exam:   Physical Exam  Eyes:      General:         Right eye: No discharge  Left eye: No discharge  Conjunctiva/sclera: Conjunctivae normal    Cardiovascular:      Rate and Rhythm: Normal rate  Pulses: Normal pulses  Heart sounds: Normal heart sounds  Pulmonary:      Breath sounds: Decreased air movement present  No wheezing or rales  Abdominal:      General: There is no distension  Tenderness: There is no abdominal tenderness  There is no guarding  Skin:     General: Skin is warm  Neurological:      General: No focal deficit present  Mental Status: She is alert and oriented to person, place, and time  Psychiatric:         Behavior: Behavior normal          Discharge instructions/Information to patient and family:   See after visit summary for information provided to patient and family  Provisions for Follow-Up Care:  See after visit summary for information related to follow-up care and any pertinent home health orders  Disposition:     [address] Transfer to [facility]       Discharge Statement:  I spent 45 minutes discharging the patient  This time was spent on the day of discharge  I had direct contact with the patient on the day of discharge  Greater than 50% of the total time was spent examining patient, answering all patient questions, arranging and discussing plan of care with patient as well as directly providing post-discharge instructions  Additional time then spent on discharge activities  Discharge Medications:  See after visit summary for reconciled discharge medications provided to patient and family        ** Please Note: This note has been constructed using a voice recognition system **

## 2021-05-15 NOTE — ASSESSMENT & PLAN NOTE
Acute left upper lobe segmental/subsegmental pulmonary embolus  The calculated ratio of right ventricular to left ventricular diameter (RV/LV ratio) is greater than 0 9, which is abnormal and could be indicative of mild right heart strain, however, this is not specific  There is no bowing of the intraventricular   septum  Patient was started on therapeutic Lovenox, will continue that    Echocardiogram was order, vascular studies negative for any DVT

## 2021-05-15 NOTE — ASSESSMENT & PLAN NOTE
Not on any medications  She is not compliant with pulmonology follow-up    Currently on steroids given COVID-19 pneumonia

## 2021-05-15 NOTE — ASSESSMENT & PLAN NOTE
CTA chest showed Lt upper lobe PE and calculated ratio of right ventricular to left ventricular diameter (RV/LV ratio) is greater than 0 9, which is abnormal and could be indicative of mild right heart strain, however, this is not specific  Francesco Mcfaddenkin is no bowing of the intraventricular Septum  Continue therapeutic Lovenox  Telemetry monitoring  Echo ordered  Doppler US LE ordered

## 2021-05-15 NOTE — ASSESSMENT & PLAN NOTE
Status post remdesivir, 1 dose of Actemra was utilized patient currently being treated for pulmonary embolism with Lovenox  Transferred to [address] [place] [address] for higher level of care due to increasing O2 requirements

## 2021-05-16 LAB
ALBUMIN SERPL BCP-MCNC: 2.9 G/DL (ref 3.5–5)
ALP SERPL-CCNC: 77 U/L (ref 46–116)
ALT SERPL W P-5'-P-CCNC: 48 U/L (ref 12–78)
ANION GAP SERPL CALCULATED.3IONS-SCNC: 7 MMOL/L (ref 4–13)
AST SERPL W P-5'-P-CCNC: 29 U/L (ref 5–45)
BASOPHILS # BLD AUTO: 0.02 THOUSANDS/ΜL (ref 0–0.1)
BASOPHILS NFR BLD AUTO: 0 % (ref 0–1)
BILIRUB SERPL-MCNC: 0.5 MG/DL (ref 0.2–1)
BUN SERPL-MCNC: 20 MG/DL (ref 5–25)
CALCIUM ALBUM COR SERPL-MCNC: 10.3 MG/DL (ref 8.3–10.1)
CALCIUM SERPL-MCNC: 9.4 MG/DL (ref 8.3–10.1)
CHLORIDE SERPL-SCNC: 102 MMOL/L (ref 100–108)
CO2 SERPL-SCNC: 28 MMOL/L (ref 21–32)
CREAT SERPL-MCNC: 0.73 MG/DL (ref 0.6–1.3)
D DIMER PPP FEU-MCNC: 2.55 UG/ML FEU
EOSINOPHIL # BLD AUTO: 0 THOUSAND/ΜL (ref 0–0.61)
EOSINOPHIL NFR BLD AUTO: 0 % (ref 0–6)
ERYTHROCYTE [DISTWIDTH] IN BLOOD BY AUTOMATED COUNT: 13.8 % (ref 11.6–15.1)
FERRITIN SERPL-MCNC: 991 NG/ML (ref 8–388)
GFR SERPL CREATININE-BSD FRML MDRD: 107 ML/MIN/1.73SQ M
GLUCOSE SERPL-MCNC: 130 MG/DL (ref 65–140)
GLUCOSE SERPL-MCNC: 147 MG/DL (ref 65–140)
GLUCOSE SERPL-MCNC: 149 MG/DL (ref 65–140)
GLUCOSE SERPL-MCNC: 202 MG/DL (ref 65–140)
GLUCOSE SERPL-MCNC: 97 MG/DL (ref 65–140)
HCT VFR BLD AUTO: 41.9 % (ref 34.8–46.1)
HGB BLD-MCNC: 13.5 G/DL (ref 11.5–15.4)
IMM GRANULOCYTES # BLD AUTO: 0.14 THOUSAND/UL (ref 0–0.2)
IMM GRANULOCYTES NFR BLD AUTO: 1 % (ref 0–2)
LYMPHOCYTES # BLD AUTO: 1.09 THOUSANDS/ΜL (ref 0.6–4.47)
LYMPHOCYTES NFR BLD AUTO: 9 % (ref 14–44)
MAGNESIUM SERPL-MCNC: 1.9 MG/DL (ref 1.6–2.6)
MCH RBC QN AUTO: 29 PG (ref 26.8–34.3)
MCHC RBC AUTO-ENTMCNC: 32.2 G/DL (ref 31.4–37.4)
MCV RBC AUTO: 90 FL (ref 82–98)
MONOCYTES # BLD AUTO: 0.59 THOUSAND/ΜL (ref 0.17–1.22)
MONOCYTES NFR BLD AUTO: 5 % (ref 4–12)
NEUTROPHILS # BLD AUTO: 9.73 THOUSANDS/ΜL (ref 1.85–7.62)
NEUTS SEG NFR BLD AUTO: 85 % (ref 43–75)
NRBC BLD AUTO-RTO: 0 /100 WBCS
PHOSPHATE SERPL-MCNC: 2.9 MG/DL (ref 2.7–4.5)
PLATELET # BLD AUTO: 537 THOUSANDS/UL (ref 149–390)
PMV BLD AUTO: 9.5 FL (ref 8.9–12.7)
POTASSIUM SERPL-SCNC: 4.7 MMOL/L (ref 3.5–5.3)
PROT SERPL-MCNC: 7.1 G/DL (ref 6.4–8.2)
RBC # BLD AUTO: 4.65 MILLION/UL (ref 3.81–5.12)
SODIUM SERPL-SCNC: 137 MMOL/L (ref 136–145)
WBC # BLD AUTO: 11.57 THOUSAND/UL (ref 4.31–10.16)

## 2021-05-16 PROCEDURE — 82948 REAGENT STRIP/BLOOD GLUCOSE: CPT

## 2021-05-16 PROCEDURE — 99232 SBSQ HOSP IP/OBS MODERATE 35: CPT | Performed by: INTERNAL MEDICINE

## 2021-05-16 PROCEDURE — 85025 COMPLETE CBC W/AUTO DIFF WBC: CPT | Performed by: PHYSICIAN ASSISTANT

## 2021-05-16 PROCEDURE — 94760 N-INVAS EAR/PLS OXIMETRY 1: CPT

## 2021-05-16 PROCEDURE — 84100 ASSAY OF PHOSPHORUS: CPT | Performed by: PHYSICIAN ASSISTANT

## 2021-05-16 PROCEDURE — 80053 COMPREHEN METABOLIC PANEL: CPT | Performed by: PHYSICIAN ASSISTANT

## 2021-05-16 PROCEDURE — 85379 FIBRIN DEGRADATION QUANT: CPT | Performed by: PHYSICIAN ASSISTANT

## 2021-05-16 PROCEDURE — 82728 ASSAY OF FERRITIN: CPT | Performed by: PHYSICIAN ASSISTANT

## 2021-05-16 PROCEDURE — 83735 ASSAY OF MAGNESIUM: CPT | Performed by: PHYSICIAN ASSISTANT

## 2021-05-16 RX ORDER — DEXAMETHASONE SODIUM PHOSPHATE 4 MG/ML
6 INJECTION, SOLUTION INTRA-ARTICULAR; INTRALESIONAL; INTRAMUSCULAR; INTRAVENOUS; SOFT TISSUE EVERY 12 HOURS SCHEDULED
Status: DISCONTINUED | OUTPATIENT
Start: 2021-05-16 | End: 2021-05-17

## 2021-05-16 RX ADMIN — IPRATROPIUM BROMIDE 2 PUFF: 17 AEROSOL, METERED RESPIRATORY (INHALATION) at 17:26

## 2021-05-16 RX ADMIN — IPRATROPIUM BROMIDE 2 PUFF: 17 AEROSOL, METERED RESPIRATORY (INHALATION) at 21:42

## 2021-05-16 RX ADMIN — ALBUTEROL SULFATE 2 PUFF: 90 AEROSOL, METERED RESPIRATORY (INHALATION) at 11:51

## 2021-05-16 RX ADMIN — SODIUM CHLORIDE 500 ML: 0.9 INJECTION, SOLUTION INTRAVENOUS at 18:31

## 2021-05-16 RX ADMIN — BENZONATATE 100 MG: 100 CAPSULE ORAL at 12:16

## 2021-05-16 RX ADMIN — Medication 2000 UNITS: at 09:26

## 2021-05-16 RX ADMIN — ALBUTEROL SULFATE 2 PUFF: 90 AEROSOL, METERED RESPIRATORY (INHALATION) at 09:27

## 2021-05-16 RX ADMIN — ALBUTEROL SULFATE 2 PUFF: 90 AEROSOL, METERED RESPIRATORY (INHALATION) at 21:42

## 2021-05-16 RX ADMIN — FAMOTIDINE 20 MG: 20 TABLET, FILM COATED ORAL at 09:26

## 2021-05-16 RX ADMIN — DEXAMETHASONE SODIUM PHOSPHATE 6 MG: 4 INJECTION, SOLUTION INTRAMUSCULAR; INTRAVENOUS at 21:43

## 2021-05-16 RX ADMIN — DOCUSATE SODIUM AND SENNOSIDES 1 TABLET: 8.6; 5 TABLET, FILM COATED ORAL at 21:42

## 2021-05-16 RX ADMIN — GUAIFENESIN 600 MG: 600 TABLET, EXTENDED RELEASE ORAL at 09:25

## 2021-05-16 RX ADMIN — ENOXAPARIN SODIUM 60 MG: 60 INJECTION SUBCUTANEOUS at 09:29

## 2021-05-16 RX ADMIN — BENZONATATE 100 MG: 100 CAPSULE ORAL at 21:43

## 2021-05-16 RX ADMIN — GUAIFENESIN 600 MG: 600 TABLET, EXTENDED RELEASE ORAL at 21:43

## 2021-05-16 RX ADMIN — ALBUTEROL SULFATE 2 PUFF: 90 AEROSOL, METERED RESPIRATORY (INHALATION) at 17:27

## 2021-05-16 RX ADMIN — ENOXAPARIN SODIUM 60 MG: 60 INJECTION SUBCUTANEOUS at 21:42

## 2021-05-16 RX ADMIN — ATORVASTATIN CALCIUM 40 MG: 40 TABLET, FILM COATED ORAL at 09:25

## 2021-05-16 RX ADMIN — BENZONATATE 100 MG: 100 CAPSULE ORAL at 05:46

## 2021-05-16 RX ADMIN — IPRATROPIUM BROMIDE 2 PUFF: 17 AEROSOL, METERED RESPIRATORY (INHALATION) at 11:51

## 2021-05-16 RX ADMIN — IPRATROPIUM BROMIDE 2 PUFF: 17 AEROSOL, METERED RESPIRATORY (INHALATION) at 09:28

## 2021-05-16 RX ADMIN — MULTIPLE VITAMINS W/ MINERALS TAB 1 TABLET: TAB ORAL at 09:25

## 2021-05-16 RX ADMIN — DEXAMETHASONE SODIUM PHOSPHATE 6 MG: 4 INJECTION, SOLUTION INTRAMUSCULAR; INTRAVENOUS at 12:51

## 2021-05-16 NOTE — ASSESSMENT & PLAN NOTE
· Status post remdesivir x5 and 1 dose of Actemra   · Transferred to [facility] for higher level of care due to increasing O2 requirements  · Therapeutic Lovenox due to pulmonary embolism  · Check procalcitonin , if positive then initiate antibiotics with doxy and ceftriaxone     · Trend inflammatory markers

## 2021-05-16 NOTE — UTILIZATION REVIEW
Initial Clinical Review    Admission: Date/Time/Statement:   Admission Orders (From admission, onward)     Ordered        05/15/21 1525  Inpatient Admission  Once                   Orders Placed This Encounter   Procedures    Inpatient Admission     Standing Status:   Standing     Number of Occurrences:   1     Order Specific Question:   Level of Care     Answer:   Level 2 Stepdown / HOT [14]     Order Specific Question:   Estimated length of stay     Answer:   More than 2 Midnights     Order Specific Question:   Certification     Answer:   I certify that inpatient services are medically necessary for this patient for a duration of greater than two midnights  See H&P and MD Progress Notes for additional information about the patient's course of treatment  55 yo female,  Hospitalized at [facility]  5/8-5/15 for treatment of  COVID 19 PNU  And  New PE    H/o sarcoidosis  Over course of hospitalization, pt has required increasing amounts of supplemental oxygen:  6-8 L/NC initially, increased to 8-13 L of oxygen by mid flow  Received tocilizumab on 05/11/2021  Completed remdesivir 5 day course  CT Chest on 5/14  Showed Left upper segmental PE : echocardiogram showed normal EF and normal ventricular contraction and no evidence of right heart strain  vascular studies negative for any DVT  Patient is being transferred to [address] [place] [address] for higher level of care due to continued deterioration of resp status         5/15    Initial Presentation: [facility],   status  Level 2 Stepdown for ongoing treatment of  COVID PNU, acute PE w/acute resp failure  Currently requiring 12-13 L MFNC, desaturates quickly on exertion  Pulmonology on board recommended to continue steroids and start bronchodilators  Cont Incentive spirometry, self proning, trend inflammatory markers, albuterol inhaler QID  Cont lovenox    EXAM: lung sounds w/decreased air movement, no wheezing or rales        Date: 5/16   Day 2:  Pt reports "some"  Improvement in SOB  Lung sounds - Decreased air entry bilaterally with coarse breath sounds on 9 L oxygen  May need lasix to keep daily neg negative 1 to 2 L;  Ck procalcitonin  (IF POS, start doxy and ceftriaxone)  Ck Ace (angiotensin converting enzyme) level  Wt Readings from Last 1 Encounters:   05/16/21 62 2 kg (137 lb 2 oz)     Additional Vital Signs:   05/16/21 1034  --  --  --  --  --  93 %  --  --  MFNC prongs  --   05/16/21 0739  --  --  --  --  --  92 %  6 L/min  Mid flow nasal cannula  MFNC prongs  --   05/16/21 0730  98 2 °F (36 8 °C)  80  18  95/66  75  94 %  --  Mid flow nasal cannula  --  Lying   05/16/21 0300  97 9 °F (36 6 °C)  81  30Abnormal   94/65  74  93 %  --  Mid flow nasal cannula  --  Lying   05/16/21 0200  --  --  --  --  --  93 %  8 L/min  Mid flow nasal cannula  MFNC prongs  --   05/15/21 2315  99 3 °F (37 4 °C)  79  43Abnormal   113/70  86  87 %Abnormal   --  --  --  --   05/15/21 1948  --  --  --  --  --  98 %  8 L/min  Mid flow nasal cannula  MFNC prongs  --   05/15/21 1835  98 °F (36 7 °C)  92  35Abnormal   92/54  70  90 %  --  --  --  Sitting   05/15/21 1527  --  --  --  --  --  --  13 L/min  Mid flow nasal cannula  --  --   05/15/21 1515  --  --  --  --  --  92 %  --  --  MFNC prongs  --   05/15/21 1400  98 5 °F (36 9 °C)  96  20  106/65  79  91 %  10 L/min  Mid flow nasal cannula  --  Lying       Pertinent Labs/Diagnostic Test Results:   5/14  CTA Chest 1   Acute left upper lobe segmental/subsegmental pulmonary embolus  2  Diffuse bilateral groundglass and consolidation likely corresponding to progressive Covid-19 pneumonia         3  Coronary artery calcification           Results from last 7 days   Lab Units 05/16/21  0355 05/15/21  0514 05/10/21  0541   WBC Thousand/uL 11 57* 10 39* 7 82   HEMOGLOBIN g/dL 13 5 13 9 11 6   HEMATOCRIT % 41 9 42 8 36 5   PLATELETS Thousands/uL 537* 646* 440*   NEUTROS ABS Thousands/µL 9 73* 8 77*  --    BANDS PCT % --   --  6         Results from last 7 days   Lab Units 05/16/21  0355 05/15/21  0514 05/14/21  0448 05/12/21  0443 05/11/21  0455   SODIUM mmol/L 137 135 133 141 142   POTASSIUM mmol/L 4 7 4 3 5 2* 4 4 4 9   CHLORIDE mmol/L 102 100 103 109* 110*   CO2 mmol/L 28 26 21* 24 24   ANION GAP mmol/L 7 9 9 8 8   BUN mg/dL 20 21* 20 26* 33*   CREATININE mg/dL 0 73 0 65 0 58 0 65 0 68   EGFR ml/min/1 73sq m 107 116 120 116 114   CALCIUM mg/dL 9 4 9 8 9 4 9 3 9 4   MAGNESIUM mg/dL 1 9  --   --   --   --    PHOSPHORUS mg/dL 2 9  --   --   --   --      Results from last 7 days   Lab Units 05/16/21  0355   AST U/L 29   ALT U/L 48   ALK PHOS U/L 77   TOTAL PROTEIN g/dL 7 1   ALBUMIN g/dL 2 9*   TOTAL BILIRUBIN mg/dL 0 50     Results from last 7 days   Lab Units 05/16/21  0758 05/15/21  2054 05/15/21  1629 05/15/21  1210 05/14/21  1613 05/14/21  1137 05/14/21  0711 05/13/21  1631 05/13/21  1120 05/13/21  0717 05/12/21  1627 05/12/21  1118   POC GLUCOSE mg/dl 97 238* 116 125 236* 194* 98 144* 165* 101 129 122     Results from last 7 days   Lab Units 05/16/21  0355 05/15/21  0514 05/14/21  0448 05/12/21  0443 05/11/21  0455 05/10/21  0943   GLUCOSE RANDOM mg/dL 130 128 82 87 99 144*             No results found for: BETA-HYDROXYBUTYRATE       Results from last 7 days   Lab Units 05/10/21  0541   PH IVRGIE  7 334   PCO2 VIRGIE mm Hg 37 5*   PO2 VIRGIE mm Hg 61 9*   HCO3 VIRGIE mmol/L 19 5*   BASE EXC VIRGIE mmol/L -5 8   O2 CONTENT VIRGIE ml/dL 15 6   O2 HGB, VENOUS % 88 6*     Results from last 7 days   Lab Units 05/16/21  0355 05/14/21  0448 05/11/21  0455 05/10/21  0541   D-DIMER QUANTITATIVE ug/ml FEU 2 55* 3 99* 1 02* 0 91*     Results from last 7 days   Lab Units 05/14/21  0448 05/11/21  0455   FERRITIN ng/mL 924* 1,364*     Results from last 7 days   Lab Units 05/14/21 0448 05/11/21  0455   CRP mg/L 2 5* 2 6*     Past Medical History:   Diagnosis Date    Anxiety     Asthma     Back pain     Sarcoidosis     Vertigo      Present on Admission:   Acute hypoxemic respiratory failure due to COVID-19 ([facility] )   (Resolved) MYRTLE (acute kidney injury) ([facility] )   Sarcoidosis   Hypercholesterolemia   Pneumonia due to COVID-19 virus  Admitting Diagnosis: Acute hypoxemic respiratory failure due to COVID-19 ([facility] ) [U07 1, J96 01]  Age/Sex: 54 y o  female  Admission Orders:    Self proning;  PT/OT; Continuous pulse oximetry w/supplemental oxygen prn ;  Daily wgt;  I/O q shift;  accucks qid w/SSI;    IP CONSULT TO PULMONOLOGY    Scheduled Medications:  albuterol, 2 puff, Inhalation, 4x Daily  atorvastatin, 40 mg, Oral, Daily  benzonatate, 100 mg, Oral, Q8H  cholecalciferol, 2,000 Units, Oral, Daily  dexamethasone, 6 mg, Intravenous, Q24H  enoxaparin, 1 mg/kg, Subcutaneous, Q12H JOSE A  famotidine, 20 mg, Oral, Daily  guaiFENesin, 600 mg, Oral, Q12H JOSE A  insulin lispro, 1-5 Units, Subcutaneous, TID AC  ipratropium, 2 puff, Inhalation, 4x Daily  multivitamin-minerals, 1 tablet, Oral, Daily  senna-docusate sodium, 1 tablet, Oral, HS      Continuous IV Infusions:     PRN Meds:  acetaminophen, 650 mg, Oral, Q6H PRN  meclizine, 12 5 mg, Oral, Q8H PRN  ondansetron, 4 mg, Intravenous, Q6H PRN  polyethylene glycol, 17 g, Oral, Daily PRN  sodium chloride, 1 spray, Each Nare, Q1H PRN      Network Utilization Review Department  ATTENTION: Please call with any questions or concerns to [phone] and carefully listen to the prompts so that you are directed to the right person  All voicemails are confidential   Louise Grand all requests for admission clinical reviews, approved or denied determinations and any other requests to dedicated fax number below belonging to the campus where the patient is receiving treatment  List of dedicated fax numbers for the Facilities:  [address] DENIALS (Administrative/Medical Necessity) [phone]   [address] (Maternity/NICU/Pediatrics) [phone]   [address] [place] [address] [phone]     [address] Sunitha Sanchez 5523 [address] [place] [address] Konstantin Ibarra [facility] [address] 5588 [facility] 951 [phone]

## 2021-05-16 NOTE — ASSESSMENT & PLAN NOTE
· Currently on Lovenox 1 milligram/kilogram q 12 hours  Reviewed Dopplers of lower extremity CTs are negative  · Decide and transition to a novel oral anticoagulant once more medically stable    Will likely require 6 months course

## 2021-05-16 NOTE — PLAN OF CARE
Problem: Potential for Falls  Goal: Patient will remain free of falls  Description: INTERVENTIONS:  - Assess patient frequently for physical needs  -  Identify cognitive and physical deficits and behaviors that affect risk of falls    -  [place] fall precautions as indicated by assessment   - Educate patient/family on patient safety including physical limitations  - Instruct patient to call for assistance with activity based on assessment  - Modify environment to reduce risk of injury  - Consider OT/PT consult to assist with strengthening/mobility  Outcome: Progressing

## 2021-05-16 NOTE — PROGRESS NOTES
[facility]  Progress Note - Sharon Flores 1965, 54 y o  female MRN: [id number]  Unit/Bed#: ICU 07 Encounter: [id number]  Primary Care Provider: FRED Pina   Date and time admitted to hospital: 5/15/2021  2:04 PM    * Acute hypoxemic respiratory failure due to COVID-19 [facility])  Assessment & Plan  · Continue Decadron 6 mg IV Q 24 hour  · Continue with vitamin D3, Pepcid 20 mg daily albuterol and Atrovent inhalers  She completed her vitamin-C and zinc and is on a multivitamin  · Continue Lovenox 1 milligram/kilogram q 12, decide on transition to novel oral anticoagulant once more medically stable  · Wean off supplemental oxygen as tolerated to keep saturation over 88%  · Monitor intakes and outputs , consider p r n  Lasix to keep daily net negative 1 to 2 L  · Continue Xopenex and Atrovent  · Continue incentive spirometry  · consider addition of LABA/ICS at discharge per pulmonology recommendation  · Follow on echocardiogram final reading    Pneumonia due to COVID-19 virus  Assessment & Plan  · Status post remdesivir x5 and 1 dose of Actemra   · Transferred to [address] [place] [address] for higher level of care due to increasing O2 requirements  · Therapeutic Lovenox due to pulmonary embolism  · Check procalcitonin , if positive then initiate antibiotics with doxy and ceftriaxone  Pulmonary embolism (HCC)  Assessment & Plan  · Currently on Lovenox 1 milligram/kilogram q 12 hours  Reviewed Dopplers of lower extremity CTs are negative  · Decide and transition to a novel oral anticoagulant once more medically stable  Will likely require 6 months course    Sarcoidosis  Assessment & Plan  Patient reports not being on any medications for her sarcoidosis  For now will treat her asthma and she remains on Decadron 6 mg IV Q 24 for her COVID infection  Continue with oxygen support    · Appreciate pulmonary recommendations  · Check Ace level  · consider addition of LABA/ICS at discharge per Pulmonary recommendations  · Monitor for worsening hypoxemia or distress  Hypercholesterolemia  Assessment & Plan  Continue statin        VTE Pharmacologic Prophylaxis:   Pharmacologic: Enoxaparin (Lovenox)  Mechanical VTE Prophylaxis in Place: Yes    Discussions with Specialists or Other Care Team Provider:  Nursing  Pulmonology note has been reviewed  Education and Discussions with Family / Patient:  Patient  Able to reach out to the patient's brother  Unable to leave a voice message  Current Length of Stay: 1 day(s)    Current Patient Status: Inpatient     Discharge Plan / Estimated Discharge Date:  Pending clinical improvement  Continues to require IV steroids and significant supplemental oxygen    Code Status: Level 1 - Full Code      Subjective:   Patient sitting comfortably in bed with no distress  She reports improvement of her shortness of breath  She denies chest pain, nausea, vomiting, abdominal pain, dizziness  Objective:     Vitals:   Temp (24hrs), Av 4 °F (36 9 °C), Min:97 9 °F (36 6 °C), Max:99 3 °F (37 4 °C)    Temp:  [97 9 °F (36 6 °C)-99 3 °F (37 4 °C)] 98 2 °F (36 8 °C)  HR:  [79-96] 80  Resp:  [18-43] 18  BP: ()/(54-70) 95/66  SpO2:  [87 %-98 %] 92 %  Body mass index is 27 7 kg/m²  Input and Output Summary (last 24 hours): Intake/Output Summary (Last 24 hours) at 2021 0844  Last data filed at 5/15/2021 2100  Gross per 24 hour   Intake 120 ml   Output 400 ml   Net -280 ml       Physical Exam:     Physical Exam  Vitals signs and nursing note reviewed  Constitutional:       General: She is not in acute distress  Appearance: She is not ill-appearing  HENT:      Head: Normocephalic and atraumatic  Mouth/Throat:      Mouth: Mucous membranes are moist    Eyes:      Extraocular Movements: Extraocular movements intact  Pupils: Pupils are equal, round, and reactive to light     Neck:      Musculoskeletal: Normal range of motion and neck supple  Vascular: No carotid bruit  Cardiovascular:      Rate and Rhythm: Normal rate and regular rhythm  Pulses: Normal pulses  Heart sounds: Normal heart sounds  Pulmonary:      Breath sounds: No wheezing or rales  Comments: Decreased air entry bilaterally with coarse breath sounds on 9 L oxygen  Abdominal:      General: Bowel sounds are normal       Palpations: Abdomen is soft  Tenderness: There is no abdominal tenderness  Musculoskeletal:         General: No swelling or tenderness  Right lower leg: No edema  Left lower leg: No edema  Skin:     General: Skin is warm and dry  Capillary Refill: Capillary refill takes less than 2 seconds  Neurological:      General: No focal deficit present  Mental Status: She is alert and oriented to person, place, and time  Cranial Nerves: No cranial nerve deficit  Sensory: No sensory deficit  Motor: No weakness  Psychiatric:         Mood and Affect: Mood normal          Behavior: Behavior normal              Additional Data:     Labs:    Results from last 7 days   Lab Units 05/16/21  0355   WBC Thousand/uL 11 57*   HEMOGLOBIN g/dL 13 5   HEMATOCRIT % 41 9   PLATELETS Thousands/uL 537*   NEUTROS PCT % 85*   LYMPHS PCT % 9*   MONOS PCT % 5   EOS PCT % 0     Results from last 7 days   Lab Units 05/16/21  0355   POTASSIUM mmol/L 4 7   CHLORIDE mmol/L 102   CO2 mmol/L 28   BUN mg/dL 20   CREATININE mg/dL 0 73   CALCIUM mg/dL 9 4   ALK PHOS U/L 77   ALT U/L 48   AST U/L 29           * I Have Reviewed All Lab Data Listed Above  * Additional Pertinent Lab Tests Reviewed:  [place] 66 Admission Reviewed    Imaging:    Imaging Reports Reviewed Today Include:  CT chest  Imaging Personally Reviewed by Myself Includes:  None    Recent Cultures (last 7 days):           Last 24 Hours Medication List:   Current Facility-Administered Medications   Medication Dose Route Frequency Provider Last Rate    acetaminophen  650 mg Oral Q6H PRN Salvador Doe MD      albuterol  2 puff Inhalation 4x Daily Salvador Doe MD      atorvastatin  40 mg Oral Daily Salvador Doe MD      benzonatate  100 mg Oral Q8H Salvador Doe MD      cholecalciferol  2,000 Units Oral Daily Salvador Doe MD      dexamethasone  6 mg Intravenous Q24H Salvador Doe MD      enoxaparin  1 mg/kg Subcutaneous Q12H [address] Salvador Doe MD      famotidine  20 mg Oral Daily Salvador Doe MD      guaiFENesin  600 mg Oral Q12H [address] Salvador Doe MD      insulin lispro  1-5 Units Subcutaneous TID AC Salvador Doe MD      ipratropium  2 puff Inhalation 4x Daily Salvador Doe MD      meclizine  12 5 mg Oral Q8H PRN Salvador Doe MD      multivitamin-minerals  1 tablet Oral Daily Salvador Doe MD      ondansetron  4 mg Intravenous Q6H PRN Salvador Doe MD      polyethylene glycol  17 g Oral Daily PRN Salvador Doe MD      senna-docusate sodium  1 tablet Oral HS Salvador Doe MD      sodium chloride  1 spray Each Nare Q1H PRN Salvador Doe MD          Today, Patient Was Seen By: Carlos Arrington MD    ** Please Note: This note has been constructed using a voice recognition system   **

## 2021-05-16 NOTE — ASSESSMENT & PLAN NOTE
Patient reports not being on any medications for her sarcoidosis  For now will treat her asthma and she remains on Decadron 6 mg IV Q 24 for her COVID infection  Continue with oxygen support  · Appreciate pulmonary recommendations  · Check Ace level  · consider addition of LABA/ICS at discharge per Pulmonary recommendations  · Monitor for worsening hypoxemia or distress

## 2021-05-16 NOTE — PROGRESS NOTES
Progress Note - Pulmonary   Yamini Lighter 54 y o  female MRN: [id number]  Unit/Bed#: ICU 07 Encounter: [id number]      Assessment:  1  Acute hypoxic respiratory failure  2  COVID19 pneumonia  3  Subsegmental NANCIE PE with RV strain per CT  4  History of sarcoidosis, previously well controlled off therapy    Plan:  1  Willi increase decadron to 6mg q12  2  S/p actemra on 5/3/21  3  Completed Remdesivir 5 days  4  Echo results pending  5  Continue treatment dose Lovenox  6  ACE level pending   7  Continue Xopenex and Atrovent  8  Previously on Advair  Would consider addition of LABA/ICS at discharge  9  Continue Vitamin D and multivitamin   10  Continue Lipitor   11  Continue Pepcid       Subjective:   Patient seen and examined  She had some hypoxia early this afternoon causing increase to 15L and transient nonrebreather  Objective:   Vitals: Blood pressure 95/66, pulse 80, temperature 98 2 °F (36 8 °C), temperature source Oral, resp  rate 18, height 4' 11" (1 499 m), weight 62 2 kg (137 lb 2 oz), SpO2 92 %  , 15L NC, Body mass index is 27 7 kg/m²  Intake/Output Summary (Last 24 hours) at 5/16/2021 1515  Last data filed at 5/16/2021 1400  Gross per 24 hour   Intake 360 ml   Output 700 ml   Net -340 ml         Physical Exam  Gen: Awake, alert, oriented x 3, no acute distress  HEENT: Mucous membranes moist, no oral lesions, no thrush  NECK: No accessory muscle use, JVP not elevated  Cardiac: Regular, single S1, single S2, no murmurs, no rubs, no gallops  Lungs: Decreased breath sounds, scattered rhonchi  No rales  Abdomen: normoactive bowel sounds, soft nontender, nondistended, no rebound or rigidity, no guarding  Extremities: no cyanosis, no clubbing, no edema    Labs: I have personally reviewed pertinent lab results    Results from last 7 days   Lab Units 05/16/21  0355 05/15/21  0514 05/10/21  0541   WBC Thousand/uL 11 57* 10 39* 7 82   HEMOGLOBIN g/dL 13 5 13 9 11 6   HEMATOCRIT % 41 9 42 8 36 5   PLATELETS Thousands/uL 537* 646* 440*   NEUTROS PCT % 85* 85*  --    MONOS PCT % 5 5  --    MONO PCT %  --   --  12      Results from last 7 days   Lab Units 05/16/21  0355 05/15/21  0514 05/14/21  0448   POTASSIUM mmol/L 4 7 4 3 5 2*   CHLORIDE mmol/L 102 100 103   CO2 mmol/L 28 26 21*   BUN mg/dL 20 21* 20   CREATININE mg/dL 0 73 0 65 0 58   CALCIUM mg/dL 9 4 9 8 9 4   ALK PHOS U/L 77  --   --    ALT U/L 48  --   --    AST U/L 29  --   --      Results from last 7 days   Lab Units 05/16/21  0355   MAGNESIUM mg/dL 1 9     Results from last 7 days   Lab Units 05/16/21  0355   PHOSPHORUS mg/dL 2 9              0   Lab Value Date/Time    TROPONINI <0 03 05/08/2021 1234    TROPONINI <0 02 10/04/2018 0457         Meds/Allergies   Current Facility-Administered Medications   Medication Dose Route Frequency    acetaminophen (TYLENOL) tablet 650 mg  650 mg Oral Q6H PRN    albuterol (PROVENTIL HFA,VENTOLIN HFA) inhaler 2 puff  2 puff Inhalation 4x Daily    atorvastatin (LIPITOR) tablet 40 mg  40 mg Oral Daily    benzonatate (TESSALON PERLES) capsule 100 mg  100 mg Oral Q8H    cholecalciferol (VITAMIN D3) tablet 2,000 Units  2,000 Units Oral Daily    dexamethasone (DECADRON) injection 6 mg  6 mg Intravenous Q12H [facility]    enoxaparin (LOVENOX) subcutaneous injection 60 mg  1 mg/kg Subcutaneous Q12H [facility]    famotidine (PEPCID) tablet 20 mg  20 mg Oral Daily    guaiFENesin (MUCINEX) 12 hr tablet 600 mg  600 mg Oral Q12H [facility]    insulin lispro (HumaLOG) 100 units/mL subcutaneous injection 1-5 Units  1-5 Units Subcutaneous TID AC    ipratropium (ATROVENT HFA) inhaler 2 puff  2 puff Inhalation 4x Daily    meclizine (ANTIVERT) tablet 12 5 mg  12 5 mg Oral Q8H PRN    multivitamin-minerals (CENTRUM ADULTS) tablet 1 tablet  1 tablet Oral Daily    ondansetron (ZOFRAN) injection 4 mg  4 mg Intravenous Q6H PRN    polyethylene glycol (MIRALAX) packet 17 g  17 g Oral Daily PRN    senna-docusate sodium (SENOKOT S) 8 6-50 mg per tablet 1 tablet  1 tablet Oral HS    sodium chloride (OCEAN) 0 65 % nasal spray 1 spray  1 spray Each Nare Q1H PRN     Medications Prior to Admission   Medication    atorvastatin (LIPITOR) 40 mg tablet    benzonatate (TESSALON PERLES) 100 mg capsule    cetirizine (ZyrTEC) 10 mg tablet    ferrous sulfate 324 (65 Fe) mg    fluticasone (FLONASE) 50 mcg/act nasal spray    fluticasone-salmeterol (ADVAIR, WIXELA) 250-50 mcg/dose inhaler    hydrOXYzine HCL (ATARAX) 25 mg tablet    ibuprofen (MOTRIN) 600 mg tablet    ketotifen (ZADITOR) 0 025 % ophthalmic solution    meclizine (ANTIVERT) 12 5 MG tablet    montelukast (SINGULAIR) 10 mg tablet    nystatin (MYCOSTATIN) cream    SYMBICORT 160-4 5 MCG/ACT inhaler    triamcinolone (KENALOG) 0 1 % cream    valACYclovir (VALTREX) 1,000 mg tablet    Vitamins A & D (VITAMIN A & D) ointment         Microbiology:  Lab Results   Component Value Date    BLOODCX No Growth After 5 Days  05/08/2021    BLOODCX No Growth After 5 Days  05/08/2021     Imaging and other studies: I have personally reviewed pertinent reports      None    DO Kathy Gonzalez [facility]

## 2021-05-16 NOTE — ASSESSMENT & PLAN NOTE
· Continue Decadron 6 mg IV Q 24 hour  · Continue with vitamin D3, Pepcid 20 mg daily albuterol and Atrovent inhalers  She completed her vitamin-C and zinc and is on a multivitamin  · Continue Lovenox 1 milligram/kilogram q 12, decide on transition to novel oral anticoagulant once more medically stable  · Wean off supplemental oxygen as tolerated to keep saturation over 88%  · Monitor intakes and outputs , consider p r n   Lasix to keep daily net negative 1 to 2 L  · Continue Xopenex and Atrovent  · Continue incentive spirometry  · consider addition of LABA/ICS at discharge per pulmonology recommendation  · Follow on echocardiogram final reading

## 2021-05-17 ENCOUNTER — APPOINTMENT (INPATIENT)
Dept: RADIOLOGY | Facility: HOSPITAL | Age: 56
DRG: 137 | End: 2021-05-17
Payer: MEDICARE

## 2021-05-17 LAB
ALBUMIN SERPL BCP-MCNC: 2.9 G/DL (ref 3.5–5)
ALP SERPL-CCNC: 73 U/L (ref 46–116)
ALT SERPL W P-5'-P-CCNC: 48 U/L (ref 12–78)
ANION GAP SERPL CALCULATED.3IONS-SCNC: 8 MMOL/L (ref 4–13)
AST SERPL W P-5'-P-CCNC: 22 U/L (ref 5–45)
BASOPHILS # BLD AUTO: 0.02 THOUSANDS/ΜL (ref 0–0.1)
BASOPHILS NFR BLD AUTO: 0 % (ref 0–1)
BILIRUB SERPL-MCNC: 0.5 MG/DL (ref 0.2–1)
BUN SERPL-MCNC: 19 MG/DL (ref 5–25)
CALCIUM ALBUM COR SERPL-MCNC: 10.4 MG/DL (ref 8.3–10.1)
CALCIUM SERPL-MCNC: 9.5 MG/DL (ref 8.3–10.1)
CHLORIDE SERPL-SCNC: 102 MMOL/L (ref 100–108)
CO2 SERPL-SCNC: 26 MMOL/L (ref 21–32)
CREAT SERPL-MCNC: 0.72 MG/DL (ref 0.6–1.3)
CRP SERPL QL: 3.9 MG/L
D DIMER PPP FEU-MCNC: 2.15 UG/ML FEU
EOSINOPHIL # BLD AUTO: 0 THOUSAND/ΜL (ref 0–0.61)
EOSINOPHIL NFR BLD AUTO: 0 % (ref 0–6)
ERYTHROCYTE [DISTWIDTH] IN BLOOD BY AUTOMATED COUNT: 13.8 % (ref 11.6–15.1)
GFR SERPL CREATININE-BSD FRML MDRD: 109 ML/MIN/1.73SQ M
GLUCOSE SERPL-MCNC: 135 MG/DL (ref 65–140)
GLUCOSE SERPL-MCNC: 141 MG/DL (ref 65–140)
GLUCOSE SERPL-MCNC: 145 MG/DL (ref 65–140)
GLUCOSE SERPL-MCNC: 163 MG/DL (ref 65–140)
GLUCOSE SERPL-MCNC: 164 MG/DL (ref 65–140)
HCT VFR BLD AUTO: 41.4 % (ref 34.8–46.1)
HGB BLD-MCNC: 13.4 G/DL (ref 11.5–15.4)
IMM GRANULOCYTES # BLD AUTO: 0.22 THOUSAND/UL (ref 0–0.2)
IMM GRANULOCYTES NFR BLD AUTO: 1 % (ref 0–2)
LYMPHOCYTES # BLD AUTO: 1.23 THOUSANDS/ΜL (ref 0.6–4.47)
LYMPHOCYTES NFR BLD AUTO: 8 % (ref 14–44)
MCH RBC QN AUTO: 29.6 PG (ref 26.8–34.3)
MCHC RBC AUTO-ENTMCNC: 32.4 G/DL (ref 31.4–37.4)
MCV RBC AUTO: 91 FL (ref 82–98)
MONOCYTES # BLD AUTO: 0.57 THOUSAND/ΜL (ref 0.17–1.22)
MONOCYTES NFR BLD AUTO: 4 % (ref 4–12)
NEUTROPHILS # BLD AUTO: 13.44 THOUSANDS/ΜL (ref 1.85–7.62)
NEUTS SEG NFR BLD AUTO: 87 % (ref 43–75)
NRBC BLD AUTO-RTO: 0 /100 WBCS
NT-PROBNP SERPL-MCNC: 21 PG/ML
PLATELET # BLD AUTO: 498 THOUSANDS/UL (ref 149–390)
PMV BLD AUTO: 9.8 FL (ref 8.9–12.7)
POTASSIUM SERPL-SCNC: 4.8 MMOL/L (ref 3.5–5.3)
PROCALCITONIN SERPL-MCNC: <0.05 NG/ML
PROT SERPL-MCNC: 7.1 G/DL (ref 6.4–8.2)
RBC # BLD AUTO: 4.53 MILLION/UL (ref 3.81–5.12)
SODIUM SERPL-SCNC: 136 MMOL/L (ref 136–145)
WBC # BLD AUTO: 15.48 THOUSAND/UL (ref 4.31–10.16)

## 2021-05-17 PROCEDURE — 80053 COMPREHEN METABOLIC PANEL: CPT | Performed by: INTERNAL MEDICINE

## 2021-05-17 PROCEDURE — 84145 PROCALCITONIN (PCT): CPT | Performed by: INTERNAL MEDICINE

## 2021-05-17 PROCEDURE — 86140 C-REACTIVE PROTEIN: CPT | Performed by: INTERNAL MEDICINE

## 2021-05-17 PROCEDURE — 94760 N-INVAS EAR/PLS OXIMETRY 1: CPT

## 2021-05-17 PROCEDURE — 99232 SBSQ HOSP IP/OBS MODERATE 35: CPT | Performed by: INTERNAL MEDICINE

## 2021-05-17 PROCEDURE — 82164 ANGIOTENSIN I ENZYME TEST: CPT | Performed by: INTERNAL MEDICINE

## 2021-05-17 PROCEDURE — 85025 COMPLETE CBC W/AUTO DIFF WBC: CPT | Performed by: INTERNAL MEDICINE

## 2021-05-17 PROCEDURE — 97163 PT EVAL HIGH COMPLEX 45 MIN: CPT

## 2021-05-17 PROCEDURE — 83880 ASSAY OF NATRIURETIC PEPTIDE: CPT | Performed by: INTERNAL MEDICINE

## 2021-05-17 PROCEDURE — 82948 REAGENT STRIP/BLOOD GLUCOSE: CPT

## 2021-05-17 PROCEDURE — 85379 FIBRIN DEGRADATION QUANT: CPT | Performed by: INTERNAL MEDICINE

## 2021-05-17 PROCEDURE — 71045 X-RAY EXAM CHEST 1 VIEW: CPT

## 2021-05-17 PROCEDURE — 97110 THERAPEUTIC EXERCISES: CPT

## 2021-05-17 PROCEDURE — 94762 N-INVAS EAR/PLS OXIMTRY CONT: CPT

## 2021-05-17 RX ORDER — DEXAMETHASONE SODIUM PHOSPHATE 4 MG/ML
0.1 INJECTION, SOLUTION INTRA-ARTICULAR; INTRALESIONAL; INTRAMUSCULAR; INTRAVENOUS; SOFT TISSUE EVERY 12 HOURS SCHEDULED
Status: DISCONTINUED | OUTPATIENT
Start: 2021-05-17 | End: 2021-05-20

## 2021-05-17 RX ORDER — FUROSEMIDE 10 MG/ML
40 INJECTION INTRAMUSCULAR; INTRAVENOUS ONCE
Status: COMPLETED | OUTPATIENT
Start: 2021-05-17 | End: 2021-05-17

## 2021-05-17 RX ADMIN — ALBUTEROL SULFATE 2 PUFF: 90 AEROSOL, METERED RESPIRATORY (INHALATION) at 11:26

## 2021-05-17 RX ADMIN — ENOXAPARIN SODIUM 60 MG: 60 INJECTION SUBCUTANEOUS at 08:48

## 2021-05-17 RX ADMIN — DEXAMETHASONE SODIUM PHOSPHATE 6.24 MG: 4 INJECTION, SOLUTION INTRAMUSCULAR; INTRAVENOUS at 21:17

## 2021-05-17 RX ADMIN — BENZONATATE 100 MG: 100 CAPSULE ORAL at 12:53

## 2021-05-17 RX ADMIN — ATORVASTATIN CALCIUM 40 MG: 40 TABLET, FILM COATED ORAL at 08:48

## 2021-05-17 RX ADMIN — GUAIFENESIN 600 MG: 600 TABLET, EXTENDED RELEASE ORAL at 08:48

## 2021-05-17 RX ADMIN — DOCUSATE SODIUM AND SENNOSIDES 1 TABLET: 8.6; 5 TABLET, FILM COATED ORAL at 21:16

## 2021-05-17 RX ADMIN — MULTIPLE VITAMINS W/ MINERALS TAB 1 TABLET: TAB ORAL at 08:49

## 2021-05-17 RX ADMIN — Medication 2000 UNITS: at 08:48

## 2021-05-17 RX ADMIN — IPRATROPIUM BROMIDE 2 PUFF: 17 AEROSOL, METERED RESPIRATORY (INHALATION) at 09:54

## 2021-05-17 RX ADMIN — IPRATROPIUM BROMIDE 2 PUFF: 17 AEROSOL, METERED RESPIRATORY (INHALATION) at 16:48

## 2021-05-17 RX ADMIN — GUAIFENESIN 600 MG: 600 TABLET, EXTENDED RELEASE ORAL at 21:18

## 2021-05-17 RX ADMIN — ALBUTEROL SULFATE 2 PUFF: 90 AEROSOL, METERED RESPIRATORY (INHALATION) at 16:48

## 2021-05-17 RX ADMIN — BENZONATATE 100 MG: 100 CAPSULE ORAL at 04:58

## 2021-05-17 RX ADMIN — ALBUTEROL SULFATE 2 PUFF: 90 AEROSOL, METERED RESPIRATORY (INHALATION) at 09:53

## 2021-05-17 RX ADMIN — IPRATROPIUM BROMIDE 2 PUFF: 17 AEROSOL, METERED RESPIRATORY (INHALATION) at 11:26

## 2021-05-17 RX ADMIN — FUROSEMIDE 40 MG: 10 INJECTION, SOLUTION INTRAMUSCULAR; INTRAVENOUS at 11:26

## 2021-05-17 RX ADMIN — ENOXAPARIN SODIUM 60 MG: 60 INJECTION SUBCUTANEOUS at 21:16

## 2021-05-17 RX ADMIN — BENZONATATE 100 MG: 100 CAPSULE ORAL at 21:16

## 2021-05-17 RX ADMIN — DEXAMETHASONE SODIUM PHOSPHATE 6 MG: 4 INJECTION, SOLUTION INTRAMUSCULAR; INTRAVENOUS at 08:49

## 2021-05-17 RX ADMIN — FAMOTIDINE 20 MG: 20 TABLET, FILM COATED ORAL at 08:48

## 2021-05-17 NOTE — PHYSICAL THERAPY NOTE
PHYSICAL THERAPY TREATMENT NOTE    Patient Name: Maddy Wagner  [id number] Date: 5/17/2021 05/17/21 1539   PT Last Visit   PT Visit Date 05/17/21   Note Type   Note Type Treatment   Pain Assessment   Pain Assessment Tool Pain Assessment not indicated - pt denies pain   Restrictions/Precautions   Other Precautions Contact/isolation; Airborne/isolation; Chair Alarm; Bed Alarm;Telemetry;O2;Fall Risk   General   Chart Reviewed Yes   Family/Caregiver Present No   Cognition   Arousal/Participation Alert; Cooperative   Attention Within functional limits   Orientation Level Oriented to person; Other (Comment)  (pt was identifeid w/ full name, birth date)   Following Commands Follows one step commands without difficulty   Subjective   Subjective pt agreed to participate in PT intervention  Activity Tolerance   Activity Tolerance Patient limited by fatigue   Nurse Made Aware spoke to Malcolm HUGHES, Hoserika Pickjayla PCA   Equipment Use   Comments ankle pumps 30  quad sets 10  heel slides 5  Assessment   Prognosis Good   Problem List Decreased strength;Decreased endurance; Impaired balance;Decreased mobility; Decreased safety awareness   Assessment Therapist introduced participation in LE exercises to address physical deficits noted during eval  pt had good understanding of exercise technique after initial introduction and demonstration  frequent rest breaks were needed due to fatigue  Handout was provided to improve comprehension of technique  Pt would benefit from continued PT to maximize level of functional mobility  Goals   Patient Goals go home and take care of myself  9429   Short Term Goal #1 pt will:  Increase bilateral LE strength 1/2 grade to facilitate independent mobility, Perform all bed mobility tasks independently to decrease fall risk factors, Perform all transfers independently to improve independence, Ambulate 150 ft  with least restrictive assistive device w/ supervision w/o LOB to expedite return to independent level of function, Navigate 3 stairs w/ modx1 with unilateral handrail to facilitate return to previous living environment, Increase ambulatory balance 1 grade to decrease risk for falls, Complete exercise program independently to improve strength and endurance, Tolerate 3 hr OOB to faciliate upright tolerance and Improve Barthel Index score to 85 or greater to facilitate independence   PT Treatment Day 1   Plan   Treatment/Interventions Functional transfer training;LE strengthening/ROM; Elevations; Endurance training; Therapeutic exercise;Patient/family training;Equipment eval/education; Bed mobility;Gait training   Progress Progressing toward goals   PT Frequency 5x/wk   Recommendation   PT Discharge Recommendation Post acute rehabilitation services   AM-PAC Basic Mobility Inpatient   Turning in Bed Without Bedrails 4   Lying on Back to Sitting on Edge of Flat Bed 3   Moving Bed to Chair 3   Standing Up From Chair 3   Walk in Room 2   Climb 3-5 Stairs 2   Basic Mobility Inpatient Raw Score 17   Basic Mobility Standardized Score 39 67     Skilled inpatient PT recommended while in hospital to progress pt toward treatment goals      Martin Stinson, PT

## 2021-05-17 NOTE — PROGRESS NOTES
Progress Note - Pulmonary   Wero Hiss 54 y o  female MRN: [id number]  Unit/Bed#: ICU 07 Encounter: [id number]    Assessment/Plan:    1  Acute hypoxic respiratory failure likely multifaceted as listed below        -   Currently on 8 L- 94%,  Patient does not wear home O2        -   Continue saturations greater than 89%        -   Pulmonary toileting:  Deep breathing cough, OOB  As tolerated, IS Q 1 hr        -   Patient will likely need oxygen therapy upon discharge    2  COVID-19- 5/8/2021        -  5/11/2021-  Actemra        -  5/12/2021-  Completed 5 days of remdesivir        -  Day #10/10 6 mg Decadron, will increased steroid        -   CRP- 2 5,      patient not a candidate for plasma        -   procalcitonin negative x 2- currently being monitor off antibiotics    Repeat chest x-ray and procalcitonin in a m , will increase Decadron 0 1 mg/kg as patient continues to wax and wane with oxygen between 8 and 10 L, will give 1 time IV Lasix 40 mg    3  Acute RUL   Submassive Subsegmental likely provoked secondary to COVID-19 with suspected RV  Strain        -   Echo pending,    B/L LE  Dopplers negative for DVT        -  currently on Lovenox 60 mg b i d         -  will need to make sure all cancer markers are up-to-date        -  will discuss with case management NOAC option    4  Biopsy-proven sarcoidosis without acute flare        -   Diagnosed several years previously        -   Was treated with 1 year prednisone therapy        -   Patient has remained relatively asymptomatic from a sarcoid standpoint        Chief Complaint:    "I feel a little bit better"    Subjective:    Juanito Castaneda  Was comfortably sitting in her bed  She reports she has had of still some shortness of breath upon minimal exertion  No significant overnight events reported  Patient currently denies any fever, chills, hemoptysis, headaches, night sweats, pleuritic chest pain, palpitations      Objective:    Vitals: Blood pressure 105/70, pulse 61, temperature 98 1 °F (36 7 °C), temperature source Oral, resp  rate 20, height 4' 11" (1 499 m), weight 62 2 kg (137 lb 2 oz), SpO2 92 %  10L,Body mass index is 27 7 kg/m²  Intake/Output Summary (Last 24 hours) at 5/17/2021 1004  Last data filed at 5/17/2021 0801  Gross per 24 hour   Intake 120 ml   Output 750 ml   Net -630 ml       Invasive Devices     Peripheral Intravenous Line            Peripheral IV 05/14/21 Left Antecubital 2 days                Physical Exam:  Assessed by attending  Physical Exam    Labs:    I have personally reviewed pertinent lab results CBC:   Lab Results   Component Value Date    WBC 15 48 (H) 05/17/2021    HGB 13 4 05/17/2021    HCT 41 4 05/17/2021    MCV 91 05/17/2021     (H) 05/17/2021    MCH 29 6 05/17/2021    MCHC 32 4 05/17/2021    RDW 13 8 05/17/2021    MPV 9 8 05/17/2021    NRBC 0 05/17/2021   , CMP:   Lab Results   Component Value Date    SODIUM 136 05/17/2021    K 4 8 05/17/2021     05/17/2021    CO2 26 05/17/2021    BUN 19 05/17/2021    CREATININE 0 72 05/17/2021    CALCIUM 9 5 05/17/2021    AST 22 05/17/2021    ALT 48 05/17/2021    ALKPHOS 73 05/17/2021    EGFR 109 05/17/2021       Imaging and other studies: I have personally reviewed pertinent films in PACS      CTA 05/14/2021 bilateral right upper lobe segmental and subsegmental PEs No

## 2021-05-17 NOTE — PROGRESS NOTES
[facility]  Progress Note - Raymundo Medina 1965, 54 y o  female MRN: [id number]  Unit/Bed#: ICU 07 Encounter: [id number]  Primary Care Provider: FRED Yao   Date and time admitted to hospital: 5/15/2021  2:04 PM    Pulmonary embolism [facility])  Assessment & Plan  CTA chest showed Lt upper lobe PE and calculated ratio of right ventricular to left ventricular diameter (RV/LV ratio) is greater than 0 9, which is abnormal and could be indicative of mild right heart strain, however, this is not specific  Vanna Lean is no bowing of the intraventricular Septum  Continue therapeutic Lovenox  Telemetry monitoring  Echo ordered  Doppler US LE ordered    Pneumonia due to COVID-19 virus  Assessment & Plan  - See above      Sarcoidosis  Assessment & Plan  Not on any medications  She is not compliant with pulmonology follow-up  Currently on steroids given COVID-19 pneumonia    Hypercholesterolemia  Assessment & Plan  Lipid panel showing elevated LDL  Not taking atorvastatin at home  Continue statin as per COVID-19 protocol, will need to continue statin at home  * Acute hypoxemic respiratory failure due to COVID-19 [facility])  Assessment & Plan  Likely secondary to COVID infection, presented with URI symptoms for 5-7 days  Visited ED on 05/03/2021 with similar symptoms at that time refused to get COVID-19 test  Currently on 8L [address], desaturates quickly on exertion  Pulmonology on board recommended to continue steroids and bronchodilators  Received tocilizumab on 05/11/2021  Completed remdesivir 5 day course  Incentive spirometry, self proning  Trend inflammatory markers  Continue albuterol inhaler 4 times daily    MYRTLE (acute kidney injury) (HCC)-resolved as of 5/13/2021  Assessment & Plan  Cr 1 5 on admission  Baseline unknown  Treated with IVF        VTE Pharmacologic Prophylaxis:   Pharmacologic: Enoxaparin (Lovenox)  Mechanical VTE Prophylaxis in Place: Yes    Discussions with Specialists or Other Care Team Provider: Pulm    Education and Discussions with Family / Patient:  Discussed plan of care with the patient    Current Length of Stay: 2 day(s)    Current Patient Status: Inpatient     Discharge Plan / Estimated Discharge Date: To be determined    Code Status: Level 1 - Full Code    Subjective:   Patient seen and examined  No acute events overnight  Patient's oxygenation improved patient went from 15 L med flow oxygen to 8 L  Patient was ordered 40 mg of Lasix IV by pulmonology today  Patient reports having shortness of breath with exertion  Patient denies any headache, dizziness, nausea, vomiting, constipation, diarrhea, chest pain, palpitations, wheezing  A 12 point review of systems was completed and was negative unless noted above  Objective:     Vitals:   Temp (24hrs), Av °F (36 7 °C), Min:97 6 °F (36 4 °C), Max:98 2 °F (36 8 °C)    Temp:  [97 6 °F (36 4 °C)-98 2 °F (36 8 °C)] 98 1 °F (36 7 °C)  HR:  [61-98] 80  Resp:  [18-43] 18  BP: ()/(50-70) 102/61  SpO2:  [90 %-96 %] 91 %  Body mass index is 27 7 kg/m²  Input and Output Summary (last 24 hours): Intake/Output Summary (Last 24 hours) at 2021 1346  Last data filed at 2021 1242  Gross per 24 hour   Intake 120 ml   Output 1150 ml   Net -1030 ml       Physical Exam:     Physical Exam  Vitals signs and nursing note reviewed  Constitutional:       General: She is not in acute distress  Appearance: She is not ill-appearing  HENT:      Head: Normocephalic and atraumatic  Mouth/Throat:      Mouth: Mucous membranes are moist    Eyes:      Extraocular Movements: Extraocular movements intact  Pupils: Pupils are equal, round, and reactive to light  Neck:      Musculoskeletal: Normal range of motion and neck supple  Vascular: No carotid bruit  Cardiovascular:      Rate and Rhythm: Normal rate and regular rhythm  Pulses: Normal pulses  Heart sounds: Normal heart sounds  Pulmonary:      Breath sounds: No stridor  No wheezing, rhonchi or rales  Abdominal:      General: Bowel sounds are normal       Palpations: Abdomen is soft  Tenderness: There is no abdominal tenderness  Musculoskeletal:         General: No swelling or tenderness  Right lower leg: No edema  Left lower leg: No edema  Skin:     General: Skin is warm and dry  Capillary Refill: Capillary refill takes less than 2 seconds  Neurological:      General: No focal deficit present  Mental Status: She is alert and oriented to person, place, and time  Cranial Nerves: No cranial nerve deficit  Sensory: No sensory deficit  Motor: No weakness  Psychiatric:         Mood and Affect: Mood normal          Behavior: Behavior normal          Additional Data:     Labs: I have personally reviewed pertinent reports  Results from last 7 days   Lab Units 05/17/21  0459 05/16/21  0355 05/15/21  0514   WBC Thousand/uL 15 48* 11 57* 10 39*   HEMOGLOBIN g/dL 13 4 13 5 13 9   HEMATOCRIT % 41 4 41 9 42 8   PLATELETS Thousands/uL 498* 537* 646*   NEUTROS PCT % 87* 85* 85*   MONOS PCT % 4 5 5      Results from last 7 days   Lab Units 05/17/21  0459 05/16/21  0355 05/15/21  0514   POTASSIUM mmol/L 4 8 4 7 4 3   CHLORIDE mmol/L 102 102 100   CO2 mmol/L 26 28 26   BUN mg/dL 19 20 21*   CREATININE mg/dL 0 72 0 73 0 65   CALCIUM mg/dL 9 5 9 4 9 8   ALK PHOS U/L 73 77  --    ALT U/L 48 48  --    AST U/L 22 29  --      Results from last 7 days   Lab Units 05/16/21  0355   MAGNESIUM mg/dL 1 9     Results from last 7 days   Lab Units 05/16/21  0355   PHOSPHORUS mg/dL 2 9                    * Additional Pertinent Lab Tests Reviewed: All Labs Within Last 24 Hours Reviewed    Radiology Results: I have personally reviewed pertinent reports    Xr Chest Portable    Result Date: 5/17/2021  Impression: Persistent bibasal pulmonary infiltrates Workstation performed: [id number]       Recent Cultures (last 7 days): Last 24 Hours Medication List:   Current Facility-Administered Medications   Medication Dose Route Frequency Provider Last Rate    acetaminophen  650 mg Oral Q6H PRALEKSEY Hughes MD      albuterol  2 puff Inhalation 4x Daily Mario Alberto Hughes MD      atorvastatin  40 mg Oral Daily Mario Alberto Hughes MD      benzonatate  100 mg Oral Q8H Mario Alberto Hughes MD      cholecalciferol  2,000 Units Oral Daily Mario Alberto Hughes MD      dexamethasone  0 1 mg/kg Intravenous Q12H [address] FRED Chan      enoxaparin  1 mg/kg Subcutaneous Q12H [address] Mario Alberto Hughes MD      famotidine  20 mg Oral Daily Mario Alberto Hughes MD      guaiFENesin  600 mg Oral Q12H [address] Mario Alberto Hughes MD      insulin lispro  1-5 Units Subcutaneous TID AC Mario Alberto Hughes MD      ipratropium  2 puff Inhalation 4x Daily Mario Alberto Hughes MD      meclizine  12 5 mg Oral Q8H PRALEKSEY Hughes MD      multivitamin-minerals  1 tablet Oral Daily Mario Alberto Hughes MD      ondansetron  4 mg Intravenous Q6H PRN Mario Alberto Hughes MD      polyethylene glycol  17 g Oral Daily PRALEKSEY Hughes MD      senna-docusate sodium  1 tablet Oral HS Mario Alberto Hughes MD      sodium chloride  1 spray Each Nare Q1H PRALEKSEY Hughes MD          Today, Patient Was Seen By: Jaky Loving DO    Portions of the record may have been created with voice recognition software  Occasional wrong word or "sound a like" substitutions may have occurred due to the inherent limitations of voice recognition software  Read the chart carefully and recognize, using context, where substitutions have occurred

## 2021-05-17 NOTE — CASE MANAGEMENT
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  No HX of VNA orSTR  Patient has RX coverage and uses the [employer] on [address] with no issues getting or affording her medications  Patient's PCP is FRED Davenport  No POA/AD/LW identified, patient declined information at this time  Patient identified her sister or cousin being able to provide transport home at [place]  Discussed with patient plan of care and discharge planning, when asked to update friends or family members patient politely declined  CM explained that we would get recommendations from the entire team (including PT and OT) and make discharge plans at this point  Patient expressed understanding of this  CM reviewed discharge planning process including the following: identifying caregivers at home, preference for d/c planning needs, [facility] to Bed program, availability of treatment team to discuss questions or concerns patient and/or family may have regarding diagnosis, plan of care, old or new medications and discharge planning  CM will continue to follow for care coordination and update assessment as necessary

## 2021-05-17 NOTE — PHYSICAL THERAPY NOTE
PHYSICAL THERAPY EVALUATION NOTE    Patient Name: Siobhan Quinn  BLCYF'Y Date: 5/17/2021  AGE:   54 y o  Mrn:   [id number]  ADMIT DX:  Acute hypoxemic respiratory failure due to COVID-19 (HCC) [U07 1, J96 01]    Past Medical History:   Diagnosis Date    Anxiety     Asthma     Back pain     Sarcoidosis     Vertigo      Length Of Stay: 2  PHYSICAL THERAPY EVALUATION :    05/17/21 1529   PT Last Visit   PT Visit Date 05/17/21   Note Type   Note type Evaluation   Pain Assessment   Pain Assessment Tool Pain Assessment not indicated - pt denies pain   Home Living   Type of Home Apartment   Home Layout One level; Other (Comment)  (1 flight HARVEY w/ railing)   Additional Comments lives alone  ambulates w/o device  no DME  independent w/ ADLs  no falls in last 6 month  no history of supplemental oxygen use  Prior Function   Comments pt seen supine in bed  agreed to PT eval  denied pain or dizziness  Restrictions/Precautions   Other Precautions Contact/isolation; Airborne/isolation; Chair Alarm; Bed Alarm;Telemetry;O2;Fall Risk   General   Additional Pertinent History 5/16/21 at 16:00, blood pressure was 87/50   Family/Caregiver Present No   Cognition   Arousal/Participation Alert   Attention Within functional limits   Orientation Level Oriented to person; Other (Comment)  (pt was identifeid w/ full name, birth date)   Following Commands Follows one step commands without difficulty   Comments 8L oxygen via nasal cannula  resting pulse ox 96% and 74 BPM, active high 70s and 100s BPM  after 3 minutes pt had not improved per Murphy TAYLORG instruction pt was increased to 12L oxygen via midline and nonrebreather 10L  pt improved to low 90s     RUE Assessment   RUE Assessment WFL   LUE Assessment   LUE Assessment WFL   RLE Assessment   RLE Assessment WFL  (4- to 4/5)   LLE Assessment   LLE Assessment WFL  (4- to 4/5)   Coordination   Movements are Fluid and Coordinated 1   Light Touch   RLE Light Touch Grossly intact   LLE Light Touch Grossly intact   Bed Mobility   Supine to Sit 4  Minimal assistance   Additional items Assist x 1;HOB elevated; Increased time required;Verbal cues;LE management  (for trunk/LE positioning)   Transfers   Sit to Stand 4  Minimal assistance   Additional items Assist x 1; Increased time required;Verbal cues  (for LE positioning)   Stand to Sit 5  Supervision   Additional items Increased time required;Verbal cues  (for body positioning)   Ambulation/Elevation   Gait pattern Foward flexed; Short stride;Decreased foot clearance   Gait Assistance 4  Minimal assist   Additional items Assist x 1;Verbal cues  (for posture, breathing technique)   Assistive Device None   Distance 4 feet  (additional not possible due to fatigue, declining pulse ox)   Stair Management Assistance Not tested  (due to limited ambulation tolerance, decreased pulse ox)   Balance   Static Sitting Good   Static Standing Fair -   Ambulatory Poor +   Activity Tolerance   Activity Tolerance Patient limited by fatigue   Nurse Made Aware spoke to Mikell Leventhal and Nilsa Min PCA   Assessment   Prognosis Good   Problem List Decreased strength;Decreased endurance; Impaired balance;Decreased mobility; Decreased safety awareness   Assessment Pt presents with increasing O2 requirements during course of treatment for COVID 19 pneumonia  Dx: PE, COVID-19 pneumonia, sarcoidosis, acute hypoxic respiratory failure, and MYRTLE  order placed for PT eval and tx  pt presents w/ comorbidities of asthma, back pain, and vertigo and personal factors of stair(s) to enter home, limited home support and anxiety  pt presents w/ weakness, decreased endurance, impaired balance, gait deviations, decreased safety awareness and fall risk   these impairments are evident in findings from physical examination (weakness), mobility assessment (need for standby to min assist w/ all phases of mobility when usually mobilizing independently, tolerance to only 4 feet of ambulation and need for cueing for mobility and breathing technique), and Barthel Index: 60/100  pt needed input for mobility and breathing technique  pt is at risk for falls due to physical and safety awareness deficits  pt's clinical presentation is unstable/unpredictable (evident in need for assist w/ all phases of mobility when usually mobilizing independently, tolerance to only 4 feet of ambulation, declining oxygen saturation w/ low level of activity, need for supplemental oxygen in order to maintain oxygen saturation and need for input for mobility technique/safety)  pt needs inpatient PT tx to improve mobility deficits and progress mobility training as appropriate  discharge recommendation is for inpatient rehab to reduce fall risk and maximize level of functional independence  Goals   Patient Goals go home and take care of myself  STG Expiration Date 05/31/21   Short Term Goal #1 pt will: Increase bilateral LE strength 1/2 grade to facilitate independent mobility, Perform all bed mobility tasks independently to decrease fall risk factors, Perform all transfers independently to improve independence, Ambulate 150 ft  with least restrictive assistive device w/ supervision w/o LOB to expedite return to independent level of function, Navigate 3 stairs w/ modx1 with unilateral handrail to facilitate return to previous living environment, Increase ambulatory balance 1 grade to decrease risk for falls, Complete exercise program independently to improve strength and endurance, Tolerate 3 hr OOB to faciliate upright tolerance and Improve Barthel Index score to 85 or greater to facilitate independence   Plan   Treatment/Interventions Functional transfer training;LE strengthening/ROM; Elevations; Endurance training; Therapeutic exercise;Patient/family training;Equipment eval/education; Bed mobility;Gait training   PT Frequency 5x/wk   Recommendation   PT Discharge Recommendation Post acute rehabilitation services   AM-PAC Basic Mobility Inpatient   Turning in Bed Without Bedrails 4   Lying on Back to Sitting on Edge of Flat Bed 3   Moving Bed to Chair 3   Standing Up From Chair 3   Walk in Room 2   Climb 3-5 Stairs 2   Basic Mobility Inpatient Raw Score 17   Basic Mobility Standardized Score 39 67   Barthel Index   Feeding 10   Bathing 0   Grooming Score 5   Dressing Score 5   Bladder Score 10   Bowels Score 10   Toilet Use Score 10   Transfers (Bed/Chair) Score 10   Mobility (Level Surface) Score 0   Stairs Score 0   Barthel Index Score 60     The patient's AM-PAC Basic Mobility Inpatient Short Form Raw Score is 17, Standardized Score is 39 67  A standardized score less than 42 9 suggests the patient may benefit from discharge to post-acute rehabilitation services  Please also refer to the recommendation of the Physical Therapist for safe discharge planning  Skilled PT recommended while in hospital and upon DC to progress pt toward treatment goals       Kristian Sierra, PT

## 2021-05-17 NOTE — PLAN OF CARE
Problem: PHYSICAL THERAPY ADULT  Goal: Performs mobility at highest level of function for planned discharge setting  See evaluation for individualized goals  Description: Treatment/Interventions: Functional transfer training, LE strengthening/ROM, Elevations, Endurance training, Therapeutic exercise, Patient/family training, Equipment eval/education, Bed mobility, Gait training          See flowsheet documentation for full assessment, interventions and recommendations  Outcome: Progressing  Note: Prognosis: Good  Problem List: Decreased strength, Decreased endurance, Impaired balance, Decreased mobility, Decreased safety awareness  Assessment: Therapist introduced participation in LE exercises to address physical deficits noted during eval  pt had good understanding of exercise technique after initial introduction and demonstration  frequent rest breaks were needed due to fatigue  Handout was provided to improve comprehension of technique  Pt would benefit from continued PT to maximize level of functional mobility  PT Discharge Recommendation: Post acute rehabilitation services          See flowsheet documentation for full assessment

## 2021-05-18 LAB
ACE SERPL-CCNC: 34 U/L (ref 14–82)
ALBUMIN SERPL BCP-MCNC: 3.3 G/DL (ref 3.5–5)
ALP SERPL-CCNC: 84 U/L (ref 46–116)
ALT SERPL W P-5'-P-CCNC: 57 U/L (ref 12–78)
ANION GAP SERPL CALCULATED.3IONS-SCNC: 7 MMOL/L (ref 4–13)
AST SERPL W P-5'-P-CCNC: 29 U/L (ref 5–45)
BILIRUB SERPL-MCNC: 0.61 MG/DL (ref 0.2–1)
BUN SERPL-MCNC: 22 MG/DL (ref 5–25)
CALCIUM ALBUM COR SERPL-MCNC: 10.7 MG/DL (ref 8.3–10.1)
CALCIUM SERPL-MCNC: 10.1 MG/DL (ref 8.3–10.1)
CHLORIDE SERPL-SCNC: 99 MMOL/L (ref 100–108)
CO2 SERPL-SCNC: 29 MMOL/L (ref 21–32)
CREAT SERPL-MCNC: 0.82 MG/DL (ref 0.6–1.3)
ERYTHROCYTE [DISTWIDTH] IN BLOOD BY AUTOMATED COUNT: 13.6 % (ref 11.6–15.1)
FERRITIN SERPL-MCNC: 1171 NG/ML (ref 8–388)
GFR SERPL CREATININE-BSD FRML MDRD: 93 ML/MIN/1.73SQ M
GLUCOSE SERPL-MCNC: 120 MG/DL (ref 65–140)
GLUCOSE SERPL-MCNC: 128 MG/DL (ref 65–140)
GLUCOSE SERPL-MCNC: 152 MG/DL (ref 65–140)
GLUCOSE SERPL-MCNC: 174 MG/DL (ref 65–140)
GLUCOSE SERPL-MCNC: 176 MG/DL (ref 65–140)
HCT VFR BLD AUTO: 45.4 % (ref 34.8–46.1)
HGB BLD-MCNC: 14.6 G/DL (ref 11.5–15.4)
MCH RBC QN AUTO: 29.1 PG (ref 26.8–34.3)
MCHC RBC AUTO-ENTMCNC: 32.2 G/DL (ref 31.4–37.4)
MCV RBC AUTO: 91 FL (ref 82–98)
PLATELET # BLD AUTO: 537 THOUSANDS/UL (ref 149–390)
PMV BLD AUTO: 9.9 FL (ref 8.9–12.7)
POTASSIUM SERPL-SCNC: 4.4 MMOL/L (ref 3.5–5.3)
PROCALCITONIN SERPL-MCNC: <0.05 NG/ML
PROT SERPL-MCNC: 7.7 G/DL (ref 6.4–8.2)
RBC # BLD AUTO: 5.01 MILLION/UL (ref 3.81–5.12)
SODIUM SERPL-SCNC: 135 MMOL/L (ref 136–145)
WBC # BLD AUTO: 19.39 THOUSAND/UL (ref 4.31–10.16)

## 2021-05-18 PROCEDURE — 85027 COMPLETE CBC AUTOMATED: CPT | Performed by: INTERNAL MEDICINE

## 2021-05-18 PROCEDURE — 80053 COMPREHEN METABOLIC PANEL: CPT | Performed by: INTERNAL MEDICINE

## 2021-05-18 PROCEDURE — 82948 REAGENT STRIP/BLOOD GLUCOSE: CPT

## 2021-05-18 PROCEDURE — 82728 ASSAY OF FERRITIN: CPT | Performed by: INTERNAL MEDICINE

## 2021-05-18 PROCEDURE — 97110 THERAPEUTIC EXERCISES: CPT

## 2021-05-18 PROCEDURE — 99232 SBSQ HOSP IP/OBS MODERATE 35: CPT | Performed by: INTERNAL MEDICINE

## 2021-05-18 PROCEDURE — 94760 N-INVAS EAR/PLS OXIMETRY 1: CPT

## 2021-05-18 PROCEDURE — 84145 PROCALCITONIN (PCT): CPT | Performed by: INTERNAL MEDICINE

## 2021-05-18 PROCEDURE — 97116 GAIT TRAINING THERAPY: CPT

## 2021-05-18 RX ORDER — POLYETHYLENE GLYCOL 3350 17 G/17G
17 POWDER, FOR SOLUTION ORAL DAILY
Status: DISCONTINUED | OUTPATIENT
Start: 2021-05-18 | End: 2021-05-19

## 2021-05-18 RX ADMIN — DEXAMETHASONE SODIUM PHOSPHATE 6.24 MG: 4 INJECTION, SOLUTION INTRAMUSCULAR; INTRAVENOUS at 21:05

## 2021-05-18 RX ADMIN — ALBUTEROL SULFATE 2 PUFF: 90 AEROSOL, METERED RESPIRATORY (INHALATION) at 16:04

## 2021-05-18 RX ADMIN — IPRATROPIUM BROMIDE 2 PUFF: 17 AEROSOL, METERED RESPIRATORY (INHALATION) at 10:09

## 2021-05-18 RX ADMIN — ENOXAPARIN SODIUM 60 MG: 60 INJECTION SUBCUTANEOUS at 21:06

## 2021-05-18 RX ADMIN — IPRATROPIUM BROMIDE 2 PUFF: 17 AEROSOL, METERED RESPIRATORY (INHALATION) at 16:04

## 2021-05-18 RX ADMIN — ALBUTEROL SULFATE 2 PUFF: 90 AEROSOL, METERED RESPIRATORY (INHALATION) at 10:09

## 2021-05-18 RX ADMIN — IPRATROPIUM BROMIDE 2 PUFF: 17 AEROSOL, METERED RESPIRATORY (INHALATION) at 12:59

## 2021-05-18 RX ADMIN — FAMOTIDINE 20 MG: 20 TABLET, FILM COATED ORAL at 10:10

## 2021-05-18 RX ADMIN — ACETAMINOPHEN 650 MG: 325 TABLET, FILM COATED ORAL at 00:55

## 2021-05-18 RX ADMIN — ALBUTEROL SULFATE 2 PUFF: 90 AEROSOL, METERED RESPIRATORY (INHALATION) at 20:33

## 2021-05-18 RX ADMIN — BENZONATATE 100 MG: 100 CAPSULE ORAL at 04:34

## 2021-05-18 RX ADMIN — POLYETHYLENE GLYCOL 3350 17 G: 17 POWDER, FOR SOLUTION ORAL at 10:11

## 2021-05-18 RX ADMIN — GUAIFENESIN 600 MG: 600 TABLET, EXTENDED RELEASE ORAL at 21:06

## 2021-05-18 RX ADMIN — ENOXAPARIN SODIUM 60 MG: 60 INJECTION SUBCUTANEOUS at 10:10

## 2021-05-18 RX ADMIN — MULTIPLE VITAMINS W/ MINERALS TAB 1 TABLET: TAB ORAL at 10:08

## 2021-05-18 RX ADMIN — ALBUTEROL SULFATE 2 PUFF: 90 AEROSOL, METERED RESPIRATORY (INHALATION) at 12:59

## 2021-05-18 RX ADMIN — GUAIFENESIN 600 MG: 600 TABLET, EXTENDED RELEASE ORAL at 10:09

## 2021-05-18 RX ADMIN — IPRATROPIUM BROMIDE 2 PUFF: 17 AEROSOL, METERED RESPIRATORY (INHALATION) at 20:33

## 2021-05-18 RX ADMIN — BENZONATATE 100 MG: 100 CAPSULE ORAL at 21:06

## 2021-05-18 RX ADMIN — Medication 2000 UNITS: at 10:09

## 2021-05-18 RX ADMIN — DEXAMETHASONE SODIUM PHOSPHATE 6.24 MG: 4 INJECTION, SOLUTION INTRAMUSCULAR; INTRAVENOUS at 10:09

## 2021-05-18 RX ADMIN — ATORVASTATIN CALCIUM 40 MG: 40 TABLET, FILM COATED ORAL at 10:10

## 2021-05-18 RX ADMIN — BENZONATATE 100 MG: 100 CAPSULE ORAL at 12:59

## 2021-05-18 NOTE — PHYSICAL THERAPY NOTE
PHYSICAL THERAPY NOTE    Treatment time [id number]      Patient Name: Wero Hutchins  SYCJR'S Date: 5/18/2021 05/18/21 0700   PT Last Visit   PT Visit Date 05/18/21   Note Type   Note Type Treatment   Pain Assessment   Pain Assessment Tool 0-10   Pain Score No Pain   Restrictions/Precautions   Weight Bearing Precautions Per Order No   Other Precautions Contact/isolation; Airborne/isolation; Chair Alarm; Bed Alarm;Telemetry;O2;Fall Risk   General   Chart Reviewed Yes   Family/Caregiver Present No   Cognition   Overall Cognitive Status WFL   Arousal/Participation Responsive; Alert; Cooperative   Attention Within functional limits   Orientation Level Oriented X4   Memory Within functional limits   Following Commands Follows one step commands without difficulty   Comments 8L of nasal canula 02, resting Sp02 96% and 78 BPM, active Sp02 76%   pt required 5 min seated rest for sp02 to increase to 93%   Subjective   Subjective pt agreeable to participate in PT intervention    Bed Mobility   Rolling R 7  Independent   Rolling L 7  Independent   Supine to Sit 5  Supervision   Additional items HOB elevated; Increased time required   Sit to Supine 5  Supervision   Additional items Increased time required   Transfers   Sit to Stand 5  Supervision   Additional items Increased time required;Verbal cues   Stand to Sit 5  Supervision   Additional items Increased time required;Verbal cues   Stand pivot Unable to assess   Additional Comments pt required several therapeutic rest breaks throughout tx session secondary to SOB and fatigue    Ambulation/Elevation   Gait pattern Decreased foot clearance; Foward flexed; Short stride   Gait Assistance 4  Minimal assist   Additional items Assist x 1;Verbal cues; Other (Comment)  (VC's fo5r posture and breathing techniques )   Assistive Device None   Distance 4' fwd 4' back    Stair Management Assistance Not tested   Balance   Static Sitting Good   Dynamic Sitting Good   Static Standing Fair -   Dynamic Standing Fair   Ambulatory Poor +   Endurance Deficit   Endurance Deficit Yes   Activity Tolerance   Activity Tolerance Patient limited by fatigue   Nurse Made Aware Spoke to RN    Assessment   Prognosis Good   Problem List Decreased strength;Decreased endurance; Impaired balance;Decreased mobility; Decreased safety awareness   Assessment pt began tx session lying supine in the bed and was agreeable to participate in PT intervention  progress was noted this tx session as pt was able to perform supine<>sit EOB and sit<>supine with a dcreaseb level of assistance required (supervision)  pt was able to perform all transfers with no AD with supervision and +time  pt require several therapeutic rest breaks throughout tx session secondary to a decrease in Sp02%  pt was able to ambulate 4' fwd and 4' back with min Ax1 and VC's for breathing techniques  pt was able to complete TE with VC's and good form throughout tx session  pt did require rest breaks while performing TE  post tx pt lying supine in the bed with call bell in hand and bed alarm activate  pt Sp02% post tx was 94% and BPM was 90   Barriers to Discharge Inaccessible home environment;Decreased caregiver support   Goals   Patient Goals to go home    STG Expiration Date 05/31/21   PT Treatment Day 2   Plan   Treatment/Interventions Functional transfer training;Elevations;LE strengthening/ROM; Therapeutic exercise; Endurance training;Patient/family training;Equipment eval/education; Bed mobility;Gait training   Progress Progressing toward goals   PT Frequency 5x/wk   Recommendation   PT Discharge Recommendation Post acute rehabilitation services   AM-PAC Basic Mobility Inpatient   Turning in Bed Without Bedrails 4   Lying on Back to Sitting on Edge of Flat Bed 3   Moving Bed to Chair 3   Standing Up From Chair 3   Walk in Room 3   Climb 3-5 Stairs 2   Basic Mobility Inpatient Raw Score 18   Basic Mobility Standardized Score 41 05     Brynn Flores, PTA

## 2021-05-18 NOTE — PROGRESS NOTES
Progress Note - Pulmonary   Antionette Belcher 54 y o  female MRN: [id number]  Unit/Bed#: S -01 Encounter: [id number]    Assessment/Plan:    Acute hypoxic respiratory failure due to abnormal CT chest with acute PE and COVID pneumonia   Titrate oxygen to maintain POX > or = 89%  Further plans as below  COVID pneumonia  Continue COVID-19 pathway as below  Positive Test: 05/08/2021   Continue vitamin D3, vitamin-C, and zinc    Continue atorvastatin   Continue famotidine   Dexamethasone: Continue 0 1 mg/kg BID   Remdesivir: Completed five day course   Antibiotics: Continue to monitor off antibiotics   Convalescent plasma: Not currently a candidate   Actemra: Received 05/11/2021   Anticoagulation/DVT prophylaxis: Lovenox as below   Monitor inflammatory markers and D-dimer   Activity, self-proning, and incentive spirometry as able   Continue albuterol/Atrovent MDI QID  Acute NANCIE segmental/subsegmental PE   On Lovenox 60 mg BID  Echo noted  Will need AC for at least 3 months with further work-up/follow-up prior to D/C  History of sarcoidosis   Outpatient pulmonary follow-up  Outpatient follow-up pending course  D/W nursing  Chief Complaint:    "I feel better "    Subjective:    Felecia Burns reports she is feeling better today  She continues with a dry cough  No chest pain  No sputum production  PARIKH is improved  Objective:    Vitals: Blood pressure 101/56, pulse 70, temperature 98 °F (36 7 °C), temperature source Oral, resp  rate 18, height 4' 11" (1 499 m), weight 64 2 kg (141 lb 8 6 oz), SpO2 96 %  6L NC,Body mass index is 28 59 kg/m²        Intake/Output Summary (Last 24 hours) at 5/18/2021 0947  Last data filed at 5/18/2021 0600  Gross per 24 hour   Intake 0 ml   Output 700 ml   Net -700 ml       Invasive Devices     Peripheral Intravenous Line            Peripheral IV 05/18/21 Right Antecubital less than 1 day                Physical Exam:     Physical Exam  Vitals signs reviewed  Constitutional:       General: She is not in acute distress  Appearance: She is well-developed  She is not toxic-appearing or diaphoretic  Interventions: Nasal cannula in place  HENT:      Head: Normocephalic and atraumatic  Eyes:      General: No scleral icterus  Neck:      Musculoskeletal: Neck supple  Vascular: No JVD  Trachea: No tracheal deviation  Cardiovascular:      Rate and Rhythm: Normal rate and regular rhythm  Heart sounds: S1 normal and S2 normal  No murmur  No friction rub  No gallop  Pulmonary:      Effort: Pulmonary effort is normal  No tachypnea, accessory muscle usage or respiratory distress  Breath sounds: Normal breath sounds  No stridor  No decreased breath sounds, wheezing, rhonchi or rales  Chest:      Chest wall: No tenderness  Abdominal:      General: Bowel sounds are normal  There is no distension  Palpations: Abdomen is soft  Tenderness: There is no abdominal tenderness  There is no guarding or rebound  Skin:     General: Skin is warm and dry  Findings: No rash  Neurological:      Mental Status: She is alert and oriented to person, place, and time  GCS: GCS eye subscore is 4  GCS verbal subscore is 5  GCS motor subscore is 6  Psychiatric:         Speech: Speech normal          Behavior: Behavior normal  Behavior is cooperative           Labs:   CBC:   Lab Results   Component Value Date    WBC 19 39 (H) 05/18/2021    HGB 14 6 05/18/2021    HCT 45 4 05/18/2021    MCV 91 05/18/2021     (H) 05/18/2021    MCH 29 1 05/18/2021    MCHC 32 2 05/18/2021    RDW 13 6 05/18/2021    MPV 9 9 05/18/2021   , CMP:   Lab Results   Component Value Date    SODIUM 135 (L) 05/18/2021    K 4 4 05/18/2021    CL 99 (L) 05/18/2021    CO2 29 05/18/2021    BUN 22 05/18/2021    CREATININE 0 82 05/18/2021    CALCIUM 10 1 05/18/2021    AST 29 05/18/2021    ALT 57 05/18/2021    ALKPHOS 84 05/18/2021    EGFR 93 05/18/2021 Procalcitonin: pending    Imaging and other studies: None new

## 2021-05-18 NOTE — PLAN OF CARE
Problem: PHYSICAL THERAPY ADULT  Goal: Performs mobility at highest level of function for planned discharge setting  See evaluation for individualized goals  Description: Treatment/Interventions: Functional transfer training, LE strengthening/ROM, Elevations, Endurance training, Therapeutic exercise, Patient/family training, Equipment eval/education, Bed mobility, Gait training          See flowsheet documentation for full assessment, interventions and recommendations  Outcome: Progressing  Note: Prognosis: Good  Problem List: Decreased strength, Decreased endurance, Impaired balance, Decreased mobility, Decreased safety awareness  Assessment: pt began tx session lying supine in the bed and was agreeable to participate in PT intervention  progress was noted this tx session as pt was able to perform supine<>sit EOB and sit<>supine with a dcreaseb level of assistance required (supervision)  pt was able to perform all transfers with no AD with supervision and +time  pt require several therapeutic rest breaks throughout tx session secondary to a decrease in Sp02%  pt was able to ambulate 4' fwd and 4' back with min Ax1 and VC's for breathing techniques  pt was able to complete TE with VC's and good form throughout tx session  pt did require rest breaks while performing TE  post tx pt lying supine in the bed with call bell in hand and bed alarm activate  pt Sp02% post tx was 94% and BPM was 90  Barriers to Discharge: Inaccessible home environment, Decreased caregiver support        PT Discharge Recommendation: Post acute rehabilitation services          See flowsheet documentation for full assessment

## 2021-05-18 NOTE — PLAN OF CARE
Problem: Potential for Falls  Goal: Patient will remain free of falls  Description: INTERVENTIONS:  - Assess patient frequently for physical needs  -  Identify cognitive and physical deficits and behaviors that affect risk of falls    -  [place] fall precautions as indicated by assessment   - Educate patient/family on patient safety including physical limitations  - Instruct patient to call for assistance with activity based on assessment  - Modify environment to reduce risk of injury  - Consider OT/PT consult to assist with strengthening/mobility  Outcome: Progressing     Problem: PAIN - ADULT  Goal: Verbalizes/displays adequate comfort level or baseline comfort level  Description: Interventions:  - Encourage patient to monitor pain and request assistance  - Assess pain using appropriate pain scale  - Administer analgesics based on type and severity of pain and evaluate response  - Implement non-pharmacological measures as appropriate and evaluate response  - Consider cultural and social influences on pain and pain management  - Notify physician/advanced practitioner if interventions unsuccessful or patient reports new pain  Outcome: Progressing     Problem: INFECTION - ADULT  Goal: Absence or prevention of progression during hospitalization  Description: INTERVENTIONS:  - Assess and monitor for signs and symptoms of infection  - Monitor lab/diagnostic results  - Monitor all insertion sites, i e  indwelling lines, tubes, and drains  - Monitor endotracheal if appropriate and nasal secretions for changes in amount and color  - [place] appropriate cooling/warming therapies per order  - Administer medications as ordered  - Instruct and encourage patient and family to use good hand hygiene technique  - Identify and instruct in appropriate isolation precautions for identified infection/condition  Outcome: Progressing  Goal: Absence of fever/infection during neutropenic period  Description: INTERVENTIONS:  - Monitor WBC    Outcome: Progressing     Problem: SAFETY ADULT  Goal: Patient will remain free of falls  Description: INTERVENTIONS:  - Assess patient frequently for physical needs  -  Identify cognitive and physical deficits and behaviors that affect risk of falls    -  [place] fall precautions as indicated by assessment   - Educate patient/family on patient safety including physical limitations  - Instruct patient to call for assistance with activity based on assessment  - Modify environment to reduce risk of injury  - Consider OT/PT consult to assist with strengthening/mobility  Outcome: Progressing  Goal: Maintain or return to baseline ADL function  Description: INTERVENTIONS:  -  Assess patient's ability to carry out ADLs; assess patient's baseline for ADL function and identify physical deficits which impact ability to perform ADLs (bathing, care of mouth/teeth, toileting, grooming, dressing, etc )  - Assess/evaluate cause of self-care deficits   - Assess range of motion  - Assess patient's mobility; develop plan if impaired  - Assess patient's need for assistive devices and provide as appropriate  - Encourage maximum independence but intervene and supervise when necessary  - Involve family in performance of ADLs  - Assess for home care needs following discharge   - Consider OT consult to assist with ADL evaluation and planning for discharge  - Provide patient education as appropriate  Outcome: Progressing  Goal: Maintain or return mobility status to optimal level  Description: INTERVENTIONS:  - Assess patient's baseline mobility status (ambulation, transfers, stairs, etc )    - Identify cognitive and physical deficits and behaviors that affect mobility  - Identify mobility aids required to assist with transfers and/or ambulation (gait belt, sit-to-stand, lift, walker, cane, etc )  - [place] fall precautions as indicated by assessment  - Record patient progress and toleration of activity level on Mobility SBAR; progress patient to next Phase/Stage  - Instruct patient to call for assistance with activity based on assessment  - Consider rehabilitation consult to assist with strengthening/weightbearing, etc   Outcome: Progressing     Problem: DISCHARGE PLANNING  Goal: Discharge to home or other facility with appropriate resources  Description: INTERVENTIONS:  - Identify barriers to discharge w/patient and caregiver  - Arrange for needed discharge resources and transportation as appropriate  - Identify discharge learning needs (meds, wound care, etc )  - Arrange for interpretive services to assist at discharge as needed  - Refer to Case Management Department for coordinating discharge planning if the patient needs post-hospital services based on physician/advanced practitioner order or complex needs related to functional status, cognitive ability, or social support system  Outcome: Progressing     Problem: Knowledge Deficit  Goal: Patient/family/caregiver demonstrates understanding of disease process, treatment plan, medications, and discharge instructions  Description: Complete learning assessment and assess knowledge base    Interventions:  - Provide teaching at level of understanding  - Provide teaching via preferred learning methods  Outcome: Progressing     Problem: CARDIOVASCULAR - ADULT  Goal: Maintains optimal cardiac output and hemodynamic stability  Description: INTERVENTIONS:  - Monitor I/O, vital signs and rhythm  - Monitor for S/S and trends of decreased cardiac output  - Administer and titrate ordered vasoactive medications to optimize hemodynamic stability  - Assess quality of pulses, skin color and temperature  - Assess for signs of decreased coronary artery perfusion  - Instruct patient to report change in severity of symptoms  Outcome: Progressing  Goal: Absence of cardiac dysrhythmias or at baseline rhythm  Description: INTERVENTIONS:  - Continuous cardiac monitoring, vital signs, obtain 12 lead EKG if ordered  - Administer antiarrhythmic and heart rate control medications as ordered  - Monitor electrolytes and administer replacement therapy as ordered  Outcome: Progressing     Problem: RESPIRATORY - ADULT  Goal: Achieves optimal ventilation and oxygenation  Description: INTERVENTIONS:  - Assess for changes in respiratory status  - Assess for changes in mentation and behavior  - Position to facilitate oxygenation and minimize respiratory effort  - Oxygen administered by appropriate delivery if ordered  - Initiate smoking cessation education as indicated  - Encourage broncho-pulmonary hygiene including cough, deep breathe, Incentive Spirometry  - Assess the need for suctioning and aspirate as needed  - Assess and instruct to report SOB or any respiratory difficulty  - Respiratory Therapy support as indicated  Outcome: Progressing

## 2021-05-18 NOTE — UTILIZATION REVIEW
Notification of Discharge   This is a Notification of Discharge from our facility [address]  Please be advised that this patient has been discharge from our facility  Below you will find the admission and discharge date and time including the patients disposition  UTILIZATION REVIEW CONTACT:  Hanane Mulligan  Utilization   Network Utilization Review Department  Phone: [phone] x carefully listen to the prompts  All voicemails are confidential   Email: [email or web address]     PHYSICIAN ADVISORY SERVICES:  FOR LCTZ-SP-NBSJ REVIEW - MEDICAL NECESSITY DENIAL  Phone: [phone]  Fax: [phone]  Email: [email or web address]     PRESENTATION DATE: 5/8/2021 12:08 PM  OBERVATION ADMISSION DATE:   INPATIENT ADMISSION DATE: 5/8/21 1359   DISCHARGE DATE: 5/15/2021  2:04 PM  DISPOSITION: [address] [facility]      IMPORTANT INFORMATION:  Send all requests for admission clinical reviews, approved or denied determinations and any other requests to dedicated fax number below belonging to the campus where the patient is receiving treatment   List of dedicated fax numbers:  [address] DENIALS (Administrative/Medical Necessity) [phone]   [address]  (Maternity/NICU/Pediatrics) [phone]   [facility] [phone]   Kimberly Valdez [phone]   Tye Overton [phone]   [address] [phone]   [facility]  [phone]   [address] [facility] [address]  [address] [facility] [address] [facility] [phone]

## 2021-05-18 NOTE — ASSESSMENT & PLAN NOTE
· Continue Decadron 6 mg IV Q 24 hour  · Continue with vitamin D3, Pepcid 20 mg daily albuterol and Atrovent inhalers  She completed her vitamin-C and zinc and is on a multivitamin  · Continue Lovenox 1 milligram/kilogram q 12, decide on transition to novel oral anticoagulant once more medically stable  · Wean off supplemental oxygen as tolerated to keep saturation over 88%  · Monitor intakes and outputs , consider p r n  Lasix to keep daily net negative 1 to 2 L  · Continue Xopenex and Atrovent  · Continue incentive spirometry  · consider addition of LABA/ICS at discharge per pulmonology recommendation  · Echocardiogram was obtained which showed normal EF and normal ventricular contraction and no evidence of right heart strain

## 2021-05-18 NOTE — PROGRESS NOTES
[facility]  Progress Note - Jennifer Trinidad 1965, 54 y o  female MRN: [id number]  Unit/Bed#: S -01 Encounter: [id number]  Primary Care Provider: FRED García   Date and time admitted to hospital: 5/15/2021  2:04 PM    Pulmonary embolism ([address] )  Assessment & Plan  · Currently on Lovenox 1 milligram/kilogram q 12 hours  · Reviewed Dopplers of lower extremity CTs are negative  · Decide and transition to a novel oral anticoagulant once more medically stable  Will likely require 6 months course    Pneumonia due to COVID-19 virus  Assessment & Plan  · Status post remdesivir x5 and 1 dose of Actemra   · Transferred to [facility] for higher level of care due to increasing O2 requirements  · Therapeutic Lovenox due to pulmonary embolism  · Procal negative x2  · Trend inflammatory markers    Sarcoidosis  Assessment & Plan  Patient reports not being on any medications for her sarcoidosis  For now will treat her asthma and she remains on Decadron 6 mg IV Q 24 for her COVID infection  Continue with oxygen support  · Appreciate pulmonary recommendations  · Check Ace level  · consider addition of LABA/ICS at discharge per Pulmonary recommendations  · Monitor for worsening hypoxemia or distress  Hypercholesterolemia  Assessment & Plan  Continue statin    * Acute hypoxemic respiratory failure due to COVID-19 [facility])  Assessment & Plan  · Continue Decadron 6 mg IV Q 24 hour  · Continue with vitamin D3, Pepcid 20 mg daily albuterol and Atrovent inhalers  She completed her vitamin-C and zinc and is on a multivitamin  · Continue Lovenox 1 milligram/kilogram q 12, decide on transition to novel oral anticoagulant once more medically stable  · Wean off supplemental oxygen as tolerated to keep saturation over 88%  · Monitor intakes and outputs , consider p r n   Lasix to keep daily net negative 1 to 2 L  · Continue Xopenex and Atrovent  · Continue incentive spirometry  · consider addition of LABA/ICS at discharge per pulmonology recommendation  · Echocardiogram was obtained which showed normal EF and normal ventricular contraction and no evidence of right heart strain  VTE Pharmacologic Prophylaxis:   Pharmacologic: Enoxaparin (Lovenox)  Mechanical VTE Prophylaxis in Place: Yes    Discussions with Specialists or Other Care Team Provider: pulmonary    Education and Discussions with Family / Patient: Discussed plan of care with patient    Current Length of Stay: 3 day(s)    Current Patient Status: Inpatient     Discharge Plan / Estimated Discharge Date: TBD    Code Status: Level 1 - Full Code    Subjective:   Patient seen and examined  No acute events overnight  The patient oxygen status has continued to improve  The patient was moved from ICU step-down level 2 care to med surge  The patient reports that she is still experiencing some shortness of breath with movement along with a cough  Patient denies any headache, dizziness, nausea, vomiting, constipation, diarrhea, chest pain, palpitations, wheezing  A 12 point review of systems was completed and was negative unless noted above  Objective:     Vitals:   Temp (24hrs), Av 6 °F (36 4 °C), Min:96 1 °F (35 6 °C), Max:98 4 °F (36 9 °C)    Temp:  [96 1 °F (35 6 °C)-98 4 °F (36 9 °C)] 98 °F (36 7 °C)  HR:  [70-97] 70  Resp:  [18-35] 18  BP: ()/(56-74) 92/63  SpO2:  [90 %-98 %] 96 %  Body mass index is 28 59 kg/m²  Input and Output Summary (last 24 hours): Intake/Output Summary (Last 24 hours) at 2021 1358  Last data filed at 2021 0819  Gross per 24 hour   Intake 240 ml   Output 0 ml   Net 240 ml       Physical Exam:     Physical Exam  Vitals signs and nursing note reviewed  Constitutional:       General: She is not in acute distress  Appearance: She is not ill-appearing  HENT:      Head: Normocephalic and atraumatic        Mouth/Throat:      Mouth: Mucous membranes are moist    Eyes: Extraocular Movements: Extraocular movements intact  Pupils: Pupils are equal, round, and reactive to light  Neck:      Musculoskeletal: Normal range of motion and neck supple  Vascular: No carotid bruit  Cardiovascular:      Rate and Rhythm: Normal rate and regular rhythm  Pulses: Normal pulses  Heart sounds: Normal heart sounds  Pulmonary:      Breath sounds: No stridor  No wheezing, rhonchi or rales  Abdominal:      General: Bowel sounds are normal       Palpations: Abdomen is soft  Tenderness: There is no abdominal tenderness  Musculoskeletal:         General: No swelling or tenderness  Right lower leg: No edema  Left lower leg: No edema  Skin:     General: Skin is warm and dry  Capillary Refill: Capillary refill takes less than 2 seconds  Neurological:      General: No focal deficit present  Mental Status: She is alert and oriented to person, place, and time  Cranial Nerves: No cranial nerve deficit  Sensory: No sensory deficit  Motor: No weakness  Psychiatric:         Mood and Affect: Mood normal          Behavior: Behavior normal           Additional Data:     Labs: I have personally reviewed pertinent reports    Results from last 7 days   Lab Units 05/18/21 0442 05/17/21 0459 05/16/21 0355 05/15/21  0514   WBC Thousand/uL 19 39* 15 48* 11 57* 10 39*   HEMOGLOBIN g/dL 14 6 13 4 13 5 13 9   HEMATOCRIT % 45 4 41 4 41 9 42 8   PLATELETS Thousands/uL 537* 498* 537* 646*   NEUTROS PCT %  --  87* 85* 85*   MONOS PCT %  --  4 5 5      Results from last 7 days   Lab Units 05/18/21 0442 05/17/21 0459 05/16/21  0355   POTASSIUM mmol/L 4 4 4 8 4 7   CHLORIDE mmol/L 99* 102 102   CO2 mmol/L 29 26 28   BUN mg/dL 22 19 20   CREATININE mg/dL 0 82 0 72 0 73   CALCIUM mg/dL 10 1 9 5 9 4   ALK PHOS U/L 84 73 77   ALT U/L 57 48 48   AST U/L 29 22 29     Results from last 7 days   Lab Units 05/16/21  0355   MAGNESIUM mg/dL 1 9     Results from last 7 days   Lab Units 05/16/21  0355   PHOSPHORUS mg/dL 2 9                    * Additional Pertinent Lab Tests Reviewed: Burak Bell Admission Reviewed    Radiology Results: I have personally reviewed pertinent reports  Xr Chest Portable    Result Date: 5/17/2021  Impression: Persistent bibasal pulmonary infiltrates Workstation performed: [id number]       Recent Cultures (last 7 days):           Last 24 Hours Medication List:   Current Facility-Administered Medications   Medication Dose Route Frequency Provider Last Rate    acetaminophen  650 mg Oral Q6H PRN Fina Sal MD      albuterol  2 puff Inhalation 4x Daily Fina Sal MD      atorvastatin  40 mg Oral Daily Fina Sal MD      benzonatate  100 mg Oral Q8H Fina Sal MD      cholecalciferol  2,000 Units Oral Daily Fina Sal MD      dexamethasone  0 1 mg/kg Intravenous Q12H [address] Fina Sal MD      enoxaparin  1 mg/kg Subcutaneous Q12H [address] Fina Sal MD      famotidine  20 mg Oral Daily Fina Sal MD      guaiFENesin  600 mg Oral Q12H [address] Fina Sal MD      insulin lispro  1-5 Units Subcutaneous TID AC Fina Sal MD      ipratropium  2 puff Inhalation 4x Daily Fina Sal MD      meclizine  12 5 mg Oral Q8H PRN Fina Sal MD      multivitamin-minerals  1 tablet Oral Daily Fina Sal MD      ondansetron  4 mg Intravenous Q6H PRN Fina Sal MD      polyethylene glycol  17 g Oral Daily Costa Crowder DO      senna-docusate sodium  1 tablet Oral HS Fina Sal MD      sodium chloride  1 spray Each Nare Q1H PRN Fina Sal MD          Today, Patient Was Seen By: Costa Crowder DO    Portions of the record may have been created with voice recognition software  Occasional wrong word or "sound a like" substitutions may have occurred due to the inherent limitations of voice recognition software    Read the chart carefully and recognize, using context, where substitutions have occurred

## 2021-05-18 NOTE — CASE MANAGEMENT
Contacted pt in her room due to pt being COVID positive  Discussed STR recommendations  Freedom of choice discussed  Pt did not have a preference is any particular facilities and open to a blanket referral  [facility] referral was placed  Will continue to follow for discharge planning needs

## 2021-05-18 NOTE — ASSESSMENT & PLAN NOTE
· Currently on Lovenox 1 milligram/kilogram q 12 hours  · Reviewed Dopplers of lower extremity CTs are negative  · Decide and transition to a novel oral anticoagulant once more medically stable    Will likely require 6 months course

## 2021-05-18 NOTE — ASSESSMENT & PLAN NOTE
· Status post remdesivir x5 and 1 dose of Actemra   · Transferred to [facility] for higher level of care due to increasing O2 requirements    · Therapeutic Lovenox due to pulmonary embolism  · Procal negative x2  · Trend inflammatory markers

## 2021-05-19 LAB
ALBUMIN SERPL BCP-MCNC: 3 G/DL (ref 3.5–5)
ALP SERPL-CCNC: 71 U/L (ref 46–116)
ALT SERPL W P-5'-P-CCNC: 45 U/L (ref 12–78)
ANION GAP SERPL CALCULATED.3IONS-SCNC: 6 MMOL/L (ref 4–13)
AST SERPL W P-5'-P-CCNC: 17 U/L (ref 5–45)
BASOPHILS # BLD AUTO: 0.03 THOUSANDS/ΜL (ref 0–0.1)
BASOPHILS NFR BLD AUTO: 0 % (ref 0–1)
BILIRUB SERPL-MCNC: 0.62 MG/DL (ref 0.2–1)
BUN SERPL-MCNC: 21 MG/DL (ref 5–25)
CALCIUM ALBUM COR SERPL-MCNC: 10.7 MG/DL (ref 8.3–10.1)
CALCIUM SERPL-MCNC: 9.9 MG/DL (ref 8.3–10.1)
CHLORIDE SERPL-SCNC: 100 MMOL/L (ref 100–108)
CO2 SERPL-SCNC: 28 MMOL/L (ref 21–32)
CREAT SERPL-MCNC: 0.64 MG/DL (ref 0.6–1.3)
CRP SERPL QL: <3 MG/L
D DIMER PPP FEU-MCNC: 0.93 UG/ML FEU
EOSINOPHIL # BLD AUTO: 0 THOUSAND/ΜL (ref 0–0.61)
EOSINOPHIL NFR BLD AUTO: 0 % (ref 0–6)
ERYTHROCYTE [DISTWIDTH] IN BLOOD BY AUTOMATED COUNT: 13.7 % (ref 11.6–15.1)
FERRITIN SERPL-MCNC: 1111 NG/ML (ref 8–388)
GFR SERPL CREATININE-BSD FRML MDRD: 116 ML/MIN/1.73SQ M
GLUCOSE SERPL-MCNC: 136 MG/DL (ref 65–140)
GLUCOSE SERPL-MCNC: 161 MG/DL (ref 65–140)
GLUCOSE SERPL-MCNC: 162 MG/DL (ref 65–140)
GLUCOSE SERPL-MCNC: 183 MG/DL (ref 65–140)
GLUCOSE SERPL-MCNC: 222 MG/DL (ref 65–140)
HCT VFR BLD AUTO: 41.1 % (ref 34.8–46.1)
HGB BLD-MCNC: 13.4 G/DL (ref 11.5–15.4)
IMM GRANULOCYTES # BLD AUTO: 0.22 THOUSAND/UL (ref 0–0.2)
IMM GRANULOCYTES NFR BLD AUTO: 1 % (ref 0–2)
LYMPHOCYTES # BLD AUTO: 1.46 THOUSANDS/ΜL (ref 0.6–4.47)
LYMPHOCYTES NFR BLD AUTO: 8 % (ref 14–44)
MCH RBC QN AUTO: 29.6 PG (ref 26.8–34.3)
MCHC RBC AUTO-ENTMCNC: 32.6 G/DL (ref 31.4–37.4)
MCV RBC AUTO: 91 FL (ref 82–98)
MONOCYTES # BLD AUTO: 1.03 THOUSAND/ΜL (ref 0.17–1.22)
MONOCYTES NFR BLD AUTO: 6 % (ref 4–12)
NEUTROPHILS # BLD AUTO: 15.92 THOUSANDS/ΜL (ref 1.85–7.62)
NEUTS SEG NFR BLD AUTO: 85 % (ref 43–75)
NRBC BLD AUTO-RTO: 0 /100 WBCS
PLATELET # BLD AUTO: 470 THOUSANDS/UL (ref 149–390)
PMV BLD AUTO: 9.8 FL (ref 8.9–12.7)
POTASSIUM SERPL-SCNC: 4.8 MMOL/L (ref 3.5–5.3)
PROT SERPL-MCNC: 6.8 G/DL (ref 6.4–8.2)
RBC # BLD AUTO: 4.52 MILLION/UL (ref 3.81–5.12)
SODIUM SERPL-SCNC: 134 MMOL/L (ref 136–145)
WBC # BLD AUTO: 18.66 THOUSAND/UL (ref 4.31–10.16)

## 2021-05-19 PROCEDURE — 82948 REAGENT STRIP/BLOOD GLUCOSE: CPT

## 2021-05-19 PROCEDURE — 94760 N-INVAS EAR/PLS OXIMETRY 1: CPT

## 2021-05-19 PROCEDURE — 85025 COMPLETE CBC W/AUTO DIFF WBC: CPT | Performed by: PSYCHIATRY & NEUROLOGY

## 2021-05-19 PROCEDURE — 99232 SBSQ HOSP IP/OBS MODERATE 35: CPT | Performed by: INTERNAL MEDICINE

## 2021-05-19 PROCEDURE — 80053 COMPREHEN METABOLIC PANEL: CPT | Performed by: PSYCHIATRY & NEUROLOGY

## 2021-05-19 PROCEDURE — 85379 FIBRIN DEGRADATION QUANT: CPT | Performed by: PSYCHIATRY & NEUROLOGY

## 2021-05-19 PROCEDURE — 82728 ASSAY OF FERRITIN: CPT | Performed by: PSYCHIATRY & NEUROLOGY

## 2021-05-19 PROCEDURE — 86140 C-REACTIVE PROTEIN: CPT | Performed by: PSYCHIATRY & NEUROLOGY

## 2021-05-19 RX ORDER — AMOXICILLIN 250 MG
1 CAPSULE ORAL
Status: DISCONTINUED | OUTPATIENT
Start: 2021-05-19 | End: 2021-05-21 | Stop reason: HOSPADM

## 2021-05-19 RX ORDER — POLYETHYLENE GLYCOL 3350 17 G/17G
17 POWDER, FOR SOLUTION ORAL DAILY PRN
Status: DISCONTINUED | OUTPATIENT
Start: 2021-05-19 | End: 2021-05-21 | Stop reason: HOSPADM

## 2021-05-19 RX ADMIN — DEXAMETHASONE SODIUM PHOSPHATE 6.24 MG: 4 INJECTION, SOLUTION INTRAMUSCULAR; INTRAVENOUS at 08:39

## 2021-05-19 RX ADMIN — ALBUTEROL SULFATE 2 PUFF: 90 AEROSOL, METERED RESPIRATORY (INHALATION) at 20:29

## 2021-05-19 RX ADMIN — INSULIN LISPRO 1 UNITS: 100 INJECTION, SOLUTION INTRAVENOUS; SUBCUTANEOUS at 15:26

## 2021-05-19 RX ADMIN — ENOXAPARIN SODIUM 60 MG: 60 INJECTION SUBCUTANEOUS at 08:39

## 2021-05-19 RX ADMIN — ALBUTEROL SULFATE 2 PUFF: 90 AEROSOL, METERED RESPIRATORY (INHALATION) at 13:13

## 2021-05-19 RX ADMIN — MULTIPLE VITAMINS W/ MINERALS TAB 1 TABLET: TAB ORAL at 08:40

## 2021-05-19 RX ADMIN — BENZONATATE 100 MG: 100 CAPSULE ORAL at 05:47

## 2021-05-19 RX ADMIN — GUAIFENESIN 600 MG: 600 TABLET, EXTENDED RELEASE ORAL at 08:40

## 2021-05-19 RX ADMIN — IPRATROPIUM BROMIDE 2 PUFF: 17 AEROSOL, METERED RESPIRATORY (INHALATION) at 20:30

## 2021-05-19 RX ADMIN — BENZONATATE 100 MG: 100 CAPSULE ORAL at 13:12

## 2021-05-19 RX ADMIN — Medication 2000 UNITS: at 08:39

## 2021-05-19 RX ADMIN — INSULIN LISPRO 2 UNITS: 100 INJECTION, SOLUTION INTRAVENOUS; SUBCUTANEOUS at 13:15

## 2021-05-19 RX ADMIN — ALBUTEROL SULFATE 2 PUFF: 90 AEROSOL, METERED RESPIRATORY (INHALATION) at 08:38

## 2021-05-19 RX ADMIN — ENOXAPARIN SODIUM 60 MG: 60 INJECTION SUBCUTANEOUS at 20:37

## 2021-05-19 RX ADMIN — IPRATROPIUM BROMIDE 2 PUFF: 17 AEROSOL, METERED RESPIRATORY (INHALATION) at 15:26

## 2021-05-19 RX ADMIN — ATORVASTATIN CALCIUM 40 MG: 40 TABLET, FILM COATED ORAL at 08:40

## 2021-05-19 RX ADMIN — HYDROCODONE BITARTRATE AND HOMATROPINE METHYLBROMIDE 5 ML: 5; 1.5 SYRUP ORAL at 13:13

## 2021-05-19 RX ADMIN — GUAIFENESIN 600 MG: 600 TABLET, EXTENDED RELEASE ORAL at 20:30

## 2021-05-19 RX ADMIN — BENZONATATE 100 MG: 100 CAPSULE ORAL at 20:30

## 2021-05-19 RX ADMIN — IPRATROPIUM BROMIDE 2 PUFF: 17 AEROSOL, METERED RESPIRATORY (INHALATION) at 08:38

## 2021-05-19 RX ADMIN — DEXAMETHASONE SODIUM PHOSPHATE 6.24 MG: 4 INJECTION, SOLUTION INTRAMUSCULAR; INTRAVENOUS at 20:31

## 2021-05-19 RX ADMIN — FAMOTIDINE 20 MG: 20 TABLET, FILM COATED ORAL at 08:40

## 2021-05-19 RX ADMIN — ALBUTEROL SULFATE 2 PUFF: 90 AEROSOL, METERED RESPIRATORY (INHALATION) at 15:26

## 2021-05-19 RX ADMIN — IPRATROPIUM BROMIDE 2 PUFF: 17 AEROSOL, METERED RESPIRATORY (INHALATION) at 13:13

## 2021-05-19 RX ADMIN — HYDROCODONE BITARTRATE AND HOMATROPINE METHYLBROMIDE 5 ML: 5; 1.5 SYRUP ORAL at 09:00

## 2021-05-19 NOTE — ASSESSMENT & PLAN NOTE
· CTA 5/14 - "Acute left upper lobe segmental/subsegmental pulmonary embolus "  · Reviewed Dopplers of lower extremity - negative  · Currently on Lovenox 1 milligram/kilogram q 12 hours  · Will decide and transition to a novel oral anticoagulant once more medically stable   Will likely require 6 months course

## 2021-05-19 NOTE — ASSESSMENT & PLAN NOTE
Patient reports not being on any medications for her sarcoidosis  For now will treat her asthma and she remains on Decadron 6 mg IV Q 24 for her COVID infection  Continue with oxygen support  ACE level normal  · Appreciate pulmonary recommendations  · consider addition of LABA/ICS at discharge per Pulmonary recommendations  · Monitor for worsening hypoxemia or distress

## 2021-05-19 NOTE — PLAN OF CARE
Problem: Potential for Falls  Goal: Patient will remain free of falls  Description: INTERVENTIONS:  - Assess patient frequently for physical needs  -  Identify cognitive and physical deficits and behaviors that affect risk of falls    -  [place] fall precautions as indicated by assessment   - Educate patient/family on patient safety including physical limitations  - Instruct patient to call for assistance with activity based on assessment  - Modify environment to reduce risk of injury  - Consider OT/PT consult to assist with strengthening/mobility  Outcome: Progressing     Problem: PAIN - ADULT  Goal: Verbalizes/displays adequate comfort level or baseline comfort level  Description: Interventions:  - Encourage patient to monitor pain and request assistance  - Assess pain using appropriate pain scale  - Administer analgesics based on type and severity of pain and evaluate response  - Implement non-pharmacological measures as appropriate and evaluate response  - Consider cultural and social influences on pain and pain management  - Notify physician/advanced practitioner if interventions unsuccessful or patient reports new pain  Outcome: Progressing     Problem: INFECTION - ADULT  Goal: Absence or prevention of progression during hospitalization  Description: INTERVENTIONS:  - Assess and monitor for signs and symptoms of infection  - Monitor lab/diagnostic results  - Monitor all insertion sites, i e  indwelling lines, tubes, and drains  - Monitor endotracheal if appropriate and nasal secretions for changes in amount and color  - [place] appropriate cooling/warming therapies per order  - Administer medications as ordered  - Instruct and encourage patient and family to use good hand hygiene technique  - Identify and instruct in appropriate isolation precautions for identified infection/condition  Outcome: Progressing  Goal: Absence of fever/infection during neutropenic period  Description: INTERVENTIONS:  - Monitor WBC    Outcome: Progressing     Problem: SAFETY ADULT  Goal: Patient will remain free of falls  Description: INTERVENTIONS:  - Assess patient frequently for physical needs  -  Identify cognitive and physical deficits and behaviors that affect risk of falls    -  [place] fall precautions as indicated by assessment   - Educate patient/family on patient safety including physical limitations  - Instruct patient to call for assistance with activity based on assessment  - Modify environment to reduce risk of injury  - Consider OT/PT consult to assist with strengthening/mobility  Outcome: Progressing  Goal: Maintain or return to baseline ADL function  Description: INTERVENTIONS:  -  Assess patient's ability to carry out ADLs; assess patient's baseline for ADL function and identify physical deficits which impact ability to perform ADLs (bathing, care of mouth/teeth, toileting, grooming, dressing, etc )  - Assess/evaluate cause of self-care deficits   - Assess range of motion  - Assess patient's mobility; develop plan if impaired  - Assess patient's need for assistive devices and provide as appropriate  - Encourage maximum independence but intervene and supervise when necessary  - Involve family in performance of ADLs  - Assess for home care needs following discharge   - Consider OT consult to assist with ADL evaluation and planning for discharge  - Provide patient education as appropriate  Outcome: Progressing  Goal: Maintain or return mobility status to optimal level  Description: INTERVENTIONS:  - Assess patient's baseline mobility status (ambulation, transfers, stairs, etc )    - Identify cognitive and physical deficits and behaviors that affect mobility  - Identify mobility aids required to assist with transfers and/or ambulation (gait belt, sit-to-stand, lift, walker, cane, etc )  - [place] fall precautions as indicated by assessment  - Record patient progress and toleration of activity level on Mobility SBAR; progress patient to next Phase/Stage  - Instruct patient to call for assistance with activity based on assessment  - Consider rehabilitation consult to assist with strengthening/weightbearing, etc   Outcome: Progressing     Problem: DISCHARGE PLANNING  Goal: Discharge to home or other facility with appropriate resources  Description: INTERVENTIONS:  - Identify barriers to discharge w/patient and caregiver  - Arrange for needed discharge resources and transportation as appropriate  - Identify discharge learning needs (meds, wound care, etc )  - Arrange for interpretive services to assist at discharge as needed  - Refer to Case Management Department for coordinating discharge planning if the patient needs post-hospital services based on physician/advanced practitioner order or complex needs related to functional status, cognitive ability, or social support system  Outcome: Progressing     Problem: Knowledge Deficit  Goal: Patient/family/caregiver demonstrates understanding of disease process, treatment plan, medications, and discharge instructions  Description: Complete learning assessment and assess knowledge base    Interventions:  - Provide teaching at level of understanding  - Provide teaching via preferred learning methods  Outcome: Progressing     Problem: CARDIOVASCULAR - ADULT  Goal: Maintains optimal cardiac output and hemodynamic stability  Description: INTERVENTIONS:  - Monitor I/O, vital signs and rhythm  - Monitor for S/S and trends of decreased cardiac output  - Administer and titrate ordered vasoactive medications to optimize hemodynamic stability  - Assess quality of pulses, skin color and temperature  - Assess for signs of decreased coronary artery perfusion  - Instruct patient to report change in severity of symptoms  Outcome: Progressing  Goal: Absence of cardiac dysrhythmias or at baseline rhythm  Description: INTERVENTIONS:  - Continuous cardiac monitoring, vital signs, obtain 12 lead EKG if ordered  - Administer antiarrhythmic and heart rate control medications as ordered  - Monitor electrolytes and administer replacement therapy as ordered  Outcome: Progressing     Problem: RESPIRATORY - ADULT  Goal: Achieves optimal ventilation and oxygenation  Description: INTERVENTIONS:  - Assess for changes in respiratory status  - Assess for changes in mentation and behavior  - Position to facilitate oxygenation and minimize respiratory effort  - Oxygen administered by appropriate delivery if ordered  - Initiate smoking cessation education as indicated  - Encourage broncho-pulmonary hygiene including cough, deep breathe, Incentive Spirometry  - Assess the need for suctioning and aspirate as needed  - Assess and instruct to report SOB or any respiratory difficulty  - Respiratory Therapy support as indicated  Outcome: Progressing

## 2021-05-19 NOTE — ASSESSMENT & PLAN NOTE
· Status post remdesivir x5 and 1 dose of Actemra   · Transferred to [facility] for higher level of care due to increasing O2 requirements    · Therapeutic Lovenox due to pulmonary embolism  · Procal negative x2  · Trend inflammatory markers

## 2021-05-19 NOTE — PROGRESS NOTES
Progress Note - Pulmonary   Jennifer Trinidad 54 y o  female MRN: [id number]  Unit/Bed#: S -01 Encounter: [id number]    Assessment/Plan:    Acute hypoxic respiratory failure due to abnormal CT chest with acute PE and COVID pneumonia              Titrate oxygen to maintain POX > or = 89%  Further plans as below  Encourage OOB and IS      COVID pneumonia  Continue COVID-19 pathway as below  Positive Test: 05/08/2021  · Continue vitamin D3, vitamin-C, and zinc   · Continue atorvastatin  · Continue famotidine  · Dexamethasone: Continue 0 1 mg/kg BID with hopeful decrease tomorrow if continues to improve  · Remdesivir: Completed five day course  · Antibiotics: Continue to monitor off antibiotics  · Convalescent plasma: Not currently a candidate  · Actemra: Received 05/11/2021  · Anticoagulation/DVT prophylaxis: Lovenox as below  · Monitor inflammatory markers and D-dimer  · Continue albuterol/Atrovent MDI QID      Acute NANCIE segmental/subsegmental PE              On Lovenox 60 mg BID  Echo noted  Will need AC for at least 3 months with further work-up/follow-up prior to D/C      History of sarcoidosis              Outpatient pulmonary follow-up      Outpatient follow-up pending course  Chief Complaint:    "I'm okay "    Subjective:    [place] is resting in bed  She has not yet gotten up from sleep  She reports her SOB and cough are continuing to improve  No pain  No other complaints  Objective:    Vitals: Blood pressure 101/63, pulse 83, temperature 98 °F (36 7 °C), temperature source Oral, resp  rate 18, height 4' 11" (1 499 m), weight 64 kg (141 lb 1 5 oz), SpO2 92 %  6L NC,Body mass index is 28 5 kg/m²        Intake/Output Summary (Last 24 hours) at 5/19/2021 0816  Last data filed at 5/18/2021 1830  Gross per 24 hour   Intake 720 ml   Output --   Net 720 ml       Invasive Devices     Peripheral Intravenous Line            Peripheral IV 05/18/21 Right Antecubital 1 day                Physical Exam:     Physical Exam  Vitals signs reviewed  Constitutional:       General: She is not in acute distress  Appearance: She is well-developed  She is not toxic-appearing or diaphoretic  Interventions: Nasal cannula in place  HENT:      Head: Normocephalic and atraumatic  Eyes:      General: No scleral icterus  Neck:      Musculoskeletal: Neck supple  Vascular: No JVD  Trachea: No tracheal deviation  Cardiovascular:      Rate and Rhythm: Normal rate and regular rhythm  Heart sounds: S1 normal and S2 normal  No murmur  No friction rub  No gallop  Pulmonary:      Effort: Pulmonary effort is normal  No tachypnea, accessory muscle usage or respiratory distress  Breath sounds: Normal breath sounds  No stridor  No decreased breath sounds, wheezing, rhonchi or rales  Chest:      Chest wall: No tenderness  Abdominal:      General: Bowel sounds are normal  There is no distension  Palpations: Abdomen is soft  Tenderness: There is no abdominal tenderness  There is no guarding or rebound  Skin:     General: Skin is warm and dry  Findings: No rash  Neurological:      Mental Status: She is alert and oriented to person, place, and time  GCS: GCS eye subscore is 4  GCS verbal subscore is 5  GCS motor subscore is 6  Psychiatric:         Speech: Speech normal          Behavior: Behavior normal  Behavior is cooperative           Labs:   CBC:   Lab Results   Component Value Date    WBC 18 66 (H) 05/19/2021    HGB 13 4 05/19/2021    HCT 41 1 05/19/2021    MCV 91 05/19/2021     (H) 05/19/2021    MCH 29 6 05/19/2021    MCHC 32 6 05/19/2021    RDW 13 7 05/19/2021    MPV 9 8 05/19/2021    NRBC 0 05/19/2021   , CMP:   Lab Results   Component Value Date    SODIUM 134 (L) 05/19/2021    K 4 8 05/19/2021     05/19/2021    CO2 28 05/19/2021    BUN 21 05/19/2021    CREATININE 0 64 05/19/2021    CALCIUM 9 9 05/19/2021    AST 17 05/19/2021    ALT 45 05/19/2021    ALKPHOS 71 05/19/2021    EGFR 116 05/19/2021     CRP: < 3 0    D-Dimer: 0 93    Ferritin: Pending    Imaging and other studies: None new

## 2021-05-19 NOTE — PROGRESS NOTES
[facility]  Progress Note - Madina Davalos 1965, 54 y o  female MRN: [id number]  Unit/Bed#: S -01 Encounter: [id number]  Primary Care Provider: FRED Davenport   Date and time admitted to hospital: 5/15/2021  2:04 PM    Pulmonary embolism [facility])  Assessment & Plan  · CTA 5/14 - "Acute left upper lobe segmental/subsegmental pulmonary embolus "  · Reviewed Dopplers of lower extremity - negative  · Currently on Lovenox 1 milligram/kilogram q 12 hours  · Will decide and transition to a novel oral anticoagulant once more medically stable  Will likely require 6 months course    Pneumonia due to COVID-19 virus  Assessment & Plan  · Status post remdesivir x5 and 1 dose of Actemra   · Transferred to [address] [place] [address] for higher level of care due to increasing O2 requirements  · Therapeutic Lovenox due to pulmonary embolism  · Procal negative x2  · Trend inflammatory markers    Sarcoidosis  Assessment & Plan  Patient reports not being on any medications for her sarcoidosis  For now will treat her asthma and she remains on Decadron 6 mg IV Q 24 for her COVID infection  Continue with oxygen support  ACE level normal  · Appreciate pulmonary recommendations  · consider addition of LABA/ICS at discharge per Pulmonary recommendations  · Monitor for worsening hypoxemia or distress  Hypercholesterolemia  Assessment & Plan  Continue statin    * Acute hypoxemic respiratory failure due to COVID-19 [facility])  Assessment & Plan  · Continue Decadron 6 mg IV Q 24 hour - Per pulm may decrease tomorrow if continues to improve  · Continue with vitamin D3, Pepcid 20 mg daily albuterol and Atrovent inhalers  She completed her vitamin-C and zinc and is on a multivitamin  · Continue Lovenox 1 milligram/kilogram q 12, will decide on transition to novel oral anticoagulant once more medically stable    · Wean off supplemental oxygen as tolerated to keep saturation over 88%  · Monitor intakes and outputs , consider p r n  Lasix to keep daily net negative 1 to 2 L  · Continue Xopenex and Atrovent  · Continue incentive spirometry  · consider addition of LABA/ICS at discharge per pulmonology recommendation  · Echocardiogram was obtained which showed normal EF and normal ventricular contraction and no evidence of right heart strain  VTE Pharmacologic Prophylaxis:   Pharmacologic: Enoxaparin (Lovenox)  Mechanical VTE Prophylaxis in Place: Yes    Discussions with Specialists or Other Care Team Provider:  Pulmonology    Education and Discussions with Family / Patient:  Discussed plan of care with the patient  Updated pts brother  Current Length of Stay: 4 day(s)    Current Patient Status: Inpatient     Discharge Plan / Estimated Discharge Date: TBD    Code Status: Level 1 - Full Code    Subjective:   Patient seen and examined  This morning the while ambulating to the commode patient had a desaturation to the 80s, and required a significant amount of time to recover from the desaturation, but did recover successfully  The patient reported that she was having as significant worsening of her cough during the desaturation and so hycodon was added  RN has later informed me that her cough has significantly improved since giving it  Patient denies any headache, dizziness, nausea, vomiting, constipation, diarrhea, chest pain, palpitations, shortness of breath, wheezing  A 12 point review of systems was completed and was negative unless noted above  Objective:     Vitals:   Temp (24hrs), Av 2 °F (36 8 °C), Min:98 °F (36 7 °C), Max:98 3 °F (36 8 °C)    Temp:  [98 °F (36 7 °C)-98 3 °F (36 8 °C)] 98 2 °F (36 8 °C)  HR:  [63-83] 63  Resp:  [16-18] 16  BP: ()/(60-63) 93/60  SpO2:  [90 %-98 %] 95 %  Body mass index is 28 5 kg/m²  Input and Output Summary (last 24 hours):        Intake/Output Summary (Last 24 hours) at 2021 1335  Last data filed at 2021 0900  Gross per 24 hour   Intake 360 ml   Output --   Net 360 ml       Physical Exam:     Physical Exam  Vitals signs and nursing note reviewed  Constitutional:       General: She is not in acute distress  Appearance: She is not ill-appearing  HENT:      Head: Normocephalic and atraumatic  Mouth/Throat:      Mouth: Mucous membranes are moist    Eyes:      Extraocular Movements: Extraocular movements intact  Pupils: Pupils are equal, round, and reactive to light  Neck:      Musculoskeletal: Normal range of motion and neck supple  Vascular: No carotid bruit  Cardiovascular:      Rate and Rhythm: Normal rate and regular rhythm  Pulses: Normal pulses  Heart sounds: Normal heart sounds  Pulmonary:      Effort: Respiratory distress present  Breath sounds: No stridor  No wheezing, rhonchi or rales  Chest:      Chest wall: No tenderness  Abdominal:      General: Bowel sounds are normal  There is no distension  Palpations: Abdomen is soft  Tenderness: There is no abdominal tenderness  Musculoskeletal:         General: No swelling or tenderness  Right lower leg: No edema  Left lower leg: No edema  Skin:     General: Skin is warm and dry  Capillary Refill: Capillary refill takes less than 2 seconds  Neurological:      General: No focal deficit present  Mental Status: She is alert and oriented to person, place, and time  Cranial Nerves: No cranial nerve deficit  Sensory: No sensory deficit  Motor: No weakness  Psychiatric:         Mood and Affect: Mood normal          Behavior: Behavior normal           Additional Data:     Labs: I have personally reviewed pertinent reports    Results from last 7 days   Lab Units 05/19/21  0547 05/18/21  0442 05/17/21  0459 05/16/21  0355   WBC Thousand/uL 18 66* 19 39* 15 48* 11 57*   HEMOGLOBIN g/dL 13 4 14 6 13 4 13 5   HEMATOCRIT % 41 1 45 4 41 4 41 9   PLATELETS Thousands/uL 470* 537* 498* 537*   NEUTROS PCT % 85*  --  87* 85*   MONOS PCT % 6  --  4 5      Results from last 7 days   Lab Units 05/19/21  0547 05/18/21  0442 05/17/21  0459   POTASSIUM mmol/L 4 8 4 4 4 8   CHLORIDE mmol/L 100 99* 102   CO2 mmol/L 28 29 26   BUN mg/dL 21 22 19   CREATININE mg/dL 0 64 0 82 0 72   CALCIUM mg/dL 9 9 10 1 9 5   ALK PHOS U/L 71 84 73   ALT U/L 45 57 48   AST U/L 17 29 22     Results from last 7 days   Lab Units 05/16/21  0355   MAGNESIUM mg/dL 1 9     Results from last 7 days   Lab Units 05/16/21  0355   PHOSPHORUS mg/dL 2 9                    * Additional Pertinent Lab Tests Reviewed: All Labs Within Last 24 Hours Reviewed    Radiology Results: I have personally reviewed pertinent reports    Xr Chest Portable    Result Date: 5/17/2021  Impression: Persistent bibasal pulmonary infiltrates Workstation performed: [id number]       Recent Cultures (last 7 days):           Last 24 Hours Medication List:   Current Facility-Administered Medications   Medication Dose Route Frequency Provider Last Rate    acetaminophen  650 mg Oral Q6H PRN Ayana Davis MD      albuterol  2 puff Inhalation 4x Daily Ayana Davis MD      atorvastatin  40 mg Oral Daily Ayana Davis MD      benzonatate  100 mg Oral Q8H Ayana Davis MD      cholecalciferol  2,000 Units Oral Daily Ayana Davis MD      dexamethasone  0 1 mg/kg Intravenous Q12H [address] Ayana Davis MD      enoxaparin  1 mg/kg Subcutaneous Q12H [address] Ayana Davis MD      famotidine  20 mg Oral Daily Ayana Davis MD      guaiFENesin  600 mg Oral Q12H [address] Ayana Davis MD      HYDROcodone-homatropine  5 mL Oral Q4H PRN Blair Meza DO      insulin lispro  1-5 Units Subcutaneous TID AC Ayana Davis MD      ipratropium  2 puff Inhalation 4x Daily Ayana Davis MD      meclizine  12 5 mg Oral Q8H PRN Ayana Davis MD      multivitamin-minerals  1 tablet Oral Daily Ayana Davis MD      ondansetron  4 mg Intravenous Q6H PRN Ayana Davis MD      polyethylene glycol  17 g Oral Daily PRN Dayana [employer], DO      senna-docusate sodium  1 tablet Oral HS PRN Dayana [employer], DO      sodium chloride  1 spray Each Nare Q1H PRN Dayana [employer], DO          Today, Patient Was Seen By: [employer],     Portions of the record may have been created with voice recognition software  Occasional wrong word or "sound a like" substitutions may have occurred due to the inherent limitations of voice recognition software  Read the chart carefully and recognize, using context, where substitutions have occurred

## 2021-05-19 NOTE — PLAN OF CARE
Problem: Potential for Falls  Goal: Patient will remain free of falls  Description: INTERVENTIONS:  - Assess patient frequently for physical needs  -  Identify cognitive and physical deficits and behaviors that affect risk of falls    -  [place] fall precautions as indicated by assessment   - Educate patient/family on patient safety including physical limitations  - Instruct patient to call for assistance with activity based on assessment  - Modify environment to reduce risk of injury  - Consider OT/PT consult to assist with strengthening/mobility  Outcome: Progressing     Problem: PAIN - ADULT  Goal: Verbalizes/displays adequate comfort level or baseline comfort level  Description: Interventions:  - Encourage patient to monitor pain and request assistance  - Assess pain using appropriate pain scale  - Administer analgesics based on type and severity of pain and evaluate response  - Implement non-pharmacological measures as appropriate and evaluate response  - Consider cultural and social influences on pain and pain management  - Notify physician/advanced practitioner if interventions unsuccessful or patient reports new pain  Outcome: Progressing     Problem: INFECTION - ADULT  Goal: Absence or prevention of progression during hospitalization  Description: INTERVENTIONS:  - Assess and monitor for signs and symptoms of infection  - Monitor lab/diagnostic results  - Monitor all insertion sites, i e  indwelling lines, tubes, and drains  - Monitor endotracheal if appropriate and nasal secretions for changes in amount and color  - M[place]it appropriate cooling/warming therapies per order  - Administer medications as ordered  - Instruct and encourage patient and family to use good hand hygiene technique  - Identify and instruct in appropriate isolation precautions for identified infection/condition  Outcome: Progressing  Goal: Absence of fever/infection during neutropenic period  Description: INTERVENTIONS:  - Monitor WBC    Outcome: Progressing     Problem: SAFETY ADULT  Goal: Patient will remain free of falls  Description: INTERVENTIONS:  - Assess patient frequently for physical needs  -  Identify cognitive and physical deficits and behaviors that affect risk of falls    -  [place] fall precautions as indicated by assessment   - Educate patient/family on patient safety including physical limitations  - Instruct patient to call for assistance with activity based on assessment  - Modify environment to reduce risk of injury  - Consider OT/PT consult to assist with strengthening/mobility  Outcome: Progressing  Goal: Maintain or return to baseline ADL function  Description: INTERVENTIONS:  -  Assess patient's ability to carry out ADLs; assess patient's baseline for ADL function and identify physical deficits which impact ability to perform ADLs (bathing, care of mouth/teeth, toileting, grooming, dressing, etc )  - Assess/evaluate cause of self-care deficits   - Assess range of motion  - Assess patient's mobility; develop plan if impaired  - Assess patient's need for assistive devices and provide as appropriate  - Encourage maximum independence but intervene and supervise when necessary  - Involve family in performance of ADLs  - Assess for home care needs following discharge   - Consider OT consult to assist with ADL evaluation and planning for discharge  - Provide patient education as appropriate  Outcome: Progressing  Goal: Maintain or return mobility status to optimal level  Description: INTERVENTIONS:  - Assess patient's baseline mobility status (ambulation, transfers, stairs, etc )    - Identify cognitive and physical deficits and behaviors that affect mobility  - Identify mobility aids required to assist with transfers and/or ambulation (gait belt, sit-to-stand, lift, walker, cane, etc )  - Winston fall precautions as indicated by assessment  - Record patient progress and toleration of activity level on Mobility SBAR; progress patient to next Phase/Stage  - Instruct patient to call for assistance with activity based on assessment  - Consider rehabilitation consult to assist with strengthening/weightbearing, etc   Outcome: Progressing     Problem: DISCHARGE PLANNING  Goal: Discharge to home or other facility with appropriate resources  Description: INTERVENTIONS:  - Identify barriers to discharge w/patient and caregiver  - Arrange for needed discharge resources and transportation as appropriate  - Identify discharge learning needs (meds, wound care, etc )  - Arrange for interpretive services to assist at discharge as needed  - Refer to Case Management Department for coordinating discharge planning if the patient needs post-hospital services based on physician/advanced practitioner order or complex needs related to functional status, cognitive ability, or social support system  Outcome: Progressing     Problem: Knowledge Deficit  Goal: Patient/family/caregiver demonstrates understanding of disease process, treatment plan, medications, and discharge instructions  Description: Complete learning assessment and assess knowledge base    Interventions:  - Provide teaching at level of understanding  - Provide teaching via preferred learning methods  Outcome: Progressing     Problem: CARDIOVASCULAR - ADULT  Goal: Maintains optimal cardiac output and hemodynamic stability  Description: INTERVENTIONS:  - Monitor I/O, vital signs and rhythm  - Monitor for S/S and trends of decreased cardiac output  - Administer and titrate ordered vasoactive medications to optimize hemodynamic stability  - Assess quality of pulses, skin color and temperature  - Assess for signs of decreased coronary artery perfusion  - Instruct patient to report change in severity of symptoms  Outcome: Progressing  Goal: Absence of cardiac dysrhythmias or at baseline rhythm  Description: INTERVENTIONS:  - Continuous cardiac monitoring, vital signs, obtain 12 lead EKG if ordered  - Administer antiarrhythmic and heart rate control medications as ordered  - Monitor electrolytes and administer replacement therapy as ordered  Outcome: Progressing     Problem: RESPIRATORY - ADULT  Goal: Achieves optimal ventilation and oxygenation  Description: INTERVENTIONS:  - Assess for changes in respiratory status  - Assess for changes in mentation and behavior  - Position to facilitate oxygenation and minimize respiratory effort  - Oxygen administered by appropriate delivery if ordered  - Initiate smoking cessation education as indicated  - Encourage broncho-pulmonary hygiene including cough, deep breathe, Incentive Spirometry  - Assess the need for suctioning and aspirate as needed  - Assess and instruct to report SOB or any respiratory difficulty  - Respiratory Therapy support as indicated  Outcome: Progressing     Problem: RESPIRATORY - ADULT  Goal: Achieves optimal ventilation and oxygenation  Description: INTERVENTIONS:  - Assess for changes in respiratory status  - Assess for changes in mentation and behavior  - Position to facilitate oxygenation and minimize respiratory effort  - Oxygen administered by appropriate delivery if ordered  - Initiate smoking cessation education as indicated  - Encourage broncho-pulmonary hygiene including cough, deep breathe, Incentive Spirometry  - Assess the need for suctioning and aspirate as needed  - Assess and instruct to report SOB or any respiratory difficulty  - Respiratory Therapy support as indicated  Outcome: Progressing

## 2021-05-20 LAB
ANION GAP SERPL CALCULATED.3IONS-SCNC: 6 MMOL/L (ref 4–13)
BUN SERPL-MCNC: 19 MG/DL (ref 5–25)
CALCIUM SERPL-MCNC: 9.6 MG/DL (ref 8.3–10.1)
CHLORIDE SERPL-SCNC: 98 MMOL/L (ref 100–108)
CO2 SERPL-SCNC: 26 MMOL/L (ref 21–32)
CREAT SERPL-MCNC: 0.67 MG/DL (ref 0.6–1.3)
ERYTHROCYTE [DISTWIDTH] IN BLOOD BY AUTOMATED COUNT: 13.8 % (ref 11.6–15.1)
FERRITIN SERPL-MCNC: 1197 NG/ML (ref 8–388)
GFR SERPL CREATININE-BSD FRML MDRD: 114 ML/MIN/1.73SQ M
GLUCOSE SERPL-MCNC: 111 MG/DL (ref 65–140)
GLUCOSE SERPL-MCNC: 161 MG/DL (ref 65–140)
GLUCOSE SERPL-MCNC: 168 MG/DL (ref 65–140)
GLUCOSE SERPL-MCNC: 177 MG/DL (ref 65–140)
GLUCOSE SERPL-MCNC: 185 MG/DL (ref 65–140)
HCT VFR BLD AUTO: 41.8 % (ref 34.8–46.1)
HGB BLD-MCNC: 13.4 G/DL (ref 11.5–15.4)
MCH RBC QN AUTO: 29.4 PG (ref 26.8–34.3)
MCHC RBC AUTO-ENTMCNC: 32.1 G/DL (ref 31.4–37.4)
MCV RBC AUTO: 92 FL (ref 82–98)
PLATELET # BLD AUTO: 438 THOUSANDS/UL (ref 149–390)
PMV BLD AUTO: 10 FL (ref 8.9–12.7)
POTASSIUM SERPL-SCNC: 4.2 MMOL/L (ref 3.5–5.3)
RBC # BLD AUTO: 4.56 MILLION/UL (ref 3.81–5.12)
SODIUM SERPL-SCNC: 130 MMOL/L (ref 136–145)
WBC # BLD AUTO: 17.12 THOUSAND/UL (ref 4.31–10.16)

## 2021-05-20 PROCEDURE — 99232 SBSQ HOSP IP/OBS MODERATE 35: CPT | Performed by: INTERNAL MEDICINE

## 2021-05-20 PROCEDURE — 97530 THERAPEUTIC ACTIVITIES: CPT

## 2021-05-20 PROCEDURE — 80048 BASIC METABOLIC PNL TOTAL CA: CPT | Performed by: PSYCHIATRY & NEUROLOGY

## 2021-05-20 PROCEDURE — 97110 THERAPEUTIC EXERCISES: CPT

## 2021-05-20 PROCEDURE — 85027 COMPLETE CBC AUTOMATED: CPT | Performed by: PSYCHIATRY & NEUROLOGY

## 2021-05-20 PROCEDURE — 97167 OT EVAL HIGH COMPLEX 60 MIN: CPT

## 2021-05-20 PROCEDURE — 94760 N-INVAS EAR/PLS OXIMETRY 1: CPT

## 2021-05-20 PROCEDURE — 82948 REAGENT STRIP/BLOOD GLUCOSE: CPT

## 2021-05-20 PROCEDURE — 82728 ASSAY OF FERRITIN: CPT | Performed by: PSYCHIATRY & NEUROLOGY

## 2021-05-20 PROCEDURE — 97116 GAIT TRAINING THERAPY: CPT

## 2021-05-20 RX ORDER — BENZONATATE 100 MG/1
200 CAPSULE ORAL EVERY 8 HOURS
Status: DISCONTINUED | OUTPATIENT
Start: 2021-05-20 | End: 2021-05-21 | Stop reason: HOSPADM

## 2021-05-20 RX ORDER — DEXAMETHASONE SODIUM PHOSPHATE 4 MG/ML
6 INJECTION, SOLUTION INTRA-ARTICULAR; INTRALESIONAL; INTRAMUSCULAR; INTRAVENOUS; SOFT TISSUE DAILY
Status: DISCONTINUED | OUTPATIENT
Start: 2021-05-21 | End: 2021-05-21 | Stop reason: HOSPADM

## 2021-05-20 RX ORDER — GUAIFENESIN/DEXTROMETHORPHAN 100-10MG/5
10 SYRUP ORAL EVERY 4 HOURS PRN
Status: DISCONTINUED | OUTPATIENT
Start: 2021-05-20 | End: 2021-05-21 | Stop reason: HOSPADM

## 2021-05-20 RX ADMIN — HYDROCODONE BITARTRATE AND HOMATROPINE METHYLBROMIDE 5 ML: 5; 1.5 SYRUP ORAL at 08:30

## 2021-05-20 RX ADMIN — ALBUTEROL SULFATE 2 PUFF: 90 AEROSOL, METERED RESPIRATORY (INHALATION) at 12:36

## 2021-05-20 RX ADMIN — IPRATROPIUM BROMIDE 2 PUFF: 17 AEROSOL, METERED RESPIRATORY (INHALATION) at 16:29

## 2021-05-20 RX ADMIN — BENZONATATE 100 MG: 100 CAPSULE ORAL at 04:49

## 2021-05-20 RX ADMIN — IPRATROPIUM BROMIDE 2 PUFF: 17 AEROSOL, METERED RESPIRATORY (INHALATION) at 20:20

## 2021-05-20 RX ADMIN — BENZONATATE 200 MG: 100 CAPSULE ORAL at 23:16

## 2021-05-20 RX ADMIN — BENZONATATE 200 MG: 100 CAPSULE ORAL at 12:35

## 2021-05-20 RX ADMIN — ENOXAPARIN SODIUM 60 MG: 60 INJECTION SUBCUTANEOUS at 23:16

## 2021-05-20 RX ADMIN — DEXAMETHASONE SODIUM PHOSPHATE 6.24 MG: 4 INJECTION, SOLUTION INTRAMUSCULAR; INTRAVENOUS at 08:30

## 2021-05-20 RX ADMIN — ATORVASTATIN CALCIUM 40 MG: 40 TABLET, FILM COATED ORAL at 08:31

## 2021-05-20 RX ADMIN — GUAIFENESIN 600 MG: 600 TABLET, EXTENDED RELEASE ORAL at 08:31

## 2021-05-20 RX ADMIN — INSULIN LISPRO 1 UNITS: 100 INJECTION, SOLUTION INTRAVENOUS; SUBCUTANEOUS at 12:35

## 2021-05-20 RX ADMIN — IPRATROPIUM BROMIDE 2 PUFF: 17 AEROSOL, METERED RESPIRATORY (INHALATION) at 08:29

## 2021-05-20 RX ADMIN — ALBUTEROL SULFATE 2 PUFF: 90 AEROSOL, METERED RESPIRATORY (INHALATION) at 08:30

## 2021-05-20 RX ADMIN — ALBUTEROL SULFATE 2 PUFF: 90 AEROSOL, METERED RESPIRATORY (INHALATION) at 16:29

## 2021-05-20 RX ADMIN — ENOXAPARIN SODIUM 60 MG: 60 INJECTION SUBCUTANEOUS at 08:30

## 2021-05-20 RX ADMIN — FAMOTIDINE 20 MG: 20 TABLET, FILM COATED ORAL at 08:31

## 2021-05-20 RX ADMIN — ALBUTEROL SULFATE 2 PUFF: 90 AEROSOL, METERED RESPIRATORY (INHALATION) at 20:20

## 2021-05-20 RX ADMIN — Medication 2000 UNITS: at 08:31

## 2021-05-20 RX ADMIN — IPRATROPIUM BROMIDE 2 PUFF: 17 AEROSOL, METERED RESPIRATORY (INHALATION) at 12:35

## 2021-05-20 RX ADMIN — INSULIN LISPRO 1 UNITS: 100 INJECTION, SOLUTION INTRAVENOUS; SUBCUTANEOUS at 18:29

## 2021-05-20 RX ADMIN — MULTIPLE VITAMINS W/ MINERALS TAB 1 TABLET: TAB ORAL at 08:31

## 2021-05-20 NOTE — PROGRESS NOTES
Progress Note - Pulmonary   Wero Hiss 54 y o  female MRN: [id number]  Unit/Bed#: S -01 Encounter: [id number]    Assessment/Plan:    Acute hypoxic respiratory failure due to abnormal CT chest with acute PE and COVID pneumonia              Titrate oxygen to maintain POX > or = 89%                 Further plans as below  Encourage OOB and IS (reordered and D/W nursing)      COVID pneumonia  Continue COVID-19 pathway as below  Positive Test: 05/08/2021  · Continue vitamin D3 and MVI  · Continue atorvastatin  · Continue famotidine  · Dexamethasone: Decrease to 6 mg daily and complete 10 day course  · Remdesivir: Completed five day course  · Antibiotics: Continue to monitor off antibiotics  · Convalescent plasma: Not currently a candidate  · Actemra: Received 05/11/2021  · Anticoagulation/DVT prophylaxis: Lovenox as below  · Monitor inflammatory markers and D-dimer  · Continue albuterol/Atrovent MDI QID      Intractable cough due to above   D/C Mucinex  Increase Tessalon to 200 mg q8hrs  Continue cough syrup as needed  Acute NANCIE segmental/subsegmental PE              On Lovenox 60 mg BID              YJQO noted               Will need AC for at least 3 months with further work-up/follow-up prior to D/C      History of sarcoidosis              Outpatient pulmonary follow-up      Outpatient follow-up as per D/C instructions  D/W primary team and nursing  Will be available as needed  Please call with further questions or concerns  Chief Complaint:    "It's the cough "    Subjective:    Juanito Castaneda reports she is feeling okay today, but continues with persistent cough with very occasional clear mucous  No chest pain  SOB is improved  Plan is for D/C to rehab per primary team     Objective:    Vitals: Blood pressure 103/70, pulse 77, temperature 98 7 °F (37 1 °C), temperature source Oral, resp  rate 18, height 4' 11" (1 499 m), weight 64 kg (141 lb 1 5 oz), SpO2 94 %   4L NC,Body mass index is 28 5 kg/m²  Intake/Output Summary (Last 24 hours) at 5/20/2021 0723  Last data filed at 5/19/2021 1900  Gross per 24 hour   Intake 540 ml   Output --   Net 540 ml       Invasive Devices     Peripheral Intravenous Line            Peripheral IV 05/18/21 Right Antecubital 2 days                Physical Exam:     Physical Exam  Vitals signs reviewed  Constitutional:       General: She is not in acute distress  Appearance: She is well-developed  She is not toxic-appearing or diaphoretic  Interventions: Nasal cannula in place  HENT:      Head: Normocephalic and atraumatic  Eyes:      General: No scleral icterus  Neck:      Musculoskeletal: Neck supple  Vascular: No JVD  Trachea: No tracheal deviation  Cardiovascular:      Rate and Rhythm: Normal rate and regular rhythm  Heart sounds: S1 normal and S2 normal  No murmur  No friction rub  No gallop  Pulmonary:      Effort: Pulmonary effort is normal  No tachypnea, accessory muscle usage or respiratory distress  Breath sounds: Normal breath sounds  No stridor  No decreased breath sounds, wheezing, rhonchi or rales  Chest:      Chest wall: No tenderness  Abdominal:      General: Bowel sounds are normal  There is no distension  Palpations: Abdomen is soft  Tenderness: There is no abdominal tenderness  There is no guarding or rebound  Skin:     General: Skin is warm and dry  Findings: No rash  Neurological:      Mental Status: She is alert and oriented to person, place, and time  GCS: GCS eye subscore is 4  GCS verbal subscore is 5  GCS motor subscore is 6  Psychiatric:         Speech: Speech normal          Behavior: Behavior normal  Behavior is cooperative           Labs: None today    Imaging and other studies: None today

## 2021-05-20 NOTE — ASSESSMENT & PLAN NOTE
· Status post remdesivir x5 and 1 dose of Actemra   · Transferred to [facility] for higher level of care due to increasing O2 requirements    · Therapeutic Lovenox due to pulmonary embolism  · Procal negative x2  · Trend inflammatory markers

## 2021-05-20 NOTE — PHYSICAL THERAPY NOTE
PHYSICAL THERAPY NOTE    Patient Name: Afia Zafar  LQICH'L Date: 21 1335   PT Last Visit   PT Visit Date 21   Note Type   Note Type Treatment   Pain Assessment   Pain Assessment Tool Pain Assessment not indicated - pt denies pain   Pain Score No Pain   Restrictions/Precautions   Weight Bearing Precautions Per Order No   Other Precautions Contact/isolation; Airborne/isolation; Chair Alarm; Bed Alarm;Telemetry;O2;Fall Risk   General   Family/Caregiver Present No   Subjective   Subjective Patient supine in bed and is agreeable to participate in therapy session  Patient identifers obtained from name &   Bed Mobility   Supine to Sit 5  Supervision   Additional items Assist x 1;HOB elevated; Bedrails; Increased time required   Sit to Supine 5  Supervision   Additional items Assist x 1;HOB elevated; Bedrails   Additional Comments Pt supine in bed post session with call bell and belongings in reach  Transfers   Sit to Stand 5  Supervision   Additional items Assist x 1; Increased time required   Stand to Sit 5  Supervision   Additional items Assist x 1; Increased time required   Ambulation/Elevation   Gait pattern Decreased foot clearance; Foward flexed; Short stride; Shuffling   Gait Assistance 4  Minimal assist   Additional items Assist x 1;Verbal cues   Assistive Device None   Distance 3 steps fwd/back   Balance   Static Sitting Good   Static Standing Fair -   Ambulatory Poor +   Endurance Deficit   Endurance Deficit Yes   Endurance Deficit Description limited activity tolerance and ambulation distance with decreasing SpO2 to 79% on 6L midflow via NC    Activity Tolerance   Activity Tolerance Patient limited by fatigue;Treatment limited secondary to medical complications (Comment); Other (Comment)  (Decreased sats to 79% on 6L midflow)   Medical Staff Made Aware Spoke to Osiel campbell CM   Nurse Made Aware Spoke to Janeen Kohler RN   Exercises   [employer] Supine;10 reps;AROM; Bilateral   Heelslides Supine;10 reps;AROM; Bilateral   Hip Abduction Supine;10 reps;AROM; Bilateral   Hip Adduction Supine;10 reps;AROM; Bilateral   Ankle Pumps Supine;15 reps;AROM; Bilateral   Assessment   Prognosis Good   Problem List Decreased strength;Decreased endurance; Impaired balance;Decreased mobility; Decreased safety awareness   Assessment Patient agreeable to participate in therapy session however hesitant for OOB mobility initally requiring encouragement secondary to coughing  Patient remains consistent with supervision for supine<>sit and sit<>stand transfers  Pt able to ambulate few steps fwd and back with no assistive device and min ax1 for steadying  Further distance and additional trials not appropraite secondary to decreasing SpO2 to 79% on 6L midflow via NC requiring 4-5 minutes for recovery to 88%  Verbal instruction provided for breathing technique and fair follow through  Participated in supine B LE exercise program with input for form and pace  Continue to focus on OOB mobility with progression of ambulation as appropriate and able  Barriers to Discharge Inaccessible home environment;Decreased caregiver support   Goals   Patient Goals to get better   STG Expiration Date 05/31/21   PT Treatment Day 3   Plan   Treatment/Interventions Functional transfer training;Elevations;LE strengthening/ROM; Therapeutic exercise; Endurance training;Patient/family training;Equipment eval/education; Bed mobility;Gait training;Spoke to nursing   Progress Progressing toward goals   PT Frequency 5x/wk   Recommendation   PT Discharge Recommendation Post acute rehabilitation services       Stephen June PTA

## 2021-05-20 NOTE — PLAN OF CARE
Problem: OCCUPATIONAL THERAPY ADULT  Goal: Performs self-care activities at highest level of function for planned discharge setting  See evaluation for individualized goals  Description: Treatment Interventions: ADL retraining, Functional transfer training, Endurance training, UE strengthening/ROM, Patient/family training, Equipment evaluation/education, Compensatory technique education, Energy conservation, Activityengagement          See flowsheet documentation for full assessment, interventions and recommendations  Note: Limitation: Decreased ADL status, Decreased UE strength, Decreased Safe judgement during ADL, Decreased endurance, Decreased self-care trans, Decreased high-level ADLs  Prognosis: Good  Assessment: Patient is a 54 y o  female admitted to [facility] on 5/15/2021 due to Acute hypoxemic respiratory failure due to COVID-19 [facility])  Comorbidities affecting pt's physical performance at time of assessment include pulmonary embolism, pneumonia due to COVID, sarcoidosis  Patient has active OT orders and activity orders for Activity as tolerated  PTA pt living alone in apartment, pt (I) with all ADLS and IADLs at baseline  (-)falls, (+)drives, (+)works  Personal factors affecting pt at time of IE include:steps to enter environment, difficulty performing ADLS, difficulty performing IADLS , limited insight into deficits and decreased initiation and engagement   At the time of evaluation patient currently requires (S) for UB ADLs, (S) for LB ADLs, (S) for functional transfers, and (S) for functional mobility  The following deficits affected patient's occupational performance weakness, decreased functional strength, decreased functional balance, decreased activity tolerance, decreased safety awareness, impaired memory, impaired sequencing, impaired problem solving, impaired interpersonal skills and decreased coping skills   Patient would benefit from skilled OT services while in the hospital to address above deficits  Occupational performance areas to be addressed include ADL retraining, functional transfer training, endurance training, patient/family training, equipment evaluation/education, energy conservation, activity engagement and activity tolerance in order to maximize patient's level of function  The patient's raw score on the AM-PAC Daily Activity inpatient short form is 21, standardized score is 44 27, greater than 39 4  Patients at this level are likely to benefit from discharge to home  However pt with decreased support at home and limited endurance recommend d/c to PAR when medically cleared  Will continue to follow 1-2x/wk to address goals listed below        OT Discharge Recommendation: Post acute rehabilitation services(vs HHOT pending endurance progress)

## 2021-05-20 NOTE — CASE MANAGEMENT
Per the pharmacist at Pt's local [facility], the Pt's financial responsibility for Eliquis will be $3 00

## 2021-05-20 NOTE — ASSESSMENT & PLAN NOTE
· Continue Decadron 6 mg IV Q 24 hour - Per pulm may decrease tomorrow if continues to improve  · Continue with vitamin D3, Pepcid 20 mg daily albuterol and Atrovent inhalers  She completed her vitamin-C and zinc and is on a multivitamin  · Continue Lovenox 1 milligram/kilogram q 12, will decide on transition to novel oral anticoagulant once more medically stable  · Wean off supplemental oxygen as tolerated to keep saturation over 88%  · Monitor intakes and outputs , consider p r n  Lasix to keep daily net negative 1 to 2 L  · Continue Xopenex and Atrovent  · Continue incentive spirometry  · consider addition of LABA/ICS at discharge per pulmonology recommendation  · Echocardiogram was obtained which showed normal EF and normal ventricular contraction and no evidence of right heart strain

## 2021-05-20 NOTE — PLAN OF CARE
Problem: PHYSICAL THERAPY ADULT  Goal: Performs mobility at highest level of function for planned discharge setting  See evaluation for individualized goals  Description: Treatment/Interventions: Functional transfer training, LE strengthening/ROM, Elevations, Endurance training, Therapeutic exercise, Patient/family training, Equipment eval/education, Bed mobility, Gait training          See flowsheet documentation for full assessment, interventions and recommendations  Outcome: Progressing  Note: Prognosis: Good  Problem List: Decreased strength, Decreased endurance, Impaired balance, Decreased mobility, Decreased safety awareness  Assessment: Patient agreeable to participate in therapy session however hesitant for OOB mobility initally requiring encouragement secondary to coughing  Patient remains consistent with supervision for supine<>sit and sit<>stand transfers  Pt able to ambulate few steps fwd and back with no assistive device and min ax1 for steadying  Further distance and additional trials not appropraite secondary to decreasing SpO2 to 79% on 6L midflow via NC requiring 4-5 minutes for recovery to 88%  Verbal instruction provided for breathing technique and fair follow through  Participated in supine B LE exercise program with input for form and pace  Continue to focus on OOB mobility with progression of ambulation as appropriate and able  Barriers to Discharge: Inaccessible home environment, Decreased caregiver support        PT Discharge Recommendation: Post acute rehabilitation services          See flowsheet documentation for full assessment

## 2021-05-20 NOTE — PROGRESS NOTES
[facility]  Progress Note - Sharon Flores 1965, 54 y o  female MRN: [id number]  Unit/Bed#: S -01 Encounter: [id number]  Primary Care Provider: FRED Feliz   Date and time admitted to hospital: 5/15/2021  2:04 PM    Pulmonary embolism [facility])  Assessment & Plan  · CTA 5/14 - "Acute left upper lobe segmental/subsegmental pulmonary embolus "  · Reviewed Dopplers of lower extremity - negative  · Currently on Lovenox 1 milligram/kilogram q 12 hours  · Will decide and transition to a novel oral anticoagulant once more medically stable  Will likely require 6 months course    Pneumonia due to COVID-19 virus  Assessment & Plan  · Status post remdesivir x5 and 1 dose of Actemra   · Transferred to [address] [place] [address] for higher level of care due to increasing O2 requirements  · Therapeutic Lovenox due to pulmonary embolism  · Procal negative x2  · Trend inflammatory markers    Sarcoidosis  Assessment & Plan  Patient reports not being on any medications for her sarcoidosis  For now will treat her asthma and she remains on Decadron 6 mg IV Q 24 for her COVID infection  Continue with oxygen support  ACE level normal  · Appreciate pulmonary recommendations  · consider addition of LABA/ICS at discharge per Pulmonary recommendations  · Monitor for worsening hypoxemia or distress  Hypercholesterolemia  Assessment & Plan  Continue statin    * Acute hypoxemic respiratory failure due to COVID-19 [facility])  Assessment & Plan  · Continue Decadron 6 mg IV Q 24 hour - Per pulm may decrease tomorrow if continues to improve  · Continue with vitamin D3, Pepcid 20 mg daily albuterol and Atrovent inhalers  She completed her vitamin-C and zinc and is on a multivitamin  · Continue Lovenox 1 milligram/kilogram q 12, will decide on transition to novel oral anticoagulant once more medically stable    · Wean off supplemental oxygen as tolerated to keep saturation over 88%  · Monitor intakes and outputs , consider p r n  Lasix to keep daily net negative 1 to 2 L  · Continue Xopenex and Atrovent  · Continue incentive spirometry  · consider addition of LABA/ICS at discharge per pulmonology recommendation  · Echocardiogram was obtained which showed normal EF and normal ventricular contraction and no evidence of right heart strain  VTE Pharmacologic Prophylaxis:   Pharmacologic: Enoxaparin (Lovenox)  Mechanical VTE Prophylaxis in Place: Yes    Discussions with Specialists or Other Care Team Provider:  Pulmonary    Education and Discussions with Family / Patient:  Discussed plan of care with the patient    Current Length of Stay: 5 day(s)    Current Patient Status: Inpatient     Discharge Plan / Estimated Discharge Date:  Possibly tomorrow pending rehab acceptance    Code Status: Level 1 - Full Code    Subjective:   Patient seen and examined  No acute events overnight  The patient reported that she is feeling better, but was still experiencing a cough to some degree  The patient wanted to know why her sarcoidosis was now in issue as it had not been an issue for many years, I explained to the patient that the COVID-19 infection could worsening underlying medical conditions such as sarcoidosis  Patient denies any headache, dizziness, nausea, vomiting, constipation, diarrhea, chest pain, palpitations, shortness of breath, wheezing  A 12 point review of systems was completed and was negative unless noted above  Objective:     Vitals:   Temp (24hrs), Av 5 °F (36 9 °C), Min:98 3 °F (36 8 °C), Max:98 7 °F (37 1 °C)    Temp:  [98 3 °F (36 8 °C)-98 7 °F (37 1 °C)] 98 7 °F (37 1 °C)  HR:  [76-77] 77  Resp:  [18] 18  BP: (103-116)/(70-76) 103/70  SpO2:  [93 %-98 %] 94 %  Body mass index is 27 27 kg/m²  Input and Output Summary (last 24 hours):        Intake/Output Summary (Last 24 hours) at 2021 1303  Last data filed at 2021 0900  Gross per 24 hour   Intake 480 ml   Output 0 ml   Net 480 ml       Physical Exam:     Physical Exam  Vitals signs and nursing note reviewed  Constitutional:       General: She is not in acute distress  Appearance: She is not ill-appearing  HENT:      Head: Normocephalic and atraumatic  Mouth/Throat:      Mouth: Mucous membranes are moist    Eyes:      Extraocular Movements: Extraocular movements intact  Pupils: Pupils are equal, round, and reactive to light  Neck:      Musculoskeletal: Normal range of motion and neck supple  Vascular: No carotid bruit  Cardiovascular:      Rate and Rhythm: Normal rate and regular rhythm  Pulses: Normal pulses  Heart sounds: Normal heart sounds  Pulmonary:      Effort: No respiratory distress  Breath sounds: No stridor  No wheezing, rhonchi or rales  Chest:      Chest wall: No tenderness  Abdominal:      General: Bowel sounds are normal  There is no distension  Palpations: Abdomen is soft  Tenderness: There is no abdominal tenderness  Musculoskeletal:         General: No swelling or tenderness  Right lower leg: No edema  Left lower leg: No edema  Skin:     General: Skin is warm and dry  Capillary Refill: Capillary refill takes less than 2 seconds  Neurological:      General: No focal deficit present  Mental Status: She is alert and oriented to person, place, and time  Cranial Nerves: No cranial nerve deficit  Sensory: No sensory deficit  Motor: No weakness  Psychiatric:         Mood and Affect: Mood normal          Behavior: Behavior normal           Additional Data:     Labs: I have personally reviewed pertinent reports    Results from last 7 days   Lab Units 05/20/21  1116 05/19/21  0547 05/18/21  0442 05/17/21  0459 05/16/21  0355   WBC Thousand/uL 17 12* 18 66* 19 39* 15 48* 11 57*   HEMOGLOBIN g/dL 13 4 13 4 14 6 13 4 13 5   HEMATOCRIT % 41 8 41 1 45 4 41 4 41 9   PLATELETS Thousands/uL 438* 470* 537* 498* 537*   NEUTROS PCT %  --  85*  --  87* 85* MONOS PCT %  --  6  --  4 5      Results from last 7 days   Lab Units 05/20/21  1116 05/19/21  0547 05/18/21  0442 05/17/21  0459   POTASSIUM mmol/L 4 2 4 8 4 4 4 8   CHLORIDE mmol/L 98* 100 99* 102   CO2 mmol/L 26 28 29 26   BUN mg/dL 19 21 22 19   CREATININE mg/dL 0 67 0 64 0 82 0 72   CALCIUM mg/dL 9 6 9 9 10 1 9 5   ALK PHOS U/L  --  71 84 73   ALT U/L  --  45 57 48   AST U/L  --  17 29 22     Results from last 7 days   Lab Units 05/16/21  0355   MAGNESIUM mg/dL 1 9     Results from last 7 days   Lab Units 05/16/21  0355   PHOSPHORUS mg/dL 2 9                    * Additional Pertinent Lab Tests Reviewed: [place] [facility] Admission Reviewed    Radiology Results: I have personally reviewed pertinent reports    Xr Chest Portable    Result Date: 5/17/2021  Impression: Persistent bibasal pulmonary infiltrates Workstation performed: [id number]       Recent Cultures (last 7 days):           Last 24 Hours Medication List:   Current Facility-Administered Medications   Medication Dose Route Frequency Provider Last Rate    acetaminophen  650 mg Oral Q6H PRN Tracy Garcia MD      albuterol  2 puff Inhalation 4x Daily Tracy Garcia MD      atorvastatin  40 mg Oral Daily Tracy Garcia MD      benzonatate  200 mg Oral Q8H FRED To      cholecalciferol  2,000 Units Oral Daily MD Shakeel SelfFormerly [facility] ON 5/21/2021] dexamethasone  6 mg Intravenous Daily FRED To      dextromethorphan-guaiFENesin  10 mL Oral Q4H PRN Blair Meza, DO      enoxaparin  1 mg/kg Subcutaneous Q12H [address] Tracy Garcia MD      famotidine  20 mg Oral Daily Tracy Garcia MD      HYDROcodone-homatropine  5 mL Oral Q4H PRN Blair Meza, DO      insulin lispro  1-5 Units Subcutaneous TID AC Tracy Garcia MD      ipratropium  2 puff Inhalation 4x Daily Tracy Garcia MD      meclizine  12 5 mg Oral Q8H PRN Tracy Garcia MD      multivitamin-minerals  1 tablet Oral Daily Juanita MD Renu      ondansetron  4 mg Intravenous Q6H PRN Maulik Miranda MD      polyethylene glycol  17 g Oral Daily PRN [employer], DO      senna-docusate sodium  1 tablet Oral HS PRN [employer], DO      sodium chloride  1 spray Each Nare Q1H PRN [employer], DO          Today, Patient Was Seen By: [employer], DO    Portions of the record may have been created with voice recognition software  Occasional wrong word or "sound a like" substitutions may have occurred due to the inherent limitations of voice recognition software  Read the chart carefully and recognize, using context, where substitutions have occurred

## 2021-05-20 NOTE — PLAN OF CARE
Problem: Potential for Falls  Goal: Patient will remain free of falls  Description: INTERVENTIONS:  - Assess patient frequently for physical needs  -  Identify cognitive and physical deficits and behaviors that affect risk of falls    -  [place] fall precautions as indicated by assessment   - Educate patient/family on patient safety including physical limitations  - Instruct patient to call for assistance with activity based on assessment  - Modify environment to reduce risk of injury  - Consider OT/PT consult to assist with strengthening/mobility  Outcome: Not Progressing     Problem: PAIN - ADULT  Goal: Verbalizes/displays adequate comfort level or baseline comfort level  Description: Interventions:  - Encourage patient to monitor pain and request assistance  - Assess pain using appropriate pain scale  - Administer analgesics based on type and severity of pain and evaluate response  - Implement non-pharmacological measures as appropriate and evaluate response  - Consider cultural and social influences on pain and pain management  - Notify physician/advanced practitioner if interventions unsuccessful or patient reports new pain  Outcome: Not Progressing     Problem: INFECTION - ADULT  Goal: Absence or prevention of progression during hospitalization  Description: INTERVENTIONS:  - Assess and monitor for signs and symptoms of infection  - Monitor lab/diagnostic results  - Monitor all insertion sites, i e  indwelling lines, tubes, and drains  - Monitor endotracheal if appropriate and nasal secretions for changes in amount and color  - [place] appropriate cooling/warming therapies per order  - Administer medications as ordered  - Instruct and encourage patient and family to use good hand hygiene technique  - Identify and instruct in appropriate isolation precautions for identified infection/condition  Outcome: Not Progressing  Goal: Absence of fever/infection during neutropenic period  Description: INTERVENTIONS:  - Monitor WBC    Outcome: Not Progressing     Problem: SAFETY ADULT  Goal: Patient will remain free of falls  Description: INTERVENTIONS:  - Assess patient frequently for physical needs  -  Identify cognitive and physical deficits and behaviors that affect risk of falls    -  [place] fall precautions as indicated by assessment   - Educate patient/family on patient safety including physical limitations  - Instruct patient to call for assistance with activity based on assessment  - Modify environment to reduce risk of injury  - Consider OT/PT consult to assist with strengthening/mobility  Outcome: Not Progressing  Goal: Maintain or return to baseline ADL function  Description: INTERVENTIONS:  -  Assess patient's ability to carry out ADLs; assess patient's baseline for ADL function and identify physical deficits which impact ability to perform ADLs (bathing, care of mouth/teeth, toileting, grooming, dressing, etc )  - Assess/evaluate cause of self-care deficits   - Assess range of motion  - Assess patient's mobility; develop plan if impaired  - Assess patient's need for assistive devices and provide as appropriate  - Encourage maximum independence but intervene and supervise when necessary  - Involve family in performance of ADLs  - Assess for home care needs following discharge   - Consider OT consult to assist with ADL evaluation and planning for discharge  - Provide patient education as appropriate  Outcome: Not Progressing  Goal: Maintain or return mobility status to optimal level  Description: INTERVENTIONS:  - Assess patient's baseline mobility status (ambulation, transfers, stairs, etc )    - Identify cognitive and physical deficits and behaviors that affect mobility  - Identify mobility aids required to assist with transfers and/or ambulation (gait belt, sit-to-stand, lift, walker, cane, etc )  - [place] fall precautions as indicated by assessment  - Record patient progress and toleration of activity level on Mobility SBAR; progress patient to next Phase/Stage  - Instruct patient to call for assistance with activity based on assessment  - Consider rehabilitation consult to assist with strengthening/weightbearing, etc   Outcome: Not Progressing     Problem: DISCHARGE PLANNING  Goal: Discharge to home or other facility with appropriate resources  Description: INTERVENTIONS:  - Identify barriers to discharge w/patient and caregiver  - Arrange for needed discharge resources and transportation as appropriate  - Identify discharge learning needs (meds, wound care, etc )  - Arrange for interpretive services to assist at discharge as needed  - Refer to Case Management Department for coordinating discharge planning if the patient needs post-hospital services based on physician/advanced practitioner order or complex needs related to functional status, cognitive ability, or social support system  Outcome: Not Progressing     Problem: Knowledge Deficit  Goal: Patient/family/caregiver demonstrates understanding of disease process, treatment plan, medications, and discharge instructions  Description: Complete learning assessment and assess knowledge base    Interventions:  - Provide teaching at level of understanding  - Provide teaching via preferred learning methods  Outcome: Not Progressing     Problem: CARDIOVASCULAR - ADULT  Goal: Maintains optimal cardiac output and hemodynamic stability  Description: INTERVENTIONS:  - Monitor I/O, vital signs and rhythm  - Monitor for S/S and trends of decreased cardiac output  - Administer and titrate ordered vasoactive medications to optimize hemodynamic stability  - Assess quality of pulses, skin color and temperature  - Assess for signs of decreased coronary artery perfusion  - Instruct patient to report change in severity of symptoms  Outcome: Not Progressing  Goal: Absence of cardiac dysrhythmias or at baseline rhythm  Description: INTERVENTIONS:  - Continuous cardiac monitoring, vital signs, obtain 12 lead EKG if ordered  - Administer antiarrhythmic and heart rate control medications as ordered  - Monitor electrolytes and administer replacement therapy as ordered  Outcome: Not Progressing     Problem: RESPIRATORY - ADULT  Goal: Achieves optimal ventilation and oxygenation  Description: INTERVENTIONS:  - Assess for changes in respiratory status  - Assess for changes in mentation and behavior  - Position to facilitate oxygenation and minimize respiratory effort  - Oxygen administered by appropriate delivery if ordered  - Initiate smoking cessation education as indicated  - Encourage broncho-pulmonary hygiene including cough, deep breathe, Incentive Spirometry  - Assess the need for suctioning and aspirate as needed  - Assess and instruct to report SOB or any respiratory difficulty  - Respiratory Therapy support as indicated  Outcome: Not Progressing

## 2021-05-20 NOTE — CASE MANAGEMENT
[facility] was in contact with Pt as a follow up regarding SNF facilities  Pt reported she will accept the offered SNF bed at [facility]  CM notified [facility] of the Pt's acceptance of the offered SNF bed, and requested they begin the auth process

## 2021-05-21 VITALS
TEMPERATURE: 98.2 F | OXYGEN SATURATION: 91 % | BODY MASS INDEX: 27.2 KG/M2 | DIASTOLIC BLOOD PRESSURE: 78 MMHG | HEIGHT: 59 IN | HEART RATE: 106 BPM | SYSTOLIC BLOOD PRESSURE: 104 MMHG | RESPIRATION RATE: 16 BRPM | WEIGHT: 134.92 LBS

## 2021-05-21 LAB
ANION GAP SERPL CALCULATED.3IONS-SCNC: 5 MMOL/L (ref 4–13)
BUN SERPL-MCNC: 21 MG/DL (ref 5–25)
CALCIUM SERPL-MCNC: 9 MG/DL (ref 8.3–10.1)
CHLORIDE SERPL-SCNC: 99 MMOL/L (ref 100–108)
CO2 SERPL-SCNC: 29 MMOL/L (ref 21–32)
CREAT SERPL-MCNC: 0.67 MG/DL (ref 0.6–1.3)
ERYTHROCYTE [DISTWIDTH] IN BLOOD BY AUTOMATED COUNT: 13.8 % (ref 11.6–15.1)
GFR SERPL CREATININE-BSD FRML MDRD: 114 ML/MIN/1.73SQ M
GLUCOSE SERPL-MCNC: 102 MG/DL (ref 65–140)
GLUCOSE SERPL-MCNC: 138 MG/DL (ref 65–140)
GLUCOSE SERPL-MCNC: 211 MG/DL (ref 65–140)
GLUCOSE SERPL-MCNC: 91 MG/DL (ref 65–140)
HCT VFR BLD AUTO: 40.1 % (ref 34.8–46.1)
HGB BLD-MCNC: 13.1 G/DL (ref 11.5–15.4)
MCH RBC QN AUTO: 29.7 PG (ref 26.8–34.3)
MCHC RBC AUTO-ENTMCNC: 32.7 G/DL (ref 31.4–37.4)
MCV RBC AUTO: 91 FL (ref 82–98)
PLATELET # BLD AUTO: 420 THOUSANDS/UL (ref 149–390)
PMV BLD AUTO: 10.1 FL (ref 8.9–12.7)
POTASSIUM SERPL-SCNC: 4.5 MMOL/L (ref 3.5–5.3)
RBC # BLD AUTO: 4.41 MILLION/UL (ref 3.81–5.12)
SODIUM SERPL-SCNC: 133 MMOL/L (ref 136–145)
WBC # BLD AUTO: 14.81 THOUSAND/UL (ref 4.31–10.16)

## 2021-05-21 PROCEDURE — 94760 N-INVAS EAR/PLS OXIMETRY 1: CPT

## 2021-05-21 PROCEDURE — 97116 GAIT TRAINING THERAPY: CPT

## 2021-05-21 PROCEDURE — 85027 COMPLETE CBC AUTOMATED: CPT | Performed by: PSYCHIATRY & NEUROLOGY

## 2021-05-21 PROCEDURE — 82948 REAGENT STRIP/BLOOD GLUCOSE: CPT

## 2021-05-21 PROCEDURE — 80048 BASIC METABOLIC PNL TOTAL CA: CPT | Performed by: PSYCHIATRY & NEUROLOGY

## 2021-05-21 PROCEDURE — 97530 THERAPEUTIC ACTIVITIES: CPT

## 2021-05-21 PROCEDURE — 99239 HOSP IP/OBS DSCHRG MGMT >30: CPT | Performed by: INTERNAL MEDICINE

## 2021-05-21 RX ORDER — ALBUTEROL SULFATE 90 UG/1
2 AEROSOL, METERED RESPIRATORY (INHALATION)
Refills: 0
Start: 2021-05-21 | End: 2021-06-04 | Stop reason: SDUPTHER

## 2021-05-21 RX ORDER — MULTIVITAMIN/IRON/FOLIC ACID 18MG-0.4MG
1 TABLET ORAL DAILY
Refills: 0
Start: 2021-05-22 | End: 2022-03-01 | Stop reason: SDUPTHER

## 2021-05-21 RX ORDER — ONDANSETRON 2 MG/ML
4 INJECTION INTRAMUSCULAR; INTRAVENOUS EVERY 6 HOURS PRN
Refills: 0
Start: 2021-05-21 | End: 2021-06-04 | Stop reason: ALTCHOICE

## 2021-05-21 RX ORDER — ECHINACEA PURPUREA EXTRACT 125 MG
1 TABLET ORAL AS NEEDED
Refills: 0
Start: 2021-05-21 | End: 2021-06-09 | Stop reason: SDUPTHER

## 2021-05-21 RX ORDER — GUAIFENESIN/DEXTROMETHORPHAN 100-10MG/5
10 SYRUP ORAL EVERY 4 HOURS PRN
Refills: 0
Start: 2021-05-21 | End: 2022-03-01

## 2021-05-21 RX ORDER — MELATONIN
2000 DAILY
Refills: 0
Start: 2021-05-22 | End: 2021-06-09 | Stop reason: SDUPTHER

## 2021-05-21 RX ORDER — BENZONATATE 200 MG/1
200 CAPSULE ORAL EVERY 8 HOURS
Qty: 20 CAPSULE | Refills: 0
Start: 2021-05-21 | End: 2021-06-09

## 2021-05-21 RX ORDER — DEXAMETHASONE 1 MG
TABLET ORAL
Qty: 16 TABLET | Refills: 0 | Status: SHIPPED | OUTPATIENT
Start: 2021-05-21 | End: 2021-05-26

## 2021-05-21 RX ORDER — FAMOTIDINE 20 MG/1
20 TABLET, FILM COATED ORAL DAILY
Refills: 0 | Status: CANCELLED
Start: 2021-05-22

## 2021-05-21 RX ORDER — ACETAMINOPHEN 325 MG/1
650 TABLET ORAL EVERY 6 HOURS PRN
Refills: 0
Start: 2021-05-21 | End: 2021-06-09 | Stop reason: SDUPTHER

## 2021-05-21 RX ORDER — AMOXICILLIN 250 MG
1 CAPSULE ORAL
Refills: 0
Start: 2021-05-21 | End: 2021-06-09 | Stop reason: SDUPTHER

## 2021-05-21 RX ADMIN — ALBUTEROL SULFATE 2 PUFF: 90 AEROSOL, METERED RESPIRATORY (INHALATION) at 09:10

## 2021-05-21 RX ADMIN — DEXAMETHASONE SODIUM PHOSPHATE 6 MG: 4 INJECTION INTRA-ARTICULAR; INTRALESIONAL; INTRAMUSCULAR; INTRAVENOUS; SOFT TISSUE at 09:07

## 2021-05-21 RX ADMIN — ENOXAPARIN SODIUM 60 MG: 60 INJECTION SUBCUTANEOUS at 09:07

## 2021-05-21 RX ADMIN — IPRATROPIUM BROMIDE 2 PUFF: 17 AEROSOL, METERED RESPIRATORY (INHALATION) at 12:29

## 2021-05-21 RX ADMIN — HYDROCODONE BITARTRATE AND HOMATROPINE METHYLBROMIDE 5 ML: 5; 1.5 SYRUP ORAL at 15:06

## 2021-05-21 RX ADMIN — INSULIN LISPRO 2 UNITS: 100 INJECTION, SOLUTION INTRAVENOUS; SUBCUTANEOUS at 12:30

## 2021-05-21 RX ADMIN — BENZONATATE 200 MG: 100 CAPSULE ORAL at 06:51

## 2021-05-21 RX ADMIN — BENZONATATE 200 MG: 100 CAPSULE ORAL at 12:29

## 2021-05-21 RX ADMIN — ALBUTEROL SULFATE 2 PUFF: 90 AEROSOL, METERED RESPIRATORY (INHALATION) at 12:29

## 2021-05-21 RX ADMIN — IPRATROPIUM BROMIDE 2 PUFF: 17 AEROSOL, METERED RESPIRATORY (INHALATION) at 09:10

## 2021-05-21 NOTE — ASSESSMENT & PLAN NOTE
· CTA 5/14 - "Acute left upper lobe segmental/subsegmental pulmonary embolus "  · Reviewed Dopplers of lower extremity - negative  · Currently on Lovenox 1 milligram/kilogram q 12 hours, this is to be stopped 5/21 and patient to start on Eliquis on 05/22  · Patient is to transition to a NOAC the tomorrow 5/22

## 2021-05-21 NOTE — CASE MANAGEMENT
Pt for d/c to [facility] today, to be transported by [facility] at 4:30pm via bls  CM completed facility transfer and cmn form  CM notified Pt, Pt's brother Eros Gamez and facility of d/c arrangements

## 2021-05-21 NOTE — DISCHARGE INSTR - AVS FIRST PAGE
Dear Wero Hutchins,     It was our pleasure to care for you here at [address]  It is our hope that we were always able to exceed the expected standards for your care during your stay  You were hospitalized due to COVID-19 pneumonia  You were cared for on the [facility] 3rd floor by Susanna Fontana DO under the service of Santiago Bernardo MD with the [facility] who covers for your primary care physician (PCP), FRED Williamson, while you were hospitalized  If you have any questions or concerns related to this hospitalization, you may contact us at [phone]  For follow up as well as any medication refills, we recommend that you follow up with your primary care physician  A registered nurse will reach out to you by phone within a few days after your discharge to answer any additional questions that you may have after going home  However, at this time we provide for you here, the most important instructions / recommendations at discharge:     · Notable Medication Adjustments -   · START Eliquis 10 mg twice a day for 7 days, followed by Eliquis 5 milligrams twice a day   · START taking dexamethasone, you will be coming off of this medicine but you must complete a down titration  On the 1st day following discharge take 5 milligrams milligrams by mouth with breakfast, the following day take 4 milligrams by mouth, then the next day take 3 milligrams by mouth, then the next day take 2 milligrams by mouth and then finally the next 2 days take 1 milligram per day, after which you will completely stop the medication    · Start taking a multivitamin once daily until instructed to stop by your primary care provider  · Start taking vitamin D 1000 units daily until instructed to stop by your primary care provider  · Start taking Ipratropium, 2 puffs 4 times a day  · Start taking Tessalon Perles every 8 hours for cough  · Start taking Robitussin DM as needed for cough  · Testing Required after Discharge -   · None  · Important follow up information -   · Please follow-up with pulmonology, and your primary care provider  · Other Instructions -   · None  · Please review this entire after visit summary as additional general instructions including medication list, appointments, activity, diet, any pertinent wound care, and other additional recommendations from your care team that may be provided for you      Sincerely,     Jennie Klein DO

## 2021-05-21 NOTE — ASSESSMENT & PLAN NOTE
· CTA 5/14 - "Acute left upper lobe segmental/subsegmental pulmonary embolus "  · Reviewed Dopplers of lower extremity - negative  · Currently on Lovenox 1 milligram/kilogram q 12 hours, this is to be stopped 5/21 and patient to start on Eliquis on 05/22  · Will decide and transition to a novel oral anticoagulant once more medically stable   Will likely require 6 months course

## 2021-05-21 NOTE — ASSESSMENT & PLAN NOTE
Likely secondary to COVID infection, presented with URI symptoms for 5-7 days  Visited ED on 05/03/2021 with similar symptoms at that time refused to get COVID-19 test  Currently on 6L [address], desaturates quickly on exertion  Pulmonology on board recommended to continue steroids and bronchodilators  Received tocilizumab on 05/11/2021  Completed remdesivir 5 day course    Incentive spirometry, self proning  Trend inflammatory markers  Continue albuterol inhaler 4 times daily

## 2021-05-21 NOTE — PLAN OF CARE
Problem: Potential for Falls  Goal: Patient will remain free of falls  Description: INTERVENTIONS:  - Assess patient frequently for physical needs  -  Identify cognitive and physical deficits and behaviors that affect risk of falls    -  [place] fall precautions as indicated by assessment   - Educate patient/family on patient safety including physical limitations  - Instruct patient to call for assistance with activity based on assessment  - Modify environment to reduce risk of injury  - Consider OT/PT consult to assist with strengthening/mobility  Outcome: Progressing     Problem: PAIN - ADULT  Goal: Verbalizes/displays adequate comfort level or baseline comfort level  Description: Interventions:  - Encourage patient to monitor pain and request assistance  - Assess pain using appropriate pain scale  - Administer analgesics based on type and severity of pain and evaluate response  - Implement non-pharmacological measures as appropriate and evaluate response  - Consider cultural and social influences on pain and pain management  - Notify physician/advanced practitioner if interventions unsuccessful or patient reports new pain  Outcome: Progressing     Problem: INFECTION - ADULT  Goal: Absence or prevention of progression during hospitalization  Description: INTERVENTIONS:  - Assess and monitor for signs and symptoms of infection  - Monitor lab/diagnostic results  - Monitor all insertion sites, i e  indwelling lines, tubes, and drains  - Monitor endotracheal if appropriate and nasal secretions for changes in amount and color  - [place] appropriate cooling/warming therapies per order  - Administer medications as ordered  - Instruct and encourage patient and family to use good hand hygiene technique  - Identify and instruct in appropriate isolation precautions for identified infection/condition  Outcome: Progressing  Goal: Absence of fever/infection during neutropenic period  Description: INTERVENTIONS:  - Monitor WBC    Outcome: Progressing     Problem: SAFETY ADULT  Goal: Patient will remain free of falls  Description: INTERVENTIONS:  - Assess patient frequently for physical needs  -  Identify cognitive and physical deficits and behaviors that affect risk of falls    -  [place] fall precautions as indicated by assessment   - Educate patient/family on patient safety including physical limitations  - Instruct patient to call for assistance with activity based on assessment  - Modify environment to reduce risk of injury  - Consider OT/PT consult to assist with strengthening/mobility  Outcome: Progressing  Goal: Maintain or return to baseline ADL function  Description: INTERVENTIONS:  -  Assess patient's ability to carry out ADLs; assess patient's baseline for ADL function and identify physical deficits which impact ability to perform ADLs (bathing, care of mouth/teeth, toileting, grooming, dressing, etc )  - Assess/evaluate cause of self-care deficits   - Assess range of motion  - Assess patient's mobility; develop plan if impaired  - Assess patient's need for assistive devices and provide as appropriate  - Encourage maximum independence but intervene and supervise when necessary  - Involve family in performance of ADLs  - Assess for home care needs following discharge   - Consider OT consult to assist with ADL evaluation and planning for discharge  - Provide patient education as appropriate  Outcome: Progressing  Goal: Maintain or return mobility status to optimal level  Description: INTERVENTIONS:  - Assess patient's baseline mobility status (ambulation, transfers, stairs, etc )    - Identify cognitive and physical deficits and behaviors that affect mobility  - Identify mobility aids required to assist with transfers and/or ambulation (gait belt, sit-to-stand, lift, walker, cane, etc )  - [place] fall precautions as indicated by assessment  - Record patient progress and toleration of activity level on Mobility SBAR; progress patient to next Phase/Stage  - Instruct patient to call for assistance with activity based on assessment  - Consider rehabilitation consult to assist with strengthening/weightbearing, etc   Outcome: Progressing     Problem: DISCHARGE PLANNING  Goal: Discharge to home or other facility with appropriate resources  Description: INTERVENTIONS:  - Identify barriers to discharge w/patient and caregiver  - Arrange for needed discharge resources and transportation as appropriate  - Identify discharge learning needs (meds, wound care, etc )  - Arrange for interpretive services to assist at discharge as needed  - Refer to Case Management Department for coordinating discharge planning if the patient needs post-hospital services based on physician/advanced practitioner order or complex needs related to functional status, cognitive ability, or social support system  Outcome: Progressing     Problem: Knowledge Deficit  Goal: Patient/family/caregiver demonstrates understanding of disease process, treatment plan, medications, and discharge instructions  Description: Complete learning assessment and assess knowledge base    Interventions:  - Provide teaching at level of understanding  - Provide teaching via preferred learning methods  Outcome: Progressing     Problem: CARDIOVASCULAR - ADULT  Goal: Maintains optimal cardiac output and hemodynamic stability  Description: INTERVENTIONS:  - Monitor I/O, vital signs and rhythm  - Monitor for S/S and trends of decreased cardiac output  - Administer and titrate ordered vasoactive medications to optimize hemodynamic stability  - Assess quality of pulses, skin color and temperature  - Assess for signs of decreased coronary artery perfusion  - Instruct patient to report change in severity of symptoms  Outcome: Progressing  Goal: Absence of cardiac dysrhythmias or at baseline rhythm  Description: INTERVENTIONS:  - Continuous cardiac monitoring, vital signs, obtain 12 lead EKG if ordered  - Administer antiarrhythmic and heart rate control medications as ordered  - Monitor electrolytes and administer replacement therapy as ordered  Outcome: Progressing     Problem: RESPIRATORY - ADULT  Goal: Achieves optimal ventilation and oxygenation  Description: INTERVENTIONS:  - Assess for changes in respiratory status  - Assess for changes in mentation and behavior  - Position to facilitate oxygenation and minimize respiratory effort  - Oxygen administered by appropriate delivery if ordered  - Initiate smoking cessation education as indicated  - Encourage broncho-pulmonary hygiene including cough, deep breathe, Incentive Spirometry  - Assess the need for suctioning and aspirate as needed  - Assess and instruct to report SOB or any respiratory difficulty  - Respiratory Therapy support as indicated  Outcome: Progressing

## 2021-05-21 NOTE — ASSESSMENT & PLAN NOTE
· Continue Decadron 6 mg IV Q 24 hour - patient will require a 10 day taper at discharge  · Continue with vitamin D3, Pepcid 20 mg daily albuterol and Atrovent inhalers  She completed her vitamin-C and zinc and is on a multivitamin  · Continue Lovenox 1 milligram/kilogram stopped 5/21, patient to start Eliquis on 05/22  · Wean off supplemental oxygen as tolerated to keep saturation over 88%  · Continue Atrovent  · Continue incentive spirometry  · Echocardiogram was obtained which showed normal EF and normal ventricular contraction and no evidence of right heart strain

## 2021-05-21 NOTE — DISCHARGE SUMMARY
[facility]  Discharge- Myrna Edmond 1965, 54 y o  female MRN: [id number]  Unit/Bed#: S -01 Encounter: [id number]  Primary Care Provider: FRED Green   Date and time admitted to hospital: 5/15/2021  2:04 PM    Pulmonary embolism [facility])  Assessment & Plan  · CTA 5/14 - "Acute left upper lobe segmental/subsegmental pulmonary embolus "  · Reviewed Dopplers of lower extremity - negative  · Currently on Lovenox 1 milligram/kilogram q 12 hours, this is to be stopped 5/21 and patient to start on Eliquis on 05/22  · Patient is to transition to a NOAC the tomorrow 5/22    Pneumonia due to COVID-19 virus  Assessment & Plan  · Status post remdesivir x5 and 1 dose of Actemra   · Transferred to [facility] for higher level of care due to increasing O2 requirements  · Patient to be anticoagulated due to pulmonary embolism, patient is to complete Lovenox 5/21 and then start Eliquis  · Procal negative x2      Sarcoidosis  Assessment & Plan  Patient reports not being on any medications for her sarcoidosis  Patient on Decadron, will complete a 10 day taper on discharge  ACE level normal    Hypercholesterolemia  Assessment & Plan  Continue statin    * Acute hypoxemic respiratory failure due to COVID-19 [facility])  Assessment & Plan  · Continue Decadron 6 mg IV Q 24 hour - patient to complete a 10 day taper at discharge  · Continue with vitamin D3, Pepcid 20 mg daily albuterol and Atrovent inhalers  She completed her vitamin-C and zinc and is on a multivitamin  · Continue Lovenox 1 milligram/kilogram to be stopped 5/21 and patient to start Eliquis on 05/22  · Wean off supplemental oxygen as tolerated to keep saturation over 88%  · Continue Atrovent  · Continue incentive spirometry  · Echocardiogram was obtained which showed normal EF and normal ventricular contraction and no evidence of right heart strain        Discharging Resident Physician: Elissa Mendosa DO  Attending: Gokul Diaz MD  PCP: FRED Weber  Admission Date: 5/15/2021  Discharge Date: 05/21/21    Disposition:     Other: Rehabilitation facility    Reason for Admission:  COVID-19 pneumonia    Consultations During Hospital Stay:  · Pulmonology    Procedures Performed:     · None    Significant Findings / Test Results:     · None    Incidental Findings:   · None     Test Results Pending at Discharge (will require follow up): · None     Outpatient Tests Requested:  · None    Complications:  None    Hospital Course:     Siobhan Quinn is a 54 y o  female patient who originally presented to the hospital on 5/15/2021 due to increasing O2 requirements during course of treatment for COVID 19 pneumonia  Patient was diagnosed in 5/8/21 but had symptoms since at least 05/03/2021  Patient was admitted to [facility] treated with moderate protocol for COVID infection including remdesivir, Decadron vitamin C vitamin D and zinc   She completed the course of those vitamins and is now on multivitamin  She had increasing O2 requirements with a an 8 L to 13 L change in oxygen requirement and therefore a CTA was completed in response to an elevated D-dimer  She was found a left upper lobe pulmonary embolism and was started on Lovenox  Since the patient had fragile oxygenation she was transfered to [facility] for higher level of care  While here the patient was maintained on mid flow oxygen and was seen by pulmonology  The patient's oxygenation was slowly down titrated until being able to be discharged on 4 liters of oxygen  The patient was discharged to rehab in stable condition  The patient is to start on Eliquis on 05/22  The patient will be discharged on a oxygen to the rehab facility and will have instructions to follow-up with pulmonology as an outpatient    The patient was continued on steroids during her treatment and therefore will require a 10 day taper      Condition at Discharge: good     Discharge instructions/Information to patient and family:   See after visit summary for information provided to patient and family  Provisions for Follow-Up Care:  See after visit summary for information related to follow-up care and any pertinent home health orders  Planned Readmission:  None     Discharge Medications:  See after visit summary for reconciled discharge medications provided to patient and family        ** Please Note: This note has been constructed using a voice recognition system **

## 2021-05-21 NOTE — PLAN OF CARE
Problem: PHYSICAL THERAPY ADULT  Goal: Performs mobility at highest level of function for planned discharge setting  See evaluation for individualized goals  Description: Treatment/Interventions: Functional transfer training, LE strengthening/ROM, Elevations, Endurance training, Therapeutic exercise, Patient/family training, Equipment eval/education, Bed mobility, Gait training          See flowsheet documentation for full assessment, interventions and recommendations  Outcome: Progressing  Note: Prognosis: Good  Problem List: Decreased strength, Decreased endurance, Impaired balance, Decreased mobility, Decreased safety awareness  Assessment: Patient agreeable to participate in therapy session  Patient able to transfer supine to sit and sit<>stand with supervision and good technique  Pt able to ambulate few steps from EOB <>commode with no assistive device and min ax1 for steadying  Tolerated sitting EOB x 12 minutes with close supervision  Additional mobility trials not appropriate as patient with signficant amount of coughing impacting mobility, nursing aware  Continue to focus on OOB mobility with progression of ambulation and activity tolerance as appropriate and able  Barriers to Discharge: Inaccessible home environment, Decreased caregiver support        PT Discharge Recommendation: Post acute rehabilitation services          See flowsheet documentation for full assessment

## 2021-05-21 NOTE — ASSESSMENT & PLAN NOTE
Patient reports not being on any medications for her sarcoidosis  Patient on Decadron, will taper off for 5 days following discharge  ACE level normal

## 2021-05-21 NOTE — ASSESSMENT & PLAN NOTE
Patient reports not being on any medications for her sarcoidosis  Patient on Decadron, will complete a 10 day taper  ACE level normal

## 2021-05-21 NOTE — PHYSICAL THERAPY NOTE
PHYSICAL THERAPY NOTE    Patient Name: Finesse Oquendo  PHHVV'G Date: 5/21/2021 05/21/21 1457   PT Last Visit   PT Visit Date 05/21/21   Note Type   Note Type Treatment   Pain Assessment   Pain Assessment Tool Pain Assessment not indicated - pt denies pain   Pain Score No Pain   Restrictions/Precautions   Weight Bearing Precautions Per Order No   Other Precautions Contact/isolation; Airborne/isolation; Chair Alarm; Bed Alarm;Telemetry;O2;Fall Risk  (coughing)   General   Family/Caregiver Present No   Subjective   Subjective Patient supine in bed and is agreeable to participate in therapy session   Bed Mobility   Supine to Sit 5  Supervision   Additional items Assist x 1;HOB elevated; Bedrails   Sit to Supine 4  Minimal assistance   Additional items Assist x 1;HOB elevated; Bedrails; Increased time required   Additional Comments Pt supine in bed post session with call bell and belongings in reach  Transfers   Sit to Stand 5  Supervision   Additional items Assist x 1; Increased time required;Verbal cues   Stand to Sit 5  Supervision   Additional items Assist x 1; Armrests; Increased time required   Toilet transfer 5  Supervision   Additional items Assist x 1; Armrests; Increased time required;Verbal cues; Commode   Additional Comments Tolerated sitting EOB x 12 minutes with close supervision   Ambulation/Elevation   Gait pattern Decreased foot clearance; Foward flexed; Short stride; Shuffling   Gait Assistance 4  Minimal assist   Additional items Assist x 1;Verbal cues   Assistive Device None   Distance 2 feet x2 from EOB to commode   Balance   Static Sitting Good   Dynamic Sitting Fair +   Static Standing Fair -   Dynamic Standing Fair -   Ambulatory Fair -   Endurance Deficit   Endurance Deficit Yes   Endurance Deficit Description limited activity tolerance with decreasing SpO2 and significant coughing throughout   Activity Tolerance Activity Tolerance Patient limited by fatigue   Nurse Made Aware Spoke to Edi Dennis RN    Assessment   Prognosis Good   Problem List Decreased strength;Decreased endurance; Impaired balance;Decreased mobility; Decreased safety awareness   Assessment Patient agreeable to participate in therapy session  Patient able to transfer supine to sit and sit<>stand with supervision and good technique  Pt able to ambulate few steps from EOB <>commode with no assistive device and min ax1 for steadying  Tolerated sitting EOB x 12 minutes with close supervision  Additional mobility trials not appropriate as patient with signficant amount of coughing impacting mobility, nursing aware  Continue to focus on OOB mobility with progression of ambulation and activity tolerance as appropriate and able  Goals   Patient Goals to stop this cough   STG Expiration Date 05/31/21   PT Treatment Day 4   Plan   Treatment/Interventions Functional transfer training;Elevations;LE strengthening/ROM; Therapeutic exercise; Endurance training;Patient/family training;Equipment eval/education; Bed mobility;Gait training;Spoke to nursing   Progress Progressing toward goals   PT Frequency 5x/wk   Recommendation   PT Discharge Recommendation Post acute rehabilitation services   AM-PAC Basic Mobility Inpatient   Turning in Bed Without Bedrails 4   Lying on Back to Sitting on Edge of Flat Bed 4   Moving Bed to Chair 3   Standing Up From Chair 3   Walk in Room 3   Climb 3-5 Stairs 2   Basic Mobility Inpatient Raw Score 19   Basic Mobility Standardized Score 42 48       Suad Lozano, PTA

## 2021-05-21 NOTE — DISCHARGE INSTRUCTIONS

## 2021-05-21 NOTE — PROGRESS NOTES
[facility]  Progress Note - Jennifer Trinidad 1965, 54 y o  female MRN: [id number]  Unit/Bed#: S -01 Encounter: [id number]  Primary Care Provider: FRED García   Date and time admitted to hospital: 5/15/2021  2:04 PM    Pulmonary embolism [facility])  Assessment & Plan  · CTA 5/14 - "Acute left upper lobe segmental/subsegmental pulmonary embolus "  · Reviewed Dopplers of lower extremity - negative  · Currently on Lovenox 1 milligram/kilogram q 12 hours, this is to be stopped 5/21 and patient to start on Eliquis on 05/22  · Will decide and transition to a novel oral anticoagulant once more medically stable  Will likely require 6 months course    Pneumonia due to COVID-19 virus  Assessment & Plan  · Status post remdesivir x5 and 1 dose of Actemra   · Transferred to [address] [place] [address] for higher level of care due to increasing O2 requirements  · Patient to be anticoagulated due to pulmonary embolism   · Procal negative x2      Sarcoidosis  Assessment & Plan  Patient reports not being on any medications for her sarcoidosis  Patient on Decadron, will complete a 10 day taper  ACE level normal    Hypercholesterolemia  Assessment & Plan  Continue statin    * Acute hypoxemic respiratory failure due to COVID-19 [facility])  Assessment & Plan  · Continue Decadron 6 mg IV Q 24 hour - patient will require a 10 day taper at discharge  · Continue with vitamin D3, Pepcid 20 mg daily albuterol and Atrovent inhalers  She completed her vitamin-C and zinc and is on a multivitamin  · Continue Lovenox 1 milligram/kilogram stopped 5/21, patient to start Eliquis on 05/22  · Wean off supplemental oxygen as tolerated to keep saturation over 88%  · Continue Atrovent  · Continue incentive spirometry  · Echocardiogram was obtained which showed normal EF and normal ventricular contraction and no evidence of right heart strain        VTE Pharmacologic Prophylaxis:   Pharmacologic: Enoxaparin (Lovenox)  Mechanical VTE Prophylaxis in Place: Yes    Discussions with Specialists or Other Care Team Provider:  Pulmonology    Education and Discussions with Family / Patient:  Refused    Current Length of Stay: 6 day(s)    Current Patient Status: Inpatient     Discharge Plan / Estimated Discharge Date: To be determined, patient is stable for discharge and waiting for insurance authorization for rehab    Code Status: Level 1 - Full Code    Subjective:   Patient seen and examined  No acute events overnight  The patient was upset this morning particularly that she had not received her meal tray  She did not want to participate in my visit and refused to answer any questions or have me examine her  The patient looked stable on examination but as noted was not willing to answer any questions  Objective:     Vitals:   Temp (24hrs), Av 4 °F (36 9 °C), Min:97 7 °F (36 5 °C), Max:98 8 °F (37 1 °C)    Temp:  [97 7 °F (36 5 °C)-98 8 °F (37 1 °C)] 97 7 °F (36 5 °C)  HR:  [80-89] 80  Resp:  [16-18] 16  BP: ()/(61-70) 109/70  SpO2:  [88 %-100 %] 91 %  Body mass index is 27 25 kg/m²  Input and Output Summary (last 24 hours): Intake/Output Summary (Last 24 hours) at 2021 1347  Last data filed at 2021 1230  Gross per 24 hour   Intake 1140 ml   Output 300 ml   Net 840 ml       Physical Exam:     Physical Exam  Vitals signs and nursing note reviewed  Constitutional:       General: She is not in acute distress  HENT:      Head: Normocephalic  Nose: Nose normal    Eyes:      General: No scleral icterus  Right eye: No discharge  Left eye: No discharge  Neck:      Musculoskeletal: Normal range of motion  Pulmonary:      Effort: No respiratory distress  Musculoskeletal: Normal range of motion  Neurological:      General: No focal deficit present  Mental Status: She is alert       Physical examination was obtained through observation of the patient only no active examination was able to be performed due to the patient's refusal     Additional Data:     Labs: I have personally reviewed pertinent reports  Results from last 7 days   Lab Units 05/21/21  0617 05/20/21  1116 05/19/21  0547  05/17/21  0459 05/16/21  0355   WBC Thousand/uL 14 81* 17 12* 18 66*   < > 15 48* 11 57*   HEMOGLOBIN g/dL 13 1 13 4 13 4   < > 13 4 13 5   HEMATOCRIT % 40 1 41 8 41 1   < > 41 4 41 9   PLATELETS Thousands/uL 420* 438* 470*   < > 498* 537*   NEUTROS PCT %  --   --  85*  --  87* 85*   MONOS PCT %  --   --  6  --  4 5    < > = values in this interval not displayed  Results from last 7 days   Lab Units 05/21/21  0617 05/20/21  1116 05/19/21  0547 05/18/21  0442 05/17/21  0459   POTASSIUM mmol/L 4 5 4 2 4 8 4 4 4 8   CHLORIDE mmol/L 99* 98* 100 99* 102   CO2 mmol/L 29 26 28 29 26   BUN mg/dL 21 19 21 22 19   CREATININE mg/dL 0 67 0 67 0 64 0 82 0 72   CALCIUM mg/dL 9 0 9 6 9 9 10 1 9 5   ALK PHOS U/L  --   --  71 84 73   ALT U/L  --   --  45 57 48   AST U/L  --   --  17 29 22     Results from last 7 days   Lab Units 05/16/21  0355   MAGNESIUM mg/dL 1 9     Results from last 7 days   Lab Units 05/16/21  0355   PHOSPHORUS mg/dL 2 9                    * Additional Pertinent Lab Tests Reviewed: All Labs Within Last 24 Hours Reviewed    Radiology Results: I have personally reviewed pertinent reports    Xr Chest Portable    Result Date: 5/17/2021  Impression: Persistent bibasal pulmonary infiltrates Workstation performed: [id number]       Recent Cultures (last 7 days):           Last 24 Hours Medication List:   Current Facility-Administered Medications   Medication Dose Route Frequency Provider Last Rate    acetaminophen  650 mg Oral Q6H PRN Unruly Garcias MD      albuterol  2 puff Inhalation 4x Daily Unruly Garcias MD      [START ON 5/22/2021] apixaban  10 mg [facility] BID [facility]      [START ON 5/29/2021] apixaban  5 mg Oral BID Blair Meza DO      atorvastatin  40 mg Oral Daily MD Ayden Rae benzonatate  200 mg Oral Q8H FRED Lucio      cholecalciferol  2,000 Units Oral Daily Omar Moreira MD      dexamethasone  6 mg Intravenous Daily FRED Lucio      dextromethorphan-guaiFENesin  10 mL Oral Q4H PRN Abundio,       enoxaparin  1 mg/kg Subcutaneous Q12H [address] Blair Ann, DO      famotidine  20 mg Oral Daily Omar Moreira MD      HYDROcodone-homatropine  5 mL Oral Q4H PRN Blair Meza,       insulin lispro  1-5 Units Subcutaneous TID AC Omar Moreira MD      ipratropium  2 puff Inhalation 4x Daily Omar Moreira MD      meclizine  12 5 mg Oral Q8H PRN Omar Moreira MD      multivitamin-minerals  1 tablet Oral Daily Omar Moreira MD      ondansetron  4 mg Intravenous Q6H PRN Omar Moreira MD      polyethylene glycol  17 g Oral Daily PRN Abundio,       senna-docusate sodium  1 tablet Oral HS PRN Del Castillo, DO      sodium chloride  1 spray Each Nare Q1H PRN Abundio,           Today, Patient Was Seen By: DO Abundio    Portions of the record may have been created with voice recognition software  Occasional wrong word or "sound a like" substitutions may have occurred due to the inherent limitations of voice recognition software  Read the chart carefully and recognize, using context, where substitutions have occurred

## 2021-05-21 NOTE — ASSESSMENT & PLAN NOTE
· Status post remdesivir x5 and 1 dose of Actemra   · Transferred to [facility] for higher level of care due to increasing O2 requirements    · Patient to be anticoagulated due to pulmonary embolism, patient is to complete Lovenox 5/21 and then start Eliquis  · Procal negative x2

## 2021-05-21 NOTE — ASSESSMENT & PLAN NOTE
· Decadron 6 mg, will taper the patient off over the next 5 days  · Continue with vitamin D3, Pepcid 20 mg daily albuterol and Atrovent inhalers  She completed her vitamin-C and zinc and is on a multivitamin  · Continue Lovenox 1 milligram/kilogram to be stopped 5/21 and patient to start Eliquis on 05/22  · Wean off supplemental oxygen as tolerated to keep saturation over 88%  · Continue Atrovent  · Continue incentive spirometry  · Echocardiogram was obtained which showed normal EF and normal ventricular contraction and no evidence of right heart strain

## 2021-05-21 NOTE — ASSESSMENT & PLAN NOTE
Continue statin Erythromycin Pregnancy And Lactation Text: This medication is Pregnancy Category B and is considered safe during pregnancy. It is also excreted in breast milk.

## 2021-05-21 NOTE — ASSESSMENT & PLAN NOTE
· Status post remdesivir x5 and 1 dose of Actemra   · Transferred to [facility] for higher level of care due to increasing O2 requirements    · Patient to be anticoagulated due to pulmonary embolism   · Procal negative x2

## 2021-05-24 ENCOUNTER — NURSING HOME VISIT (OUTPATIENT)
Dept: GERIATRICS | Facility: OTHER | Age: 56
End: 2021-05-24
Payer: MEDICARE

## 2021-05-24 VITALS
TEMPERATURE: 97.3 F | OXYGEN SATURATION: 94 % | HEART RATE: 70 BPM | RESPIRATION RATE: 18 BRPM | DIASTOLIC BLOOD PRESSURE: 74 MMHG | SYSTOLIC BLOOD PRESSURE: 128 MMHG

## 2021-05-24 DIAGNOSIS — J12.82 PNEUMONIA DUE TO COVID-19 VIRUS: Primary | ICD-10-CM

## 2021-05-24 DIAGNOSIS — U07.1 PNEUMONIA DUE TO COVID-19 VIRUS: Primary | ICD-10-CM

## 2021-05-24 PROCEDURE — 99306 1ST NF CARE HIGH MDM 50: CPT | Performed by: INTERNAL MEDICINE

## 2021-05-24 NOTE — ASSESSMENT & PLAN NOTE
Patient was recently hospitalized with acute respiratory failure attributed to COVID-19 infection  She was treated with Acterma, dexamethasone and remdesivir  Oxygen requirements were high initially at 8-13 liters/minute  Respiratory status subsequently improved  CTA revealed evidence of subsegmental PE  Patient was initially started on Lovenox subsequently switched over to Eliquis  Currently doing well  SaO2 94% on oxygen  Will continue to monitor  Patient remains on dexamethasone for now    Last dose to be given on 05/26/2021

## 2021-05-24 NOTE — PROGRESS NOTES
[facility]  [address] [facility], [address] [place] [address]  [id number]    Nursing Home Admission    NAME: Yamini Rawls  AGE: 54 y o  SEX: female [id number]      Patient Location     [facility]      Patients care was coordinated with nursing facility staff  Recent vitals, labs and updated medications were reviewed on [facility] system of facility  Past Medical, surgical, social, medication and allergy history and patients previous records reviewed  Assessment/Plan:    Pneumonia due to COVID-19 virus  Patient was recently hospitalized with acute respiratory failure attributed to COVID-19 infection  She was treated with Acterma, dexamethasone and remdesivir  Oxygen requirements were high initially at 8-13 liters/minute  Respiratory status subsequently improved  CTA revealed evidence of subsegmental PE  Patient was initially started on Lovenox subsequently switched over to Eliquis  Currently doing well  SaO2 94% on oxygen  Will continue to monitor  Patient remains on dexamethasone for now  Last dose to be given on 05/26/2021    Pulmonary embolism ([facility] [address] )  · CTA 5/14 - "Acute left upper lobe segmental/subsegmental pulmonary embolus "  · Venous Dopplers of lower extremity - negative  Patient was initially treated with Lovenox subsequently transitioned over to Eliquis  Currently remains on Eliquis 10 mg b i d  to be changed to 5 mg b i d  after completing 7 days       Sarcoidosis:  Recent ACE level was normal  Patient currently remains on dexamethasone for COVID-19 pneumonia  Last dose to be given on 05/26  Respiratory status is stable with oxygen saturation of 94% on O2   Per respiratory therapist patient was noted to have episodes of desaturation with oxygen saturation dropping down in mid 70s with therapy today  SaO2 went up to 90% on 7 L of oxygen    Will continue to monitor closely    Hypercholesterolemia:  Continue statin     * Acute hypoxemic respiratory failure due to COVID-19  Patient initially needed high-flow oxygen at 8-13 liters/minute  Respiratory status subsequently improved  Currently saturating at 94% on room air  Patient is status post Acterma and  Remdesivir treatment  Currently finishing dexamethasone course  Continue apixaban for recently discovered PE  Continue with vitamin D3, albuterol, Breo and Atrovent inhalers  She completed her vitamin-C and zinc and is on a multivitamin  Chief Complaint     Acute hypoxic respiratory failure due to COVID-19 pneumonia,  Subsegmental left upper lobe PE, history of sarcoidosis, deconditioning      HPI     Patient is a 54 y o  female past medical history significant for anxiety asthma and sarcoidosis  Patient was recently hospitalized  with acute respiratory failure attributed to COVID-19 pneumonia  She was treated with remdesivir, Acterma, and steroids  Patient initially needed high-flow oxygen 8-13 liters/minute  She was subsequently weaned off of oxygen  CT chest demonstrated subsegmental PE with RV strain  Patient was started on therapeutic dose of Lovenox, later changed to Eliquis  Patient was subsequently transferred to [facility] rehab where she is being seen for post hospital admission  At the time of my evaluation patient is doing okay  Patient is saturating at 94% on oxygen  She was noted to have episodes of desaturation with therapy earlier today  Needed 7 L of oxygen to sustain SaO2 of 90%      Past Medical History:   Diagnosis Date    Anxiety     Asthma     Back pain     Sarcoidosis     Vertigo        Past Surgical History:   Procedure Laterality Date    MAMMO (HISTORICAL)  05/02/2018       Social History     Tobacco Use   Smoking Status Never Smoker   Smokeless Tobacco Never Used          Family History   Problem Relation Age of Onset    Hypertension Mother     Mental illness Mother     Asthma Mother         Allergies   Allergen Reactions    Tegretol [Carbamazepine] Rash          Current Outpatient Medications:     acetaminophen (TYLENOL) 325 mg tablet, Take 2 tablets (650 mg total) by mouth every 6 (six) hours as needed for mild pain, Disp: , Rfl: 0    albuterol (PROVENTIL HFA,VENTOLIN HFA) 90 mcg/act inhaler, Inhale 2 puffs 4 (four) times a day, Disp: , Rfl: 0    apixaban (ELIQUIS) 5 mg, Take 2 tablets (10 mg total) by mouth 2 (two) times a day for 7 days, THEN 1 tablet (5 mg total) 2 (two) times a day for 23 days  , Disp: 74 tablet, Rfl: 0    atorvastatin (LIPITOR) 40 mg tablet, Take 1 tablet (40 mg total) by mouth daily, Disp: 90 tablet, Rfl: 1    benzonatate (TESSALON PERLES) 100 mg capsule, Take 1 capsule (100 mg total) by mouth every 8 (eight) hours, Disp: 21 capsule, Rfl: 0    benzonatate (TESSALON) 200 MG capsule, Take 1 capsule (200 mg total) by mouth every 8 (eight) hours, Disp: 20 capsule, Rfl: 0    cetirizine (ZyrTEC) 10 mg tablet, Take 1 tablet (10 mg total) by mouth daily, Disp: 90 tablet, Rfl: 1    cholecalciferol (VITAMIN D3) 1,000 units tablet, Take 2 tablets (2,000 Units total) by mouth daily, Disp: , Rfl: 0    dexamethasone (DECADRON) 1 mg tablet, Take 5 tablets (5 mg total) by mouth daily with breakfast for 1 day, THEN 4 tablets (4 mg total) daily with breakfast for 1 day, THEN 3 tablets (3 mg total) daily with breakfast for 1 day, THEN 2 tablets (2 mg total) daily with breakfast for 2 days  , Disp: 16 tablet, Rfl: 0    dextromethorphan-guaiFENesin (ROBITUSSIN DM)  mg/5 mL syrup, Take 10 mL by mouth every 4 (four) hours as needed for cough, Disp: , Rfl: 0    enoxaparin (LOVENOX) 60 mg/0 6 mL, Inject 0 6 mL (60 mg total) under the skin every 12 (twelve) hours for 1 dose, Disp: 0 6 mL, Rfl: 0    ferrous sulfate 324 (65 Fe) mg, Take 1 tablet (324 mg total) by mouth 2 (two) times a day before meals, Disp: 180 tablet, Rfl: 1    fluticasone (FLONASE) 50 mcg/act nasal spray, 1 spray into each nostril daily, Disp: 18 2 mL, Rfl: 1   fluticasone-salmeterol (Marjorie Headings) 250-50 mcg/dose inhaler, Inhale 1 puff 2 (two) times a day Rinse mouth after use , Disp: 3 Inhaler, Rfl: 1    hydrOXYzine HCL (ATARAX) 25 mg tablet, Take 1 tablet (25 mg total) by mouth 2 (two) times a day, Disp: 60 tablet, Rfl: 0    ibuprofen (MOTRIN) 600 mg tablet, Take 1 tablet (600 mg total) by mouth every 6 (six) hours as needed for mild pain or moderate pain, Disp: 60 tablet, Rfl: 0    ipratropium (ATROVENT HFA) 17 mcg/act inhaler, Inhale 2 puffs 4 (four) times a day, Disp: , Rfl: 0    ketotifen (ZADITOR) 0 025 % ophthalmic solution, Administer 1 drop to both eyes 2 (two) times a day for 10 days, Disp: 1 mL, Rfl: 0    meclizine (ANTIVERT) 12 5 MG tablet, Take 1 tablet (12 5 mg total) by mouth every 8 (eight) hours as needed for dizziness, Disp: 90 tablet, Rfl: 1    montelukast (SINGULAIR) 10 mg tablet, Take 1 tablet (10 mg total) by mouth daily, Disp: 90 tablet, Rfl: 1    multivitamin-minerals (CENTRUM ADULTS) tablet, Take 1 tablet by mouth daily, Disp: , Rfl: 0    nystatin (MYCOSTATIN) cream, Apply topically 2 (two) times a day (Patient not taking: Reported on 5/3/2021), Disp: 30 g, Rfl: 0    ondansetron (ZOFRAN) 4 mg/2 mL injection, Infuse 2 mL (4 mg total) into a venous catheter every 6 (six) hours as needed for nausea or vomiting, Disp: , Rfl: 0    senna-docusate sodium (SENOKOT S) 8 6-50 mg per tablet, Take 1 tablet by mouth daily at bedtime as needed for constipation, Disp: , Rfl: 0    sodium chloride (OCEAN) 0 65 % nasal spray, 1 spray into each nostril as needed for congestion for up to 12 doses, Disp: , Rfl: 0    SYMBICORT 160-4 5 MCG/ACT inhaler, Inhale 2 puffs 2 (two) times a day, Disp: 3 Inhaler, Rfl: 1    triamcinolone (KENALOG) 0 1 % cream, Apply topically 2 (two) times a day (Patient not taking: Reported on 5/3/2021), Disp: 30 g, Rfl: 0    valACYclovir (VALTREX) 1,000 mg tablet, Take 1 tablet (1,000 mg total) by mouth 3 (three) times a day for 7 days (Patient not taking: Reported on 5/3/2021), Disp: 21 tablet, Rfl: 0    Vitamins A & D (VITAMIN A & D) ointment, Apply topically as needed for dry skin (to face), Disp: 45 g, Rfl: 1    Updated list was reviewed in [facility] system of facility  Vitals:    05/24/21 1044   BP: 128/74   Pulse: 70   Resp: 18   Temp: (!) 97 3 °F (36 3 °C)   SpO2: 94%       Review of Systems   Constitutional: Positive for fatigue  Negative for chills and fever  HENT: Negative for nosebleeds and rhinorrhea  Eyes: Negative for discharge and redness  Respiratory: Positive for shortness of breath (Episodes of desaturation with PT OT)  Negative for cough, chest tightness, wheezing and stridor  Cardiovascular: Negative for chest pain and leg swelling  Gastrointestinal: Negative for abdominal distention, abdominal pain, diarrhea and vomiting  Genitourinary: Negative for dysuria, flank pain and hematuria  Musculoskeletal: Positive for gait problem  Negative for arthralgias and back pain  Skin: Negative for pallor  Neurological: Positive for weakness (Generalized)  Negative for tremors, seizures, syncope and headaches  Psychiatric/Behavioral: Positive for behavioral problems (Irritable at times per staff)  Negative for agitation and confusion  Physical Exam  Constitutional:       General: She is not in acute distress  Appearance: She is well-developed  She is not ill-appearing or diaphoretic  HENT:      Head: Normocephalic and atraumatic  Nose: No rhinorrhea  Eyes:      General: No scleral icterus  Right eye: No discharge  Left eye: No discharge  Neck:      Musculoskeletal: Neck supple  Pulmonary:      Effort: No respiratory distress  Breath sounds: No stridor  No wheezing  Abdominal:      General: There is no distension  Musculoskeletal:      Right lower leg: No edema  Left lower leg: No edema  Skin:     Coloration: Skin is not jaundiced or pale  Neurological:      Mental Status: She is alert and oriented to person, place, and time  Mental status is at baseline  Cranial Nerves: No cranial nerve deficit  Psychiatric:         Mood and Affect: Mood normal          Behavior: Behavior normal        Cardiopulmonary exam was not done due to Covid positive status    Diagnostic Data       Recent labs were reviewed  Lab Results   Component Value Date    SODIUM 133 (L) 05/21/2021    K 4 5 05/21/2021    CL 99 (L) 05/21/2021    CO2 29 05/21/2021    BUN 21 05/21/2021    CREATININE 0 67 05/21/2021    GLUC 91 05/21/2021    CALCIUM 9 0 05/21/2021     Lab Results   Component Value Date    WBC 14 81 (H) 05/21/2021    HGB 13 1 05/21/2021    HCT 40 1 05/21/2021    MCV 91 05/21/2021     (H) 05/21/2021       Additional notes:   Continue current treatment  Continue PT OT  Discontinue hydroxyzine  Patient is listed to be on valacyclovir  Exact indication is unknown  Will clarify  Monitor respiratory status closely  Follow-up repeat labs  Updated medication list reviewed in [facility]  Patient has currently receiving hydroxyzine  Denies any problems with itching    Will discontinue and follow    This note was electronically signed by Dr Joslyn Rosenthal

## 2021-05-25 NOTE — PROGRESS NOTES
Consultation - Pulmonary Medicine   Raymundo Medina 54 y o  female MRN: [id number]    Physician Requesting Consult: Alveria Most  Reason for Consult:  Acute hypoxic respiratory failure, COVID-19 infection, PE    Acute hypoxemic respiratory failure due to COVID-19 [facility])   Improving slowly, secondary to recent COVID-19 pneumonia  I explained to the patient that most likely she will recover completely and will not need oxygen but that may take few weeks but also there is a percentage of people who remain on oxygen due to fibrotic changes of the lungs after COVID-19 pneumonia  She verbalized understanding  Continue oxygen for now to maintain pulse ox more than 88%  Pneumonia due to COVID-19 virus    Improved clinically but continues to have post infectious symptoms with dyspnea and cough which may improve completely in few weeks  Her last chest x-ray still shows  Bilateral infiltrates, I recommend to check chest x-ray in 6-8 weeks and if needed we can do CT scan of the chest at that time  Also I explained to the patient that there is a small percentage of COVID-19 patients and up with scars/fibrotic changes on the lungs that can be symptomatic or asymptomatic  No further intervention can be done now  Pulmonary embolism ([address] )    Secondary to COVID-19 pneumonia  Continue Eliquis for at least 3 months  I also notified patient to pay attention to any signs of bleeding specially  GI bleed  Sarcoidosis   I spoke to patient in length about sarcoidosis and in her case that most likely it is dollar mint and in complete remission and not causing her any symptoms  She will follow with Pulmonary as outpatient in the future        Diagnoses and all orders for this visit:    Acute hypoxemic respiratory failure due to COVID-19 ([address] )  -     XR chest pa & lateral; Future    Pneumonia due to COVID-19 virus  -     XR chest pa & lateral; Future    Acute pulmonary embolism without acute cor pulmonale, unspecified pulmonary embolism type ([facility] [address] )    Sarcoidosis  -     XR chest pa & lateral; Future      ______________________________________________________________________    HPI:    Kailey Reich is a 54 y o  female who presents  To rehab after recent hospitalization with COVID-19 pneumonia and acute hypoxic respiratory failure, complicated with segmental/subsegmental PE  Patient currently states that she is feeling better slowly, she still has cough and currently productive of clear sputum but it is improving from before, continues to have some dyspnea on exertion and continues to require 4 L of oxygen specially with exertion but everything has been improving slowly since her recent hospitalization  She denies any chest pain, denies fever or chills or night sweats, denies other complaints but constipation  Prior to this patient did not have asthma   she was diagnosed with sarcoidosis  13- 20 years ago in [place], at that time she was incarcerated and had a chest x-ray that showed what she describes as a mass, most likely it was hilar lymphadenopathy which led to bronchoscopy and biopsy and diagnosis of sarcoidosis  At that time she does not recall having symptoms but she was treated with corticosteroids for about 1 year and after that she continued to have follow ups with a pulmonologist in [place] and then here in the area without any treatment  She denies arthralgias or skin lesions, denies blurred vision  She is a nonsmoker  Review of Systems:  Review of Systems   Constitutional: Negative  HENT: Negative  Eyes: Negative  Respiratory: Positive for cough and shortness of breath  Cardiovascular: Negative  Gastrointestinal: Negative  Endocrine: Negative  Genitourinary: Negative  Musculoskeletal: Negative  Skin: Negative  Allergic/Immunologic: Negative  Neurological: Negative  Hematological: Negative  Psychiatric/Behavioral: Negative        Aside from what is mentioned in the HPI, the review of systems otherwise negative  Current Medications:    Current Outpatient Medications:     acetaminophen (TYLENOL) 325 mg tablet, Take 2 tablets (650 mg total) by mouth every 6 (six) hours as needed for mild pain, Disp: , Rfl: 0    albuterol (PROVENTIL HFA,VENTOLIN HFA) 90 mcg/act inhaler, Inhale 2 puffs 4 (four) times a day, Disp: , Rfl: 0    atorvastatin (LIPITOR) 40 mg tablet, Take 1 tablet (40 mg total) by mouth daily, Disp: 90 tablet, Rfl: 1    benzonatate (TESSALON PERLES) 100 mg capsule, Take 1 capsule (100 mg total) by mouth every 8 (eight) hours, Disp: 21 capsule, Rfl: 0    benzonatate (TESSALON) 200 MG capsule, Take 1 capsule (200 mg total) by mouth every 8 (eight) hours, Disp: 20 capsule, Rfl: 0    cetirizine (ZyrTEC) 10 mg tablet, Take 1 tablet (10 mg total) by mouth daily, Disp: 90 tablet, Rfl: 1    cholecalciferol (VITAMIN D3) 1,000 units tablet, Take 2 tablets (2,000 Units total) by mouth daily, Disp: , Rfl: 0    dexamethasone (DECADRON) 1 mg tablet, Take 5 tablets (5 mg total) by mouth daily with breakfast for 1 day, THEN 4 tablets (4 mg total) daily with breakfast for 1 day, THEN 3 tablets (3 mg total) daily with breakfast for 1 day, THEN 2 tablets (2 mg total) daily with breakfast for 2 days  , Disp: 16 tablet, Rfl: 0    dextromethorphan-guaiFENesin (ROBITUSSIN DM)  mg/5 mL syrup, Take 10 mL by mouth every 4 (four) hours as needed for cough, Disp: , Rfl: 0    ferrous sulfate 324 (65 Fe) mg, Take 1 tablet (324 mg total) by mouth 2 (two) times a day before meals, Disp: 180 tablet, Rfl: 1    fluticasone (FLONASE) 50 mcg/act nasal spray, 1 spray into each nostril daily, Disp: 18 2 mL, Rfl: 1    fluticasone-salmeterol (ADVAIR, WIXELA) 250-50 mcg/dose inhaler, Inhale 1 puff 2 (two) times a day Rinse mouth after use , Disp: 3 Inhaler, Rfl: 1    hydrOXYzine HCL (ATARAX) 25 mg tablet, Take 1 tablet (25 mg total) by mouth 2 (two) times a day, Disp: 60 tablet, Rfl: 0    ibuprofen (MOTRIN) 600 mg tablet, Take 1 tablet (600 mg total) by mouth every 6 (six) hours as needed for mild pain or moderate pain, Disp: 60 tablet, Rfl: 0    ipratropium (ATROVENT HFA) 17 mcg/act inhaler, Inhale 2 puffs 4 (four) times a day, Disp: , Rfl: 0    ketotifen (ZADITOR) 0 025 % ophthalmic solution, Administer 1 drop to both eyes 2 (two) times a day for 10 days, Disp: 1 mL, Rfl: 0    meclizine (ANTIVERT) 12 5 MG tablet, Take 1 tablet (12 5 mg total) by mouth every 8 (eight) hours as needed for dizziness, Disp: 90 tablet, Rfl: 1    montelukast (SINGULAIR) 10 mg tablet, Take 1 tablet (10 mg total) by mouth daily, Disp: 90 tablet, Rfl: 1    multivitamin-minerals (CENTRUM ADULTS) tablet, Take 1 tablet by mouth daily, Disp: , Rfl: 0    nystatin (MYCOSTATIN) cream, Apply topically 2 (two) times a day (Patient not taking: Reported on 5/3/2021), Disp: 30 g, Rfl: 0    ondansetron (ZOFRAN) 4 mg/2 mL injection, Infuse 2 mL (4 mg total) into a venous catheter every 6 (six) hours as needed for nausea or vomiting, Disp: , Rfl: 0    senna-docusate sodium (SENOKOT S) 8 6-50 mg per tablet, Take 1 tablet by mouth daily at bedtime as needed for constipation, Disp: , Rfl: 0    sodium chloride (OCEAN) 0 65 % nasal spray, 1 spray into each nostril as needed for congestion for up to 12 doses, Disp: , Rfl: 0    SYMBICORT 160-4 5 MCG/ACT inhaler, Inhale 2 puffs 2 (two) times a day, Disp: 3 Inhaler, Rfl: 1    triamcinolone (KENALOG) 0 1 % cream, Apply topically 2 (two) times a day (Patient not taking: Reported on 5/3/2021), Disp: 30 g, Rfl: 0    valACYclovir (VALTREX) 1,000 mg tablet, Take 1 tablet (1,000 mg total) by mouth 3 (three) times a day for 7 days (Patient not taking: Reported on 5/3/2021), Disp: 21 tablet, Rfl: 0    Vitamins A & D (VITAMIN A & D) ointment, Apply topically as needed for dry skin (to face), Disp: 45 g, Rfl: 1    Historical Information   Past Medical History:   Diagnosis Date  Anxiety     Asthma     Back pain     Sarcoidosis     Vertigo      Past Surgical History:   Procedure Laterality Date    MAMMO (HISTORICAL)  05/02/2018     Social History   Social History     Tobacco Use   Smoking Status Never Smoker   Smokeless Tobacco Never Used       Occupational history:   no occupational exposure    Family History:   Family History   Problem Relation Age of Onset    Hypertension Mother     Mental illness Mother     Asthma Mother          PhysicalExamination:  Vitals:  Temperature 97 9°, heart rate 89, blood pressure 112/68, respiratory rate 18, pulse ox 94% on 4 L       General: alert, not in acute distress  HEENT: PERRL, no icteric sclera or cyanosis, no thrush  Neck:  Supple, no lymphadenopathy or thyromegaly, no JVD  Lungs:  Equal breath sounds and clear auscultations bilaterally, no wheezing but few crackles at bases bilaterally   Heart: S1S2 regular, no murmures or gallops  Abdomen: soft, non-tender, bowel sounds  present  Extrimities: no edema, no clubbing or cyanosis  Neuro: Alert and oriented x 3, no focal neurodeficits   Skin: intact, no rashes      Diagnostic Data:  Labs: I personally reviewed the most recent laboratory data pertinent to today's visit    Lab Results   Component Value Date    WBC 14 81 (H) 05/21/2021    HGB 13 1 05/21/2021    HCT 40 1 05/21/2021    MCV 91 05/21/2021     (H) 05/21/2021     Lab Results   Component Value Date    CALCIUM 9 0 05/21/2021    K 4 5 05/21/2021    CO2 29 05/21/2021    CL 99 (L) 05/21/2021    BUN 21 05/21/2021    CREATININE 0 67 05/21/2021     No results found for: IGE  Lab Results   Component Value Date    ALT 45 05/19/2021    AST 17 05/19/2021    ALKPHOS 71 05/19/2021      COVID-19 PCR on 5/8/2021 positive     QuantiFERON gold test on 5/11/2021: Indeterminate       PFT:    [facility] PFT 2018:   The patient is a 48year-old female former minimal smoker   Spirometry   shows good effort   FVC is normal   Flow rates are normal   After inhaled   bronchodilator, there is no significant change in FVC or FEV1   Diffusing   capacity is decreased   Except for diffusing capacity, the patient has a   normal pulmonary function test      Imaging:  I personally reviewed the images on the [facility] system pertinent to today's visit   chest x-ray reviewed on PACS from 5/17/2021: Bilateral infiltrates     chest CT scan from 5/14/2021 reviewed on PACs:  Bilateral diffuse patchy areas of ground-glass opacities and consolidation specially peripherally and more in the upper lobes    Acute segmental/ subsegmental left upper lobe PE       duplex of lower extremity on 5/14/2021: No DVT    Other studies:   echocardiogram: LVEF 50-55% with grade 1 diastolic dysfunction, ventricular septum dyssynergy motion, trace MR, estimated peak PA pressure 30, normal RV      Morena Grijalva MD

## 2021-05-26 ENCOUNTER — NURSING HOME VISIT (OUTPATIENT)
Dept: PULMONOLOGY | Facility: CLINIC | Age: 56
End: 2021-05-26
Payer: MEDICARE

## 2021-05-26 DIAGNOSIS — D86.9 SARCOIDOSIS: ICD-10-CM

## 2021-05-26 DIAGNOSIS — J96.01 ACUTE HYPOXEMIC RESPIRATORY FAILURE DUE TO COVID-19 (HCC): Primary | ICD-10-CM

## 2021-05-26 DIAGNOSIS — U07.1 ACUTE HYPOXEMIC RESPIRATORY FAILURE DUE TO COVID-19 (HCC): Primary | ICD-10-CM

## 2021-05-26 DIAGNOSIS — I26.99 ACUTE PULMONARY EMBOLISM WITHOUT ACUTE COR PULMONALE, UNSPECIFIED PULMONARY EMBOLISM TYPE (HCC): ICD-10-CM

## 2021-05-26 DIAGNOSIS — U07.1 PNEUMONIA DUE TO COVID-19 VIRUS: ICD-10-CM

## 2021-05-26 DIAGNOSIS — J12.82 PNEUMONIA DUE TO COVID-19 VIRUS: ICD-10-CM

## 2021-05-26 PROCEDURE — 99305 1ST NF CARE MODERATE MDM 35: CPT | Performed by: INTERNAL MEDICINE

## 2021-05-26 NOTE — ASSESSMENT & PLAN NOTE
Improved clinically but continues to have post infectious symptoms with dyspnea and cough which may improve completely in few weeks  Her last chest x-ray still shows  Bilateral infiltrates, I recommend to check chest x-ray in 6-8 weeks and if needed we can do CT scan of the chest at that time  Also I explained to the patient that there is a small percentage of COVID-19 patients and up with scars/fibrotic changes on the lungs that can be symptomatic or asymptomatic  No further intervention can be done now

## 2021-05-26 NOTE — ASSESSMENT & PLAN NOTE
Improving slowly, secondary to recent COVID-19 pneumonia  I explained to the patient that most likely she will recover completely and will not need oxygen but that may take few weeks but also there is a percentage of people who remain on oxygen due to fibrotic changes of the lungs after COVID-19 pneumonia  She verbalized understanding  Continue oxygen for now to maintain pulse ox more than 88%

## 2021-05-26 NOTE — ASSESSMENT & PLAN NOTE
I spoke to patient in length about sarcoidosis and in her case that most likely it is dollar mint and in complete remission and not causing her any symptoms  She will follow with Pulmonary as outpatient in the future

## 2021-05-26 NOTE — ASSESSMENT & PLAN NOTE
Secondary to COVID-19 pneumonia  Continue Eliquis for at least 3 months  I also notified patient to pay attention to any signs of bleeding specially  GI bleed

## 2021-05-28 ENCOUNTER — NURSING HOME VISIT (OUTPATIENT)
Dept: GERIATRICS | Facility: OTHER | Age: 56
End: 2021-05-28
Payer: MEDICARE

## 2021-05-28 DIAGNOSIS — J12.82 PNEUMONIA DUE TO COVID-19 VIRUS: Primary | ICD-10-CM

## 2021-05-28 DIAGNOSIS — U07.1 ACUTE HYPOXEMIC RESPIRATORY FAILURE DUE TO COVID-19 (HCC): ICD-10-CM

## 2021-05-28 DIAGNOSIS — D50.8 OTHER IRON DEFICIENCY ANEMIA: ICD-10-CM

## 2021-05-28 DIAGNOSIS — J30.2 SEASONAL ALLERGIES: ICD-10-CM

## 2021-05-28 DIAGNOSIS — J96.01 ACUTE HYPOXEMIC RESPIRATORY FAILURE DUE TO COVID-19 (HCC): ICD-10-CM

## 2021-05-28 DIAGNOSIS — I26.99 ACUTE PULMONARY EMBOLISM WITHOUT ACUTE COR PULMONALE, UNSPECIFIED PULMONARY EMBOLISM TYPE (HCC): ICD-10-CM

## 2021-05-28 DIAGNOSIS — U07.1 PNEUMONIA DUE TO COVID-19 VIRUS: Primary | ICD-10-CM

## 2021-05-28 DIAGNOSIS — R53.81 PHYSICAL DECONDITIONING: ICD-10-CM

## 2021-05-28 PROCEDURE — 99309 SBSQ NF CARE MODERATE MDM 30: CPT | Performed by: NURSE PRACTITIONER

## 2021-05-28 NOTE — ASSESSMENT & PLAN NOTE
-most recent chest x-ray revealed bilateral infiltrates and pulmonologist recommends repeat chest x-ray in 6-8 weeks (around July 7) and CT scan of the chest at that time if no improvement  -diminished breath sounds bibasilar along with decreased breath sounds throughout with abnormal lung sounds    No complaints of shortness of breath at rest   Shortness of breath during therapy sessions per patient  -continue albuterol as needed, Breo Ellipta, cough medicine  -will continue to monitor closely

## 2021-05-28 NOTE — PROGRESS NOTES
[facility]  POS: 31 (STR)  Progress Note    Chief Complaint/Reason for visit: STR follow up  History of Present Illness:  27-year-old female seen and examined for STR follow up  Received patient resting in bed watching TV  Was seen by pulmonology on 05/26/2021; notes reviewed  Respiratory status stable on oxygen via nasal cannula  Patient has residual cough and dyspnea from COVID-19 pneumonia  Patient states that her appetite is good  Past Medical History: unchanged from history and physical  Past Medical History:   Diagnosis Date    Anxiety     Asthma     Back pain     Sarcoidosis     Vertigo      Family History: unchanged from history and physical  Social History: unchanged from history and physical  Resident Since:  05/21/2021  Review of systems: Review of Systems   HENT: Negative  Eyes: Negative  Respiratory: Positive for cough  Shortness of breath on exertion   Cardiovascular: Negative  Endocrine: Negative  Genitourinary: Negative  Musculoskeletal: Positive for gait problem  Neurological: Negative for dizziness, syncope, speech difficulty, light-headedness, numbness and headaches  All other systems reviewed and are negative  Medications: All medication and routine orders were reviewed and updated  Allergies: Reviewed and unchanged  Consults reviewed: Other  Labs/Diagnostics (reviewed by this provider): Copy in Chart    Imaging Reviewed:  None today    Physical Exam  All vital signs were reviewed  Weight:  133 4 lb  Temp:  97 9      BP: 124/66 Pulse:  82  Resp: 16 O2 Sat:  96% oxygen  Constitutional: Normocephalic  Orientation:Person, Place, Day and Date     Physical Exam  Vitals signs and nursing note reviewed  Constitutional:       General: She is not in acute distress  Appearance: She is not toxic-appearing or diaphoretic  Comments: Middle aged woman who appears with illness  HENT:      Head: Normocephalic  Nose: No congestion or rhinorrhea  Mouth/Throat:      Mouth: Mucous membranes are moist       Pharynx: No oropharyngeal exudate  Eyes:      General: No scleral icterus  Right eye: No discharge  Left eye: No discharge  Extraocular Movements: Extraocular movements intact  Conjunctiva/sclera: Conjunctivae normal       Pupils: Pupils are equal, round, and reactive to light  Neck:      Musculoskeletal: Neck supple  No neck rigidity  Cardiovascular:      Rate and Rhythm: Normal rate and regular rhythm  Pulses: Normal pulses  Pulmonary:      Effort: Pulmonary effort is normal  No respiratory distress  Breath sounds: Rhonchi and rales present  No wheezing  Comments: Diminished breath sounds bibasilar with decreased breath sounds throughout all with scattered rhonchi and rales  O2 on via nasal cannula  Dyspneic on exertion  Abdominal:      General: Bowel sounds are normal  There is no distension  Palpations: Abdomen is soft  Tenderness: There is no abdominal tenderness  There is no guarding  Musculoskeletal:      Right lower leg: No edema  Left lower leg: No edema  Comments: Moves all 4 extremities  Lymphadenopathy:      Cervical: No cervical adenopathy  Skin:     General: Skin is warm and dry  Capillary Refill: Capillary refill takes less than 2 seconds  Neurological:      Mental Status: She is alert  Mental status is at baseline  Cranial Nerves: No cranial nerve deficit  Motor: Weakness present  Gait: Gait abnormal    Psychiatric:         Mood and Affect: Mood normal          Behavior: Behavior normal          Thought Content:  Thought content normal        Assessment/Plan:  63-year-old female with:     Pneumonia due to COVID-19 virus  -most recent chest x-ray revealed bilateral infiltrates and pulmonologist recommends repeat chest x-ray in 6-8 weeks (around July 7) and CT scan of the chest at that time if no improvement  -diminished breath sounds bibasilar along with decreased breath sounds throughout with abnormal lung sounds  No complaints of shortness of breath at rest   Shortness of breath during therapy sessions per patient  -continue albuterol as needed, Breo Ellipta, cough medicine  -will continue to monitor closely    Acute hypoxemic respiratory failure due to COVID-19 [facility])  -continue oxygen via nasal cannula to maintain O2 sat >88%  -continue to monitor    Iron deficiency anemia  -check CBC with diff on 06/01/2021    Pulmonary embolism ([address] )  -in setting of COVID-19 pneumonia  -continue apixaban as ordered for least 3 months  -no signs of active  -check CBC with diff on June 1, 2021    Seasonal allergies  -continue certrizine and fluticasone    Physical deconditioning  -multifactorial  -continue care and support at SNF for ADLs  -continue PT/OT  -fall precautions  -ensure adequate hydration and nutrition  -management of acute and chronic medical conditions as outlined    **Please note: This follow-up note was constructed using a voice recognition system  [address] [place] [address] [facility]  1/[id number]/[id number] PM

## 2021-05-28 NOTE — ASSESSMENT & PLAN NOTE
-in setting of COVID-19 pneumonia  -continue apixaban as ordered for least 3 months  -no signs of active  -check CBC with diff on June 1, 2021

## 2021-05-28 NOTE — ASSESSMENT & PLAN NOTE
-multifactorial  -continue care and support at SNF for ADLs  -continue PT/OT  -fall precautions  -ensure adequate hydration and nutrition  -management of acute and chronic medical conditions as outlined

## 2021-06-03 ENCOUNTER — NURSING HOME VISIT (OUTPATIENT)
Dept: PULMONOLOGY | Facility: CLINIC | Age: 56
End: 2021-06-03
Payer: MEDICARE

## 2021-06-03 DIAGNOSIS — J12.82 PNEUMONIA DUE TO COVID-19 VIRUS: Primary | ICD-10-CM

## 2021-06-03 DIAGNOSIS — D86.9 SARCOIDOSIS: ICD-10-CM

## 2021-06-03 DIAGNOSIS — I26.02 ACUTE SADDLE PULMONARY EMBOLISM WITH ACUTE COR PULMONALE (HCC): ICD-10-CM

## 2021-06-03 DIAGNOSIS — U07.1 PNEUMONIA DUE TO COVID-19 VIRUS: Primary | ICD-10-CM

## 2021-06-03 PROCEDURE — 99309 SBSQ NF CARE MODERATE MDM 30: CPT | Performed by: INTERNAL MEDICINE

## 2021-06-03 NOTE — ASSESSMENT & PLAN NOTE
Mr Henry Quinn will maintain on Eliquis for at least three months    I would check Ddimer before considering stopping her anticoagulation

## 2021-06-03 NOTE — PROGRESS NOTES
Assessment/Plan:    Pneumonia due to COVID-19 virus  Ms Karly Jacome is recovering well from her recent COVID pneumonia  She will continue to use her Breo daily and get a followup CXR in 6 weeks  We would like to see her in the office several weeks after discharge to do a 6 minute walk  We will do ambulatory pulse ox to determine oxygen needs prior to discharge  Pulmonary embolism [facility])  Mr Karly Jacome will maintain on Eliquis for at least three months  I would check Ddimer before considering stopping her anticoagulation       Diagnoses and all orders for this visit:    Pneumonia due to COVID-19 virus    Acute saddle pulmonary embolism with acute cor pulmonale ([facility] [address] )    Sarcoidosis          Subjective:      Patient ID: Kailey Reich is a 54 y o  female  Ms Karly Jacome is feeling well  She denies any shortness of breath of worsening cough  She is not wheezing or having any leg swelling or chest pain      The following portions of the patient's history were reviewed and updated as appropriate: allergies, current medications, past family history, past medical history, past social history, past surgical history and problem list     Review of Systems   Constitutional: Negative  Negative for unexpected weight change  HENT: Negative  Negative for postnasal drip  Eyes: Negative  Respiratory: Negative  Negative for cough, shortness of breath and wheezing  Cardiovascular: Negative  Negative for chest pain and leg swelling  Gastrointestinal: Negative  Endocrine: Negative  Genitourinary: Negative  Musculoskeletal: Negative  Allergic/Immunologic: Negative  Neurological: Negative  Hematological: Negative  Objective:      LMP  (LMP Unknown)          Physical Exam  Constitutional:       Appearance: She is well-developed  HENT:      Head: Normocephalic  Eyes:      Pupils: Pupils are equal, round, and reactive to light  Neck:      Musculoskeletal: Normal range of motion and neck supple  Cardiovascular:      Rate and Rhythm: Normal rate  Heart sounds: No murmur  Pulmonary:      Effort: Pulmonary effort is normal  No respiratory distress  Breath sounds: Normal breath sounds  No wheezing or rales  Abdominal:      Palpations: Abdomen is soft  Musculoskeletal: Normal range of motion  Skin:     General: Skin is warm and dry  Neurological:      Mental Status: She is alert and oriented to person, place, and time

## 2021-06-04 ENCOUNTER — NURSING HOME VISIT (OUTPATIENT)
Dept: GERIATRICS | Facility: OTHER | Age: 56
End: 2021-06-04
Payer: MEDICARE

## 2021-06-04 DIAGNOSIS — U07.1 PNEUMONIA DUE TO COVID-19 VIRUS: Primary | ICD-10-CM

## 2021-06-04 DIAGNOSIS — D86.9 SARCOIDOSIS: ICD-10-CM

## 2021-06-04 DIAGNOSIS — I26.02 ACUTE SADDLE PULMONARY EMBOLISM WITH ACUTE COR PULMONALE (HCC): ICD-10-CM

## 2021-06-04 DIAGNOSIS — J30.2 SEASONAL ALLERGIES: ICD-10-CM

## 2021-06-04 DIAGNOSIS — J96.01 ACUTE HYPOXEMIC RESPIRATORY FAILURE DUE TO COVID-19 (HCC): ICD-10-CM

## 2021-06-04 DIAGNOSIS — J45.20 MILD INTERMITTENT ASTHMA, UNSPECIFIED WHETHER COMPLICATED: ICD-10-CM

## 2021-06-04 DIAGNOSIS — E78.00 HYPERCHOLESTEREMIA: ICD-10-CM

## 2021-06-04 DIAGNOSIS — U07.1 ACUTE HYPOXEMIC RESPIRATORY FAILURE DUE TO COVID-19 (HCC): ICD-10-CM

## 2021-06-04 DIAGNOSIS — J12.82 PNEUMONIA DUE TO COVID-19 VIRUS: Primary | ICD-10-CM

## 2021-06-04 DIAGNOSIS — D50.8 OTHER IRON DEFICIENCY ANEMIA: ICD-10-CM

## 2021-06-04 DIAGNOSIS — R53.81 PHYSICAL DECONDITIONING: ICD-10-CM

## 2021-06-04 PROCEDURE — 99316 NF DSCHRG MGMT 30 MIN+: CPT | Performed by: NURSE PRACTITIONER

## 2021-06-04 RX ORDER — ALBUTEROL SULFATE 90 UG/1
2 AEROSOL, METERED RESPIRATORY (INHALATION)
Refills: 0
Start: 2021-06-04 | End: 2021-06-09 | Stop reason: SDUPTHER

## 2021-06-04 RX ORDER — FLUTICASONE PROPIONATE 50 MCG
1 SPRAY, SUSPENSION (ML) NASAL DAILY
Qty: 18.2 ML | Refills: 1 | Status: SHIPPED | OUTPATIENT
Start: 2021-06-04 | End: 2021-06-09 | Stop reason: SDUPTHER

## 2021-06-04 RX ORDER — FERROUS SULFATE TAB EC 324 MG (65 MG FE EQUIVALENT) 324 (65 FE) MG
324 TABLET DELAYED RESPONSE ORAL
Qty: 180 TABLET | Refills: 1 | Status: SHIPPED | OUTPATIENT
Start: 2021-06-04 | End: 2021-06-09 | Stop reason: SDUPTHER

## 2021-06-04 RX ORDER — MONTELUKAST SODIUM 10 MG/1
10 TABLET ORAL DAILY
Qty: 90 TABLET | Refills: 1 | Status: SHIPPED | OUTPATIENT
Start: 2021-06-04 | End: 2021-11-10 | Stop reason: SDUPTHER

## 2021-06-04 RX ORDER — ATORVASTATIN CALCIUM 40 MG/1
40 TABLET, FILM COATED ORAL DAILY
Qty: 90 TABLET | Refills: 1 | Status: SHIPPED | OUTPATIENT
Start: 2021-06-04 | End: 2021-06-09 | Stop reason: SDUPTHER

## 2021-06-04 NOTE — ASSESSMENT & PLAN NOTE
-multifactorial  -ambulating 150 ft with no assistance and 15 stairs with standby  -continue PT/OT at home with home health services to prevent physical deterioration, increase strength and endurance

## 2021-06-04 NOTE — PROGRESS NOTES
[address] [facility]  [address]  [phone]  DISCHARGE SUMMARY  POS: 31 (HSS)  Facility: [facility]    NAME: Madelin Stevenson  AGE: 54 y o  SEX: female  DATE OF ADMISSION:  05/21/2020  DATE OF DISCHARGE:  06/07/2021 DISCHARGE DISPOSITION:  Home    Reason for admission: Patient was admitted from [facility] for rehabilitation after hospitalization for acute hypoxemic respiratory failure due to Covid-19; pneumonia due to Covid-19 virus; pulmonary embolism; acute kidney injury    Admission Diagnoses:  Covid-19 pneumonia; acute respiratory failure; pulmonary embolism; physical deconditioning  Additional Problems:   Past Medical History:   Diagnosis Date    Anxiety     Asthma     Back pain     Sarcoidosis     Vertigo      Discharge Diagnoses: See problem list follow up recommendations below  Course of stay: Patient was admitted to [facility] for rehabilitation following hospitalization for above mentioned  During the resident's stay at [facility], she received skilled nursing care, PT, OT, dietitian support, social service support, and medical management for an overall improvement in her condition  Patient denies shortness of breath, chest pain, headache, dizziness, lightheadedness, abdominal pain, nausea, vomiting, diarrhea, or constipation  She is scheduled to be discharged home on 06/07/2021  A referral was placed to [facility] by social service  All current medications were ordered electronically to [facility] in [place] in  Labs and testing performed during stay:  None during SNF stay    Discharge Medications: See discharge medication list which was reviewed and signed  Status at time of discharge exam: Stable    Today's Visit: 6/4/20212:50 PM    Subjective:  No complaints or concerns    Review of systems:  Per review of present illness, all other systems reviewed and negative      Vitals:  Weight:  138 2 lb   BP: 103/55   temp: 97 3   HR:  90   Resp: 18  O2 sat:  98% on oxygen via nasal cannula    Exam: Physical Exam  Vitals signs and nursing note reviewed  Constitutional:       General: She is not in acute distress  Appearance: She is not toxic-appearing or diaphoretic  Comments: 51-year-old female who appears in no distress  HENT:      Head: Normocephalic  Nose: No congestion or rhinorrhea  Mouth/Throat:      Mouth: Mucous membranes are moist       Pharynx: No oropharyngeal exudate  Eyes:      General: No scleral icterus  Right eye: No discharge  Left eye: No discharge  Extraocular Movements: Extraocular movements intact  Conjunctiva/sclera: Conjunctivae normal       Pupils: Pupils are equal, round, and reactive to light  Neck:      Musculoskeletal: Neck supple  No neck rigidity  Cardiovascular:      Rate and Rhythm: Normal rate and regular rhythm  Pulses: Normal pulses  Pulmonary:      Effort: Pulmonary effort is normal  No respiratory distress  Breath sounds: No wheezing, rhonchi or rales  Comments: Decreased breath sounds bibasilar  Abdominal:      General: Bowel sounds are normal  There is no distension  Palpations: Abdomen is soft  Tenderness: There is no abdominal tenderness  There is no guarding  Musculoskeletal:      Right lower leg: No edema  Left lower leg: No edema  Comments: Moves all 4 extremities  Ambulatory   Lymphadenopathy:      Cervical: No cervical adenopathy  Skin:     General: Skin is warm and dry  Capillary Refill: Capillary refill takes less than 2 seconds  Neurological:      Mental Status: She is alert  Mental status is at baseline  Cranial Nerves: No cranial nerve deficit  Sensory: No sensory deficit  Motor: Weakness present  Psychiatric:         Mood and Affect: Mood normal          Behavior: Behavior normal          Thought Content:  Thought content normal          Discussion with patient/family and further instructions:  -Fall precautions  -Aspiration precautions  -Bleeding precautions  -Monitor for signs/symptoms of infection  -Medication list was reviewed     Follow-up Recommendations: Please follow-up with your primary care physician within 7-10 days of discharge to review medication changes and current status  Problem List Follow-up Recommendations:  63-year-old female with:    Pneumonia due to COVID-19 virus  -improving with less dyspnea on exertion and coughing less  -continue [facility]  -pulmonology recommending follow-up CXR in 6 weeks  -follow up with pulmonology within 2-3 weeks    Acute hypoxemic respiratory failure due to COVID-19 [facility])  -continue oxygen via nasal cannula until seen by pulmonology outpatient    [facility] )  -continue Eliquis for at least 3 months  -no signs of active bleeding  -follow up with pulmonology    Iron deficiency anemia  -most recent hemoglobin 13 1  -continue ferrous sulfate  -follow up with PCP for management    Seasonal allergies  -continue certrizine and fluticasone    Physical deconditioning  -multifactorial  -ambulating 150 ft with no assistance and 15 stairs with standby  -continue PT/OT at home with home health services to prevent physical deterioration, increase strength and endurance    I have spent >30 minutes with patient today in which greater than 50% of this time was spent in counseling/coordination of care regarding Intructions for management, Patient and family education and Importance of tx compliance  PCP made aware of discharge summary via epic communications  **Please note: This discharge summary was constructed using a voice recognition system       Carissa Hernandez [address], [address]  4/6/07063:47 PM

## 2021-06-04 NOTE — ASSESSMENT & PLAN NOTE
-improving with less dyspnea on exertion and coughing less  -continue [facility]  -pulmonology recommending follow-up CXR in 6 weeks  -follow up with pulmonology within 2-3 weeks

## 2021-06-04 NOTE — LETTER
June 4, 2021     Nacho Morgan, [address] [place] [address]    Patient: Wero Hutchins   YOB: 1965   Date of Visit: 6/4/2021       Dear Dr Charles Fu: Thank you for referring Wero Hutchins to me for evaluation  Below are my notes for this consultation  If you have questions, please do not hesitate to call me  I look forward to following your patient along with you  Sincerely,        FRED Douglass        CC: No Recipients  Stephen Douglass  6/4/2021  2:32 PM  Incomplete  [address] [facility]  [address]  [phone]  DISCHARGE SUMMARY  POS:   Facility:     NAME: Wero Hutchins  AGE: 54 y o  SEX: female  DATE OF ADMISSION:  DATE OF DISCHARGE: DISCHARGE DISPOSITION:     Reason for admission: Patient was admitted from *** for rehabilitation after hospitalization for     Admission Diagnoses: Additional Problems:   Discharge Diagnoses: See problem list follow up recommendations below  Course of stay: Patient was admitted to [facility] for rehabilitation following hospitalization for above mentioned  During the resident's stay at [facility], she received skilled nursing care, PT, OT, dietitian support, social service support, and medical management for an overall improvement in her condition  Patient denies shortness of breath, chest pain, headache, dizziness, lightheadedness, abdominal pain, nausea, vomiting, diarrhea, or constipation  She is scheduled to be discharged home on 06/07/2021  A referral was placed to [facility] by social service  All current medications were ordered electronically to [facility] in      Labs and testing performed during stay:  None during SNF stay    Discharge Medications: See discharge medication list which was reviewed and signed  Status at time of discharge exam: Stable    Today's Visit: 6/4/20212:27 PM    Subjective:  No complaints or concerns    Review of systems:  Per review of present illness, all other systems reviewed and negative  Vitals:  Weight:  138 2 lb   BP: 103/55   temp:  97 3   HR:  90   Resp: 18  O2 sat:  98% on oxygen via nasal cannula    Exam: Physical Exam  Vitals signs and nursing note reviewed  Constitutional:       General: She is not in acute distress  Appearance: She is not toxic-appearing or diaphoretic  Comments: 49-year-old female who appears in no distress  HENT:      Head: Normocephalic  Nose: No congestion or rhinorrhea  Mouth/Throat:      Mouth: Mucous membranes are moist       Pharynx: No oropharyngeal exudate  Eyes:      General: No scleral icterus  Right eye: No discharge  Left eye: No discharge  Extraocular Movements: Extraocular movements intact  Conjunctiva/sclera: Conjunctivae normal       Pupils: Pupils are equal, round, and reactive to light  Neck:      Musculoskeletal: Neck supple  No neck rigidity  Cardiovascular:      Rate and Rhythm: Normal rate and regular rhythm  Pulses: Normal pulses  Pulmonary:      Effort: Pulmonary effort is normal  No respiratory distress  Breath sounds: No wheezing, rhonchi or rales  Comments: Decreased breath sounds bibasilar  Abdominal:      General: Bowel sounds are normal  There is no distension  Palpations: Abdomen is soft  Tenderness: There is no abdominal tenderness  There is no guarding  Musculoskeletal:      Right lower leg: No edema  Left lower leg: No edema  Comments: Moves all 4 extremities  Ambulatory   Lymphadenopathy:      Cervical: No cervical adenopathy  Skin:     General: Skin is warm and dry  Capillary Refill: Capillary refill takes less than 2 seconds  Neurological:      Mental Status: She is alert  Mental status is at baseline  Cranial Nerves: No cranial nerve deficit  Sensory: No sensory deficit  Motor: Weakness present     Psychiatric: Mood and Affect: Mood normal          Behavior: Behavior normal          Thought Content: Thought content normal          Discussion with patient/family and further instructions:  -Fall precautions  -Aspiration precautions  -Bleeding precautions  -Monitor for signs/symptoms of infection  -Medication list was reviewed     Follow-up Recommendations: Please follow-up with your primary care physician within 7-10 days of discharge to review medication changes and current status  Problem List Follow-up Recommendations:  77-year-old female with:    Pneumonia due to COVID-19 virus  -improving with less dyspnea on exertion and coughing less  -continue [facility]  -pulmonology recommending follow-up CXR in 6 weeks  -follow up with pulmonology within 2-3 weeks    Acute hypoxemic respiratory failure due to COVID-19 [facility])  -continue oxygen via nasal cannula until seen by pulmonology outpatient    [facility]  -the Eliquis for at least 3 months  -no signs of active bleeding  -follow up with pulmonology    Iron deficiency anemia  -most recent hemoglobin 13 1  -follow up with PCP for management    Seasonal allergies  -continue certrizine and fluticasone    Physical deconditioning  -multifactorial  -ambulating 150 ft with no assistance and 15 stairs with standby  -continue PT/OT at home with home health services to prevent physical deterioration, increase strength and endurance    I have spent >30 minutes with patient today in which greater than 50% of this time was spent in counseling/coordination of care regarding Intructions for management, Patient and family education and Importance of tx compliance  PCP made aware of discharge summary via epic communications  **Please note: This discharge summary was constructed using a voice recognition system       Carissa [address], [address]   7/2/11706:24 PM

## 2021-06-04 NOTE — LETTER
June 4, 2021     Rocío Ascencio, [address] [facility] [address] [facility] [address]    Patient: Luisa Davis   YOB: 1965   Date of Visit: 6/4/2021       Dear Dr Stanford Due: Thank you for referring Luisa Davis to me for evaluation  Below are my notes for this consultation  If you have questions, please do not hesitate to call me  I look forward to following your patient along with you  Sincerely,        FRED Saavedra        CC: No Recipients  Roxy Renner, [address]  6/4/2021  2:32 PM  Incomplete  [address] [facility]  [address]  [phone]  DISCHARGE SUMMARY  POS:   Facility:     NAME: Luisa Davis  AGE: 54 y o  SEX: female  DATE OF ADMISSION:  DATE OF DISCHARGE: DISCHARGE DISPOSITION:     Reason for admission: Patient was admitted from *** for rehabilitation after hospitalization for     Admission Diagnoses: Additional Problems:   Discharge Diagnoses: See problem list follow up recommendations below  Course of stay: Patient was admitted to [facility] for rehabilitation following hospitalization for above mentioned  During the resident's stay at [facility], she received skilled nursing care, PT, OT, dietitian support, social service support, and medical management for an overall improvement in her condition  Patient denies shortness of breath, chest pain, headache, dizziness, lightheadedness, abdominal pain, nausea, vomiting, diarrhea, or constipation  She is scheduled to be discharged home on 06/07/2021  A referral was placed to [facility] by social service  All current medications were ordered electronically to [facility] in [facility] in      Labs and testing performed during stay:  None during SNF stay    Discharge Medications: See discharge medication list which was reviewed and signed  Status at time of discharge exam: Stable    Today's Visit: 6/4/20212:27 PM    Subjective:  No complaints or concerns    Review of systems:  Per review of present illness, all other systems reviewed and negative  Vitals:  Weight:  138 2 lb   BP: 103/55   temp:  97 3   HR:  90   Resp: 18  O2 sat:  98% on oxygen via nasal cannula    Exam: Physical Exam  Vitals signs and nursing note reviewed  Constitutional:       General: She is not in acute distress  Appearance: She is not toxic-appearing or diaphoretic  Comments: 44-year-old female who appears in no distress  HENT:      Head: Normocephalic  Nose: No congestion or rhinorrhea  Mouth/Throat:      Mouth: Mucous membranes are moist       Pharynx: No oropharyngeal exudate  Eyes:      General: No scleral icterus  Right eye: No discharge  Left eye: No discharge  Extraocular Movements: Extraocular movements intact  Conjunctiva/sclera: Conjunctivae normal       Pupils: Pupils are equal, round, and reactive to light  Neck:      Musculoskeletal: Neck supple  No neck rigidity  Cardiovascular:      Rate and Rhythm: Normal rate and regular rhythm  Pulses: Normal pulses  Pulmonary:      Effort: Pulmonary effort is normal  No respiratory distress  Breath sounds: No wheezing, rhonchi or rales  Comments: Decreased breath sounds bibasilar  Abdominal:      General: Bowel sounds are normal  There is no distension  Palpations: Abdomen is soft  Tenderness: There is no abdominal tenderness  There is no guarding  Musculoskeletal:      Right lower leg: No edema  Left lower leg: No edema  Comments: Moves all 4 extremities  Ambulatory   Lymphadenopathy:      Cervical: No cervical adenopathy  Skin:     General: Skin is warm and dry  Capillary Refill: Capillary refill takes less than 2 seconds  Neurological:      Mental Status: She is alert  Mental status is at baseline  Cranial Nerves: No cranial nerve deficit  Sensory: No sensory deficit  Motor: Weakness present     Psychiatric: Mood and Affect: Mood normal          Behavior: Behavior normal          Thought Content: Thought content normal          Discussion with patient/family and further instructions:  -Fall precautions  -Aspiration precautions  -Bleeding precautions  -Monitor for signs/symptoms of infection  -Medication list was reviewed     Follow-up Recommendations: Please follow-up with your primary care physician within 7-10 days of discharge to review medication changes and current status  Problem List Follow-up Recommendations:  66-year-old female with:    Pneumonia due to COVID-19 virus  -improving with less dyspnea on exertion and coughing less  -continue [facility]  -pulmonology recommending follow-up CXR in 6 weeks  -follow up with pulmonology within 2-3 weeks    Acute hypoxemic respiratory failure due to COVID-19 [facility])  -continue oxygen via nasal cannula until seen by pulmonology outpatient    P[facility] ([facility] )  -the Eliquis for at least 3 months  -no signs of active bleeding  -follow up with pulmonology    Iron deficiency anemia  -most recent hemoglobin 13 1  -follow up with PCP for management    Seasonal allergies  -continue certrizine and fluticasone    Physical deconditioning  -multifactorial  -ambulating 150 ft with no assistance and 15 stairs with standby  -continue PT/OT at home with home health services to prevent physical deterioration, increase strength and endurance    I have spent >30 minutes with patient today in which greater than 50% of this time was spent in counseling/coordination of care regarding Intructions for management, Patient and family education and Importance of tx compliance  PCP made aware of discharge summary via epic communications  **Please note: This discharge summary was constructed using a voice recognition system       Carissa [address], [address]   3/8/89478:60 PM

## 2021-06-04 NOTE — LETTER
June 4, 2021     Author He, [address] [facility] [address]    Patient: Zuleyka Theodore   YOB: 1965   Date of Visit: 6/4/2021       Dear Dr Azul Rios: Thank you for referring Zuleyka Theodore to me for evaluation  Below are my notes for this consultation  If you have questions, please do not hesitate to call me  I look forward to following your patient along with you  Sincerely,        FRED Marr        CC: No Recipients  Leda Marr  6/4/2021  2:52 PM  Sign when Signing Visit  [address] [facility]  [address] [phone]  DISCHARGE SUMMARY  POS: 31 (CWO)  Facility: [facility]    NAME: Zuleyka Theodore  AGE: 54 y o  SEX: female  DATE OF ADMISSION:  05/21/2020  DATE OF DISCHARGE:  06/07/2021 DISCHARGE DISPOSITION:  Home    Reason for admission: Patient was admitted from [facility] for rehabilitation after hospitalization for acute hypoxemic respiratory failure due to Covid-19; pneumonia due to Covid-19 virus; pulmonary embolism; acute kidney injury    Admission Diagnoses:  Covid-19 pneumonia; acute respiratory failure; pulmonary embolism; physical deconditioning  Additional Problems:   Past Medical History:   Diagnosis Date    Anxiety     Asthma     Back pain     Sarcoidosis     Vertigo      Discharge Diagnoses: See problem list follow up recommendations below  Course of stay: Patient was admitted to [facility] for rehabilitation following hospitalization for above mentioned  During the resident's stay at [facility], she received skilled nursing care, PT, OT, dietitian support, social service support, and medical management for an overall improvement in her condition  Patient denies shortness of breath, chest pain, headache, dizziness, lightheadedness, abdominal pain, nausea, vomiting, diarrhea, or constipation  She is scheduled to be discharged home on 06/07/2021    A referral was placed to [facility] by social service  All current medications were ordered electronically to [facility] in [place] in  Labs and testing performed during stay:  None during SNF stay    Discharge Medications: See discharge medication list which was reviewed and signed  Status at time of discharge exam: Stable    Today's Visit: 6/4/20212:50 PM    Subjective:  No complaints or concerns    Review of systems:  Per review of present illness, all other systems reviewed and negative  Vitals:  Weight:  138 2 lb   BP: 103/55   temp:  97 3   HR:  90   Resp: 18  O2 sat:  98% on oxygen via nasal cannula    Exam: Physical Exam  Vitals signs and nursing note reviewed  Constitutional:       General: She is not in acute distress  Appearance: She is not toxic-appearing or diaphoretic  Comments: 26-year-old female who appears in no distress  HENT:      Head: Normocephalic  Nose: No congestion or rhinorrhea  Mouth/Throat:      Mouth: Mucous membranes are moist       Pharynx: No oropharyngeal exudate  Eyes:      General: No scleral icterus  Right eye: No discharge  Left eye: No discharge  Extraocular Movements: Extraocular movements intact  Conjunctiva/sclera: Conjunctivae normal       Pupils: Pupils are equal, round, and reactive to light  Neck:      Musculoskeletal: Neck supple  No neck rigidity  Cardiovascular:      Rate and Rhythm: Normal rate and regular rhythm  Pulses: Normal pulses  Pulmonary:      Effort: Pulmonary effort is normal  No respiratory distress  Breath sounds: No wheezing, rhonchi or rales  Comments: Decreased breath sounds bibasilar  Abdominal:      General: Bowel sounds are normal  There is no distension  Palpations: Abdomen is soft  Tenderness: There is no abdominal tenderness  There is no guarding  Musculoskeletal:      Right lower leg: No edema  Left lower leg: No edema  Comments: Moves all 4 extremities  Ambulatory   Lymphadenopathy:      Cervical: No cervical adenopathy  Skin:     General: Skin is warm and dry  Capillary Refill: Capillary refill takes less than 2 seconds  Neurological:      Mental Status: She is alert  Mental status is at baseline  Cranial Nerves: No cranial nerve deficit  Sensory: No sensory deficit  Motor: Weakness present  Psychiatric:         Mood and Affect: Mood normal          Behavior: Behavior normal          Thought Content: Thought content normal          Discussion with patient/family and further instructions:  -Fall precautions  -Aspiration precautions  -Bleeding precautions  -Monitor for signs/symptoms of infection  -Medication list was reviewed     Follow-up Recommendations: Please follow-up with your primary care physician within 7-10 days of discharge to review medication changes and current status       Problem List Follow-up Recommendations:  54-year-old female with:    Pneumonia due to COVID-19 virus  -improving with less dyspnea on exertion and coughing less  -continue [facility]  -pulmonology recommending follow-up CXR in 6 weeks  -follow up with pulmonology within 2-3 weeks    Acute hypoxemic respiratory failure due to COVID-19 [facility])  -continue oxygen via nasal cannula until seen by pulmonology outpatient    [facility]  -continue Eliquis for at least 3 months  -no signs of active bleeding  -follow up with pulmonology    Iron deficiency anemia  -most recent hemoglobin 13 1  -continue ferrous sulfate  -follow up with PCP for management    Seasonal allergies  -continue certrizine and fluticasone    Physical deconditioning  -multifactorial  -ambulating 150 ft with no assistance and 15 stairs with standby  -continue PT/OT at home with home health services to prevent physical deterioration, increase strength and endurance    I have spent >30 minutes with patient today in which greater than 50% of this time was spent in counseling/coordination of care regarding Intructions for management, Patient and family education and Importance of tx compliance  PCP made aware of discharge summary via epic communications  **Please note: This discharge summary was constructed using a voice recognition system       Carissa Hernandez [address], [address] [facility]  2/0/80393:41 PM

## 2021-06-07 RX ORDER — LATANOPROST 50 UG/ML
SOLUTION/ DROPS OPHTHALMIC
COMMUNITY
Start: 2021-04-21

## 2021-06-09 ENCOUNTER — TELEPHONE (OUTPATIENT)
Dept: PULMONOLOGY | Facility: CLINIC | Age: 56
End: 2021-06-09

## 2021-06-09 ENCOUNTER — OFFICE VISIT (OUTPATIENT)
Dept: FAMILY MEDICINE CLINIC | Facility: CLINIC | Age: 56
End: 2021-06-09

## 2021-06-09 ENCOUNTER — TRANSITIONAL CARE MANAGEMENT (OUTPATIENT)
Dept: FAMILY MEDICINE CLINIC | Facility: CLINIC | Age: 56
End: 2021-06-09

## 2021-06-09 VITALS
BODY MASS INDEX: 29.15 KG/M2 | WEIGHT: 144.6 LBS | DIASTOLIC BLOOD PRESSURE: 70 MMHG | HEART RATE: 103 BPM | RESPIRATION RATE: 17 BRPM | SYSTOLIC BLOOD PRESSURE: 124 MMHG | TEMPERATURE: 97.7 F | OXYGEN SATURATION: 96 % | HEIGHT: 59 IN

## 2021-06-09 DIAGNOSIS — E78.00 HYPERCHOLESTEREMIA: ICD-10-CM

## 2021-06-09 DIAGNOSIS — J06.9 VIRAL URI WITH COUGH: ICD-10-CM

## 2021-06-09 DIAGNOSIS — D86.9 SARCOIDOSIS: ICD-10-CM

## 2021-06-09 DIAGNOSIS — J45.20 MILD INTERMITTENT ASTHMA, UNSPECIFIED WHETHER COMPLICATED: ICD-10-CM

## 2021-06-09 DIAGNOSIS — J12.82 PNEUMONIA DUE TO COVID-19 VIRUS: ICD-10-CM

## 2021-06-09 DIAGNOSIS — I27.82 OTHER CHRONIC PULMONARY EMBOLISM WITHOUT ACUTE COR PULMONALE (HCC): Primary | ICD-10-CM

## 2021-06-09 DIAGNOSIS — D50.8 OTHER IRON DEFICIENCY ANEMIA: ICD-10-CM

## 2021-06-09 DIAGNOSIS — L29.9 PRURITUS: ICD-10-CM

## 2021-06-09 DIAGNOSIS — R26.9 GAIT ABNORMALITY: ICD-10-CM

## 2021-06-09 DIAGNOSIS — U07.1 ACUTE HYPOXEMIC RESPIRATORY FAILURE DUE TO COVID-19 (HCC): ICD-10-CM

## 2021-06-09 DIAGNOSIS — J96.01 ACUTE HYPOXEMIC RESPIRATORY FAILURE DUE TO COVID-19 (HCC): ICD-10-CM

## 2021-06-09 DIAGNOSIS — U07.1 PNEUMONIA DUE TO COVID-19 VIRUS: ICD-10-CM

## 2021-06-09 DIAGNOSIS — J30.2 SEASONAL ALLERGIES: ICD-10-CM

## 2021-06-09 PROBLEM — N17.9 ACUTE KIDNEY FAILURE, UNSPECIFIED (HCC): Status: ACTIVE | Noted: 2021-05-21

## 2021-06-09 PROBLEM — I25.10 ATHEROSCLEROTIC HEART DISEASE OF NATIVE CORONARY ARTERY WITHOUT ANGINA PECTORIS: Status: ACTIVE | Noted: 2021-05-21

## 2021-06-09 PROBLEM — R13.12 DYSPHAGIA, OROPHARYNGEAL PHASE: Status: ACTIVE | Noted: 2021-05-21

## 2021-06-09 PROBLEM — Z79.51 LONG TERM (CURRENT) USE OF INHALED STEROIDS: Status: ACTIVE | Noted: 2021-05-21

## 2021-06-09 PROBLEM — R42 DISEQUILIBRIUM: Status: ACTIVE | Noted: 2021-05-21

## 2021-06-09 PROCEDURE — 99496 TRANSJ CARE MGMT HIGH F2F 7D: CPT | Performed by: NURSE PRACTITIONER

## 2021-06-09 RX ORDER — FLUTICASONE PROPIONATE 50 MCG
1 SPRAY, SUSPENSION (ML) NASAL DAILY
Qty: 18.2 ML | Refills: 1 | Status: SHIPPED | OUTPATIENT
Start: 2021-06-09 | End: 2022-03-01 | Stop reason: SDUPTHER

## 2021-06-09 RX ORDER — FERROUS SULFATE TAB EC 324 MG (65 MG FE EQUIVALENT) 324 (65 FE) MG
324 TABLET DELAYED RESPONSE ORAL
Qty: 180 TABLET | Refills: 1 | Status: SHIPPED | OUTPATIENT
Start: 2021-06-09 | End: 2021-11-19 | Stop reason: SDUPTHER

## 2021-06-09 RX ORDER — MELATONIN
2000 DAILY
Qty: 90 TABLET | Refills: 1 | Status: SHIPPED | OUTPATIENT
Start: 2021-06-09 | End: 2021-11-29 | Stop reason: SDUPTHER

## 2021-06-09 RX ORDER — ECHINACEA PURPUREA EXTRACT 125 MG
1 TABLET ORAL AS NEEDED
Qty: 60 ML | Refills: 3 | Status: SHIPPED | OUTPATIENT
Start: 2021-06-09 | End: 2022-03-01 | Stop reason: SDUPTHER

## 2021-06-09 RX ORDER — BENZONATATE 200 MG/1
200 CAPSULE ORAL EVERY 8 HOURS
Qty: 45 CAPSULE | Refills: 1 | Status: SHIPPED | OUTPATIENT
Start: 2021-06-09 | End: 2021-08-23

## 2021-06-09 RX ORDER — AMOXICILLIN 250 MG
1 CAPSULE ORAL
Qty: 100 TABLET | Refills: 1 | Status: SHIPPED | OUTPATIENT
Start: 2021-06-09 | End: 2022-03-01 | Stop reason: SDUPTHER

## 2021-06-09 RX ORDER — CETIRIZINE HYDROCHLORIDE 10 MG/1
10 TABLET ORAL DAILY
Qty: 90 TABLET | Refills: 1 | Status: SHIPPED | OUTPATIENT
Start: 2021-06-09 | End: 2021-11-19

## 2021-06-09 RX ORDER — ALBUTEROL SULFATE 90 UG/1
2 AEROSOL, METERED RESPIRATORY (INHALATION)
Refills: 0
Start: 2021-06-09 | End: 2021-08-03 | Stop reason: SDUPTHER

## 2021-06-09 RX ORDER — ACETAMINOPHEN 325 MG/1
650 TABLET ORAL EVERY 6 HOURS PRN
Refills: 0
Start: 2021-06-09

## 2021-06-09 RX ORDER — IPRATROPIUM BROMIDE AND ALBUTEROL SULFATE 2.5; .5 MG/3ML; MG/3ML
3 SOLUTION RESPIRATORY (INHALATION) EVERY 6 HOURS PRN
Qty: 540 ML | Refills: 1 | Status: SHIPPED | OUTPATIENT
Start: 2021-06-09 | End: 2021-07-09

## 2021-06-09 RX ORDER — IPRATROPIUM BROMIDE AND ALBUTEROL SULFATE 2.5; .5 MG/3ML; MG/3ML
SOLUTION RESPIRATORY (INHALATION)
COMMUNITY
Start: 2021-05-31 | End: 2021-06-09 | Stop reason: SDUPTHER

## 2021-06-09 RX ORDER — ATORVASTATIN CALCIUM 40 MG/1
40 TABLET, FILM COATED ORAL DAILY
Qty: 90 TABLET | Refills: 1 | Status: SHIPPED | OUTPATIENT
Start: 2021-06-09 | End: 2021-11-10 | Stop reason: SDUPTHER

## 2021-06-09 RX ORDER — HYDROXYZINE HYDROCHLORIDE 25 MG/1
25 TABLET, FILM COATED ORAL 2 TIMES DAILY
Qty: 60 TABLET | Refills: 0 | Status: SHIPPED | OUTPATIENT
Start: 2021-06-09 | End: 2021-08-23

## 2021-06-09 NOTE — PROGRESS NOTES
BMI Counseling: Body mass index is 29 21 kg/m²  The BMI is above normal  Nutrition recommendations include decreasing portion sizes, encouraging healthy choices of fruits and vegetables, decreasing fast food intake, consuming healthier snacks, limiting drinks that contain sugar, moderation in carbohydrate intake, increasing intake of lean protein, reducing intake of saturated and trans fat and reducing intake of cholesterol  Exercise recommendations include exercising 3-5 times per week and strength training exercises  No pharmacotherapy was ordered  Patient referred to PCP due to patient being overweight   She had a lot of muscle waste   Will initiate home health       Assessment/Plan:    Reviewed   [facility]  Patient had COVID - with complications --secondary to sarcoid issues and developed a PE with respiratory failure - currently on 2 LNC Oxygen     Has follow up with [facility]   Hematology referral in placed   Labs follow up ordered for 2 weeks   Refills provided   Discussed importance of blood thinners use hydration and monitoring for s/s of bleeding   Discussion with patient/family and further instructions:  Reviewed the following :     -Fall precautions  -Aspiration precautions  -Bleeding precautions  -Monitor for signs/symptoms of infection  -Medication list was reviewed      Follow-up Recommendations: Please follow-up with your primary care physician within 7-10 days of discharge to review medication changes and current status       Problem List Follow-up Recommendations:  59-year-old female with:     Pneumonia due to COVID-19 virus  -improving with less dyspnea on exertion and coughing less  -continue [facility]  -pulmonology recommending follow-up CXR in 6 weeks  -follow up with pulmonology within 2-3 weeks     Acute hypoxemic respiratory failure due to COVID-19 ([facility] [address] )  -continue oxygen via nasal cannula until seen by pulmonology outpatient     Pulmonary embolism ([facility] )  -continue Eliquis for at least 3 months  -no signs of active bleeding  -follow up with pulmonology     Iron deficiency anemia  -most recent hemoglobin 13 1  -continue ferrous sulfate  -follow up with PCP for management     Seasonal allergies  -continue certrizine and fluticasone     Physical deconditioning  -multifactorial  -ambulating 150 ft with no assistance and 15 stairs with standby  -continue PT/OT at home with home health services to prevent physical deterioration, increase strength and endurance     I have spent >30 minutes with patient today in which greater than 50% of this time was spent in counseling/coordination of care regarding Intructions for management, Patient and family education and Importance of tx compliance      PCP made aware of discharge summary via epic communications      **Please note:  This discharge summary was constructed using a voice recognition system    [facility]  6/4/20212:50 PM     Problem List Items Addressed This Visit        Respiratory    Viral URI with cough    Relevant Medications    sodium chloride (OCEAN) 0 65 % nasal spray    Acute hypoxemic respiratory failure due to COVID-19 [facility])    Relevant Medications    albuterol (PROVENTIL HFA,VENTOLIN HFA) 90 mcg/act inhaler    benzonatate (TESSALON) 200 MG capsule    cholecalciferol (VITAMIN D3) 1,000 units tablet    ipratropium (ATROVENT HFA) 17 mcg/act inhaler    senna-docusate sodium (SENOKOT S) 8 6-50 mg per tablet    Other Relevant Orders    Comprehensive metabolic panel    CBC and differential    UA (URINE) with reflex to Scope    Ambulatory referral to Hematology / Oncology    Ambulatory Referral to Home Health    Pneumonia due to COVID-19 virus    Relevant Medications    acetaminophen (TYLENOL) 325 mg tablet    albuterol (PROVENTIL HFA,VENTOLIN HFA) 90 mcg/act inhaler    apixaban (ELIQUIS) 5 mg    benzonatate (TESSALON) 200 MG capsule    cetirizine (ZyrTEC) 10 mg tablet    cholecalciferol (VITAMIN D3) 1,000 units tablet fluticasone-salmeterol (ADVAIR, WIXELA) 250-50 mcg/dose inhaler    fluticasone (FLONASE) 50 mcg/act nasal spray    ipratropium (ATROVENT HFA) 17 mcg/act inhaler    ipratropium-albuterol (DUO-NEB) 0 5-2 5 mg/3 mL nebulizer solution    sodium chloride (OCEAN) 0 65 % nasal spray    Other Relevant Orders    Comprehensive metabolic panel    CBC and differential    UA (URINE) with reflex to Scope    Ambulatory referral to Hematology / Oncology    Ambulatory Referral to [address] [place] [address] Eddie Carrasco       Cardiovascular and Mediastinum    Pulmonary embolism ([facility] [address] ) - Primary    Relevant Medications    apixaban (ELIQUIS) 5 mg    ipratropium-albuterol (DUO-NEB) 0 5-2 5 mg/3 mL nebulizer solution    Other Relevant Orders    Comprehensive metabolic panel    CBC and differential    UA (URINE) with reflex to Scope    Ambulatory referral to Hematology / Oncology    Ambulatory Referral to [address] [place] [address] Eddie Carrasco       Other    Seasonal allergies    Relevant Medications    fluticasone (FLONASE) 50 mcg/act nasal spray    Iron deficiency anemia    Relevant Medications    ferrous sulfate 324 (65 Fe) mg    Other Relevant Orders    Comprehensive metabolic panel    CBC and differential    UA (URINE) with reflex to Scope    Ambulatory referral to Hematology / Oncology    Sarcoidosis    Relevant Medications    albuterol (PROVENTIL HFA,VENTOLIN HFA) 90 mcg/act inhaler    apixaban (ELIQUIS) 5 mg    ipratropium (ATROVENT HFA) 17 mcg/act inhaler    ipratropium-albuterol (DUO-NEB) 0 5-2 5 mg/3 mL nebulizer solution    Other Relevant Orders    Comprehensive metabolic panel    CBC and differential    UA (URINE) with reflex to Scope    Ambulatory referral to Hematology / Oncology    Ambulatory Referral to [address] [place] [address] Eddie Carrasco      Other Visit Diagnoses     Hypercholesteremia        Relevant Medications    atorvastatin (LIPITOR) 40 mg tablet    Mild intermittent asthma, unspecified whether complicated        Relevant Medications    albuterol (PROVENTIL HFA,VENTOLIN HFA) 90 mcg/act inhaler cetirizine (ZyrTEC) 10 mg tablet    fluticasone-salmeterol (ADVAIR, WIXELA) 250-50 mcg/dose inhaler    ipratropium (ATROVENT HFA) 17 mcg/act inhaler    ipratropium-albuterol (DUO-NEB) 0 5-2 5 mg/3 mL nebulizer solution    Pruritus        Relevant Medications    hydrOXYzine HCL (ATARAX) 25 mg tablet    Gait abnormality        Relevant Orders    Ambulatory Referral to [address] [place] [address] Eddie Carrasco        Reason for admission: Patient was admitted from [facility] for rehabilitation after hospitalization for acute hypoxemic respiratory failure due to Covid-19; pneumonia due to Covid-19 virus; pulmonary embolism; acute kidney injury     Admission Diagnoses:  Covid-19 pneumonia; acute respiratory failure; pulmonary embolism; physical deconditioning  Additional Problems:   Medical History        Past Medical History:   Diagnosis Date    Anxiety      Asthma      Back pain      Sarcoidosis      Vertigo          Discharge Diagnoses: See problem list follow up recommendations below      Course of stay: Patient was admitted to [address] [place] [address] for rehabilitation following hospitalization for above mentioned  During the resident's stay at [address] [place] [address], she received skilled nursing care, PT, OT, dietitian support, social service support, and medical management for an overall improvement in her condition  Patient denies shortness of breath, chest pain, headache, dizziness, lightheadedness, abdominal pain, nausea, vomiting, diarrhea, or constipation  She is scheduled to be discharged home on 06/07/2021  A referral was placed to [facility] by social service    All current medications were ordered electronically to [facility] in      Labs and testing performed during stay:  None during SNF stay     Discharge Medications: See discharge medication list which was reviewed and signed  Status at time of discharge exam: Stable     Today's Visit: 6/4/20212:50 PM     Subjective:  No complaints or concerns     Review of systems:  Per review of present illness, all other systems reviewed and negative      Vitals:  Weight:  138 2 lb   BP: 103/55   temp:  97 3   HR:  90   Resp: 18  O2 sat:  98% on oxygen via nasal cannula      Subjective:      Patient ID: Joshua Enriquez is a 54 y o  female  Presents in office post follow up from rehab facility   Patient had COVID - with complications --secondary to sarcoid issues and developed a PE with respiratory failure - currently on 2 LNC Oxygen- she was inpatient and in ICU and due to decline in phsycial status and weakness - muscle waste she was sent to rehab     Here for transition of care however her insurance was found inactive --? We will see her as follow up and review meds and evaluate as she needs  Medications refilled enough until she sees PULM    Will discuss need for labs       The following portions of the patient's history were reviewed and updated as appropriate:   Past Medical History:  She has a past medical history of Anxiety, Asthma, Back pain, Sarcoidosis, and Vertigo  ,  _______________________________________________________________________  Medical Problems:  does not have any pertinent problems on file ,  _______________________________________________________________________  Past Surgical History:   has a past surgical history that includes Mammo (historical) (05/02/2018)  ,  _______________________________________________________________________  Family History:  family history includes Asthma in her mother; Hypertension in her mother; Mental illness in her mother ,  _______________________________________________________________________  Social History:   reports that she has never smoked  She has never used smokeless tobacco  She reports previous alcohol use  She reports previous drug use  Drug: Marijuana  ,  _______________________________________________________________________  Allergies:  is allergic to carbamazepine     _______________________________________________________________________  Current Outpatient Medications   Medication Sig Dispense Refill    acetaminophen (TYLENOL) 325 mg tablet Take 2 tablets (650 mg total) by mouth every 6 (six) hours as needed for mild pain  0    albuterol (PROVENTIL HFA,VENTOLIN HFA) 90 mcg/act inhaler Inhale 2 puffs 4 (four) times a day  0    apixaban (ELIQUIS) 5 mg Take 1 tablet (5 mg total) by mouth 2 (two) times a day 180 tablet 0    atorvastatin (LIPITOR) 40 mg tablet Take 1 tablet (40 mg total) by mouth daily 90 tablet 1    benzonatate (TESSALON) 200 MG capsule Take 1 capsule (200 mg total) by mouth every 8 (eight) hours As needed for excessive cough 45 capsule 1    cetirizine (ZyrTEC) 10 mg tablet Take 1 tablet (10 mg total) by mouth daily 90 tablet 1    cholecalciferol (VITAMIN D3) 1,000 units tablet Take 2 tablets (2,000 Units total) by mouth daily 90 tablet 1    dextromethorphan-guaiFENesin (ROBITUSSIN DM)  mg/5 mL syrup Take 10 mL by mouth every 4 (four) hours as needed for cough  0    ferrous sulfate 324 (65 Fe) mg Take 1 tablet (324 mg total) by mouth 2 (two) times a day before meals 180 tablet 1    fluticasone (FLONASE) 50 mcg/act nasal spray 1 spray into each nostril daily 18 2 mL 1    fluticasone-salmeterol (ADVAIR, WIXELA) 250-50 mcg/dose inhaler Inhale 1 puff 2 (two) times a day Rinse mouth after use   60 blister 2    hydrOXYzine HCL (ATARAX) 25 mg tablet Take 1 tablet (25 mg total) by mouth 2 (two) times a day 60 tablet 0    ipratropium (ATROVENT HFA) 17 mcg/act inhaler Inhale 2 puffs 4 (four) times a day  0    ipratropium-albuterol (DUO-NEB) 0 5-2 5 mg/3 mL nebulizer solution Take 3 mL by nebulization every 6 (six) hours as needed for wheezing or shortness of breath 540 mL 1    ketotifen (ZADITOR) 0 025 % ophthalmic solution Administer 1 drop to both eyes 2 (two) times a day for 10 days 1 mL 0    latanoprost (XALATAN) 0 005 % ophthalmic solution place 1 drop into both eyes at bedtime      meclizine (ANTIVERT) 12 5 MG tablet Take 1 tablet (12 5 mg total) by mouth every 8 (eight) hours as needed for dizziness 90 tablet 1    montelukast (SINGULAIR) 10 mg tablet Take 1 tablet (10 mg total) by mouth daily 90 tablet 1    multivitamin-minerals (CENTRUM ADULTS) tablet Take 1 tablet by mouth daily  0    senna-docusate sodium (SENOKOT S) 8 6-50 mg per tablet Take 1 tablet by mouth daily at bedtime as needed for constipation 100 tablet 1    sodium chloride (OCEAN) 0 65 % nasal spray 1 spray into each nostril as needed for congestion for up to 12 doses 60 mL 3    Vitamins A & D (VITAMIN A & D) ointment Apply topically as needed for dry skin (to face) 45 g 1     No current facility-administered medications for this visit       _______________________________________________________________________  Review of Systems   Constitutional: Positive for fatigue and unexpected weight change (weight loss muscle waste )  Negative for fever  HENT: Positive for congestion and sinus pressure  Negative for sore throat  Respiratory: Positive for cough and shortness of breath (improved )  S/p complicated post COVID hospitalization   On continues Oxygen at 2 L at this point and nebulizer treatments at home   UNDERLYING SARCOIDOSIS    Cardiovascular: Negative for chest pain and palpitations  Gastrointestinal: Negative for abdominal distention, abdominal pain, nausea and vomiting  Genitourinary: Negative for difficulty urinating and flank pain  Musculoskeletal: Positive for arthralgias, gait problem and myalgias  Muscle wasting    Skin: Negative  Allergic/Immunologic: Positive for environmental allergies  Neurological: Positive for weakness and headaches  Hematological: Negative for adenopathy  Psychiatric/Behavioral: Positive for sleep disturbance  Negative for suicidal ideas  The patient is nervous/anxious            Objective:  Vitals: 06/09/21 1643   BP: 124/70   BP Location: Left arm   Patient Position: Sitting   Cuff Size: Standard   Pulse: 103   Resp: 17   Temp: 97 7 °F (36 5 °C)   TempSrc: Temporal   SpO2: 96%   Weight: 65 6 kg (144 lb 9 6 oz)   Height: 4' 11" (1 499 m)     Body mass index is 29 21 kg/m²  Physical Exam  Vitals signs and nursing note reviewed  Constitutional:       Appearance: Normal appearance  Comments: BMI 29 21   Increased weakness and weight loss post hospitalization   HENT:      Head: Atraumatic  Nose: Congestion present  Mouth/Throat:      Mouth: Mucous membranes are dry  Eyes:      Extraocular Movements: Extraocular movements intact  Neck:      Musculoskeletal: Normal range of motion  Cardiovascular:      Rate and Rhythm: Regular rhythm  Tachycardia present  Pulses: Normal pulses  Heart sounds: Normal heart sounds  Pulmonary:      Breath sounds: Wheezing present  Comments: Labored breathing and continue oxygen @L   Diminished lung sounds    Abdominal:      Palpations: Abdomen is soft  Musculoskeletal: Normal range of motion  Skin:     General: Skin is warm  Capillary Refill: Capillary refill takes less than 2 seconds  Neurological:      Mental Status: She is alert and oriented to person, place, and time     Psychiatric:         Behavior: Behavior normal       Comments: Sad

## 2021-06-11 ENCOUNTER — TELEPHONE (OUTPATIENT)
Dept: FAMILY MEDICINE CLINIC | Facility: CLINIC | Age: 56
End: 2021-06-11

## 2021-06-11 ENCOUNTER — TELEPHONE (OUTPATIENT)
Dept: LAB | Facility: HOSPITAL | Age: 56
End: 2021-06-11

## 2021-06-11 DIAGNOSIS — R05.9 COUGH: Primary | ICD-10-CM

## 2021-06-11 DIAGNOSIS — D86.9 SARCOIDOSIS: ICD-10-CM

## 2021-06-11 RX ORDER — BUDESONIDE AND FORMOTEROL FUMARATE DIHYDRATE 160; 4.5 UG/1; UG/1
2 AEROSOL RESPIRATORY (INHALATION) 2 TIMES DAILY
Qty: 60 G | Refills: 0 | Status: SHIPPED | OUTPATIENT
Start: 2021-06-11 | End: 2021-08-03 | Stop reason: SDUPTHER

## 2021-06-11 NOTE — TELEPHONE ENCOUNTER
They are requesting  Symbicort instead   Changed and sent new script until follow up with PULM    Please let patient know

## 2021-06-11 NOTE — PATIENT INSTRUCTIONS
Sarcoidosis   WHAT YOU NEED TO KNOW:   Sarcoidosis is a condition in which inflammatory cells collect in tissues and organs  These cells form granulomas (lumps) in the lungs, skin, lymph nodes, or eyes  DISCHARGE INSTRUCTIONS:   Medicines:   · Cytotoxic medicines: These decrease redness, pain, and swelling, and help slow down your immune system       · Steroids: This medicine helps decrease inflammation  · NSAIDs:  These medicines decrease swelling and pain  You can buy NSAIDs without a doctor's order  Ask your healthcare provider which medicine is right for you and how much to take  Take as directed  NSAIDs can cause stomach bleeding or kidney problems if not taken correctly  · Acetaminophen: This medicine decreases pain and fever  You can buy acetaminophen without a doctor's order  Ask how much to take and how often to take it  Follow directions  Acetaminophen can cause liver damage if not taken correctly  · Take your medicine as directed  Contact your healthcare provider if you think your medicine is not helping or if you have side effects  Tell him of her if you are allergic to any medicine  Keep a list of the medicines, vitamins, and herbs you take  Include the amounts, and when and why you take them  Bring the list or the pill bottles to follow-up visits  Carry your medicine list with you in case of an emergency  Follow up with your healthcare provider or specialist as directed: You may need to return to have regular checkups and eye exams  Write down your questions so you remember to ask them during your visits  Self-care:   · Eat a variety of healthy foods:  Healthy foods include fruits, vegetables, whole-grain breads, low-fat dairy products, beans, lean meats, and fish  Ask if you need to be on a special diet  · Get plenty of exercise:  Ask your healthcare provider or specialist about the best exercise plan for you   Exercise may help decrease fatigue and improve your symptoms of sarcoidosis  · Do not smoke: If you smoke, it is never too late to quit  Smoking increases your risk of further lung problems  Ask your healthcare provider or specialist for information if you need help quitting  Contact your healthcare provider or specialist if:   · You have a fever  · You have a severe headache and pain in your neck  · You have chills, a cough, or feel weak and achy  · You have pain, redness, and swelling in your muscles and joints  · Your skin is itchy, swollen, or has a rash  · You have questions about your condition or care  Return to the emergency department if:   · You cannot feel your arms or legs, or they become weak  · You have seizures  · You have sudden trouble breathing  · You have trouble thinking and remembering things  · You have severe chest pain  © Copyright [address] Information is for End User's use only and may not be sold, redistributed or otherwise used for commercial purposes  All illustrations and images included in CareNotes® are the copyrighted property of A D A M , Inc  or [address] [place], [place] [address]faye sylvia   The above information is an  only  It is not intended as medical advice for individual conditions or treatments  Talk to your doctor, nurse or pharmacist before following any medical regimen to see if it is safe and effective for you  COVID-19 and Chronic Health Conditions   WHAT YOU NEED TO KNOW:   Your chronic condition may increase your risk for COVID-19 or serious problems it can cause, such as pneumonia  Healthcare providers might need to make changes that affect how you usually manage your chronic health condition  Providers may change hours of operation or not have patients come in to be seen  You may not be able to make appointments to get blood drawn or to have tests or procedures  This may continue until the virus that causes COVID-19 is controlled  Until then, you can take steps to manage your condition  The steps will also lower your risk for COVID-19 or the serious problems it causes  DISCHARGE INSTRUCTIONS:   If you think you may be infected with the new coronavirus,  do the following to protect others:  · If emergency care is needed,  tell the  about the possible infection, or call ahead and tell the emergency department  · Call a healthcare provider  for instructions if symptoms are mild  Do not  arrive without calling first  Your provider will need to protect staff members and other patients  · Cover your mouth and nose  while you are getting medical care  This will help lower the risk of infecting others  Call your local emergency number [phone] in the [address] [place] [address],[address]) or emergency department if:   · You have trouble breathing or shortness of breath  · You have chest pain or pressure that lasts longer than 5 minutes  · You become confused or hard to wake  · Your lips or face are blue  · You have a fever of 104°F (40°C) or higher  Call your doctor or healthcare provider if:   · You do not  have symptoms of COVID-19 but had close physical contact within 14 days with someone who tested positive  · You have questions or concerns about your COVID-19 or your chronic condition      The following may increase your risk for serious effects of COVID-19:   · Age 72 years or older    · Obesity, diabetes, cancer, or a liver disease    · Kidney failure, chronic kidney disease, or sickle cell disease    · Lung disease, COPD, moderate-to-severe asthma, cystic fibrosis, or pulmonary fibrosis (scarring in your lungs)    · A severe heart disease, such as coronary artery disease or heart failure    · A brain blood vessel disorder or disease, or a condition such as dementia    · Living in a nursing home or long-term care facility    · Smoking cigarettes    · Hypertension (high blood pressure) or thalassemia    · An immune system problem, HIV or AIDS, or a blood or bone marrow transplant    · Long-term use of certain medicines, such as corticosteroids    · Pregnancy    Manage your chronic health condition during this time:  If you do not have a regular healthcare provider, experts recommend you contact a local [facility] health center or health department  The following can help you manage your condition and prevent COVID-19:  · Get emergency care for your condition if needed  Talk to your healthcare providers about symptoms of your chronic condition that need immediate care  Your providers can help you create a plan or add exacerbation management to your plan  The plan will include when to go to an emergency department and when to call your local emergency number  This will depend on where you live and the services that are available during this time  · Go to dialysis appointments as scheduled  It is important to stay on schedule  You will need to have enough food to be able to follow the emergency diet plan if you must miss a session  The emergency diet needs to be part of the management plan for your condition  · Reschedule any upcoming appointments as needed  Medical facilities may be closed until the new coronavirus is better controlled  This means you may need to reschedule a surgery, procedure, or check-up appointment  If you cannot have a phone or video appointment, you will need to make a new appointment  Some providers may be scheduling appointments several months in advance  Some surgeries and procedures will happen as scheduled  This depends on the medical condition and the reason for the surgery or procedure  You may need to have extra testing for COVID-19 several days before  · Follow any regular management plan you use  Your healthcare provider will tell you if you need to make any changes to your regular management plan  For example, if you have asthma, continue to follow your asthma action plan  If you have diabetes, you may need to check your blood sugar level more often   Stress and illness can make blood sugar levels go up  You may need to adjust medicine such as insulin  If you have a heart condition or high blood pressure, you may need to check your blood pressure more often  Stress and illness can also raise your blood pressure  · Talk to your healthcare providers about your medicines  You may be able to get more than 1 month of medicine at a time  This will lower the number of times you need to go to a pharmacy to get your medicines  Make sure you have enough medicine if you have a condition that can lead to an emergency  Examples include asthma medicines, insulin, or an epinephrine pen  Check the expiration dates on the medicines you currently have  Ask for refills as soon as possible, if needed  If it is not time to refill prescriptions, you may be able to get an emergency supply of some medicines  Medicine plans vary, so ask your healthcare provider or pharmacist for options  · Have supplies available in your home  If possible, get extra supplies you use regularly  Examples include absorbent pads, syringes, and wound cleaning solutions  This will limit the number of trips out of your home to get supplies  · Know the signs and symptoms of COVID-19  Signs and symptoms usually start about 5 days after infection but can take 2 to 14 days  Signs and symptoms range from mild to severe  You may feel like you have the flu or a bad cold  Your chronic health condition may cause some of the same symptoms COVID-19 causes  This can make it hard to know if a symptom is from COVID-19 or your chronic condition  Keep a record of any new or worsening symptom you have  This is especially important if you have a condition that often causes shortness of breath  Your provider can tell you if you should be tested for COVID-19  Information is still being learned  Tell your healthcare provider if you think you were infected but develop signs or symptoms not listed below:    ? A cough    ?  Shortness of breath or trouble breathing that may become severe    ? A fever of at least 100 4°F, or 38°C (may be lower in adults 65 or older)    ? Chills that might include shaking    ? Muscle pain, body aches, or a headache    ? A sore throat    ? Suddenly not being able to taste or smell anything    ? Feeling very tired (fatigue)    ? Congestion (stuffy head and nose), or a runny nose    ? Diarrhea, nausea, or vomiting    What you can do to prevent having to go out of your home during this time:   · Ask your healthcare provider for other ways to have appointments  You may be able to have appointments without having to go into the provider's office  Some providers offer phone, video, or other types of appointments  · Have food, medicines, and other supplies delivered  Some pharmacies can send certain medicines to you through the mail  Grocery stores and restaurants may be able to deliver food and other items  If possible, have delivered items placed somewhere  Try not to have someone hand you an item  You will be so close to the person that the virus can spread between you  Wash your hands after you put the delivered items away  · Ask someone to get items you need  The person can get groceries, medicines, or other needed items for you  Choose a person who does not have signs or symptoms of COVID-19 or has tested negative for it  The person should not be waiting for test results  He or she should not have a condition that increases the risk for COVID-19 or serious problems it causes  Lower your risk for COVID-19:  The virus that causes COVID-19 spreads quickly and easily  It mainly spreads through droplets that form when a person talks, coughs, or sneezes  An infected person may also be able to leave the virus on objects and surfaces  Another person can get the virus on his or her hands by touching the object or surface  Infection happens if the person then touches his or her eyes or mouth with unwashed hands   The best way to prevent infection is to avoid anyone who is infected, but this can be hard to do  An infected person can spread the virus before signs or symptoms begin, or even if signs or symptoms never develop  The following are ways to prevent the spread of the virus and lower your risk for COVID-19:      · Wash your hands often throughout the day  Use soap and water  Rub your soapy hands together, lacing your fingers  Wash the front and back of each hand, and in between your fingers  Use the fingers of one hand to scrub under the fingernails of the other hand  Wash for at least 20 seconds  Rinse with warm, running water for several seconds  Then dry your hands with a clean towel or paper towel  Use hand  that contains alcohol if soap and water are not available  Do not touch your eyes, nose, or mouth without washing your hands first  Teach children how to wash their hands  · Cover sneezes and coughs  Turn your face away and cover your mouth and nose with a tissue  Throw the tissue away  Use the bend of your arm if a tissue is not available  Then wash your hands well with soap and water or use hand   Turn your head and cover your face if someone near you is coughing or sneezing  Teach children how to cover a cough or sneeze  Remind them to wash their hands  · Make a habit of not touching your face  If you get the virus on your hands, you can transfer it to your eyes, nose, or mouth and become infected  · Clean and disinfect high-touch surfaces and objects in your home often  Do this regularly, even if you think no one living in or coming to your home is infected with the virus  Surfaces include countertops, cupboard doors, desks, handles, handrails, doorknobs, toilet handles, faucets, chairs, and light switches  Objects include keys, phones, computer keyboards and mice, video games, remote controls, and children's toys   Some surfaces and objects may need to be cleaned several times each day, depending on how often they are used  ? Use disinfecting wipes or a disinfecting solution  You can make a solution by mixing 4 teaspoons of bleach with 1 quart (4 cups) of water  Clean with a sponge or cloth that can be thrown away or washed in hot, soapy water and reused  Clean used dishes and utensils in hot, soapy water or in the   ? Be careful with   Do not use any cleaning or disinfecting products that can trigger an asthma attack or other breathing problems  Open windows or have circulating air as you clean  Do not  mix ammonia with bleach  This will create toxic fumes  Read the labels of all products you use  · Ask about vaccines you may need  No vaccine is available yet for the new coronavirus  It is still a good idea to get other vaccines to help protect your immune system  Get the influenza (flu) vaccine as soon as recommended each year, usually starting in September or October  A flu infection can make COVID-19 worse if you have them at the same time  The flu can also cause signs and symptoms that are similar to COVID-19  Get the pneumonia vaccine if recommended  Your healthcare provider can tell you if you also need other vaccines, and when to get them  Follow social distancing guidelines if you must go out of your home during this time:  Social distancing means people stay far enough apart physically that the virus cannot spread from one person to another  National and local social distancing rules vary  Rules may change over time as restrictions are lifted, but they may return if an outbreak happens where you live  It is important to know and follow all current social distancing rules in your area  The following are general guidelines:  · Limit trips out of your home  Most local guidelines allow leaving the home to buy food or supplies, or to seek medical care if necessary      · Stay at least 6 feet (2 meters) away from anyone who does not live in your home   Do not shake hands with, hug, or kiss a person as a greeting  Stand or walk as far from others as possible, especially around anyone who is sneezing or coughing  If you must use public transportation (such as a bus or subway), try to sit or stand away from others  You can stay safely connected with others through phone calls, e-mail messages, social media websites, and video chats  Check in on anyone who may be having a hard time socially distancing, or who lives alone  Ask administrators at nursing homes or long-term care facilities how you can safely communicate with someone living there  · Wear a cloth face covering (mask) when you must go out  Only do this if your chronic condition does not cause breathing problems  You can make a face covering out of a thick cloth  This will save medical masks for healthcare workers and first responders  Choose fabric that holds its shape after it is washed and dried, such as cotton  Put the top half of a paper coffee filter in the middle of the fabric to create an extra filter for you  Check that you can breathe through the fabric when it is folded into several layers  Fold the fabric into a long band  Put each end through a rubber band or hair tie to create ear loops  Fold the ends of the fabric toward the middle  Hold the covering to your face  Adjust it so it covers your nose and mouth completely  Hook the loops onto your ears to keep the covering in place  The following are important points to remember:     ? Do not use a plastic face shield instead of a cloth covering  A face shield will not protect others from droplets  If a face shield must be used, choose one that wraps around both sides of the face and goes below the chin  Do not use disposable shields more than 1 time  Pervis Dais that can be used again need to be cleaned and disinfected between uses  Do not put a face shield or a cloth covering on a  or infant   These increase the risk for sudden infant death syndrome (SIDS)  ? Continue social distancing while you are out  A face covering does not mean you can have close physical contact with others  You still need to stay at least 6 feet (2 meters) away from others  ? Do not touch your face covering or eyes while you are wearing the covering  Your eyes will not be covered by the cloth  You can be infected if the virus is on your hand and you touch your eyes  Wash your hands with soap and water for 20 seconds or use hand  before you remove your face covering  ? Do not remove your face covering to talk, sneeze, or cough  Your face covering will help protect you and others around you  ? Wash face coverings often  Wash them in a washing machine in the warmest water the fabric allows  Make sure the fabric is completely dry before you use the face covering again  Only wear clean coverings when you go out  Use a new coffee filter each time  ? Certain individuals should not use face coverings  Do not put a face covering on anyone who is younger than 2 years  [facility] children may not be able to breathe through the covering  Do not put a face covering on anyone who cannot remove it by himself or herself  ? You can use a clear face covering if others need to read your lips  A clear covering may be helpful if you communicate with someone who is deaf or hard of hearing  A clear covering is made of cloth but has plastic over the mouth area  This will help the person see your lips more easily  Do not use a plastic face shield instead  ? Do not use face coverings that have breathing valves or vents  Valves or vents on the sides are designed to allow breathed air to go out  This makes breathing easier, but the virus can travel out of the valve or vent and be spread to others  · Do not have anyone over to your home unless it is necessary  It is okay to have medical workers in to provide care   Wear your face covering, and remind medical workers to wear a mask or face covering  Remind them to wash their hands when they arrive and before they leave  Do not  have family members, friends, or neighbors over, even if they are not sick  A person can pass the new virus to others before symptoms of COVID-19 begin  Some people never even develop symptoms  Children commonly have mild symptoms or no symptoms  It is important that you continue social distancing with everyone, including children  It may be hard to tell a child not to hug or kiss you  Explain that this is how he or she can help you stay healthy  · Do not go to someone else's home unless it is necessary  Do not go over to visit, even if the person is lonely  Only go if you need to help him or her  · Avoid large gatherings and crowds  Gatherings or crowds of 10 or more individuals can cause the virus to spread  Examples of gatherings include parties, sporting events, [employer] services, and conferences  Crowds may form at beaches, urias, and tourist attractions  You can continue to protect yourself by staying away from large gatherings and crowds  · Stay safe if you must go out to work  You may have a job only done outside your home that is considered essential  Keep physical distance between you and other workers as much as possible  Follow your employer's rules so everyone stays safe  Help strengthen your immune system:   · Do not smoke  Nicotine and other chemicals in cigarettes and cigars can cause lung damage and increase your risk for infections such as bronchitis or pneumonia  Ask your healthcare provider for information if you currently smoke and need help to quit  E-cigarettes or smokeless tobacco still contain nicotine  Talk to your healthcare provider before you use these products  · Eat a variety of healthy foods  Examples include vegetables, fruits, whole-grain breads and cereals, lean meats and poultry, fish, low-fat dairy products, and cooked beans   Healthy foods contain nutrients that help keep your immune system strong  · Find ways to manage stress  You may be feeling more stressed than usual because of the COVID-19 outbreak  The situation is very stressful to many people  Talk to your healthcare providers about ways to manage stress during this time  Stress can lead to breathing problems or make the problems worse  Stress can trigger an attack or exacerbation of many health conditions  It is important to do things that help you feel more relaxed, such as the following:     ? Pick 1 or 2 times a day to watch the news  Constant news watching can increase your stress levels  ? Talk to a friend on the phone or through a video chat  ? Take a warm, soothing bath  ? Listen to music  ? Exercise can also help relieve stress  This may be hard if your regular gym or outdoor exercise area is closed  If you do not have exercise equipment at home, try walking inside your home  You can walk quickly or turn on music and dance  Follow up with your doctor or healthcare provider as directed: Your providers will tell you when you can come in for tests, procedures, or check-ups  Bring your symptom record with you to all appointments  Write down your questions so you remember to ask them during your visits  For more information:   · Centers for Disease Control and Prevention  [address] , [address]  Phone: 9- 243 - 6512116  Phone: 8- 300 - 2049781  Web Address: [employer] com br    © Copyright [address] [place] [address] Drive Information is for End User's use only and may not be sold, redistributed or otherwise used for commercial purposes  All illustrations and images included in CareNotes® are the copyrighted property of A D A M , Inc  or [facility] Shea Alicia  The above information is an  only  It is not intended as medical advice for individual conditions or treatments   Talk to your doctor, nurse or pharmacist before following any medical regimen to see if it is safe and effective for you

## 2021-06-17 ENCOUNTER — TELEPHONE (OUTPATIENT)
Dept: SURGICAL ONCOLOGY | Facility: CLINIC | Age: 56
End: 2021-06-17

## 2021-06-17 NOTE — TELEPHONE ENCOUNTER
New Patient Encounter    New Patient Intake Form   Patient Details:  Ruth Moe  1965  [id number]    Background Information:  [address] starts by opening a telephone encounter and gathering the following information   Who is calling to schedule? If not self, relationship to patient? Patient   Referring Provider Dr Claudia Love   What is the diagnosis? U07 1, J12 82 (ICD-10-CM) - Pneumonia due to COVID-19 virus   U07 1, J96 01 (ICD-10-CM) - Acute hypoxemic respiratory failure due to COVID-19 (HCC)   D86 9 (ICD-10-CM) - Sarcoidosis   D50 8 (ICD-10-CM) - Other iron deficiency anemia   I27 82 (ICD-10-CM) - Other chronic pulmonary embolism without acute cor pulmonale (HCC)   Is this Cancer or Non-Cancer? Non-Cancer   Is this diagnosis confirmed? Yes   When was the diagnosis? 6/2021   Is there a confirmed diagnosis from a biopsy/tissue reviewed by pathology? NA   Were outside slides requested? NA   Is patient aware of diagnosis? Yes   Is there a personal history and what kind? No   Is there a family history and what kind? No   Reason for visit? New Diagnosis   Have you had any imaging or labs done? If so: when, where? yes  sl   Are records in [facility]? yes   If patient has a prior history of cancer were old records obtained? NA   Was the patient told to bring a disk? No   Does the patient smoke or Vape? No   If yes, how many packs or cartridges per day? Scheduling Information:   Preferred [facility]: [facility]     Are there any dates/time the patient cannot be seen? Miscellaneous:    After completing the above information, please route to Financial Counselor and the appropriate Nurse Navigator for review

## 2021-06-18 ENCOUNTER — APPOINTMENT (OUTPATIENT)
Dept: LAB | Facility: HOSPITAL | Age: 56
End: 2021-06-18

## 2021-06-18 DIAGNOSIS — D50.8 ACHLORHYDRIC ANEMIA: ICD-10-CM

## 2021-06-18 DIAGNOSIS — I27.82 CHRONIC PULMONARY THROMBOEMBOLISM SYNDROME (HCC): ICD-10-CM

## 2021-06-18 DIAGNOSIS — D86.9 SARCOIDOSIS: ICD-10-CM

## 2021-06-18 DIAGNOSIS — J96.01 ACUTE RESPIRATORY FAILURE WITH HYPOXIA (HCC): ICD-10-CM

## 2021-06-18 DIAGNOSIS — J12.82 PNEUMONIA DUE TO CORONAVIRUS DISEASE 2019 (CODE): ICD-10-CM

## 2021-06-18 DIAGNOSIS — U07.1 LAB TEST POSITIVE FOR DETECTION OF COVID-19 VIRUS: ICD-10-CM

## 2021-06-18 LAB
ALBUMIN SERPL BCP-MCNC: 3.9 G/DL (ref 3.4–4.8)
ALP SERPL-CCNC: 81.2 U/L (ref 35–140)
ALT SERPL W P-5'-P-CCNC: 11 U/L (ref 5–54)
ANION GAP SERPL CALCULATED.3IONS-SCNC: 7 MMOL/L (ref 4–13)
AST SERPL W P-5'-P-CCNC: 17 U/L (ref 15–41)
BACTERIA UR QL AUTO: ABNORMAL /HPF
BASOPHILS # BLD AUTO: 0.04 THOUSANDS/ΜL (ref 0–0.1)
BASOPHILS NFR BLD AUTO: 0 % (ref 0–1)
BILIRUB SERPL-MCNC: 0.6 MG/DL (ref 0.3–1.2)
BILIRUB UR QL STRIP: NEGATIVE
BUN SERPL-MCNC: 7 MG/DL (ref 6–20)
CALCIUM SERPL-MCNC: 10.4 MG/DL (ref 8.4–10.2)
CHLORIDE SERPL-SCNC: 100 MMOL/L (ref 96–108)
CLARITY UR: CLEAR
CO2 SERPL-SCNC: 29 MMOL/L (ref 22–33)
COLOR UR: YELLOW
CREAT SERPL-MCNC: 0.59 MG/DL (ref 0.4–1.1)
EOSINOPHIL # BLD AUTO: 0.34 THOUSAND/ΜL (ref 0–0.61)
EOSINOPHIL NFR BLD AUTO: 4 % (ref 0–6)
ERYTHROCYTE [DISTWIDTH] IN BLOOD BY AUTOMATED COUNT: 15.9 % (ref 11.6–15.1)
GFR SERPL CREATININE-BSD FRML MDRD: 119 ML/MIN/1.73SQ M
GLUCOSE P FAST SERPL-MCNC: 115 MG/DL (ref 70–105)
GLUCOSE UR STRIP-MCNC: NEGATIVE MG/DL
HCT VFR BLD AUTO: 37.4 % (ref 34.8–46.1)
HGB BLD-MCNC: 11.6 G/DL (ref 11.5–15.4)
HGB UR QL STRIP.AUTO: NEGATIVE
IMM GRANULOCYTES # BLD AUTO: 0.06 THOUSAND/UL (ref 0–0.2)
IMM GRANULOCYTES NFR BLD AUTO: 1 % (ref 0–2)
KETONES UR STRIP-MCNC: NEGATIVE MG/DL
LEUKOCYTE ESTERASE UR QL STRIP: ABNORMAL
LYMPHOCYTES # BLD AUTO: 2.12 THOUSANDS/ΜL (ref 0.6–4.47)
LYMPHOCYTES NFR BLD AUTO: 23 % (ref 14–44)
MCH RBC QN AUTO: 29.1 PG (ref 26.8–34.3)
MCHC RBC AUTO-ENTMCNC: 31 G/DL (ref 31.4–37.4)
MCV RBC AUTO: 94 FL (ref 82–98)
MONOCYTES # BLD AUTO: 1.02 THOUSAND/ΜL (ref 0.17–1.22)
MONOCYTES NFR BLD AUTO: 11 % (ref 4–12)
MUCOUS THREADS UR QL AUTO: ABNORMAL
NEUTROPHILS # BLD AUTO: 5.62 THOUSANDS/ΜL (ref 1.85–7.62)
NEUTS SEG NFR BLD AUTO: 61 % (ref 43–75)
NITRITE UR QL STRIP: NEGATIVE
NON-SQ EPI CELLS URNS QL MICRO: ABNORMAL /HPF
PH UR STRIP.AUTO: 7 [PH]
PLATELET # BLD AUTO: 576 THOUSANDS/UL (ref 149–390)
PMV BLD AUTO: 10.5 FL (ref 8.9–12.7)
POTASSIUM SERPL-SCNC: 4.5 MMOL/L (ref 3.5–5)
PROT SERPL-MCNC: 7.5 G/DL (ref 6.4–8.3)
PROT UR STRIP-MCNC: NEGATIVE MG/DL
RBC # BLD AUTO: 3.98 MILLION/UL (ref 3.81–5.12)
RBC #/AREA URNS AUTO: ABNORMAL /HPF
SODIUM SERPL-SCNC: 136 MMOL/L (ref 133–145)
SP GR UR STRIP.AUTO: 1.01 (ref 1–1.03)
UROBILINOGEN UR QL STRIP.AUTO: 0.2 E.U./DL
WBC # BLD AUTO: 9.2 THOUSAND/UL (ref 4.31–10.16)
WBC #/AREA URNS AUTO: ABNORMAL /HPF

## 2021-06-18 PROCEDURE — 85025 COMPLETE CBC W/AUTO DIFF WBC: CPT

## 2021-06-18 PROCEDURE — 80053 COMPREHEN METABOLIC PANEL: CPT

## 2021-06-18 PROCEDURE — 81001 URINALYSIS AUTO W/SCOPE: CPT

## 2021-06-18 PROCEDURE — 36415 COLL VENOUS BLD VENIPUNCTURE: CPT

## 2021-08-03 ENCOUNTER — OFFICE VISIT (OUTPATIENT)
Dept: FAMILY MEDICINE CLINIC | Facility: CLINIC | Age: 56
End: 2021-08-03
Payer: COMMERCIAL

## 2021-08-03 VITALS
SYSTOLIC BLOOD PRESSURE: 132 MMHG | DIASTOLIC BLOOD PRESSURE: 62 MMHG | HEART RATE: 88 BPM | WEIGHT: 151.2 LBS | HEIGHT: 59 IN | BODY MASS INDEX: 30.48 KG/M2 | RESPIRATION RATE: 17 BRPM | OXYGEN SATURATION: 95 % | TEMPERATURE: 98 F

## 2021-08-03 DIAGNOSIS — Z12.31 ENCOUNTER FOR SCREENING MAMMOGRAM FOR MALIGNANT NEOPLASM OF BREAST: ICD-10-CM

## 2021-08-03 DIAGNOSIS — J96.01 ACUTE HYPOXEMIC RESPIRATORY FAILURE DUE TO COVID-19 (HCC): ICD-10-CM

## 2021-08-03 DIAGNOSIS — R53.81 PHYSICAL DECONDITIONING: ICD-10-CM

## 2021-08-03 DIAGNOSIS — Z12.11 SCREENING FOR COLON CANCER: ICD-10-CM

## 2021-08-03 DIAGNOSIS — U07.1 PNEUMONIA DUE TO COVID-19 VIRUS: ICD-10-CM

## 2021-08-03 DIAGNOSIS — D86.9 SARCOIDOSIS: ICD-10-CM

## 2021-08-03 DIAGNOSIS — E78.00 HYPERCHOLESTEROLEMIA: ICD-10-CM

## 2021-08-03 DIAGNOSIS — U07.1 ACUTE HYPOXEMIC RESPIRATORY FAILURE DUE TO COVID-19 (HCC): ICD-10-CM

## 2021-08-03 DIAGNOSIS — J96.01 ACUTE RESPIRATORY FAILURE WITH HYPOXIA (HCC): Primary | ICD-10-CM

## 2021-08-03 DIAGNOSIS — J12.82 PNEUMONIA DUE TO COVID-19 VIRUS: ICD-10-CM

## 2021-08-03 DIAGNOSIS — R05.9 COUGH: ICD-10-CM

## 2021-08-03 DIAGNOSIS — J30.2 SEASONAL ALLERGIES: ICD-10-CM

## 2021-08-03 PROCEDURE — 99214 OFFICE O/P EST MOD 30 MIN: CPT | Performed by: NURSE PRACTITIONER

## 2021-08-03 RX ORDER — BUDESONIDE AND FORMOTEROL FUMARATE DIHYDRATE 160; 4.5 UG/1; UG/1
2 AEROSOL RESPIRATORY (INHALATION) 2 TIMES DAILY
Qty: 60 G | Refills: 0 | Status: SHIPPED | OUTPATIENT
Start: 2021-08-03 | End: 2021-08-12

## 2021-08-03 RX ORDER — ALBUTEROL SULFATE 90 UG/1
2 AEROSOL, METERED RESPIRATORY (INHALATION)
Refills: 0
Start: 2021-08-03 | End: 2021-09-15

## 2021-08-03 NOTE — PROGRESS NOTES
BMI Counseling: Body mass index is 30 54 kg/m²  The BMI is above normal  Nutrition recommendations include decreasing portion sizes, encouraging healthy choices of fruits and vegetables, decreasing fast food intake, consuming healthier snacks, limiting drinks that contain sugar, moderation in carbohydrate intake, increasing intake of lean protein, reducing intake of saturated and trans fat and reducing intake of cholesterol  Exercise recommendations include vigorous physical activity 75 minutes/week, exercising 3-5 times per week and strength training exercises  No pharmacotherapy was ordered  Patient referred to PCP due to patient being overweight   BMI 30 54       Assessment/Plan:    follow up s/p hospital - she was seen for TCM this is follow up labs and referrals       Patient had COVID - with complications --secondary to sarcoid issues and developed a PE with respiratory failure - currently on 2 LNC Oxygen- she was inpatient and in ICU and due to decline in phsycial status and weakness - muscle waste she was sent to rehab   [address] today - does not have oxygen on 0- she has follow up with PULM in few days was awaiting for insurance to be strengthen out   Shortness of breath much imroved   Wants to know if she can stop O2 use   She will need to see Cardiology and PULM and will need follow up with hematology          Problem List Items Addressed This Visit        Respiratory    Acute hypoxemic respiratory failure due to COVID-19 [facility])    Relevant Medications    albuterol (PROVENTIL HFA,VENTOLIN HFA) 90 mcg/act inhaler    Other Relevant Orders    Ambulatory referral to Cardiology    Pneumonia due to COVID-19 virus    Relevant Medications    albuterol (PROVENTIL HFA,VENTOLIN HFA) 90 mcg/act inhaler    budesonide-formoterol (SYMBICORT) 160-4 5 mcg/act inhaler    Other Relevant Orders    Ambulatory referral to Cardiology    Acute respiratory failure with hypoxia ([facility] [address] ) - Primary    Relevant Orders    Ambulatory referral to Cardiology       Other    Seasonal allergies    Relevant Orders    Comprehensive metabolic panel    CBC and differential    TSH, 3rd generation    HEMOGLOBIN A1C W/ EAG ESTIMATION    UA (URINE) with reflex to Scope    Hypercholesterolemia    Relevant Orders    Ambulatory referral to Cardiology    Comprehensive metabolic panel    CBC and differential    TSH, 3rd generation    HEMOGLOBIN A1C W/ EAG ESTIMATION    UA (URINE) with reflex to Scope    Sarcoidosis    Relevant Medications    albuterol (PROVENTIL HFA,VENTOLIN HFA) 90 mcg/act inhaler    budesonide-formoterol (SYMBICORT) 160-4 5 mcg/act inhaler    Other Relevant Orders    Ambulatory referral to Cardiology    Comprehensive metabolic panel    CBC and differential    TSH, 3rd generation    HEMOGLOBIN A1C W/ EAG ESTIMATION    UA (URINE) with reflex to Scope    Physical deconditioning    Relevant Orders    Comprehensive metabolic panel    CBC and differential    TSH, 3rd generation    HEMOGLOBIN A1C W/ EAG ESTIMATION    UA (URINE) with reflex to Scope      Other Visit Diagnoses     Cough        Relevant Medications    budesonide-formoterol (SYMBICORT) 160-4 5 mcg/act inhaler    Screening for colon cancer        Relevant Orders    Cologuard    Encounter for screening mammogram for malignant neoplasm of breast        Relevant Orders    Mammo diagnostic bilateral w cad            Subjective:      Patient ID: Andreina Nazario is a 64 y o  female        Patient had COVID - with complications --secondary to sarcoid issues and developed a PE with respiratory failure - currently on 2 LNC Oxygen- she was inpatient and in ICU and due to decline in phsycial status and weakness - muscle waste she was sent to rehab   [address] [place] [address] today - does not have oxygen on 0- she has follow up with PULM in few days was awaiting for insurance to be strengthen out   Shortness of breath much imroved   Wants to know if she can stop O2 use   She will need to see Cardiology and PULM and will need follow up with hematology       The following portions of the patient's history were reviewed and updated as appropriate:   Past Medical History:  She has a past medical history of Anxiety, Asthma, Back pain, Sarcoidosis, and Vertigo  ,  _______________________________________________________________________  Medical Problems:  does not have any pertinent problems on file ,  _______________________________________________________________________  Past Surgical History:   has a past surgical history that includes Mammo (historical) (05/02/2018)  ,  _______________________________________________________________________  Family History:  family history includes Asthma in her mother; Hypertension in her mother; Mental illness in her mother ,  _______________________________________________________________________  Social History:   reports that she has never smoked  She has never used smokeless tobacco  She reports previous alcohol use  She reports previous drug use  Drug: Marijuana  ,  _______________________________________________________________________  Allergies:  is allergic to carbamazepine     _______________________________________________________________________  Current Outpatient Medications   Medication Sig Dispense Refill    acetaminophen (TYLENOL) 325 mg tablet Take 2 tablets (650 mg total) by mouth every 6 (six) hours as needed for mild pain  0    albuterol (PROVENTIL HFA,VENTOLIN HFA) 90 mcg/act inhaler Inhale 2 puffs 4 (four) times a day  0    apixaban (ELIQUIS) 5 mg Take 1 tablet (5 mg total) by mouth 2 (two) times a day 180 tablet 0    atorvastatin (LIPITOR) 40 mg tablet Take 1 tablet (40 mg total) by mouth daily 90 tablet 1    budesonide-formoterol (SYMBICORT) 160-4 5 mcg/act inhaler Inhale 2 puffs 2 (two) times a day Rinse mouth after use   60 g 0    cetirizine (ZyrTEC) 10 mg tablet Take 1 tablet (10 mg total) by mouth daily 90 tablet 1    cholecalciferol (VITAMIN D3) 1,000 units tablet Take 2 tablets (2,000 Units total) by mouth daily 90 tablet 1    dextromethorphan-guaiFENesin (ROBITUSSIN DM)  mg/5 mL syrup Take 10 mL by mouth every 4 (four) hours as needed for cough  0    ferrous sulfate 324 (65 Fe) mg Take 1 tablet (324 mg total) by mouth 2 (two) times a day before meals 180 tablet 1    fluticasone (FLONASE) 50 mcg/act nasal spray 1 spray into each nostril daily 18 2 mL 1    hydrOXYzine HCL (ATARAX) 25 mg tablet Take 1 tablet (25 mg total) by mouth 2 (two) times a day 60 tablet 0    ipratropium (ATROVENT HFA) 17 mcg/act inhaler Inhale 2 puffs 4 (four) times a day  0    ketotifen (ZADITOR) 0 025 % ophthalmic solution Administer 1 drop to both eyes 2 (two) times a day for 10 days 1 mL 0    latanoprost (XALATAN) 0 005 % ophthalmic solution place 1 drop into both eyes at bedtime      meclizine (ANTIVERT) 12 5 MG tablet Take 1 tablet (12 5 mg total) by mouth every 8 (eight) hours as needed for dizziness 90 tablet 1    montelukast (SINGULAIR) 10 mg tablet Take 1 tablet (10 mg total) by mouth daily 90 tablet 1    multivitamin-minerals (CENTRUM ADULTS) tablet Take 1 tablet by mouth daily  0    senna-docusate sodium (SENOKOT S) 8 6-50 mg per tablet Take 1 tablet by mouth daily at bedtime as needed for constipation 100 tablet 1    sodium chloride (OCEAN) 0 65 % nasal spray 1 spray into each nostril as needed for congestion for up to 12 doses 60 mL 3    Vitamins A & D (VITAMIN A & D) ointment Apply topically as needed for dry skin (to face) 45 g 1     No current facility-administered medications for this visit      _______________________________________________________________________  Review of Systems   Constitutional: Positive for unexpected weight change (weight loss muscle waste )  Negative for fatigue and fever  HENT: Negative for congestion, sinus pressure and sore throat  Respiratory: Negative for cough and shortness of breath (improved )           S/p complicated post COVID hospitalization   On continues Oxygen at 2 L at this point and nebulizer treatments at home   UNDERLYING SARCOIDOSIS     Much better slight SOB but overall much better    Cardiovascular: Negative for chest pain and palpitations  Gastrointestinal: Negative for abdominal distention, abdominal pain, nausea and vomiting  Genitourinary: Negative for difficulty urinating and flank pain  Musculoskeletal: Positive for arthralgias, gait problem and myalgias  Muscle wasting    Skin: Negative  Allergic/Immunologic: Positive for environmental allergies  Neurological: Positive for weakness (getting stronger )  Negative for headaches  Hematological: Negative for adenopathy  Psychiatric/Behavioral: Positive for sleep disturbance  Negative for suicidal ideas  The patient is nervous/anxious  Objective:  Vitals:    08/03/21 1158   BP: 132/62   BP Location: Left arm   Patient Position: Sitting   Cuff Size: Standard   Pulse: 88   Resp: 17   Temp: 98 °F (36 7 °C)   TempSrc: Temporal   SpO2: 95%   Weight: 68 6 kg (151 lb 3 2 oz)   Height: 4' 11" (1 499 m)     Body mass index is 30 54 kg/m²  Physical Exam  Vitals and nursing note reviewed  Constitutional:       Appearance: Normal appearance  Comments: BMI 30 54    HENT:      Head: Normocephalic and atraumatic  Nose: No congestion  Mouth/Throat:      Mouth: Mucous membranes are dry  Eyes:      Extraocular Movements: Extraocular movements intact  Cardiovascular:      Rate and Rhythm: Regular rhythm  Tachycardia present  Pulses: Normal pulses  Heart sounds: Normal heart sounds  Pulmonary:      Effort: Pulmonary effort is normal       Breath sounds: Normal breath sounds  No wheezing  Comments: Decreased but clear on room air     Abdominal:      Palpations: Abdomen is soft  Musculoskeletal:         General: Normal range of motion  Cervical back: Normal range of motion  Skin:     General: Skin is warm  Capillary Refill: Capillary refill takes less than 2 seconds  Neurological:      Mental Status: She is alert and oriented to person, place, and time  Psychiatric:         Behavior: Behavior normal       Comments: Sad          Urinalysis with microscopic  Order: [id number]  Status:  Final result   Visible to patient:  No ([facility] [address]   Next appt:  08/10/2021 at 03:00 PM in Pulmonology Hilda Christie MD)   Dx:  Sarcoidosis; Achlorhydric anemia; Chr    2 Result Notes   Ref Range & Units 6/18/21 11:29 AM   Clarity, UA Clear Clear    Color, UA Yellow Yellow    Specific [place], UA 1 001 - 1 030 1 015    pH, UA 5 0, 5 5, 6 0, 6 5, 7 0, 7 5, 8 0 7 0    Glucose, UA Negative mg/dl Negative    Ketones, UA Negative mg/dl Negative    Blood, UA Negative Negative    Protein, UA Negative, Interference- unable to analyze mg/dl Negative    Nitrite, UA Negative Negative    Bilirubin, UA Negative Negative    Urobilinogen, UA 0 2, 1 0 E U /dl E U /dl 0 2    Leukocytes, UA Negative TraceAbnormal     WBC, UA None Seen, 0-1, 1-2, 0-5, 2-4 /hpf 2-4    RBC, UA None Seen, 0-1, 1-2, 2-4, 0-5 /hpf 0-1    Bacteria, UA None Seen, Occasional /hpf Occasional    Epithelial Cells None Seen, Occasional /hpf Occasional    MUCUS THREADS None Seen OccasionalAbnormal           Specimen Collected: 06/18/21 11:29 AM         Contains abnormal data Comprehensive metabolic panel  Order: [id number]  Status:  Final result   Visible to patient:  No (inaccessible in [address] [facility])   Next appt:  08/10/2021 at 03:00 PM in Pulmonology Hilda Christie MD)   Dx:  Sarcoidosis; Achlorhydric anemia; Chr    2 Result Notes   Ref Range & Units 6/18/21 11:29 AM   Sodium 133 - 145 mmol/L 136    Potassium 3 5 - 5 0 mmol/L 4 5    Comment: R-Specimen slightly hemolyzed  Interpret results with caution     Chloride 96 - 108 mmol/L 100    CO2 22 - 33 mmol/L 29    ANION GAP 4 - 13 mmol/L 7    BUN 6 - 20 mg/dL 7    Creatinine 0 40 - 1 10 mg/dL 0 59    Comment: Standardized to IDMS reference method   Glucose, Fasting 70 - 105 mg/dL 115High     Comment: Specimen collection should occur prior to Sulfasalazine administration due to the potential for falsely depressed results  Specimen collection should occur prior to Sulfapyridine administration due to the potential for falsely elevated results  Calcium 8 4 - 10 2 mg/dL 10 4High     AST 15 - 41 U/L 17    Comment: Specimen collection should occur prior to Sulfasalazine administration due to the potential for falsely depressed results  ALT 5 - 54 U/L 11    Comment: Specimen collection should occur prior to Sulfasalazine administration due to the potential for falsely depressed results  Alkaline Phosphatase 35 - 140 U/L 81 2    Total Protein 6 4 - 8 3 g/dL 7 5    Albumin 3 4 - 4 8 g/dL 3 9    Total Bilirubin 0 30 - 1 20 mg/dL 0 60    eGFR ml/min/1 73sq m 119       Narrative    National Kidney Disease Foundation guidelines for Chronic Kidney Disease (CKD):     Stage 1 with normal or high GFR (GFR > 90 mL/min/1 73 square meters)     Stage 2 Mild CKD (GFR = 60-89 mL/min/1 73 square meters)     Stage 3A Moderate CKD (GFR = 45-59 mL/min/1 73 square meters)     Stage 3B Moderate CKD (GFR = 30-44 mL/min/1 73 square meters)     Stage 4 Severe CKD (GFR = 15-29 mL/min/1 73 square meters)     Stage 5 End Stage CKD (GFR <15 mL/min           Contains abnormal data CBC and differential  Order: [id number]  Status:  Final result   Visible to patient:  No ([facility] [address] [facility])   Next appt:  08/10/2021 at 03:00 PM in Pulmonology Andi Kelley MD)   Dx:  Sarcoidosis;  Achlorhydric anemia; Chr    2 Result Notes   Ref Range & Units 6/18/21 11:29 AM   WBC 4 31 - 10 16 Thousand/uL 9 20    RBC 3 81 - 5 12 Million/uL 3 98    Hemoglobin 11 5 - 15 4 g/dL 11 6    Hematocrit 34 8 - 46 1 % 37 4    MCV 82 - 98 fL 94    MCH 26 8 - 34 3 pg 29 1    MCHC 31 4 - 37 4 g/dL 31 0Low     RDW 11 6 - 15 1 % 15 9High     MPV 8 9 - 12 7 fL 10 5    Platelets 673 - 623 Thousands/uL 576High     Neutrophils Relative 43 - 75 % 61    Immat GRANS % 0 - 2 % 1    Lymphocytes Relative 14 - 44 % 23    Monocytes Relative 4 - 12 % 11    Eosinophils Relative 0 - 6 % 4    Basophils Relative 0 - 1 % 0    Neutrophils Absolute 1 85 - 7 62 Thousands/µL 5 62    Immature Grans Absolute 0 00 - 0 20 Thousand/uL 0 06    Lymphocytes Absolute 0 60 - 4 47 Thousands/µL 2 12    Monocytes Absolute 0 17 - 1 22 Thousand/µL 1 02    Eosinophils Absolute 0 00 - 0 61 Thousand/µL 0 34    Basophils Absolute 0 00 - 0 10 Thousands/µL 0 04          Specimen Collected: 06/18/21 11:29 AM   Last Resulted: 06/18/21 12:43 PM                  Contains abnormal data Urinalysis with microscopic  Order: [id number]  Status:  Final result   Visible to patient:  No (inaccessible in [place]   Next appt:  08/10/2021 at 03:00 PM in Pulmonology Marce Forde MD)   Dx:  Sarcoidosis;  Achlorhydric anemia; Chr    2 Result Notes   Ref Range & Units 6/18/21 11:29 AM   Clarity, UA Clear Clear    Color, UA Yellow Yellow    Specific [place], UA 1 001 - 1 030 1 015    pH, UA 5 0, 5 5, 6 0, 6 5, 7 0, 7 5, 8 0 7 0    Glucose, UA Negative mg/dl Negative    Ketones, UA Negative mg/dl Negative    Blood, UA Negative Negative    Protein, UA Negative, Interference- unable to analyze mg/dl Negative    Nitrite, UA Negative Negative    Bilirubin, UA Negative Negative    Urobilinogen, UA 0 2, 1 0 E U /dl E U /dl 0 2    Leukocytes, UA Negative TraceAbnormal     WBC, UA None Seen, 0-1, 1-2, 0-5, 2-4 /hpf 2-4    RBC, UA None Seen, 0-1, 1-2, 2-4, 0-5 /hpf 0-1    Bacteria, UA None Seen, Occasional /hpf Occasional    Epithelial Cells None Seen, Occasional /hpf Occasional    MUCUS THREADS None Seen OccasionalAbnormal           Specimen Collected: 06/18/21 11:29 AM   Last Resulted: 06/18/21  1:08 PM              Comprehensive metabolic panel  Order: [id number]  Status:  Final result Visible to patient:  No (inaccessible in [place])   Next appt:  08/10/2021 at 03:00 PM in Pulmonology Jacobo Argueta MD)   Dx:  Sarcoidosis; Achlorhydric anemia; Chr    2 Result Notes   Ref Range & Units 6/18/21 11:29 AM   Sodium 133 - 145 mmol/L 136    Potassium 3 5 - 5 0 mmol/L 4 5    Comment: R-Specimen slightly hemolyzed  Interpret results with caution  Chloride 96 - 108 mmol/L 100    CO2 22 - 33 mmol/L 29    ANION GAP 4 - 13 mmol/L 7    BUN 6 - 20 mg/dL 7    Creatinine 0 40 - 1 10 mg/dL 0 59    Comment: Standardized to IDMS reference method   Glucose, Fasting 70 - 105 mg/dL 115High     Comment: Specimen collection should occur prior to Sulfasalazine administration due to the potential for falsely depressed results  Specimen collection should occur prior to Sulfapyridine administration due to the potential for falsely elevated results  Calcium 8 4 - 10 2 mg/dL 10 4High     AST 15 - 41 U/L 17    Comment: Specimen collection should occur prior to Sulfasalazine administration due to the potential for falsely depressed results  ALT 5 - 54 U/L 11    Comment: Specimen collection should occur prior to Sulfasalazine administration due to the potential for falsely depressed results      Alkaline Phosphatase 35 - 140 U/L 81 2    Total Protein 6 4 - 8 3 g/dL 7 5    Albumin 3 4 - 4 8 g/dL 3 9    Total Bilirubin 0 30 - 1 20 mg/dL 0 60    eGFR ml/min/1 73sq m 119       Narrative    National Kidney Disease Foundation guidelines for Chronic Kidney Disease (CKD):     Stage 1 with normal or high GFR (GFR > 90 mL/min/1 73 square meters)     Stage 2 Mild CKD (GFR = 60-89 mL/min/1 73 square meters)     Stage 3A Moderate CKD (GFR = 45-59 mL/min/1 73 square meters)     Stage 3B Moderate CKD (GFR = 30-44 mL/min/1 73 square meters)     Stage 4 Severe CKD (GFR = 15-29 mL/min/1 73 square meters)     Stage 5 End Stage CKD (GFR <15 mL/min/1 73 square meters)   Note: GFR calculation is accurate only with a steady state creatinine      Specimen Collected: 06/18/21 11:29 AM   Last Resulted: 06/18/21  1:05 PM           Contains abnormal data CBC and differential  Order: [id number]  Status:  Final result   Visible to patient:  No (inaccessible in [place])   Next appt:  08/10/2021 at 03:00 PM in Pulmonology Dwight Clemente MD)   Dx:  Sarcoidosis;  Achlorhydric anemia; Chr    2 Result Notes   Ref Range & Units 6/18/21 11:29 AM   WBC 4 31 - 10 16 Thousand/uL 9 20    RBC 3 81 - 5 12 Million/uL 3 98    Hemoglobin 11 5 - 15 4 g/dL 11 6    Hematocrit 34 8 - 46 1 % 37 4    MCV 82 - 98 fL 94    MCH 26 8 - 34 3 pg 29 1    MCHC 31 4 - 37 4 g/dL 31 0Low     RDW 11 6 - 15 1 % 15 9High     MPV 8 9 - 12 7 fL 10 5    Platelets 152 - 150 Thousands/uL 576High     Neutrophils Relative 43 - 75 % 61    Immat GRANS % 0 - 2 % 1    Lymphocytes Relative 14 - 44 % 23    Monocytes Relative 4 - 12 % 11    Eosinophils Relative 0 - 6 % 4    Basophils Relative 0 - 1 % 0    Neutrophils Absolute 1 85 - 7 62 Thousands/µL 5 62    Immature Grans Absolute 0 00 - 0 20 Thousand/uL 0 06    Lymphocytes Absolute 0 60 - 4 47 Thousands/µL 2 12    Monocytes Absolute 0 17 - 1 22 Thousand/µL 1 02    Eosinophils Absolute 0 00 - 0 61 Thousand/µL 0 34    Basophils Absolute 0 00 - 0 10 Thousands/µL 0 04          Specimen Collected: 06/18/21 11:29 AM   Last Resulted: 06/18/21 12:43 PM        Lab Flowsheet     Order Details     View Encounter     Lab and Collection Details     Routing     Result History           Result Care Coordination

## 2021-08-05 NOTE — PATIENT INSTRUCTIONS
Using Oxygen at [facility]:   You may need extra oxygen if you are not able to breathe enough oxygen on your own  You need a doctor's order to get oxygen therapy  The order will include how much you need, and how often you need it  Use oxygen as directed  DISCHARGE INSTRUCTIONS:   Call, or have someone call, your local emergency number (911 in the [address]) if:   · Your breathing becomes fast, or it hurts to inhale  · You have sudden chest pain  · You are tired, confused, cannot think clearly, or faint  Return to the emergency department if:   · You have a headache, your heart is beating fast, and you are shaking  · Your breathing is shallow, slow, or more difficult than usual for you  · You feel anxious or cannot sit still  · Your fingernails or lips turn blue  Call your doctor if:   · The oxygen tubes create sores on your skin, or make you bleed  · You have trouble sleeping because you cannot breathe well  · You have questions or concerns about your condition or care  Types of oxygen supply systems:  Your healthcare provider will pick your oxygen supply based on how much oxygen you need, and how active you are  Oxygen can be supplied the following 3 ways:  · Compressed oxygen  holds oxygen in a metal cylinder (tank) under pressure  The tank can be set to release only the amount of oxygen you need as you breathe  Compressed oxygen tanks are heavy, and are meant to be used when you stay mostly in one place  You may need help to move or secure it  Smaller tanks and wheeled carts are available to help you move with ease, or when you travel  · Liquid oxygen  is kept chilled inside a small, insulated case  The liquid warms and becomes a breathable gas when you need to breathe in  Liquid oxygen cases are smaller and easy to carry around  You can refill your small liquid oxygen case from a big tank kept in your home   Your oxygen delivery service will fill your large tank every 1 to 2 weeks  · An oxygen concentrator  is an electric machine that stores oxygen from the air  This machine is heavy and may come with a wheeled cart to help you move it from room to room  Types of oxygen breathing devices:  Each device is connected to the oxygen supply with tubing  The tubing should be long enough to let you move around your house  You may need a humidifier to moisten the oxygen  This may prevent dryness in your nose, mouth, and throat  Ask your healthcare provider if you need a humidifier, and how to attach it to your oxygen supply  · A nasal cannula  is a 2-pronged plastic tube that fits inside your nostrils  Place one prong in each nostril  Loop the tubing around your ears, or attach it to your eyeglasses to keep it in place  Make sure your cannula fits you well and is comfortable  · An oxygen mask  is attached to a plastic tube and covers your nose and mouth  It is usually held in place by an elastic strap that wraps around the back of your head  You can use an oxygen mask if you need a lot of oxygen  Your healthcare provider may tell you to use a nasal cannula during the day, and a mask at night  A mask may help if your nose is dry or stuffy  · Transtracheal oxygen  is given through a small, flexible catheter inserted into your trachea (windpipe)  A necklace holds the catheter in place  Use oxygen safely:   · Do not use oxygen around heat or flame  Compressed oxygen can catch on fire  Keep the oxygen container 5 feet away from open flames or heaters, such as candles or hot water heaters  Do not use anything flammable, such as cleaning fluids, gasoline, or aerosol sprays near your oxygen  Keep a fire extinguisher and a phone close by in case of a fire  Tell your fire department that you have oxygen in your home if you need to call them for help  · Do not smoke while you are using oxygen  Do not let anyone smoke around you       · Do not change the flow of your oxygen unless your healthcare provider tells you to  Turn your oxygen container or concentrator off when you are not using your oxygen  · Do not drink alcohol or take sedatives  while you use oxygen  These may slow your breathing  · Put signs on all the doors of your house  to let visitors and emergency workers know that oxygen is in use  Tell your electric company that you have electrical medical equipment  They will put you on a priority list to fix your power quickly if it goes out  · Follow instructions for use and maintenance of your oxygen equipment  Keep oxygen containers secured in an upright position  Oxygen containers may become damaged if they fall over  An oxygen container may cause serious injury if it breaks  Clean your oxygen supplies:   · Wash or replace equipment parts  as directed  Wash your nasal prongs with soap and water twice a week  Replace your nasal prongs every 2 weeks  Replace your tubing every 2 months, or when it becomes stiff  Change the tubing if moisture appears on the inside of the tube  Moisture can make bacteria grow, and cause infections  Change the cannula and tubing after you have a cold or the flu  · Ask your healthcare provider how to clean your oxygen mask or transtracheal catheter  Replace the oxygen mask every 2 weeks  · Disinfect the buttons and outside of your oxygen concentrator  Clean your air filter at least once a week with soap and water  Let it air dry  Replace the filter at least once a week  Ask your oxygen supply company to service your concentrator at least once a year  Ask your healthcare provider if you have any questions about how to clean the air filter  · Wash your humidifier bottle with soap and warm water between each refill  Rinse and air dry the bottle before you refill it with distilled water  Do not use tap water  Disinfect the outside of the bottle and cap once the inside of the bottle has been washed      General tips for oxygen use:   · Keep a backup oxygen supply in case of an emergency  Always keep a backup oxygen tank that does not run on electricity in case there is a power failure  Oxygen may leak out of your container  Ask your healthcare provider if your supply has a tool to reduce wasted oxygen  · Use gauze or water-based lubricants to help soothe your skin  Oxygen may dry out your skin, mouth, or throat  Place gauze on top of your ears or under the tubing on your cheeks if they become sore  Use water-based lubricants on your lips and nostrils if they become dry or sore  Do not use oil-based lubricants  They may be flammable  · Order new oxygen well before your current supply runs out  Your oxygen company may not deliver on holidays  Ask your healthcare provider for help planning your oxygen needs when you travel  · Keep the phone number of your oxygen supply company handy  Place it in an area that you see every day, such as on your fridge  Contact them if you have any problems with your supplies  Follow up with your doctor as directed:  Write down your questions so you remember to ask them during your visits  © Copyright [employer] 2021 Information is for End User's use only and may not be sold, redistributed or otherwise used for commercial purposes  All illustrations and images included in CareNotes® are the copyrighted property of [employer]  or Perry Alicia  The above information is an  only  It is not intended as medical advice for individual conditions or treatments  Talk to your doctor, nurse or pharmacist before following any medical regimen to see if it is safe and effective for you  Shortness of Breath   WHAT YOU NEED TO KNOW:   Shortness of breath is a feeling that you cannot get enough air when you breathe in  You may have this feeling only during activity, or all the time  Your symptoms can range from mild to severe   Shortness of breath may be a sign of a serious health condition that needs immediate care  DISCHARGE INSTRUCTIONS:   Return to the emergency department if:   · Your signs and symptoms are the same or worse within 24 hours of treatment  · The skin over your ribs or on your neck sinks in when you breathe  · You feel confused or dizzy  Contact your healthcare provider if:   · You have new or worsening symptoms  · You have questions or concerns about your condition or care  Medicines:   · Medicines  may be used to treat the cause of your symptoms  You may need medicine to treat a bacterial infection or reduce anxiety  Other medicines may be used to open your airway, reduce swelling, or remove extra fluid  If you have a heart condition, you may need medicine to help your heart beat more strongly or regularly  · Take your medicine as directed  Contact your healthcare provider if you think your medicine is not helping or if you have side effects  Tell him or her if you are allergic to any medicine  Keep a list of the medicines, vitamins, and herbs you take  Include the amounts, and when and why you take them  Bring the list or the pill bottles to follow-up visits  Carry your medicine list with you in case of an emergency  Manage shortness of breath:   · Create an action plan  You and your healthcare provider can work together to create a plan for how to handle shortness of breath  The plan can include daily activities, treatment changes, and what to do if you have severe breathing problems  · Lean forward on your elbows when you sit  This helps your lungs expand and may make it easier to breathe  · Use pursed-lip breathing any time you feel short of breath  Breathe in through your nose and then slowly breathe out through your mouth with your lips slightly puckered  It should take you twice as long to breathe out as it did to breathe in  · Do not smoke    Nicotine and other chemicals in cigarettes and cigars can cause lung damage and make shortness of breath worse  Ask your healthcare provider for information if you currently smoke and need help to quit  E-cigarettes or smokeless tobacco still contain nicotine  Talk to your healthcare provider before you use these products  · Reach or maintain a healthy weight  Your healthcare provider can help you create a safe weight loss plan if you are overweight  · Exercise as directed  Exercise can help your lungs work more easily  Exercise can also help you lose weight if needed  Try to get at least 30 minutes of exercise most days of the week  Follow up with your healthcare provider or specialist as directed:  Write down your questions so you remember to ask them during your visits  © Copyright [address] 2021 Information is for End User's use only and may not be sold, redistributed or otherwise used for commercial purposes  All illustrations and images included in CareNotes® are the copyrighted property of A [employer] M [employer] Inc  or Perry Short   The above information is an  only  It is not intended as medical advice for individual conditions or treatments  Talk to your doctor, nurse or pharmacist before following any medical regimen to see if it is safe and effective for you

## 2021-08-10 ENCOUNTER — OFFICE VISIT (OUTPATIENT)
Dept: PULMONOLOGY | Facility: CLINIC | Age: 56
End: 2021-08-10
Payer: COMMERCIAL

## 2021-08-10 VITALS
WEIGHT: 149.5 LBS | HEART RATE: 95 BPM | OXYGEN SATURATION: 97 % | BODY MASS INDEX: 30.14 KG/M2 | TEMPERATURE: 97.4 F | HEIGHT: 59 IN | SYSTOLIC BLOOD PRESSURE: 118 MMHG | DIASTOLIC BLOOD PRESSURE: 64 MMHG

## 2021-08-10 DIAGNOSIS — U07.1 ACUTE HYPOXEMIC RESPIRATORY FAILURE DUE TO COVID-19 (HCC): ICD-10-CM

## 2021-08-10 DIAGNOSIS — J96.01 ACUTE HYPOXEMIC RESPIRATORY FAILURE DUE TO COVID-19 (HCC): ICD-10-CM

## 2021-08-10 DIAGNOSIS — I27.82 OTHER CHRONIC PULMONARY EMBOLISM WITHOUT ACUTE COR PULMONALE (HCC): ICD-10-CM

## 2021-08-10 DIAGNOSIS — U07.1 PNEUMONIA DUE TO COVID-19 VIRUS: ICD-10-CM

## 2021-08-10 DIAGNOSIS — J12.82 PNEUMONIA DUE TO COVID-19 VIRUS: ICD-10-CM

## 2021-08-10 DIAGNOSIS — D86.9 SARCOIDOSIS: ICD-10-CM

## 2021-08-10 PROCEDURE — 1036F TOBACCO NON-USER: CPT | Performed by: INTERNAL MEDICINE

## 2021-08-10 PROCEDURE — 99214 OFFICE O/P EST MOD 30 MIN: CPT | Performed by: INTERNAL MEDICINE

## 2021-08-10 NOTE — ASSESSMENT & PLAN NOTE
She was admitted [facility] in May with acute COVID pneumonia  Her CT scan done at that time showed bilateral infiltrates and nonspecific likely reactive lymphadenopathy  There were also some nodular shadows  She has previous history of sarcoidosis  On clinical examination she had occasional crackles bilaterally  I have ordered a repeat PFT and a 6 minute walk test   We will also do id repeat chest x-ray  Meanwhile I advised her to continue with Symbicort and albuterol which she has been taking  I had a long discussion with her and answered all her questions

## 2021-08-10 NOTE — ASSESSMENT & PLAN NOTE
She has previous history of sarcoidosis  Her CT scan showed bilateral nodule are shadows  We will await repeat imaging

## 2021-08-10 NOTE — PROGRESS NOTES
Assessment/Plan:    Acute hypoxemic respiratory failure due to COVID-19 [facility])  She developed acute hypoxic respiratory failure following COVID pneumonia and was discharged home on 2 L of nasal cannula oxygen  Currently her oxygen requirements have come down and she uses occasionally at night  Her exercise tolerance has improved  We will do a repeat 6 minute walk test to see whether she need to have continued oxygen  Pneumonia due to COVID-19 virus  She was admitted [facility] in May with acute COVID pneumonia  Her CT scan done at that time showed bilateral infiltrates and nonspecific likely reactive lymphadenopathy  There were also some nodular shadows  She has previous history of sarcoidosis  On clinical examination she had occasional crackles bilaterally  I have ordered a repeat PFT and a 6 minute walk test   We will also do id repeat chest x-ray  Meanwhile I advised her to continue with Symbicort and albuterol which she has been taking  I had a long discussion with her and answered all her questions  Acute pulmonary embolism without acute cor pulmonale (HCC)  She had acute pulmonary embolism and has been started on systemic anticoagulation with apixaban  She had COVID infection  She had acute segmental and subsegmental left upper lobe thrombus    Sarcoidosis  She has previous history of sarcoidosis  Her CT scan showed bilateral nodule are shadows  We will await repeat imaging  Diagnoses and all orders for this visit:    Pneumonia due to COVID-19 virus  -     Ambulatory referral to Hematology / Oncology  -     Complete PFT with post Bronchodilator and Six Minute walk; Future  -     XR chest pa & lateral; Future    Acute hypoxemic respiratory failure due to COVID-19 [facility])  -     Ambulatory referral to Hematology / Oncology  -     Complete PFT with post Bronchodilator and Six Minute walk;  Future  -     XR chest pa & lateral; Future    Sarcoidosis  -     Ambulatory referral to Hematology / Oncology  -     Complete PFT with post Bronchodilator and Six Minute walk; Future  -     XR chest pa & lateral; Future    Other chronic pulmonary embolism without acute cor pulmonale ([address] )  -     Ambulatory referral to Hematology / Oncology          Subjective:      Patient ID: Daina Jansen is a 64 y o  female  Girish Clements was admitted [facility] with COVID infection with acute hypoxic respiratory failure and pneumonia and was discharged home on oxygen supplementation  She was also given Symbicort and albuterol  Her CT scan at that time showed bilateral ground-glass and other infiltrates along with some nodular shadows  She was also noted to have some lymphadenopathy which is thought to be reactive  She has previous history of sarcoidosis  Currently she is on 2 liters/minute of oxygen supplementation which she uses only at night occasionally  She states that her shortness of breath has improved and she can not walk more than 2 blocks  She has occasional nonproductive cough  No wheezing no chest pain no swelling of feet no rashes  Her appetite is good  She has no fever or chills  She also had acute pulmonary embolism and she is on systemic anticoagulation with apixaban  She denies smoking cigarettes  But she used to use weeds  She denies any chest pain or palpitations  She has been referred by her primary care physician to pulmonary critical care for follow-up for her pneumonia and sarcoidosis  The following portions of the patient's history were reviewed and updated as appropriate: allergies, current medications, past family history, past medical history, past social history, past surgical history and problem list     Review of Systems   Constitutional: Negative for appetite change, chills and fever  HENT: Negative for hearing loss, rhinorrhea, sneezing, trouble swallowing and voice change  Eyes: Negative for visual disturbance     Respiratory: Positive for cough, chest tightness and shortness of breath  Negative for wheezing  Cardiovascular: Negative for chest pain, palpitations and leg swelling  Gastrointestinal: Negative for abdominal pain, constipation, diarrhea, nausea and vomiting  Endocrine: Negative for polyuria  Genitourinary: Negative for dysuria, frequency and urgency  Musculoskeletal: Negative for arthralgias and gait problem  Skin: Negative for rash  Allergic/Immunologic: Positive for environmental allergies  Neurological: Negative for dizziness, seizures, light-headedness and headaches  Psychiatric/Behavioral: Negative for agitation and sleep disturbance  The patient is not nervous/anxious  Objective:      /64 (BP Location: Right arm, Patient Position: Sitting, Cuff Size: Adult)   Pulse 95   Temp (!) 97 4 °F (36 3 °C) (Tympanic)   Ht 4' 11" (1 499 m)   Wt 67 8 kg (149 lb 8 oz)   SpO2 97%   BMI 30 20 kg/m²          Physical Exam  Vitals reviewed  Constitutional:       General: She is not in acute distress  Appearance: She is not ill-appearing, toxic-appearing or diaphoretic  HENT:      Head: Normocephalic  Eyes:      General: No scleral icterus  Conjunctiva/sclera: Conjunctivae normal    Cardiovascular:      Rate and Rhythm: Normal rate  Heart sounds: Normal heart sounds  No murmur heard  Pulmonary:      Effort: Pulmonary effort is normal  No respiratory distress  Breath sounds: Normal breath sounds  No stridor  No wheezing, rhonchi or rales (Occasional crackles bilaterally)  Chest:      Chest wall: No tenderness  Abdominal:      General: Bowel sounds are normal       Palpations: Abdomen is soft  Tenderness: There is no abdominal tenderness  There is no guarding  Musculoskeletal:      Cervical back: No rigidity  Right lower leg: No edema  Left lower leg: No edema  Lymphadenopathy:      Cervical: No cervical adenopathy  Skin:     General: Skin is warm  Coloration: Skin is not jaundiced  Neurological:      Mental Status: She is alert and oriented to person, place, and time  Gait: Gait normal    Psychiatric:         Mood and Affect: Mood normal          Behavior: Behavior normal          Thought Content:  Thought content normal          Judgment: Judgment normal

## 2021-08-10 NOTE — ASSESSMENT & PLAN NOTE
She had acute pulmonary embolism and has been started on systemic anticoagulation with apixaban  She had COVID infection    She had acute segmental and subsegmental left upper lobe thrombus

## 2021-08-10 NOTE — ASSESSMENT & PLAN NOTE
She developed acute hypoxic respiratory failure following COVID pneumonia and was discharged home on 2 L of nasal cannula oxygen  Currently her oxygen requirements have come down and she uses occasionally at night  Her exercise tolerance has improved  We will do a repeat 6 minute walk test to see whether she need to have continued oxygen

## 2021-08-12 DIAGNOSIS — D86.9 SARCOIDOSIS: ICD-10-CM

## 2021-08-12 DIAGNOSIS — R05.9 COUGH: ICD-10-CM

## 2021-08-12 RX ORDER — BUDESONIDE AND FORMOTEROL FUMARATE DIHYDRATE 160; 4.5 UG/1; UG/1
AEROSOL RESPIRATORY (INHALATION)
Qty: 10.2 G | Refills: 0 | Status: SHIPPED | OUTPATIENT
Start: 2021-08-12 | End: 2021-08-23 | Stop reason: SDUPTHER

## 2021-08-12 NOTE — TELEPHONE ENCOUNTER
Patient called she lost her simvacort inhaler on the bus   She needs another  one    called to [facility] [address]    Thank you

## 2021-08-12 NOTE — TELEPHONE ENCOUNTER
Bella called and although they have received the new Rx, because pt has Bandy, they are requiring another PA because pt claims that she lost it  Pharmacy is asking that this please be expedited because pt is without her inhaler

## 2021-08-13 NOTE — TELEPHONE ENCOUNTER
Mao, pt's Symbicort was denied by insurance  Pt needs inhaler ASAP   The following is a list of inhalers that her insurance covers:    Fluticasone Salmeterol  Dulera  Flovent HFA  Advair HFA  Anoro Ellipta    Please send one of these for pt, [facility], [facility]

## 2021-08-23 ENCOUNTER — HOSPITAL ENCOUNTER (OUTPATIENT)
Dept: RADIOLOGY | Facility: HOSPITAL | Age: 56
Discharge: HOME | End: 2021-08-23
Attending: INTERNAL MEDICINE
Payer: COMMERCIAL

## 2021-08-23 ENCOUNTER — TELEPHONE (OUTPATIENT)
Dept: FAMILY MEDICINE CLINIC | Facility: CLINIC | Age: 56
End: 2021-08-23

## 2021-08-23 DIAGNOSIS — U07.1 ACUTE HYPOXEMIC RESPIRATORY FAILURE DUE TO COVID-19 (HCC): ICD-10-CM

## 2021-08-23 DIAGNOSIS — J96.01 ACUTE HYPOXEMIC RESPIRATORY FAILURE DUE TO COVID-19 (HCC): ICD-10-CM

## 2021-08-23 DIAGNOSIS — J12.82 PNEUMONIA DUE TO COVID-19 VIRUS: ICD-10-CM

## 2021-08-23 DIAGNOSIS — U07.1 PNEUMONIA DUE TO COVID-19 VIRUS: ICD-10-CM

## 2021-08-23 DIAGNOSIS — D86.9 SARCOIDOSIS: ICD-10-CM

## 2021-08-23 DIAGNOSIS — R05.9 COUGH: ICD-10-CM

## 2021-08-23 DIAGNOSIS — L29.9 PRURITUS: ICD-10-CM

## 2021-08-23 PROCEDURE — 71046 X-RAY EXAM CHEST 2 VIEWS: CPT

## 2021-08-23 RX ORDER — BENZONATATE 200 MG/1
CAPSULE ORAL
Qty: 45 CAPSULE | Refills: 1 | Status: SHIPPED | OUTPATIENT
Start: 2021-08-23 | End: 2021-11-19 | Stop reason: SDUPTHER

## 2021-08-23 RX ORDER — HYDROXYZINE HYDROCHLORIDE 25 MG/1
TABLET, FILM COATED ORAL
Qty: 60 TABLET | Refills: 0 | Status: SHIPPED | OUTPATIENT
Start: 2021-08-23 | End: 2021-10-18

## 2021-08-23 RX ORDER — BUDESONIDE AND FORMOTEROL FUMARATE DIHYDRATE 160; 4.5 UG/1; UG/1
2 AEROSOL RESPIRATORY (INHALATION) 2 TIMES DAILY
Qty: 30 G | Refills: 1 | Status: SHIPPED | OUTPATIENT
Start: 2021-08-23 | End: 2021-08-26 | Stop reason: SDUPTHER

## 2021-08-23 NOTE — TELEPHONE ENCOUNTER
[facility] called - pt lost her Symbicort inhaler and an override is needed to replace - number to call is [phone]    [facility] can be reached at [phone]

## 2021-08-26 ENCOUNTER — HOSPITAL ENCOUNTER (OUTPATIENT)
Dept: PULMONOLOGY | Facility: HOSPITAL | Age: 56
Discharge: HOME | End: 2021-08-26
Attending: INTERNAL MEDICINE
Payer: COMMERCIAL

## 2021-08-26 DIAGNOSIS — J12.82 PNEUMONIA DUE TO COVID-19 VIRUS: ICD-10-CM

## 2021-08-26 DIAGNOSIS — D86.9 SARCOIDOSIS: ICD-10-CM

## 2021-08-26 DIAGNOSIS — U07.1 PNEUMONIA DUE TO COVID-19 VIRUS: ICD-10-CM

## 2021-08-26 DIAGNOSIS — U07.1 ACUTE HYPOXEMIC RESPIRATORY FAILURE DUE TO COVID-19 (HCC): ICD-10-CM

## 2021-08-26 DIAGNOSIS — J96.01 ACUTE HYPOXEMIC RESPIRATORY FAILURE DUE TO COVID-19 (HCC): ICD-10-CM

## 2021-08-26 PROCEDURE — 94618 PULMONARY STRESS TESTING: CPT | Performed by: INTERNAL MEDICINE

## 2021-08-26 PROCEDURE — 94726 PLETHYSMOGRAPHY LUNG VOLUMES: CPT | Performed by: INTERNAL MEDICINE

## 2021-08-26 PROCEDURE — 94060 EVALUATION OF WHEEZING: CPT | Performed by: INTERNAL MEDICINE

## 2021-08-26 PROCEDURE — 94729 DIFFUSING CAPACITY: CPT | Performed by: INTERNAL MEDICINE

## 2021-08-26 PROCEDURE — 94726 PLETHYSMOGRAPHY LUNG VOLUMES: CPT

## 2021-08-26 PROCEDURE — 94761 N-INVAS EAR/PLS OXIMETRY MLT: CPT

## 2021-08-26 PROCEDURE — 94060 EVALUATION OF WHEEZING: CPT

## 2021-08-26 PROCEDURE — 94729 DIFFUSING CAPACITY: CPT

## 2021-08-26 RX ORDER — ALBUTEROL SULFATE 2.5 MG/3ML
2.5 SOLUTION RESPIRATORY (INHALATION) ONCE
Status: COMPLETED | OUTPATIENT
Start: 2021-08-26 | End: 2021-08-26

## 2021-08-26 RX ORDER — BUDESONIDE AND FORMOTEROL FUMARATE DIHYDRATE 160; 4.5 UG/1; UG/1
2 AEROSOL RESPIRATORY (INHALATION) 2 TIMES DAILY
Qty: 10.2 G | Refills: 3 | Status: SHIPPED | OUTPATIENT
Start: 2021-08-26 | End: 2021-11-10 | Stop reason: SDUPTHER

## 2021-08-26 RX ADMIN — ALBUTEROL SULFATE 2.5 MG: 2.5 SOLUTION RESPIRATORY (INHALATION) at 09:27

## 2021-08-26 NOTE — TELEPHONE ENCOUNTER
[facility] called - it is still not going through  She needs an override with insurance, not a new script  They have a script on file      Call insurance at [phone]    Please call [facility]

## 2021-08-26 NOTE — TELEPHONE ENCOUNTER
Darlene Gonzáles - I talked to insurance company, they state only a 30 day supply at a time can be called in, I changed this to a 30 day supply and sent to pharmacy  The next refill is not due until 8/29/2021  As soon as [facility] runs claim we should receive a notice for an override so patient can get one before 8/29  Spoke with patient - Aware

## 2021-08-27 NOTE — TELEPHONE ENCOUNTER
No samples unfortunately  Looks like we are waiting on [facility] to run the claim as per Radha's note

## 2021-08-27 NOTE — TELEPHONE ENCOUNTER
INSURANCE COVERAGE REGARDING PAYMENT  FOR YOUR COLONOSCOPY        Colon Cancer is the second leading cause of death among cancers, per the American [employer] Society. It is preventable. Early detection is the key. Your doctor will determine which tests need to be done for prevention and/or treatment. However, colonoscopies can be performed for several reasons:     Screening To screen for any problems within the colon. In this case, the patient is not symptomatic and does not have a personal history of previous colon cancer/condition or abnormal findings. Billed as screening.   Surveillance To monitor for a previously diagnosed colon condition (such as polyps) or when performed at more frequent intervals than every ten years because the patient has a personal/family history of colonic polyps or colon cancer. Billed as diagnostic.   Diagnostic Patient with symptoms, used to evaluate the colon. Billed as diagnostic.       If during a screening colonoscopy, our physician finds an abnormality, performs a biopsy or polypectomy (removal of polyp), your insurance company may consider the procedure to be a diagnostic exam and no longer a screening procedure.     Every insurance company is different. We encourage you to call your insurance company regarding plan benefits. Generally, screening benefits and diagnostic benefits may be paid at different levels. Charges associated with anesthesia or type of facility may also be processed differently. This varies with each insurance company, so we want you to be aware of this prior to your procedure. You do not have to call your insurance company if you have Medicare. For an estimate of potential charges, you may call the [facility] Patient Contact Center at [phone], option 5.     The authorization staff at [facility] will contact your insurance company to check precertification requirements for your colonoscopy. However, precertification, which serves as notification  Her insurance will only cover it once a month     They will refill on 8/29     If she lost it she may have to pay for it   Or see if we have any samples in the office or she can call the lung doctor is never a guarantee of payment. If you have questions regarding precertification for your procedure, please contact your insurance company. If you need further assistance call our authorization department at [phone].

## 2021-09-14 ENCOUNTER — TELEPHONE (OUTPATIENT)
Dept: FAMILY MEDICINE CLINIC | Facility: CLINIC | Age: 56
End: 2021-09-14

## 2021-09-14 NOTE — TELEPHONE ENCOUNTER
Pt called - she would like to speak with Kelley Patiño regarding what social security discussed with her on Friday 9/10  Please contact pt at [phone]

## 2021-09-15 ENCOUNTER — APPOINTMENT (OUTPATIENT)
Dept: LAB | Facility: HOSPITAL | Age: 56
End: 2021-09-15
Attending: INTERNAL MEDICINE
Payer: COMMERCIAL

## 2021-09-15 ENCOUNTER — HOSPITAL ENCOUNTER (OUTPATIENT)
Dept: CT IMAGING | Facility: HOSPITAL | Age: 56
Discharge: HOME | End: 2021-09-15
Attending: INTERNAL MEDICINE
Payer: COMMERCIAL

## 2021-09-15 ENCOUNTER — OFFICE VISIT (OUTPATIENT)
Dept: PULMONOLOGY | Facility: CLINIC | Age: 56
End: 2021-09-15
Payer: COMMERCIAL

## 2021-09-15 VITALS
SYSTOLIC BLOOD PRESSURE: 128 MMHG | DIASTOLIC BLOOD PRESSURE: 82 MMHG | WEIGHT: 153 LBS | OXYGEN SATURATION: 96 % | BODY MASS INDEX: 30.84 KG/M2 | HEART RATE: 97 BPM | HEIGHT: 59 IN | TEMPERATURE: 97.8 F

## 2021-09-15 DIAGNOSIS — U07.1 PNEUMONIA DUE TO COVID-19 VIRUS: ICD-10-CM

## 2021-09-15 DIAGNOSIS — I26.92 ACUTE SADDLE PULMONARY EMBOLISM WITHOUT ACUTE COR PULMONALE (HCC): Primary | ICD-10-CM

## 2021-09-15 DIAGNOSIS — J12.82 PNEUMONIA DUE TO COVID-19 VIRUS: ICD-10-CM

## 2021-09-15 DIAGNOSIS — I26.92 ACUTE SADDLE PULMONARY EMBOLISM WITHOUT ACUTE COR PULMONALE (HCC): ICD-10-CM

## 2021-09-15 DIAGNOSIS — D86.9 SARCOIDOSIS: ICD-10-CM

## 2021-09-15 DIAGNOSIS — J96.01 ACUTE RESPIRATORY FAILURE WITH HYPOXIA (HCC): ICD-10-CM

## 2021-09-15 LAB
CREAT SERPL-MCNC: 0.8 MG/DL (ref 0.4–1.1)
GFR SERPL CREATININE-BSD FRML MDRD: 95 ML/MIN/1.73SQ M

## 2021-09-15 PROCEDURE — 36415 COLL VENOUS BLD VENIPUNCTURE: CPT

## 2021-09-15 PROCEDURE — G1004 CDSM NDSC: HCPCS

## 2021-09-15 PROCEDURE — 99214 OFFICE O/P EST MOD 30 MIN: CPT | Performed by: INTERNAL MEDICINE

## 2021-09-15 PROCEDURE — 82565 ASSAY OF CREATININE: CPT

## 2021-09-15 PROCEDURE — 71275 CT ANGIOGRAPHY CHEST: CPT

## 2021-09-15 RX ORDER — ALBUTEROL SULFATE 90 UG/1
2 AEROSOL, METERED RESPIRATORY (INHALATION) EVERY 6 HOURS PRN
Qty: 18 G | Refills: 1 | Status: SHIPPED | OUTPATIENT
Start: 2021-09-15 | End: 2021-11-10 | Stop reason: SDUPTHER

## 2021-09-15 RX ADMIN — IOHEXOL 85 ML: 350 INJECTION, SOLUTION INTRAVENOUS at 16:37

## 2021-09-15 NOTE — ASSESSMENT & PLAN NOTE
She had acute pulmonary embolism and has been started on systemic anticoagulation with apixaban  She had COVID infection and has been on systemic anticoagulation for more than 3 months  She had acute segmental and subsegmental left upper lobe thrombus  She also had mild pulmonary hypertension  We will do a repeat CT angiogram as well as bilateral lower extremity ultrasound  If these are negative I would like to stop her systemic anticoagulation  I had a long discussion with her and answered all her questions

## 2021-09-15 NOTE — ASSESSMENT & PLAN NOTE
This has resolved  Currently she is not needing any oxygen  Her recent 6 minute walk test did not show any need for oxygen supplementation

## 2021-09-15 NOTE — ASSESSMENT & PLAN NOTE
She was admitted [facility] in May with acute COVID pneumonia  Her CT scan done at that time showed bilateral infiltrates and nonspecific likely reactive lymphadenopathy  There were also some nodular shadows  She has previous history of sarcoidosis  On clinical examination , her chest was clear to auscultation today  Her chest x-ray showed significant improvement in ground-glass infiltrates bilaterally which was noted previously  She had some nodular opacities from her previous sarcoidosis as well as some scarring  Her PFT showed diffusion defect with restriction  There is no clear obstruction  I advised her to continue with Symbicort and albuterol which she has been taking    I had a long discussion with her and answered all her questions

## 2021-09-15 NOTE — ASSESSMENT & PLAN NOTE
She has previous history of sarcoidosis  Her CT scan showed bilateral nodular shadows    We will await repeat imaging

## 2021-09-15 NOTE — PROGRESS NOTES
Assessment/Plan:    Pneumonia due to COVID-19 virus  She was admitted [facility] in May with acute COVID pneumonia  Her CT scan done at that time showed bilateral infiltrates and nonspecific likely reactive lymphadenopathy  There were also some nodular shadows  She has previous history of sarcoidosis  On clinical examination , her chest was clear to auscultation today  Her chest x-ray showed significant improvement in ground-glass infiltrates bilaterally which was noted previously  She had some nodular opacities from her previous sarcoidosis as well as some scarring  Her PFT showed diffusion defect with restriction  There is no clear obstruction  I advised her to continue with Symbicort and albuterol which she has been taking  I had a long discussion with her and answered all her questions    Acute respiratory failure with hypoxia ([address] )  This has resolved  Currently she is not needing any oxygen  Her recent 6 minute walk test did not show any need for oxygen supplementation  Sarcoidosis  She has previous history of sarcoidosis  Her CT scan showed bilateral nodular shadows  We will await repeat imaging    Acute pulmonary embolism without acute cor pulmonale (HCC)  She had acute pulmonary embolism and has been started on systemic anticoagulation with apixaban  She had COVID infection and has been on systemic anticoagulation for more than 3 months  She had acute segmental and subsegmental left upper lobe thrombus  She also had mild pulmonary hypertension  We will do a repeat CT angiogram as well as bilateral lower extremity ultrasound  If these are negative I would like to stop her systemic anticoagulation  I had a long discussion with her and answered all her questions  Diagnoses and all orders for this visit:    Acute saddle pulmonary embolism without acute cor pulmonale (HCC)  -     CTA chest pe study; Future  -     Creatinine, serum;  Future  -     VAS lower limb venous duplex study, complete bilateral; Future    Pneumonia due to COVID-19 virus  -     CTA chest pe study; Future  -     VAS lower limb venous duplex study, complete bilateral; Future  -     albuterol (ProAir HFA) 90 mcg/act inhaler; Inhale 2 puffs every 6 (six) hours as needed for wheezing or shortness of breath    Acute respiratory failure with hypoxia (HCC)    Sarcoidosis  -     albuterol (ProAir HFA) 90 mcg/act inhaler; Inhale 2 puffs every 6 (six) hours as needed for wheezing or shortness of breath          Subjective:      Patient ID: Edi Crook is a 64 y o  female  David Lewis has come for follow-up for her shortness of breath, 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 and previous COVID pneumonia  Currently her shortness of breath is better and her exercise tolerance is at least 2 blocks on level ground  She has no significant cough or phlegm or wheeze or chest pain  She denies any swelling of feet  No fever or chills  She has been on treatment with Symbicort regularly  She also used to use nebulized albuterol  She has not used any rescue nebulizer for some time  She has been ambulatory  She has no hoarseness of voice or dysphagia  No chest pain or palpitations  She had previous acute pulmonary embolism in the setting of COVID pneumonia  She has been on systemic anticoagulation with Eliquis  Her recent chest x-ray showed significant improvement in ground-glass infiltrates bilaterally  She had some nodule opacities from her previous sarcoidosis  Currently she is not on any oxygen  Her recent PFT showed a moderate diffusion defect  There was no clear obstruction she did not need any oxygen on 6 minute walk test   Her exercise capacity was good        The following portions of the patient's history were reviewed and updated as appropriate: allergies, current medications, past medical history, past social history, past surgical history and problem list     Review of Systems   Constitutional: Negative for appetite change, chills, fever and unexpected weight change  HENT: Negative for hearing loss, rhinorrhea, sneezing, trouble swallowing and voice change  Eyes: Negative for visual disturbance  Respiratory: Positive for shortness of breath  Negative for cough, chest tightness and wheezing  Cardiovascular: Negative for chest pain, palpitations and leg swelling  Gastrointestinal: Negative for abdominal pain, constipation, diarrhea, nausea and vomiting  Endocrine: Negative for polyuria  Genitourinary: Negative for dysuria, frequency and urgency  Musculoskeletal: Negative for arthralgias and gait problem  Skin: Negative for rash  Allergic/Immunologic: Negative for environmental allergies  Neurological: Negative for dizziness, syncope, light-headedness and headaches  Psychiatric/Behavioral: Negative for agitation and sleep disturbance  The patient is nervous/anxious  Objective:      /82 (BP Location: Right arm, Patient Position: Sitting, Cuff Size: Adult)   Pulse 97   Temp 97 8 °F (36 6 °C) (Tympanic)   Ht 4' 11" (1 499 m)   Wt 69 4 kg (153 lb)   SpO2 96%   BMI 30 90 kg/m²          Physical Exam  Vitals reviewed  Constitutional:       General: She is not in acute distress  Appearance: She is obese  She is not ill-appearing, toxic-appearing or diaphoretic  HENT:      Head: Normocephalic  Mouth/Throat:      Mouth: Mucous membranes are moist    Eyes:      General: No scleral icterus  Conjunctiva/sclera: Conjunctivae normal    Cardiovascular:      Rate and Rhythm: Normal rate  Heart sounds: Normal heart sounds  No murmur heard  Pulmonary:      Effort: Pulmonary effort is normal  No respiratory distress  Breath sounds: Normal breath sounds  No stridor  No wheezing, rhonchi or rales  Chest:      Chest wall: No tenderness  Abdominal:      General: Bowel sounds are normal       Palpations: Abdomen is soft  Tenderness:  There is no abdominal tenderness  There is no guarding  Musculoskeletal:      Cervical back: No rigidity  Right lower leg: No edema  Left lower leg: No edema  Lymphadenopathy:      Cervical: No cervical adenopathy  Skin:     General: Skin is warm  Coloration: Skin is not jaundiced or pale  Neurological:      Mental Status: She is alert and oriented to person, place, and time  Gait: Gait normal    Psychiatric:         Mood and Affect: Mood normal          Behavior: Behavior normal          Thought Content:  Thought content normal          Judgment: Judgment normal

## 2021-09-16 ENCOUNTER — TELEPHONE (OUTPATIENT)
Dept: CARDIOLOGY CLINIC | Facility: CLINIC | Age: 56
End: 2021-09-16

## 2021-09-20 ENCOUNTER — OFFICE VISIT (OUTPATIENT)
Dept: CARDIOLOGY CLINIC | Facility: CLINIC | Age: 56
End: 2021-09-20
Payer: COMMERCIAL

## 2021-09-20 VITALS
OXYGEN SATURATION: 98 % | HEIGHT: 59 IN | BODY MASS INDEX: 31.29 KG/M2 | DIASTOLIC BLOOD PRESSURE: 74 MMHG | HEART RATE: 81 BPM | WEIGHT: 155.2 LBS | SYSTOLIC BLOOD PRESSURE: 102 MMHG

## 2021-09-20 DIAGNOSIS — U09.9 POST-COVID SYNDROME: ICD-10-CM

## 2021-09-20 DIAGNOSIS — B33.24 VIRAL CARDIOMYOPATHY (HCC): ICD-10-CM

## 2021-09-20 DIAGNOSIS — R06.02 SHORTNESS OF BREATH: Primary | ICD-10-CM

## 2021-09-20 DIAGNOSIS — E78.00 HYPERCHOLESTEROLEMIA: ICD-10-CM

## 2021-09-20 PROCEDURE — 93000 ELECTROCARDIOGRAM COMPLETE: CPT | Performed by: INTERNAL MEDICINE

## 2021-09-20 PROCEDURE — 1036F TOBACCO NON-USER: CPT | Performed by: INTERNAL MEDICINE

## 2021-09-20 PROCEDURE — 3008F BODY MASS INDEX DOCD: CPT | Performed by: INTERNAL MEDICINE

## 2021-09-20 PROCEDURE — 99204 OFFICE O/P NEW MOD 45 MIN: CPT | Performed by: INTERNAL MEDICINE

## 2021-09-20 NOTE — ASSESSMENT & PLAN NOTE
Mild residual shortness of breath, most likely secondary to   Recent COVID-19 pneumonia process  CT chest in reviewed  This shows mild residual infiltrate  Low likelihood of CAD

## 2021-09-20 NOTE — LETTER
2021     Osman Cardona MD  [address] [facility],[address]  [address] [place] [address]    Patient: Cinthya Soni   YOB: 1965   Date of Visit: 2021       Dear Dr Orona Lowers:    Thank you for referring Cinthya Soni to me for evaluation  Below are my notes for this consultation  If you have questions, please do not hesitate to call me  I look forward to following your patient along with you  Sincerely,        Alex Torres MD        CC: Gali Otto, [address] MD  2021  8:51 AM  Incomplete  [facility]  [address] [facility] [address]  [address]  [address] [address]  Phone#  [phone]  Fax#  [phone] [address]'s Cardiology Office Consultation             NAME: Cinthya Soni  AGE: 64 y o  SEX: female   : 1965   MRN: [id number]    DATE: 2021  TIME: 8:50 AM    Assessment and plan:    1  Shortness of breath  Assessment & Plan:    Exertional dyspnea due to combination of COVID-19 infection and pulmonary embolism  Recent CT of the chest showed resolution of the PE  Right ventricular did not seem to be enlarged  Prior echo showed evidence of low normal LV function  A repeat limited echo has been requested to to ensure that she does not have significant pulmonary hypertension, RV dysfunction or progressive LV failure related to COVID-19 infection  Orders:  -     POCT ECG  -     Echo limited with contrast if indicated; Future; Expected date: 2021    2  Hypercholesterolemia  Assessment & Plan:  Lab Results   Component Value Date     [address] (H) 2021       She will continue her statin therapy and follow-up with primary care  Orders:  -     Ambulatory referral to Cardiology    3  Post-COVID syndrome  Assessment & Plan:    Mild residual shortness of breath, most likely secondary to   Recent COVID-19 pneumonia process  CT chest in reviewed  This shows mild residual infiltrate  Low likelihood of CAD      Orders:  - Echo limited with contrast if indicated; Future; Expected date: 09/20/2021    4  Viral cardiomyopathy ([facility] [address] )  Assessment & Plan:    Concerns for post COVID-19 cardiomyopathy  Repeat limited echo planned to assess LV systolic function  If EF is below 50 percent, initiate medical therapy  Otherwise continue risk factor modification and regular exercise  Chief Complaint   Patient presents with   [address] Patient Visit     Dr Conklin(pulmonologist) post COVID- Sarcoidosis     Shortness of Breath     on exertion occasionally     Dizziness       HPI:    Becky Otto is a 64y o -year-old female who presents to the cardiology clinic for evaluation  She has been referred here by Dr Claribel Guzmán  She reports a diagnosis of COVID-19 back in May of this year  Since then she has had shortness of breath related to underlying pulmonary embolism which occurred after the COVID-19 infection  She subsequently has had a follow-up CT performed last week which showed no residual pulmonary embolus but there was still some residual ground-glass opacities seen from COVID-19  She continues to have cough and reports some dizziness  She denies any chest pain  She has no known history of coronary artery disease  She states she is not sure why she is here  She also has a history of pulmonary sarcoidosis  Recent CT of the chest independently reviewed and results discussed with the patient  Her lower extremity Doppler is pending before decision is made to stop the anticoagulation therapy  She reports feeling a whole lot better  Her dyspnea and her cough have improved  Her functional capacity also has markedly improved  She has underlying low risk of coronary artery disease  She denies any palpitations  Denies orthopnea, PND or lower extremity edema      The 10-year ASCVD risk score (Pj Morejon et al , 2013) is: 1 9%    Values used to calculate the score:      Age: 64 years      Sex: Female      Is Non- : Yes      Diabetic: No      Tobacco smoker: No      Systolic Blood Pressure: 348 mmHg      Is BP treated: No      HDL Cholesterol: 42 mg/dL      Total Cholesterol: 175 mg/dL      Past history, family history, social history, current medications, vital signs, recent lab and imaging studies and  prior cardiology studies reviewed independently on this visit      Cardiology Problem list:  COVID-19 pneumonia:  5/21  ECHO:  5/21: EF 50-55 percent, septal dyssynergy, PA pressure 30  Pulmonary embolism:  5/21 9/21: Repeat CTA: no residual pulmonary embolus , residual ground-glass opacities from COVID-19  Dyslipidemia    BP Readings from Last 4 Encounters:   09/20/21 102/74   09/15/21 128/82   08/10/21 118/64   08/03/21 132/62      Wt Readings from Last 3 Encounters:   09/20/21 70 4 kg (155 lb 3 2 oz)   09/15/21 69 4 kg (153 lb)   08/10/21 67 8 kg (149 lb 8 oz)       Lab Results   Component Value Date    LDLCALC 105 (H) 05/09/2021     Lab Results   Component Value Date    CREATININE 0 80 09/15/2021          ALLERGIES:  Allergies   Allergen Reactions    Carbamazepine Itching, Rash and Vomiting         Current Outpatient Medications:     acetaminophen (TYLENOL) 325 mg tablet, Take 2 tablets (650 mg total) by mouth every 6 (six) hours as needed for mild pain, Disp:  , Rfl: 0    apixaban (ELIQUIS) 5 mg, Take 1 tablet (5 mg total) by mouth 2 (two) times a day, Disp: 180 tablet, Rfl: 0    atorvastatin (LIPITOR) 40 mg tablet, Take 1 tablet (40 mg total) by mouth daily, Disp: 90 tablet, Rfl: 1    benzonatate (TESSALON) 200 MG capsule, take 1 capsule by mouth every 8 hours if needed for EXCESSIVE COUGH, Disp: 45 capsule, Rfl: 1    budesonide-formoterol (Symbicort) 160-4 5 mcg/act inhaler, Inhale 2 puffs 2 (two) times a day Rinse mouth after use, Disp: 10 2 g, Rfl: 3    cetirizine (ZyrTEC) 10 mg tablet, Take 1 tablet (10 mg total) by mouth daily, Disp: 90 tablet, Rfl: 1    cholecalciferol (VITAMIN D3) 1,000 units tablet, Take 2 tablets (2,000 Units total) by mouth daily, Disp: 90 tablet, Rfl: 1    dextromethorphan-guaiFENesin (ROBITUSSIN DM)  mg/5 mL syrup, Take 10 mL by mouth every 4 (four) hours as needed for cough, Disp: , Rfl: 0    ferrous sulfate 324 (65 Fe) mg, Take 1 tablet (324 mg total) by mouth 2 (two) times a day before meals, Disp: 180 tablet, Rfl: 1    fluticasone (FLONASE) 50 mcg/act nasal spray, 1 spray into each nostril daily, Disp: 18 2 mL, Rfl: 1    hydrOXYzine HCL (ATARAX) 25 mg tablet, take 1 tablet by mouth twice a day, Disp: 60 tablet, Rfl: 0    ipratropium (ATROVENT HFA) 17 mcg/act inhaler, Inhale 2 puffs 4 (four) times a day, Disp: , Rfl: 0    ketotifen (ZADITOR) 0 025 % ophthalmic solution, Administer 1 drop to both eyes 2 (two) times a day for 10 days, Disp: 1 mL, Rfl: 0    latanoprost (XALATAN) 0 005 % ophthalmic solution, place 1 drop into both eyes at bedtime, Disp: , Rfl:     meclizine (ANTIVERT) 12 5 MG tablet, Take 1 tablet (12 5 mg total) by mouth every 8 (eight) hours as needed for dizziness, Disp: 90 tablet, Rfl: 1    montelukast (SINGULAIR) 10 mg tablet, Take 1 tablet (10 mg total) by mouth daily, Disp: 90 tablet, Rfl: 1    multivitamin-minerals (CENTRUM ADULTS) tablet, Take 1 tablet by mouth daily, Disp: , Rfl: 0    senna-docusate sodium (SENOKOT S) 8 6-50 mg per tablet, Take 1 tablet by mouth daily at bedtime as needed for constipation, Disp: 100 tablet, Rfl: 1    sodium chloride (OCEAN) 0 65 % nasal spray, 1 spray into each nostril as needed for congestion for up to 12 doses, Disp: 60 mL, Rfl: 3    Vitamins A & D (VITAMIN A & D) ointment, Apply topically as needed for dry skin (to face), Disp: 45 g, Rfl: 1    albuterol (ProAir HFA) 90 mcg/act inhaler, Inhale 2 puffs every 6 (six) hours as needed for wheezing or shortness of breath, Disp: 18 g, Rfl: 1      Review of Systems   Constitutional: Negative for fever and unexpected weight change  HENT: Negative for congestion and trouble swallowing  Eyes: Negative for visual disturbance  Respiratory: Positive for cough and shortness of breath  Negative for chest tightness and wheezing  Cardiovascular: Negative for chest pain, palpitations and leg swelling  Gastrointestinal: Negative for abdominal pain and blood in stool  Endocrine: Negative for polyuria  Genitourinary: Negative for hematuria  Musculoskeletal: Positive for gait problem  Negative for arthralgias and myalgias  Skin: Negative for rash  Neurological: Positive for dizziness  Negative for tremors and weakness  Hematological: Does not bruise/bleed easily  Psychiatric/Behavioral: Negative for sleep disturbance         Past Medical History:   Diagnosis Date    Anxiety     Asthma     Back pain     Sarcoidosis     Vertigo        Past Surgical History:   Procedure Laterality Date    MAMMO (HISTORICAL)  2018       Family History   Problem Relation Age of Onset    Hypertension Mother     Mental illness Mother     Asthma Mother        Social History     Socioeconomic History    Marital status: Single     Spouse name: Not on file    Number of children: Not on file    Years of education: Not on file    Highest education level: Not on file   Occupational History    Not on file   Tobacco Use    Smoking status: Never Smoker    Smokeless tobacco: Never Used   Vaping Use    Vaping Use: Never used   Substance and Sexual Activity    Alcohol use: Not Currently     Comment: social    Drug use: Not Currently     Types: Marijuana    Sexual activity: Not Currently   Other Topics Concern    Not on file   Social History Narrative    · Most recent tobacco use screenin2019      · Do you currently or have you served in the [employer]:   No      · Were you activated, into active duty, as a member of the [employer] or as a Reservist:   No      Social Determinants of Health     Financial Resource Strain:  Difficulty of Paying Living Expenses:    Food Insecurity:     Worried About Running Out of Food in the Last Year:     Ran Out of Food in the Last Year:    Transportation Needs:     Lack of Transportation (Medical):      Lack of Transportation (Non-Medical):    Physical Activity:     Days of Exercise per Week:     Minutes of Exercise per Session:    Stress:     Feeling of Stress :    Social Connections:     Frequency of Communication with Friends and Family:     Frequency of Social Gatherings with Friends and Family:     Attends Sabianism Services:     Active Member of Clubs or Organizations:     Attends Club or Organization Meetings:     Marital Status:    Intimate Partner Violence:     Fear of Current or Ex-Partner:     Emotionally Abused:     Physically Abused:     Sexually Abused:          Objective:     Vitals:    09/20/21 0823   BP: 102/74   Pulse: 81   SpO2: 98%     Wt Readings from Last 3 Encounters:   09/20/21 70 4 kg (155 lb 3 2 oz)   09/15/21 69 4 kg (153 lb)   08/10/21 67 8 kg (149 lb 8 oz)     Pulse Readings from Last 3 Encounters:   09/20/21 81   09/15/21 97   08/10/21 95     BP Readings from Last 3 Encounters:   09/20/21 102/74   09/15/21 128/82   08/10/21 118/64         Physical Exam    Pertinent Laboratory/Diagnostic Studies:    Laboratory studies reviewed personally by Estela Rangel MD    BMP:   Lab Results   Component Value Date    SODIUM 136 06/18/2021    K 4 5 06/18/2021     06/18/2021    CO2 29 06/18/2021    BUN 7 06/18/2021    CREATININE 0 80 09/15/2021    GLUC 91 05/21/2021    CALCIUM 10 4 (H) 06/18/2021     CBC:  Lab Results   Component Value Date    WBC 9 20 06/18/2021    RBC 3 98 06/18/2021    HGB 11 6 06/18/2021    HCT 37 4 06/18/2021    MCV 94 06/18/2021    MCH 29 1 06/18/2021    RDW 15 9 (H) 06/18/2021     (H) 06/18/2021     Coags:    Lipid Profile:   No results found for: CHOL  Lab Results   Component Value Date    HDL 42 (L) 05/09/2021     Lab Results Component Value Date    LDLCALC 105 (H) 05/09/2021     Lab Results   Component Value Date    TRIG 142 0 05/09/2021      No results found for: [id number], TSH  Lab Results   Component Value Date    ALT 11 06/18/2021    AST 17 06/18/2021         Imaging Studies:   XR chest pa & lateral    Result Date: 8/26/2021  Narrative: CHEST INDICATION:   U07 1: COVID-19 J12 82: Pneumonia due to coronavirus disease 2019 U07 1: COVID-19 J96 01: Acute respiratory failure with hypoxia D86 9: Sarcoidosis, unspecified  Covid positive in May 2021  COMPARISON:  Chest radiograph from 5/17/2021 and 5/8/2021, chest CT from 5/14/2021 and 11/5/2018  EXAM PERFORMED/VIEWS:  XR CHEST PA & LATERAL  DUAL ENERGY SUBTRACTION  FINDINGS: Cardiomediastinal silhouette appears unremarkable  Mild bilateral groundglass opacity, markedly improved from 5/17/2021  No effusion or pneumothorax  Osseous structures appear within normal limits for patient age  Impression: Mild bilateral groundglass opacity, markedly improved from May 2021  The residual opacity is due to scarring from sarcoidosis  Superimposed post Covid pneumonitis/scar cannot be excluded  Workstation performed: [id number]     CTA chest pe study    Result Date: 9/15/2021  Narrative: CTA - CHEST WITH IV CONTRAST - PULMONARY ANGIOGRAM INDICATION:   I26 92: Saddle embolus of pulmonary artery without acute cor pulmonale U07 1: COVID-19 J12 82: Pneumonia due to coronavirus disease 2019  COMPARISON: 5/14/2021 TECHNIQUE: CTA examination of the chest was performed using angiographic technique according to a protocol specifically tailored to evaluate for pulmonary embolism  Axial, sagittal, and coronal 2D reformatted images were created from the source data and  submitted for interpretation  In addition, coronal 3D MIP postprocessing was performed on the acquisition scanner  Radiation dose length product (DLP) for this visit:  228 mGy-cm     This examination, like all CT scans performed in the [facility], was performed utilizing techniques to minimize radiation dose exposure, including the use of iterative reconstruction and automated exposure control  IV Contrast:  85 mL of iohexol (OMNIPAQUE)  FINDINGS: PULMONARY ARTERIAL TREE:  Previous left upper lobe pulmonary embolus has resolved  There is no pulmonary embolus identified  LUNGS:  There has been interval improvement in the appearance of the lungs  Previous bilateral airspace consolidative changes have improved  However, there is a groundglass abnormality predominantly throughout the lower lungs   Mild heterogeneous groundglass upper lung zones  While some of this may represent residual from previous Covid 19 infection, an acute process such as edema is not able to be excluded  There are subpleural cystic changes as well as calcified granulomas  No pneumothorax  No tracheal or endobronchial lesions  PLEURA:  Unremarkable  HEART/GREAT VESSELS:  Coronary artery calcification  MEDIASTINUM AND MARGUERITE:  Nonspecific mediastinal and bilateral hilar lymph nodes, likely reactive  CHEST WALL AND LOWER NECK:   Unremarkable  VISUALIZED STRUCTURES IN THE UPPER ABDOMEN:  Unremarkable  OSSEOUS STRUCTURES:  No acute fracture or destructive osseous lesion  Impression: 1  Interval resolution of previous pulmonary embolus  No pulmonary embolus is seen currently  2   Interval improvement in the appearance of the lungs with residual groundglass opacities which may reflect residual from previous Covid 19 infection although an acute process such as edema or pneumonitis is unable to be excluded  Workstation performed: [id number]     Complete PFT with post Bronchodilator and Six Minute walk    Result Date: 8/26/2021  Narrative: Cristina Gomez FUNCTION TEST Date of service: 08/26/21 Physician requesting PFT:  Camelia Bullard MD Reason for PFT:  Shortness of breath on exertion; nonproductive cough    Previous COVID infection INTERPRETATION:  Good patient effort  The oxygen saturation 96% % on room air at rest  The results of this test appear valid, although the ATS standards for 3 acceptable maneuvers was not met  The spirometry showed decreased FEV1 (71% of the predicted), decreased FVC (62% of the predicted), and increased FEV1/FVC ratio (93)  There was no significant improvement with inhaled bronchodilator  The lung volumes were all reduced  The total lung capacity was 2 54 L (59% of the predicted)  The diffusion capacity was reduced (54% of the predicted) indicating a moderately severe loss of functional alveolar capillary surface  The reduction persisted though mild, even after volume correction  The flow volume loop showed a restrictive pattern  Impression: Mild restriction with diffusion defect  This could be due to a pulmonary vascular or interstitial process  Clinical correlation is advised  6 MINUTE WALK TEST The patient subsequently underwent a 6 minute walk test   Her baseline oxygen saturation was 95% on room air at rest, blood pressure 112/80 and heart rate 106  She walked 328 meters during the 6 minute walk  The lowest oxygen saturation was 89%  She did not stop during the test   Her Vik dyspnea score was 3 post exercise  IMPRESSION: Satisfactory exercise tolerance  No significant oxygen desaturation at rest or during exertion  Fonnie Romberg Cecil Abbott, M D  Cardiac testing:   EKG reviewed personally:   Sinus rhythm, rate  85,  milliseconds  Orders Placed This Encounter   Procedures    POCT ECG    Echo limited with contrast if indicated           Lesley Saunders MD, [facility] of the record may have been created with voice recognition software  Occasional wrong word or "sound a like" substitutions may have occurred due to the inherent limitations of voice recognition software  Read the chart carefully and recognize, using context, where substitutions have occurred    If you have any questions or concerns about the context, text or information contained within the body of this dictation, please contact myself, the provider, for further clarification  Aaliyah Shay MD  2021  8:45 AM  Sign when Signing Visit  [facility] [address]  [address] [place] [address]  Phone#  [phone]  Fax#  [phone]  [facility] Cardiology Office Consultation             NAME: Graeme Molina  AGE: 64 y o  SEX: female   : 1965   MRN: [id number]    DATE: 2021  TIME: 8:43 AM    Assessment and plan:    1  Shortness of breath  -     POCT ECG    2  Acute hypoxemic respiratory failure due to COVID-19 [facility])  -     Ambulatory referral to Cardiology    3  Hypercholesterolemia  -     Ambulatory referral to Cardiology    4  Post-COVID syndrome           Discussion:    Chief Complaint   Patient presents with   [facility] Patient Visit     Dr Conklin(pulmonologist) post COVID- Sarcoidosis     Shortness of Breath     on exertion occasionally     Dizziness       HPI:    Graeme Molina is a 64y o -year-old female who presents to the cardiology clinic for evaluation  She reports a diagnosis of COVID-19 back in May of this year  Since then she has had shortness of breath related to underlying pulmonary embolism which occurred after the COVID-19 infection  She subsequently has had a follow-up CT performed last week which showed no residual pulmonary embolus but there was still some residual ground-glass opacities seen from COVID-19  She continues to have cough and reports some dizziness  She denies any chest pain  She has no known history of coronary artery disease  She states she is not sure why she is here  She also has a history of pulmonary sarcoidosis      The 10-year ASCVD risk score (Harley Landeros et al , 2013) is: 1 9%    Values used to calculate the score:      Age: 64 years      Sex: Female      Is Non- : Yes      Diabetic: No      Tobacco smoker: No Systolic Blood Pressure: 004 mmHg      Is BP treated: No      HDL Cholesterol: 42 mg/dL      Total Cholesterol: 175 mg/dL      Past history, family history, social history, current medications, vital signs, recent lab and imaging studies and  prior cardiology studies reviewed independently on this visit      Cardiology Problem list:  COVID-19 pneumonia:  5/21  ECHO:  5/21: EF 50-55 percent, septal dyssynergy, PA pressure 30  Pulmonary embolism:  5/21 9/21: Repeat CTA: no residual pulmonary embolus , residual ground-glass opacities from COVID-19  Dyslipidemia    BP Readings from Last 4 Encounters:   09/20/21 102/74   09/15/21 128/82   08/10/21 118/64   08/03/21 132/62      Wt Readings from Last 3 Encounters:   09/20/21 70 4 kg (155 lb 3 2 oz)   09/15/21 69 4 kg (153 lb)   08/10/21 67 8 kg (149 lb 8 oz)       Lab Results   Component Value Date    LDLCALC 105 (H) 05/09/2021     Lab Results   Component Value Date    CREATININE 0 80 09/15/2021          ALLERGIES:  Allergies   Allergen Reactions    Carbamazepine Itching, Rash and Vomiting         Current Outpatient Medications:     acetaminophen (TYLENOL) 325 mg tablet, Take 2 tablets (650 mg total) by mouth every 6 (six) hours as needed for mild pain, Disp:  , Rfl: 0    apixaban (ELIQUIS) 5 mg, Take 1 tablet (5 mg total) by mouth 2 (two) times a day, Disp: 180 tablet, Rfl: 0    atorvastatin (LIPITOR) 40 mg tablet, Take 1 tablet (40 mg total) by mouth daily, Disp: 90 tablet, Rfl: 1    benzonatate (TESSALON) 200 MG capsule, take 1 capsule by mouth every 8 hours if needed for EXCESSIVE COUGH, Disp: 45 capsule, Rfl: 1    budesonide-formoterol (Symbicort) 160-4 5 mcg/act inhaler, Inhale 2 puffs 2 (two) times a day Rinse mouth after use, Disp: 10 2 g, Rfl: 3    cetirizine (ZyrTEC) 10 mg tablet, Take 1 tablet (10 mg total) by mouth daily, Disp: 90 tablet, Rfl: 1    cholecalciferol (VITAMIN D3) 1,000 units tablet, Take 2 tablets (2,000 Units total) by mouth daily, Disp: 90 tablet, Rfl: 1    dextromethorphan-guaiFENesin (ROBITUSSIN DM)  mg/5 mL syrup, Take 10 mL by mouth every 4 (four) hours as needed for cough, Disp: , Rfl: 0    ferrous sulfate 324 (65 Fe) mg, Take 1 tablet (324 mg total) by mouth 2 (two) times a day before meals, Disp: 180 tablet, Rfl: 1    fluticasone (FLONASE) 50 mcg/act nasal spray, 1 spray into each nostril daily, Disp: 18 2 mL, Rfl: 1    hydrOXYzine HCL (ATARAX) 25 mg tablet, take 1 tablet by mouth twice a day, Disp: 60 tablet, Rfl: 0    ipratropium (ATROVENT HFA) 17 mcg/act inhaler, Inhale 2 puffs 4 (four) times a day, Disp: , Rfl: 0    ketotifen (ZADITOR) 0 025 % ophthalmic solution, Administer 1 drop to both eyes 2 (two) times a day for 10 days, Disp: 1 mL, Rfl: 0    latanoprost (XALATAN) 0 005 % ophthalmic solution, place 1 drop into both eyes at bedtime, Disp: , Rfl:     meclizine (ANTIVERT) 12 5 MG tablet, Take 1 tablet (12 5 mg total) by mouth every 8 (eight) hours as needed for dizziness, Disp: 90 tablet, Rfl: 1    montelukast (SINGULAIR) 10 mg tablet, Take 1 tablet (10 mg total) by mouth daily, Disp: 90 tablet, Rfl: 1    multivitamin-minerals (CENTRUM ADULTS) tablet, Take 1 tablet by mouth daily, Disp: , Rfl: 0    senna-docusate sodium (SENOKOT S) 8 6-50 mg per tablet, Take 1 tablet by mouth daily at bedtime as needed for constipation, Disp: 100 tablet, Rfl: 1    sodium chloride (OCEAN) 0 65 % nasal spray, 1 spray into each nostril as needed for congestion for up to 12 doses, Disp: 60 mL, Rfl: 3    Vitamins A & D (VITAMIN A & D) ointment, Apply topically as needed for dry skin (to face), Disp: 45 g, Rfl: 1    albuterol (ProAir HFA) 90 mcg/act inhaler, Inhale 2 puffs every 6 (six) hours as needed for wheezing or shortness of breath, Disp: 18 g, Rfl: 1      Review of Systems   Constitutional: Negative for fever and unexpected weight change  HENT: Negative for congestion and trouble swallowing      Eyes: Negative for visual disturbance  Respiratory: Positive for cough and shortness of breath  Negative for chest tightness and wheezing  Cardiovascular: Negative for chest pain, palpitations and leg swelling  Gastrointestinal: Negative for abdominal pain and blood in stool  Endocrine: Negative for polyuria  Genitourinary: Negative for hematuria  Musculoskeletal: Positive for gait problem  Negative for arthralgias and myalgias  Skin: Negative for rash  Neurological: Positive for dizziness  Negative for tremors and weakness  Hematological: Does not bruise/bleed easily  Psychiatric/Behavioral: Negative for sleep disturbance         Past Medical History:   Diagnosis Date    Anxiety     Asthma     Back pain     Sarcoidosis     Vertigo        Past Surgical History:   Procedure Laterality Date    MAMMO (HISTORICAL)  2018       Family History   Problem Relation Age of Onset    Hypertension Mother     Mental illness Mother     Asthma Mother        Social History     Socioeconomic History    Marital status: Single     Spouse name: Not on file    Number of children: Not on file    Years of education: Not on file    Highest education level: Not on file   Occupational History    Not on file   Tobacco Use    Smoking status: Never Smoker    Smokeless tobacco: Never Used   Vaping Use    Vaping Use: Never used   Substance and Sexual Activity    Alcohol use: Not Currently     Comment: social    Drug use: Not Currently     Types: Marijuana    Sexual activity: Not Currently   Other Topics Concern    Not on file   Social History Narrative    · Most recent tobacco use screenin2019      · Do you currently or have you served in the [employer]:   No      · Were you activated, into active duty, as a member of the [employer] or as a Reservist:   No      Social Determinants of Health     Financial Resource Strain:     Difficulty of Paying Living Expenses:    Food Insecurity:     Worried About Running Out of Food in the Last Year:    [address] in the Last Year:    Transportation Needs:     Lack of Transportation (Medical):      Lack of Transportation (Non-Medical):    Physical Activity:     Days of Exercise per Week:     Minutes of Exercise per Session:    Stress:     Feeling of Stress :    Social Connections:     Frequency of Communication with Friends and Family:     Frequency of Social Gatherings with Friends and Family:     Attends [employer] Services:     Active Member of Clubs or Organizations:     Attends Club or Organization Meetings:     Marital Status:    Intimate Partner Violence:     Fear of Current or Ex-Partner:     Emotionally Abused:     Physically Abused:     Sexually Abused:          Objective:     Vitals:    09/20/21 0823   BP: 102/74   Pulse: 81   SpO2: 98%     Wt Readings from Last 3 Encounters:   09/20/21 70 4 kg (155 lb 3 2 oz)   09/15/21 69 4 kg (153 lb)   08/10/21 67 8 kg (149 lb 8 oz)     Pulse Readings from Last 3 Encounters:   09/20/21 81   09/15/21 97   08/10/21 95     BP Readings from Last 3 Encounters:   09/20/21 102/74   09/15/21 128/82   08/10/21 118/64         Physical Exam    Pertinent Laboratory/Diagnostic Studies:    Laboratory studies reviewed personally by Penny Foreman MD    BMP:   Lab Results   Component Value Date    SODIUM 136 06/18/2021    K 4 5 06/18/2021     06/18/2021    CO2 29 06/18/2021    BUN 7 06/18/2021    CREATININE 0 80 09/15/2021    GLUC 91 05/21/2021    CALCIUM 10 4 (H) 06/18/2021     CBC:  Lab Results   Component Value Date    WBC 9 20 06/18/2021    RBC 3 98 06/18/2021    HGB 11 6 06/18/2021    HCT 37 4 06/18/2021    MCV 94 06/18/2021    MCH 29 1 06/18/2021    RDW 15 9 (H) 06/18/2021     (H) 06/18/2021     Coags:    Lipid Profile:   No results found for: CHOL  Lab Results   Component Value Date    HDL 42 (L) 05/09/2021     Lab Results   Component Value Date    LDLCALC 105 (H) 05/09/2021     Lab Results   Component Value Date    TRIG 142 0 05/09/2021      No results found for: Sharona Lundberg  Lab Results   Component Value Date    ALT 11 06/18/2021    AST 17 06/18/2021         Imaging Studies:   XR chest pa & lateral    Result Date: 8/26/2021  Narrative: CHEST INDICATION:   U07 1: COVID-19 J12 82: Pneumonia due to coronavirus disease 2019 U07 1: COVID-19 J96 01: Acute respiratory failure with hypoxia D86 9: Sarcoidosis, unspecified  Covid positive in May 2021  COMPARISON:  Chest radiograph from 5/17/2021 and 5/8/2021, chest CT from 5/14/2021 and 11/5/2018  EXAM PERFORMED/VIEWS:  XR CHEST PA & LATERAL  DUAL ENERGY SUBTRACTION  FINDINGS: Cardiomediastinal silhouette appears unremarkable  Mild bilateral groundglass opacity, markedly improved from 5/17/2021  No effusion or pneumothorax  Osseous structures appear within normal limits for patient age  Impression: Mild bilateral groundglass opacity, markedly improved from May 2021  The residual opacity is due to scarring from sarcoidosis  Superimposed post Covid pneumonitis/scar cannot be excluded  Workstation performed: [id number]     CTA chest pe study    Result Date: 9/15/2021  Narrative: CTA - CHEST WITH IV CONTRAST - PULMONARY ANGIOGRAM INDICATION:   I26 92: Saddle embolus of pulmonary artery without acute cor pulmonale U07 1: COVID-19 J12 82: Pneumonia due to coronavirus disease 2019  COMPARISON: 5/14/2021 TECHNIQUE: CTA examination of the chest was performed using angiographic technique according to a protocol specifically tailored to evaluate for pulmonary embolism  Axial, sagittal, and coronal 2D reformatted images were created from the source data and  submitted for interpretation  In addition, coronal 3D MIP postprocessing was performed on the acquisition scanner  Radiation dose length product (DLP) for this visit:  228 mGy-cm     This examination, like all CT scans performed in the [facility], was performed utilizing techniques to minimize radiation dose exposure, including the use of iterative reconstruction and automated exposure control  IV Contrast:  85 mL of iohexol (OMNIPAQUE)  FINDINGS: PULMONARY ARTERIAL TREE:  Previous left upper lobe pulmonary embolus has resolved  There is no pulmonary embolus identified  LUNGS:  There has been interval improvement in the appearance of the lungs  Previous bilateral airspace consolidative changes have improved  However, there is a groundglass abnormality predominantly throughout the lower lungs   Mild heterogeneous groundglass upper lung zones  While some of this may represent residual from previous Covid 19 infection, an acute process such as edema is not able to be excluded  There are subpleural cystic changes as well as calcified granulomas  No pneumothorax  No tracheal or endobronchial lesions  PLEURA:  Unremarkable  HEART/GREAT VESSELS:  Coronary artery calcification  MEDIASTINUM AND MARGUERITE:  Nonspecific mediastinal and bilateral hilar lymph nodes, likely reactive  CHEST WALL AND LOWER NECK:   Unremarkable  VISUALIZED STRUCTURES IN THE UPPER ABDOMEN:  Unremarkable  OSSEOUS STRUCTURES:  No acute fracture or destructive osseous lesion  Impression: 1  Interval resolution of previous pulmonary embolus  No pulmonary embolus is seen currently  2   Interval improvement in the appearance of the lungs with residual groundglass opacities which may reflect residual from previous Covid 19 infection although an acute process such as edema or pneumonitis is unable to be excluded  Workstation performed: [id number]     Complete PFT with post Bronchodilator and Six Minute walk    Result Date: 8/26/2021  Narrative: [facility] [place] FUNCTION TEST Date of service: 08/26/21 Physician requesting PFT:  Levi Martinez MD Reason for PFT:  Shortness of breath on exertion; nonproductive cough  Previous COVID infection INTERPRETATION:  Good patient effort   The oxygen saturation 96% % on room air at rest  The results of this test appear valid, although the ATS standards for 3 acceptable maneuvers was not met  The spirometry showed decreased FEV1 (71% of the predicted), decreased FVC (62% of the predicted), and increased FEV1/FVC ratio (93)  There was no significant improvement with inhaled bronchodilator  The lung volumes were all reduced  The total lung capacity was 2 54 L (59% of the predicted)  The diffusion capacity was reduced (54% of the predicted) indicating a moderately severe loss of functional alveolar capillary surface  The reduction persisted though mild, even after volume correction  The flow volume loop showed a restrictive pattern  Impression: Mild restriction with diffusion defect  This could be due to a pulmonary vascular or interstitial process  Clinical correlation is advised  6 MINUTE WALK TEST The patient subsequently underwent a 6 minute walk test   Her baseline oxygen saturation was 95% on room air at rest, blood pressure 112/80 and heart rate 106  She walked 328 meters during the 6 minute walk  The lowest oxygen saturation was 89%  She did not stop during the test   Her Vik dyspnea score was 3 post exercise  IMPRESSION: Satisfactory exercise tolerance  No significant oxygen desaturation at rest or during exertion  ARACELI Blanco  Cardiac testing:   EKG reviewed personally: {ekg findings:[id number]::"normal EKG, normal sinus rhythm","unchanged from previous tracings"}  No results found for this or any previous visit  Orders Placed This Encounter   Procedures    POCT ECG           Domi Diaz MD, [facility] [place]    Portions of the record may have been created with voice recognition software  Occasional wrong word or "sound a like" substitutions may have occurred due to the inherent limitations of voice recognition software  Read the chart carefully and recognize, using context, where substitutions have occurred    If you have any questions or concerns about the context, text or information contained within the body of this dictation, please contact myself, the provider, for further clarification

## 2021-09-20 NOTE — ASSESSMENT & PLAN NOTE
Lab Results   Component Value Date    [address] 105 (H) 05/09/2021       She will continue her statin therapy and follow-up with primary care

## 2021-09-20 NOTE — PROGRESS NOTES
[facility] [place]  [address] [facility] [address]  [address]  [facility] [address]  Phone#  [phone]  Fax#  [phone]  [facility] Cardiology Office Consultation             NAME: Jaky Carson  AGE: 64 y o  SEX: female   : 1965   MRN: [id number]    DATE: 2021  TIME: 8:50 AM    Assessment and plan:    1  Shortness of breath  Assessment & Plan:    Exertional dyspnea due to combination of COVID-19 infection and pulmonary embolism  Recent CT of the chest showed resolution of the PE  Right ventricular did not seem to be enlarged  Prior echo showed evidence of low normal LV function  A repeat limited echo has been requested to to ensure that she does not have significant pulmonary hypertension, RV dysfunction or progressive LV failure related to COVID-19 infection  Orders:  -     POCT ECG  -     Echo limited with contrast if indicated; Future; Expected date: 2021    2  Hypercholesterolemia  Assessment & Plan:  Lab Results   Component Value Date     [address] (H) 2021       She will continue her statin therapy and follow-up with primary care  Orders:  -     Ambulatory referral to Cardiology    3  Post-COVID syndrome  Assessment & Plan:    Mild residual shortness of breath, most likely secondary to   Recent COVID-19 pneumonia process  CT chest in reviewed  This shows mild residual infiltrate  Low likelihood of CAD  Orders:  -     Echo limited with contrast if indicated; Future; Expected date: 2021    4  Viral cardiomyopathy ([address] )  Assessment & Plan:    Concerns for post COVID-19 cardiomyopathy  Repeat limited echo planned to assess LV systolic function  If EF is below 50 percent, initiate medical therapy  Otherwise continue risk factor modification and regular exercise                 Chief Complaint   Patient presents with   Gene Saavedra Patient Visit     Dr Conklin(pulmonologist) post COVID- Sarcoidosis     Shortness of Breath     on exertion occasionally     Dizziness       HPI:    Chau Chance is a 64y o -year-old female who presents to the cardiology clinic for evaluation  She has been referred here by Dr Melissa Randall  She reports a diagnosis of COVID-19 back in May of this year  Since then she has had shortness of breath related to underlying pulmonary embolism which occurred after the COVID-19 infection  She subsequently has had a follow-up CT performed last week which showed no residual pulmonary embolus but there was still some residual ground-glass opacities seen from COVID-19  She continues to have cough and reports some dizziness  She denies any chest pain  She has no known history of coronary artery disease  She states she is not sure why she is here  She also has a history of pulmonary sarcoidosis  Recent CT of the chest independently reviewed and results discussed with the patient  Her lower extremity Doppler is pending before decision is made to stop the anticoagulation therapy  She reports feeling a whole lot better  Her dyspnea and her cough have improved  Her functional capacity also has markedly improved  She has underlying low risk of coronary artery disease  She denies any palpitations  Denies orthopnea, PND or lower extremity edema  The 10-year ASCVD risk score (Irving Swan et al , 2013) is: 1 9%    Values used to calculate the score:      Age: 64 years      Sex: Female      Is Non- : Yes      Diabetic: No      Tobacco smoker: No      Systolic Blood Pressure: 529 mmHg      Is BP treated: No      HDL Cholesterol: 42 mg/dL      Total Cholesterol: 175 mg/dL      Past history, family history, social history, current medications, vital signs, recent lab and imaging studies and  prior cardiology studies reviewed independently on this visit      Cardiology Problem list:  COVID-19 pneumonia:  5/21  ECHO:  5/21: EF 50-55 percent, septal dyssynergy, PA pressure 30  Pulmonary embolism:  5/21 9/21: Repeat CTA: no residual pulmonary embolus , residual ground-glass opacities from COVID-19  Dyslipidemia    BP Readings from Last 4 Encounters:   09/20/21 102/74   09/15/21 128/82   08/10/21 118/64   08/03/21 132/62      Wt Readings from Last 3 Encounters:   09/20/21 70 4 kg (155 lb 3 2 oz)   09/15/21 69 4 kg (153 lb)   08/10/21 67 8 kg (149 lb 8 oz)       Lab Results   Component Value Date    LDLCALC 105 (H) 05/09/2021     Lab Results   Component Value Date    CREATININE 0 80 09/15/2021          ALLERGIES:  Allergies   Allergen Reactions    Carbamazepine Itching, Rash and Vomiting         Current Outpatient Medications:     acetaminophen (TYLENOL) 325 mg tablet, Take 2 tablets (650 mg total) by mouth every 6 (six) hours as needed for mild pain, Disp:  , Rfl: 0    apixaban (ELIQUIS) 5 mg, Take 1 tablet (5 mg total) by mouth 2 (two) times a day, Disp: 180 tablet, Rfl: 0    atorvastatin (LIPITOR) 40 mg tablet, Take 1 tablet (40 mg total) by mouth daily, Disp: 90 tablet, Rfl: 1    benzonatate (TESSALON) 200 MG capsule, take 1 capsule by mouth every 8 hours if needed for EXCESSIVE COUGH, Disp: 45 capsule, Rfl: 1    budesonide-formoterol (Symbicort) 160-4 5 mcg/act inhaler, Inhale 2 puffs 2 (two) times a day Rinse mouth after use, Disp: 10 2 g, Rfl: 3    cetirizine (ZyrTEC) 10 mg tablet, Take 1 tablet (10 mg total) by mouth daily, Disp: 90 tablet, Rfl: 1    cholecalciferol (VITAMIN D3) 1,000 units tablet, Take 2 tablets (2,000 Units total) by mouth daily, Disp: 90 tablet, Rfl: 1    dextromethorphan-guaiFENesin (ROBITUSSIN DM)  mg/5 mL syrup, Take 10 mL by mouth every 4 (four) hours as needed for cough, Disp: , Rfl: 0    ferrous sulfate 324 (65 Fe) mg, Take 1 tablet (324 mg total) by mouth 2 (two) times a day before meals, Disp: 180 tablet, Rfl: 1    fluticasone (FLONASE) 50 mcg/act nasal spray, 1 spray into each nostril daily, Disp: 18 2 mL, Rfl: 1    hydrOXYzine HCL (ATARAX) 25 mg tablet, take 1 tablet by mouth twice a day, Disp: 60 tablet, Rfl: 0    ipratropium (ATROVENT HFA) 17 mcg/act inhaler, Inhale 2 puffs 4 (four) times a day, Disp: , Rfl: 0    ketotifen (ZADITOR) 0 025 % ophthalmic solution, Administer 1 drop to both eyes 2 (two) times a day for 10 days, Disp: 1 mL, Rfl: 0    latanoprost (XALATAN) 0 005 % ophthalmic solution, place 1 drop into both eyes at bedtime, Disp: , Rfl:     meclizine (ANTIVERT) 12 5 MG tablet, Take 1 tablet (12 5 mg total) by mouth every 8 (eight) hours as needed for dizziness, Disp: 90 tablet, Rfl: 1    montelukast (SINGULAIR) 10 mg tablet, Take 1 tablet (10 mg total) by mouth daily, Disp: 90 tablet, Rfl: 1    multivitamin-minerals (CENTRUM ADULTS) tablet, Take 1 tablet by mouth daily, Disp: , Rfl: 0    senna-docusate sodium (SENOKOT S) 8 6-50 mg per tablet, Take 1 tablet by mouth daily at bedtime as needed for constipation, Disp: 100 tablet, Rfl: 1    sodium chloride (OCEAN) 0 65 % nasal spray, 1 spray into each nostril as needed for congestion for up to 12 doses, Disp: 60 mL, Rfl: 3    Vitamins A & D (VITAMIN A & D) ointment, Apply topically as needed for dry skin (to face), Disp: 45 g, Rfl: 1    albuterol (ProAir HFA) 90 mcg/act inhaler, Inhale 2 puffs every 6 (six) hours as needed for wheezing or shortness of breath, Disp: 18 g, Rfl: 1      Review of Systems   Constitutional: Negative for fever and unexpected weight change  HENT: Negative for congestion and trouble swallowing  Eyes: Negative for visual disturbance  Respiratory: Positive for cough and shortness of breath  Negative for chest tightness and wheezing  Cardiovascular: Negative for chest pain, palpitations and leg swelling  Gastrointestinal: Negative for abdominal pain and blood in stool  Endocrine: Negative for polyuria  Genitourinary: Negative for hematuria  Musculoskeletal: Positive for gait problem  Negative for arthralgias and myalgias  Skin: Negative for rash     Neurological: Positive for dizziness  Negative for tremors and weakness  Hematological: Does not bruise/bleed easily  Psychiatric/Behavioral: Negative for sleep disturbance  Past Medical History:   Diagnosis Date    Anxiety     Asthma     Back pain     Sarcoidosis     Vertigo        Past Surgical History:   Procedure Laterality Date    MAMMO (HISTORICAL)  2018       Family History   Problem Relation Age of Onset    Hypertension Mother     Mental illness Mother     Asthma Mother        Social History     Socioeconomic History    Marital status: Single     Spouse name: Not on file    Number of children: Not on file    Years of education: Not on file    Highest education level: Not on file   Occupational History    Not on file   Tobacco Use    Smoking status: Never Smoker    Smokeless tobacco: Never Used   Vaping Use    Vaping Use: Never used   Substance and Sexual Activity    Alcohol use: Not Currently     Comment: social    Drug use: Not Currently     Types: Marijuana    Sexual activity: Not Currently   Other Topics Concern    Not on file   Social History Narrative    · Most recent tobacco use screenin2019      · Do you currently or have you served in [employer]:   No      · Were you activated, into active duty, as a member of the [employer] or as a Reservist:   No      Social Determinants of Health     Financial Resource Strain:     Difficulty of Paying Living Expenses:    Food Insecurity:     Worried About [address] [facility] in the Last Year:    [address] [facility] in the Last Year:    Transportation Needs:     Lack of Transportation (Medical):      Lack of Transportation (Non-Medical):    Physical Activity:     Days of Exercise per Week:     Minutes of Exercise per Session:    Stress:     Feeling of Stress :    Social Connections:     Frequency of Communication with Friends and Family:     Frequency of Social Gatherings with Friends and Family:     Attends Christianity Services:     Active Member of Clubs or Organizations:     Attends Club or Organization Meetings:     Marital Status:    Intimate Partner Violence:     Fear of Current or Ex-Partner:     Emotionally Abused:     Physically Abused:     Sexually Abused:          Objective:     Vitals:    09/20/21 0823   BP: 102/74   Pulse: 81   SpO2: 98%     Wt Readings from Last 3 Encounters:   09/20/21 70 4 kg (155 lb 3 2 oz)   09/15/21 69 4 kg (153 lb)   08/10/21 67 8 kg (149 lb 8 oz)     Pulse Readings from Last 3 Encounters:   09/20/21 81   09/15/21 97   08/10/21 95     BP Readings from Last 3 Encounters:   09/20/21 102/74   09/15/21 128/82   08/10/21 118/64         Physical Exam    Pertinent Laboratory/Diagnostic Studies:    Laboratory studies reviewed personally by Mari Stallworth MD    BMP:   Lab Results   Component Value Date    SODIUM 136 06/18/2021    K 4 5 06/18/2021     06/18/2021    CO2 29 06/18/2021    BUN 7 06/18/2021    CREATININE 0 80 09/15/2021    GLUC 91 05/21/2021    CALCIUM 10 4 (H) 06/18/2021     CBC:  Lab Results   Component Value Date    WBC 9 20 06/18/2021    RBC 3 98 06/18/2021    HGB 11 6 06/18/2021    HCT 37 4 06/18/2021    MCV 94 06/18/2021    MCH 29 1 06/18/2021    RDW 15 9 (H) 06/18/2021     (H) 06/18/2021     Coags:    Lipid Profile:   No results found for: CHOL  Lab Results   Component Value Date    HDL 42 (L) 05/09/2021     Lab Results   Component Value Date    LDLCALC 105 (H) 05/09/2021     Lab Results   Component Value Date    TRIG 142 0 05/09/2021      No results found for: [id number], TSH  Lab Results   Component Value Date    ALT 11 06/18/2021    AST 17 06/18/2021         Imaging Studies:   XR chest pa & lateral    Result Date: 8/26/2021  Narrative: CHEST INDICATION:   U07 1: COVID-19 J12 82: Pneumonia due to coronavirus disease 2019 U07 1: COVID-19 J96 01: Acute respiratory failure with hypoxia D86 9: Sarcoidosis, unspecified  Covid positive in May 2021   COMPARISON:  Chest radiograph from 5/17/2021 and 5/8/2021, chest CT from 5/14/2021 and 11/5/2018  EXAM PERFORMED/VIEWS:  XR CHEST PA & LATERAL  DUAL ENERGY SUBTRACTION  FINDINGS: Cardiomediastinal silhouette appears unremarkable  Mild bilateral groundglass opacity, markedly improved from 5/17/2021  No effusion or pneumothorax  Osseous structures appear within normal limits for patient age  Impression: Mild bilateral groundglass opacity, markedly improved from May 2021  The residual opacity is due to scarring from sarcoidosis  Superimposed post Covid pneumonitis/scar cannot be excluded  Workstation performed: [id number]     CTA chest pe study    Result Date: 9/15/2021  Narrative: CTA - CHEST WITH IV CONTRAST - PULMONARY ANGIOGRAM INDICATION:   I26 92: Saddle embolus of pulmonary artery without acute cor pulmonale U07 1: COVID-19 J12 82: Pneumonia due to coronavirus disease 2019  COMPARISON: 5/14/2021 TECHNIQUE: CTA examination of the chest was performed using angiographic technique according to a protocol specifically tailored to evaluate for pulmonary embolism  Axial, sagittal, and coronal 2D reformatted images were created from the source data and  submitted for interpretation  In addition, coronal 3D MIP postprocessing was performed on the acquisition scanner  Radiation dose length product (DLP) for this visit:  228 mGy-cm   This examination, like all CT scans performed in the [facility], was performed utilizing techniques to minimize radiation dose exposure, including the use of iterative reconstruction and automated exposure control  IV Contrast:  85 mL of iohexol (OMNIPAQUE)  FINDINGS: PULMONARY ARTERIAL TREE:  Previous left upper lobe pulmonary embolus has resolved  There is no pulmonary embolus identified  LUNGS:  There has been interval improvement in the appearance of the lungs  Previous bilateral airspace consolidative changes have improved    However, there is a groundglass abnormality predominantly throughout the lower lungs   Mild heterogeneous groundglass upper lung zones  While some of this may represent residual from previous Covid 19 infection, an acute process such as edema is not able to be excluded  There are subpleural cystic changes as well as calcified granulomas  No pneumothorax  No tracheal or endobronchial lesions  PLEURA:  Unremarkable  HEART/GREAT VESSELS:  Coronary artery calcification  MEDIASTINUM AND MARGUERITE:  Nonspecific mediastinal and bilateral hilar lymph nodes, likely reactive  CHEST WALL AND LOWER NECK:   Unremarkable  VISUALIZED STRUCTURES IN THE UPPER ABDOMEN:  Unremarkable  OSSEOUS STRUCTURES:  No acute fracture or destructive osseous lesion  Impression: 1  Interval resolution of previous pulmonary embolus  No pulmonary embolus is seen currently  2   Interval improvement in the appearance of the lungs with residual groundglass opacities which may reflect residual from previous Covid 19 infection although an acute process such as edema or pneumonitis is unable to be excluded  Workstation performed: [id number]     Complete PFT with post Bronchodilator and Six Minute walk    Result Date: 8/26/2021  Narrative: Andris Buerger FUNCTION TEST Date of service: 08/26/21 Physician requesting PFT:  Nikki Fagan MD Reason for PFT:  Shortness of breath on exertion; nonproductive cough  Previous COVID infection INTERPRETATION:  Good patient effort  The oxygen saturation 96% % on room air at rest  The results of this test appear valid, although the ATS standards for 3 acceptable maneuvers was not met  The spirometry showed decreased FEV1 (71% of the predicted), decreased FVC (62% of the predicted), and increased FEV1/FVC ratio (93)  There was no significant improvement with inhaled bronchodilator  The lung volumes were all reduced  The total lung capacity was 2 54 L (59% of the predicted)   The diffusion capacity was reduced (54% of the predicted) indicating a moderately severe loss of functional alveolar capillary surface  The reduction persisted though mild, even after volume correction  The flow volume loop showed a restrictive pattern  Impression: Mild restriction with diffusion defect  This could be due to a pulmonary vascular or interstitial process  Clinical correlation is advised  6 MINUTE WALK TEST The patient subsequently underwent a 6 minute walk test   Her baseline oxygen saturation was 95% on room air at rest, blood pressure 112/80 and heart rate 106  She walked 328 meters during the 6 minute walk  The lowest oxygen saturation was 89%  She did not stop during the test   Her Vik dyspnea score was 3 post exercise  IMPRESSION: Satisfactory exercise tolerance  No significant oxygen desaturation at rest or during exertion  ARACELI Yu  Cardiac testing:   EKG reviewed personally:   Sinus rhythm, rate  85,  milliseconds  Orders Placed This Encounter   Procedures    POCT ECG    Echo limited with contrast if indicated           Lopez Haddad MD, [facility] of the record may have been created with voice recognition software  Occasional wrong word or "sound a like" substitutions may have occurred due to the inherent limitations of voice recognition software  Read the chart carefully and recognize, using context, where substitutions have occurred  If you have any questions or concerns about the context, text or information contained within the body of this dictation, please contact myself, the provider, for further clarification

## 2021-09-20 NOTE — PATIENT INSTRUCTIONS
Limited echocardiogram planned to evaluate heart function and rule out significant valvular heart disease  Lab Results   Component Value Date    LDLCALC 105 (H) 05/09/2021         Continue current cardiac medications  Continue modification of cardiac risk factors including increase in physical activity, plant  based or Mediterranean style start diet, maintenance of healthy body weight and avoidance of tobacco products  Report any worsening cardiac symptoms (chest pain,shortness of breath or fainting)  Keep follow-up as planned with primary care and other specialists

## 2021-09-20 NOTE — ASSESSMENT & PLAN NOTE
Concerns for post COVID-19 cardiomyopathy  Repeat limited echo planned to assess LV systolic function  If EF is below 50 percent, initiate medical therapy  Otherwise continue risk factor modification and regular exercise

## 2021-09-20 NOTE — ASSESSMENT & PLAN NOTE
Exertional dyspnea due to combination of COVID-19 infection and pulmonary embolism  Recent CT of the chest showed resolution of the PE  Right ventricular did not seem to be enlarged  Prior echo showed evidence of low normal LV function  A repeat limited echo has been requested to to ensure that she does not have significant pulmonary hypertension, RV dysfunction or progressive LV failure related to COVID-19 infection

## 2021-09-20 NOTE — LETTER
2021     Devaughn Choi MD  [address] [facility],[address]  [address] [place] [address]    Patient: Regan Osullivan   YOB: 1965   Date of Visit: 2021       Dear Dr Sierra Agrawal:    Thank you for referring Regan Osullivan to me for evaluation  Below are my notes for this consultation  If you have questions, please do not hesitate to call me  I look forward to following your patient along with you  Sincerely,        Juliano Henderson MD        CC: Padma Camargo, [address] MD  2021  8:52 AM  Sign when Signing Visit   [address] [place] [address]  Phone#  [phone]  Fax#  [phone]  [facility] Cardiology Office Consultation             NAME: Regan Osullivan  AGE: 64 y o  SEX: female   : 1965   MRN: [id number]    DATE: 2021  TIME: 8:50 AM    Assessment and plan:    1  Shortness of breath  Assessment & Plan:    Exertional dyspnea due to combination of COVID-19 infection and pulmonary embolism  Recent CT of the chest showed resolution of the PE  Right ventricular did not seem to be enlarged  Prior echo showed evidence of low normal LV function  A repeat limited echo has been requested to to ensure that she does not have significant pulmonary hypertension, RV dysfunction or progressive LV failure related to COVID-19 infection  Orders:  -     POCT ECG  -     Echo limited with contrast if indicated; Future; Expected date: 2021    2  Hypercholesterolemia  Assessment & Plan:  Lab Results   Component Value Date     [address] (H) 2021       She will continue her statin therapy and follow-up with primary care  Orders:  -     Ambulatory referral to Cardiology    3  Post-COVID syndrome  Assessment & Plan:    Mild residual shortness of breath, most likely secondary to   Recent COVID-19 pneumonia process  CT chest in reviewed  This shows mild residual infiltrate    Low likelihood of CAD     Orders:  -     Echo limited with contrast if indicated; Future; Expected date: 09/20/2021    4  Viral cardiomyopathy ([facility] [address] )  Assessment & Plan:    Concerns for post COVID-19 cardiomyopathy  Repeat limited echo planned to assess LV systolic function  If EF is below 50 percent, initiate medical therapy  Otherwise continue risk factor modification and regular exercise  Chief Complaint   Patient presents with   [address] Patient Visit     Dr Conklin(pulmonologist) post COVID- Sarcoidosis     Shortness of Breath     on exertion occasionally     Dizziness       HPI:    Rashi Sanchez is a 64y o -year-old female who presents to the cardiology clinic for evaluation  She has been referred here by Dr Marquita Gaines  She reports a diagnosis of COVID-19 back in May of this year  Since then she has had shortness of breath related to underlying pulmonary embolism which occurred after the COVID-19 infection  She subsequently has had a follow-up CT performed last week which showed no residual pulmonary embolus but there was still some residual ground-glass opacities seen from COVID-19  She continues to have cough and reports some dizziness  She denies any chest pain  She has no known history of coronary artery disease  She states she is not sure why she is here  She also has a history of pulmonary sarcoidosis  Recent CT of the chest independently reviewed and results discussed with the patient  Her lower extremity Doppler is pending before decision is made to stop the anticoagulation therapy  She reports feeling a whole lot better  Her dyspnea and her cough have improved  Her functional capacity also has markedly improved  She has underlying low risk of coronary artery disease  She denies any palpitations  Denies orthopnea, PND or lower extremity edema      The 10-year ASCVD risk score (Virgin Meckel , et al , 2013) is: 1 9%    Values used to calculate the score:      Age: 64 years      Sex: Female      Is Non- : Yes      Diabetic: No      Tobacco smoker: No      Systolic Blood Pressure: 630 mmHg      Is BP treated: No      HDL Cholesterol: 42 mg/dL      Total Cholesterol: 175 mg/dL      Past history, family history, social history, current medications, vital signs, recent lab and imaging studies and  prior cardiology studies reviewed independently on this visit      Cardiology Problem list:  COVID-19 pneumonia:  5/21  ECHO:  5/21: EF 50-55 percent, septal dyssynergy, PA pressure 30  Pulmonary embolism:  5/21 9/21: Repeat CTA: no residual pulmonary embolus , residual ground-glass opacities from COVID-19  Dyslipidemia    BP Readings from Last 4 Encounters:   09/20/21 102/74   09/15/21 128/82   08/10/21 118/64   08/03/21 132/62      Wt Readings from Last 3 Encounters:   09/20/21 70 4 kg (155 lb 3 2 oz)   09/15/21 69 4 kg (153 lb)   08/10/21 67 8 kg (149 lb 8 oz)       Lab Results   Component Value Date    LDLCALC 105 (H) 05/09/2021     Lab Results   Component Value Date    CREATININE 0 80 09/15/2021          ALLERGIES:  Allergies   Allergen Reactions    Carbamazepine Itching, Rash and Vomiting         Current Outpatient Medications:     acetaminophen (TYLENOL) 325 mg tablet, Take 2 tablets (650 mg total) by mouth every 6 (six) hours as needed for mild pain, Disp:  , Rfl: 0    apixaban (ELIQUIS) 5 mg, Take 1 tablet (5 mg total) by mouth 2 (two) times a day, Disp: 180 tablet, Rfl: 0    atorvastatin (LIPITOR) 40 mg tablet, Take 1 tablet (40 mg total) by mouth daily, Disp: 90 tablet, Rfl: 1    benzonatate (TESSALON) 200 MG capsule, take 1 capsule by mouth every 8 hours if needed for EXCESSIVE COUGH, Disp: 45 capsule, Rfl: 1    budesonide-formoterol (Symbicort) 160-4 5 mcg/act inhaler, Inhale 2 puffs 2 (two) times a day Rinse mouth after use, Disp: 10 2 g, Rfl: 3    cetirizine (ZyrTEC) 10 mg tablet, Take 1 tablet (10 mg total) by mouth daily, Disp: 90 tablet, Rfl: 1   cholecalciferol (VITAMIN D3) 1,000 units tablet, Take 2 tablets (2,000 Units total) by mouth daily, Disp: 90 tablet, Rfl: 1    dextromethorphan-guaiFENesin (ROBITUSSIN DM)  mg/5 mL syrup, Take 10 mL by mouth every 4 (four) hours as needed for cough, Disp: , Rfl: 0    ferrous sulfate 324 (65 Fe) mg, Take 1 tablet (324 mg total) by mouth 2 (two) times a day before meals, Disp: 180 tablet, Rfl: 1    fluticasone (FLONASE) 50 mcg/act nasal spray, 1 spray into each nostril daily, Disp: 18 2 mL, Rfl: 1    hydrOXYzine HCL (ATARAX) 25 mg tablet, take 1 tablet by mouth twice a day, Disp: 60 tablet, Rfl: 0    ipratropium (ATROVENT HFA) 17 mcg/act inhaler, Inhale 2 puffs 4 (four) times a day, Disp: , Rfl: 0    ketotifen (ZADITOR) 0 025 % ophthalmic solution, Administer 1 drop to both eyes 2 (two) times a day for 10 days, Disp: 1 mL, Rfl: 0    latanoprost (XALATAN) 0 005 % ophthalmic solution, place 1 drop into both eyes at bedtime, Disp: , Rfl:     meclizine (ANTIVERT) 12 5 MG tablet, Take 1 tablet (12 5 mg total) by mouth every 8 (eight) hours as needed for dizziness, Disp: 90 tablet, Rfl: 1    montelukast (SINGULAIR) 10 mg tablet, Take 1 tablet (10 mg total) by mouth daily, Disp: 90 tablet, Rfl: 1    multivitamin-minerals (CENTRUM ADULTS) tablet, Take 1 tablet by mouth daily, Disp: , Rfl: 0    senna-docusate sodium (SENOKOT S) 8 6-50 mg per tablet, Take 1 tablet by mouth daily at bedtime as needed for constipation, Disp: 100 tablet, Rfl: 1    sodium chloride (OCEAN) 0 65 % nasal spray, 1 spray into each nostril as needed for congestion for up to 12 doses, Disp: 60 mL, Rfl: 3    Vitamins A & D (VITAMIN A & D) ointment, Apply topically as needed for dry skin (to face), Disp: 45 g, Rfl: 1    albuterol (ProAir HFA) 90 mcg/act inhaler, Inhale 2 puffs every 6 (six) hours as needed for wheezing or shortness of breath, Disp: 18 g, Rfl: 1      Review of Systems   Constitutional: Negative for fever and unexpected weight change  HENT: Negative for congestion and trouble swallowing  Eyes: Negative for visual disturbance  Respiratory: Positive for cough and shortness of breath  Negative for chest tightness and wheezing  Cardiovascular: Negative for chest pain, palpitations and leg swelling  Gastrointestinal: Negative for abdominal pain and blood in stool  Endocrine: Negative for polyuria  Genitourinary: Negative for hematuria  Musculoskeletal: Positive for gait problem  Negative for arthralgias and myalgias  Skin: Negative for rash  Neurological: Positive for dizziness  Negative for tremors and weakness  Hematological: Does not bruise/bleed easily  Psychiatric/Behavioral: Negative for sleep disturbance         Past Medical History:   Diagnosis Date    Anxiety     Asthma     Back pain     Sarcoidosis     Vertigo        Past Surgical History:   Procedure Laterality Date    MAMMO (HISTORICAL)  2018       Family History   Problem Relation Age of Onset    Hypertension Mother     Mental illness Mother     Asthma Mother        Social History     Socioeconomic History    Marital status: Single     Spouse name: Not on file    Number of children: Not on file    Years of education: Not on file    Highest education level: Not on file   Occupational History    Not on file   Tobacco Use    Smoking status: Never Smoker    Smokeless tobacco: Never Used   Vaping Use    Vaping Use: Never used   Substance and Sexual Activity    Alcohol use: Not Currently     Comment: social    Drug use: Not Currently     Types: Marijuana    Sexual activity: Not Currently   Other Topics Concern    Not on file   Social History Narrative    · Most recent tobacco use screenin2019      · Do you currently or have you served in the [employer] [address]:   No      · Were you activated, into active duty, as a member of the [employer] or as a Reservist:   No      Social Determinants of [facility] Resource Strain:     Difficulty of Paying Living Expenses:    Food Insecurity:     Worried About Running Out of Food in the Last Year:     [address] in the Last Year:    Transportation Needs:     Lack of Transportation (Medical):      Lack of Transportation (Non-Medical):    Physical Activity:     Days of Exercise per Week:     Minutes of Exercise per Session:    Stress:     Feeling of Stress :    Social Connections:     Frequency of Communication with Friends and Family:     Frequency of Social Gatherings with Friends and Family:     Attends [employer] Services:     Active Member of Clubs or Organizations:     Attends Club or Organization Meetings:     Marital Status:    Intimate Partner Violence:     Fear of Current or Ex-Partner:     Emotionally Abused:     Physically Abused:     Sexually Abused:          Objective:     Vitals:    09/20/21 0823   BP: 102/74   Pulse: 81   SpO2: 98%     Wt Readings from Last 3 Encounters:   09/20/21 70 4 kg (155 lb 3 2 oz)   09/15/21 69 4 kg (153 lb)   08/10/21 67 8 kg (149 lb 8 oz)     Pulse Readings from Last 3 Encounters:   09/20/21 81   09/15/21 97   08/10/21 95     BP Readings from Last 3 Encounters:   09/20/21 102/74   09/15/21 128/82   08/10/21 118/64         Physical Exam    Pertinent Laboratory/Diagnostic Studies:    Laboratory studies reviewed personally by Juliano Henderson MD    BMP:   Lab Results   Component Value Date    SODIUM 136 06/18/2021    K 4 5 06/18/2021     06/18/2021    CO2 29 06/18/2021    BUN 7 06/18/2021    CREATININE 0 80 09/15/2021    GLUC 91 05/21/2021    CALCIUM 10 4 (H) 06/18/2021     CBC:  Lab Results   Component Value Date    WBC 9 20 06/18/2021    RBC 3 98 06/18/2021    HGB 11 6 06/18/2021    HCT 37 4 06/18/2021    MCV 94 06/18/2021    MCH 29 1 06/18/2021    RDW 15 9 (H) 06/18/2021     (H) 06/18/2021     Coags:    Lipid Profile:   No results found for: CHOL  Lab Results   Component Value Date    HDL 42 (L) 05/09/2021 Lab Results   Component Value Date    LDLCALC 105 (H) 05/09/2021     Lab Results   Component Value Date    TRIG 142 0 05/09/2021      No results found for: QNT7ALCRCPMH, TSH  Lab Results   Component Value Date    ALT 11 06/18/2021    AST 17 06/18/2021         Imaging Studies:   XR chest pa & lateral    Result Date: 8/26/2021  Narrative: CHEST INDICATION:   U07 1: COVID-19 J12 82: Pneumonia due to coronavirus disease 2019 U07 1: COVID-19 J96 01: Acute respiratory failure with hypoxia D86 9: Sarcoidosis, unspecified  Covid positive in May 2021  COMPARISON:  Chest radiograph from 5/17/2021 and 5/8/2021, chest CT from 5/14/2021 and 11/5/2018  EXAM PERFORMED/VIEWS:  XR CHEST PA & LATERAL  DUAL ENERGY SUBTRACTION  FINDINGS: Cardiomediastinal silhouette appears unremarkable  Mild bilateral groundglass opacity, markedly improved from 5/17/2021  No effusion or pneumothorax  Osseous structures appear within normal limits for patient age  Impression: Mild bilateral groundglass opacity, markedly improved from May 2021  The residual opacity is due to scarring from sarcoidosis  Superimposed post Covid pneumonitis/scar cannot be excluded  Workstation performed: [id number]     CTA chest pe study    Result Date: 9/15/2021  Narrative: CTA - CHEST WITH IV CONTRAST - PULMONARY ANGIOGRAM INDICATION:   I26 92: Saddle embolus of pulmonary artery without acute cor pulmonale U07 1: COVID-19 J12 82: Pneumonia due to coronavirus disease 2019  COMPARISON: 5/14/2021 TECHNIQUE: CTA examination of the chest was performed using angiographic technique according to a protocol specifically tailored to evaluate for pulmonary embolism  Axial, sagittal, and coronal 2D reformatted images were created from the source data and  submitted for interpretation  In addition, coronal 3D MIP postprocessing was performed on the acquisition scanner  Radiation dose length product (DLP) for this visit:  228 mGy-cm     This examination, like all CT scans performed in the [facility], was performed utilizing techniques to minimize radiation dose exposure, including the use of iterative reconstruction and automated exposure control  IV Contrast:  85 mL of iohexol (OMNIPAQUE)  FINDINGS: PULMONARY ARTERIAL TREE:  Previous left upper lobe pulmonary embolus has resolved  There is no pulmonary embolus identified  LUNGS:  There has been interval improvement in the appearance of the lungs  Previous bilateral airspace consolidative changes have improved  However, there is a groundglass abnormality predominantly throughout the lower lungs   Mild heterogeneous groundglass upper lung zones  While some of this may represent residual from previous Covid 19 infection, an acute process such as edema is not able to be excluded  There are subpleural cystic changes as well as calcified granulomas  No pneumothorax  No tracheal or endobronchial lesions  PLEURA:  Unremarkable  HEART/GREAT VESSELS:  Coronary artery calcification  MEDIASTINUM AND MARGUERITE:  Nonspecific mediastinal and bilateral hilar lymph nodes, likely reactive  CHEST WALL AND LOWER NECK:   Unremarkable  VISUALIZED STRUCTURES IN THE UPPER ABDOMEN:  Unremarkable  OSSEOUS STRUCTURES:  No acute fracture or destructive osseous lesion  Impression: 1  Interval resolution of previous pulmonary embolus  No pulmonary embolus is seen currently  2   Interval improvement in the appearance of the lungs with residual groundglass opacities which may reflect residual from previous Covid 19 infection although an acute process such as edema or pneumonitis is unable to be excluded  Workstation performed: [id number]     Complete PFT with post Bronchodilator and Six Minute walk    Result Date: 8/26/2021  Narrative: Sherlene Hammans FUNCTION TEST Date of service: 08/26/21 Physician requesting PFT:  Zully Clements MD Reason for PFT:  Shortness of breath on exertion; nonproductive cough    Previous COVID infection INTERPRETATION:  Good patient effort  The oxygen saturation 96% % on room air at rest  The results of this test appear valid, although the ATS standards for 3 acceptable maneuvers was not met  The spirometry showed decreased FEV1 (71% of the predicted), decreased FVC (62% of the predicted), and increased FEV1/FVC ratio (93)  There was no significant improvement with inhaled bronchodilator  The lung volumes were all reduced  The total lung capacity was 2 54 L (59% of the predicted)  The diffusion capacity was reduced (54% of the predicted) indicating a moderately severe loss of functional alveolar capillary surface  The reduction persisted though mild, even after volume correction  The flow volume loop showed a restrictive pattern  Impression: Mild restriction with diffusion defect  This could be due to a pulmonary vascular or interstitial process  Clinical correlation is advised  6 MINUTE WALK TEST The patient subsequently underwent a 6 minute walk test   Her baseline oxygen saturation was 95% on room air at rest, blood pressure 112/80 and heart rate 106  She walked 328 meters during the 6 minute walk  The lowest oxygen saturation was 89%  She did not stop during the test   Her Vik dyspnea score was 3 post exercise  IMPRESSION: Satisfactory exercise tolerance  No significant oxygen desaturation at rest or during exertion  ARACELI Pride  Cardiac testing:   EKG reviewed personally:   Sinus rhythm, rate  85,  milliseconds  Orders Placed This Encounter   Procedures    POCT ECG    Echo limited with contrast if indicated           Ruth Hernandez MD, [facility] of the record may have been created with voice recognition software  Occasional wrong word or "sound a like" substitutions may have occurred due to the inherent limitations of voice recognition software  Read the chart carefully and recognize, using context, where substitutions have occurred    If you have any questions or concerns about the context, text or information contained within the body of this dictation, please contact myself, the provider, for further clarification

## 2021-09-21 ENCOUNTER — HOSPITAL ENCOUNTER (OUTPATIENT)
Dept: NON INVASIVE DIAGNOSTICS | Facility: CLINIC | Age: 56
Discharge: HOME | End: 2021-09-21
Attending: INTERNAL MEDICINE
Payer: COMMERCIAL

## 2021-09-21 ENCOUNTER — TELEPHONE (OUTPATIENT)
Dept: FAMILY MEDICINE CLINIC | Facility: CLINIC | Age: 56
End: 2021-09-21

## 2021-09-21 DIAGNOSIS — I26.92 ACUTE SADDLE PULMONARY EMBOLISM WITHOUT ACUTE COR PULMONALE (HCC): ICD-10-CM

## 2021-09-21 DIAGNOSIS — U07.1 PNEUMONIA DUE TO COVID-19 VIRUS: ICD-10-CM

## 2021-09-21 DIAGNOSIS — J12.82 PNEUMONIA DUE TO COVID-19 VIRUS: ICD-10-CM

## 2021-09-21 PROCEDURE — 93970 EXTREMITY STUDY: CPT

## 2021-09-21 PROCEDURE — 93970 EXTREMITY STUDY: CPT | Performed by: SURGERY

## 2021-09-21 NOTE — TELEPHONE ENCOUNTER
Pt called - she needs a letter stating that pt was hospitalized from May 10 - June 7 and was under doctor care

## 2021-09-21 NOTE — TELEPHONE ENCOUNTER
Pt is asking that you write a letter stating that she was hospitalized from 5/10/21-6/7/21   She is asking that you do it as soon as you can please    [employer]  Fax to: [phone]

## 2021-10-16 DIAGNOSIS — L29.9 PRURITUS: ICD-10-CM

## 2021-10-18 DIAGNOSIS — D86.9 SARCOIDOSIS: ICD-10-CM

## 2021-10-18 DIAGNOSIS — R05.9 COUGH: Primary | ICD-10-CM

## 2021-10-18 RX ORDER — ALBUTEROL SULFATE 2.5 MG/3ML
SOLUTION RESPIRATORY (INHALATION)
Qty: 225 ML | Refills: 1 | Status: SHIPPED | OUTPATIENT
Start: 2021-10-18 | End: 2021-11-10 | Stop reason: SDUPTHER

## 2021-10-18 RX ORDER — HYDROXYZINE HYDROCHLORIDE 25 MG/1
TABLET, FILM COATED ORAL
Qty: 60 TABLET | Refills: 0 | Status: SHIPPED | OUTPATIENT
Start: 2021-10-18 | End: 2021-11-10 | Stop reason: SDUPTHER

## 2021-11-10 ENCOUNTER — TELEPHONE (OUTPATIENT)
Dept: FAMILY MEDICINE CLINIC | Facility: CLINIC | Age: 56
End: 2021-11-10

## 2021-11-10 DIAGNOSIS — U07.1 ACUTE HYPOXEMIC RESPIRATORY FAILURE DUE TO COVID-19 (HCC): ICD-10-CM

## 2021-11-10 DIAGNOSIS — L29.9 PRURITUS: ICD-10-CM

## 2021-11-10 DIAGNOSIS — J12.82 PNEUMONIA DUE TO COVID-19 VIRUS: ICD-10-CM

## 2021-11-10 DIAGNOSIS — J96.01 ACUTE HYPOXEMIC RESPIRATORY FAILURE DUE TO COVID-19 (HCC): ICD-10-CM

## 2021-11-10 DIAGNOSIS — E78.00 HYPERCHOLESTEREMIA: ICD-10-CM

## 2021-11-10 DIAGNOSIS — J30.2 SEASONAL ALLERGIES: ICD-10-CM

## 2021-11-10 DIAGNOSIS — D86.9 SARCOIDOSIS: ICD-10-CM

## 2021-11-10 DIAGNOSIS — J45.20 MILD INTERMITTENT ASTHMA, UNSPECIFIED WHETHER COMPLICATED: ICD-10-CM

## 2021-11-10 DIAGNOSIS — R05.9 COUGH: ICD-10-CM

## 2021-11-10 DIAGNOSIS — U07.1 PNEUMONIA DUE TO COVID-19 VIRUS: ICD-10-CM

## 2021-11-10 RX ORDER — BUDESONIDE AND FORMOTEROL FUMARATE DIHYDRATE 160; 4.5 UG/1; UG/1
2 AEROSOL RESPIRATORY (INHALATION) 2 TIMES DAILY
Qty: 10.2 G | Refills: 3 | Status: SHIPPED | OUTPATIENT
Start: 2021-11-10 | End: 2021-11-19 | Stop reason: SDUPTHER

## 2021-11-10 RX ORDER — MONTELUKAST SODIUM 10 MG/1
10 TABLET ORAL DAILY
Qty: 90 TABLET | Refills: 1 | Status: SHIPPED | OUTPATIENT
Start: 2021-11-10 | End: 2021-11-19 | Stop reason: SDUPTHER

## 2021-11-10 RX ORDER — MECLIZINE HCL 12.5 MG/1
12.5 TABLET ORAL EVERY 8 HOURS PRN
Qty: 90 TABLET | Refills: 1 | Status: SHIPPED | OUTPATIENT
Start: 2021-11-10 | End: 2021-12-10

## 2021-11-10 RX ORDER — BENZONATATE 200 MG/1
CAPSULE ORAL
Qty: 45 CAPSULE | Refills: 1 | OUTPATIENT
Start: 2021-11-10

## 2021-11-10 RX ORDER — HYDROXYZINE HYDROCHLORIDE 25 MG/1
25 TABLET, FILM COATED ORAL 2 TIMES DAILY
Qty: 60 TABLET | Refills: 0 | Status: SHIPPED | OUTPATIENT
Start: 2021-11-10 | End: 2021-11-19 | Stop reason: SDUPTHER

## 2021-11-10 RX ORDER — ALBUTEROL SULFATE 90 UG/1
2 AEROSOL, METERED RESPIRATORY (INHALATION) EVERY 6 HOURS PRN
Qty: 18 G | Refills: 1 | Status: SHIPPED | OUTPATIENT
Start: 2021-11-10 | End: 2022-03-01 | Stop reason: SDUPTHER

## 2021-11-10 RX ORDER — ALBUTEROL SULFATE 2.5 MG/3ML
2.5 SOLUTION RESPIRATORY (INHALATION) EVERY 6 HOURS PRN
Qty: 225 ML | Refills: 1 | Status: SHIPPED | OUTPATIENT
Start: 2021-11-10 | End: 2021-11-19 | Stop reason: SDUPTHER

## 2021-11-10 RX ORDER — ATORVASTATIN CALCIUM 40 MG/1
40 TABLET, FILM COATED ORAL DAILY
Qty: 90 TABLET | Refills: 1 | Status: SHIPPED | OUTPATIENT
Start: 2021-11-10 | End: 2021-11-19 | Stop reason: SDUPTHER

## 2021-11-15 ENCOUNTER — TELEPHONE (OUTPATIENT)
Dept: FAMILY MEDICINE CLINIC | Facility: CLINIC | Age: 56
End: 2021-11-15

## 2021-11-18 ENCOUNTER — HOSPITAL ENCOUNTER (OUTPATIENT)
Dept: NON INVASIVE DIAGNOSTICS | Facility: CLINIC | Age: 56
Discharge: HOME | End: 2021-11-18
Payer: COMMERCIAL

## 2021-11-18 VITALS
DIASTOLIC BLOOD PRESSURE: 74 MMHG | WEIGHT: 155 LBS | HEIGHT: 59 IN | SYSTOLIC BLOOD PRESSURE: 102 MMHG | BODY MASS INDEX: 31.25 KG/M2 | HEART RATE: 83 BPM

## 2021-11-18 DIAGNOSIS — U09.9 POST-COVID SYNDROME: ICD-10-CM

## 2021-11-18 DIAGNOSIS — R06.02 SHORTNESS OF BREATH: ICD-10-CM

## 2021-11-18 LAB
AORTIC ROOT: 3 CM
APICAL FOUR CHAMBER EJECTION FRACTION: 62 %
E WAVE DECELERATION TIME: 105 MS
FRACTIONAL SHORTENING: 33 % (ref 28–44)
INTERVENTRICULAR SEPTUM IN DIASTOLE (PARASTERNAL SHORT AXIS VIEW): 0.7 CM
LEFT ATRIUM AREA SYSTOLE SINGLE PLANE A4C: 11.4 CM2
LEFT INTERNAL DIMENSION IN SYSTOLE: 3.2 CM (ref 2.1–4)
LEFT VENTRICULAR INTERNAL DIMENSION IN DIASTOLE: 4.8 CM (ref 3.95–5.88)
LEFT VENTRICULAR POSTERIOR WALL IN END DIASTOLE: 0.7 CM
LEFT VENTRICULAR STROKE VOLUME: 67 ML
MV E'TISSUE VEL-SEP: 10 CM/S
MV PEAK A VEL: 0.93 M/S
MV PEAK E VEL: 92 CM/S
MV STENOSIS PRESSURE HALF TIME: 0 MS
RIGHT ATRIUM AREA SYSTOLE A4C: 8.9 CM2
RIGHT VENTRICLE ID DIMENSION: 2.6 CM
SL CV LV EF: 55
SL CV PED ECHO LEFT VENTRICLE DIASTOLIC VOLUME (MOD BIPLANE) 2D: 108 ML
SL CV PED ECHO LEFT VENTRICLE SYSTOLIC VOLUME (MOD BIPLANE) 2D: 41 ML
TRICUSPID VALVE PEAK REGURGITATION VELOCITY: 2.77 M/S
TRICUSPID VALVE S': 0.9 CM/S
TV PEAK GRADIENT: 31 MMHG
Z-SCORE OF LEFT VENTRICULAR DIMENSION IN END SYSTOLE: -0.04

## 2021-11-18 PROCEDURE — 93325 DOPPLER ECHO COLOR FLOW MAPG: CPT | Performed by: INTERNAL MEDICINE

## 2021-11-18 PROCEDURE — 93308 TTE F-UP OR LMTD: CPT | Performed by: INTERNAL MEDICINE

## 2021-11-18 PROCEDURE — 93321 DOPPLER ECHO F-UP/LMTD STD: CPT

## 2021-11-18 PROCEDURE — 93325 DOPPLER ECHO COLOR FLOW MAPG: CPT

## 2021-11-18 PROCEDURE — 93321 DOPPLER ECHO F-UP/LMTD STD: CPT | Performed by: INTERNAL MEDICINE

## 2021-11-18 PROCEDURE — 93308 TTE F-UP OR LMTD: CPT

## 2021-11-19 ENCOUNTER — OFFICE VISIT (OUTPATIENT)
Dept: FAMILY MEDICINE CLINIC | Facility: CLINIC | Age: 56
End: 2021-11-19
Payer: COMMERCIAL

## 2021-11-19 VITALS
DIASTOLIC BLOOD PRESSURE: 74 MMHG | RESPIRATION RATE: 17 BRPM | SYSTOLIC BLOOD PRESSURE: 118 MMHG | BODY MASS INDEX: 31.29 KG/M2 | WEIGHT: 155.2 LBS | HEIGHT: 59 IN | OXYGEN SATURATION: 95 % | HEART RATE: 79 BPM | TEMPERATURE: 97.2 F

## 2021-11-19 DIAGNOSIS — Z23 NEED FOR TDAP VACCINATION: Primary | ICD-10-CM

## 2021-11-19 DIAGNOSIS — L29.9 PRURITUS: ICD-10-CM

## 2021-11-19 DIAGNOSIS — R10.33 PERIUMBILICAL ABDOMINAL PAIN: ICD-10-CM

## 2021-11-19 DIAGNOSIS — K21.9 GASTROESOPHAGEAL REFLUX DISEASE, UNSPECIFIED WHETHER ESOPHAGITIS PRESENT: ICD-10-CM

## 2021-11-19 DIAGNOSIS — R11.2 NAUSEA AND VOMITING, INTRACTABILITY OF VOMITING NOT SPECIFIED, UNSPECIFIED VOMITING TYPE: ICD-10-CM

## 2021-11-19 DIAGNOSIS — Z00.00 ANNUAL PHYSICAL EXAM: ICD-10-CM

## 2021-11-19 DIAGNOSIS — J30.2 SEASONAL ALLERGIES: ICD-10-CM

## 2021-11-19 DIAGNOSIS — Z23 NEED FOR PNEUMOCOCCAL VACCINATION: ICD-10-CM

## 2021-11-19 DIAGNOSIS — D86.9 SARCOIDOSIS: ICD-10-CM

## 2021-11-19 DIAGNOSIS — J45.20 MILD INTERMITTENT ASTHMA, UNSPECIFIED WHETHER COMPLICATED: ICD-10-CM

## 2021-11-19 DIAGNOSIS — D50.8 OTHER IRON DEFICIENCY ANEMIA: ICD-10-CM

## 2021-11-19 DIAGNOSIS — Z86.16 PERSONAL HISTORY OF COVID-19: ICD-10-CM

## 2021-11-19 DIAGNOSIS — E78.00 HYPERCHOLESTEREMIA: ICD-10-CM

## 2021-11-19 DIAGNOSIS — Z23 NEED FOR INFLUENZA VACCINATION: ICD-10-CM

## 2021-11-19 DIAGNOSIS — R05.9 COUGH: ICD-10-CM

## 2021-11-19 PROBLEM — R53.81 PHYSICAL DECONDITIONING: Status: RESOLVED | Noted: 2021-05-28 | Resolved: 2021-11-19

## 2021-11-19 PROBLEM — J12.82 PNEUMONIA DUE TO COVID-19 VIRUS: Status: RESOLVED | Noted: 2021-05-08 | Resolved: 2021-11-19

## 2021-11-19 PROBLEM — J06.9 VIRAL URI WITH COUGH: Status: RESOLVED | Noted: 2021-05-03 | Resolved: 2021-11-19

## 2021-11-19 PROBLEM — R26.9 UNSPECIFIED ABNORMALITIES OF GAIT AND MOBILITY: Status: RESOLVED | Noted: 2021-05-21 | Resolved: 2021-11-19

## 2021-11-19 PROBLEM — U07.1 PNEUMONIA DUE TO COVID-19 VIRUS: Status: RESOLVED | Noted: 2021-05-08 | Resolved: 2021-11-19

## 2021-11-19 PROBLEM — J96.01 ACUTE RESPIRATORY FAILURE WITH HYPOXIA (HCC): Status: RESOLVED | Noted: 2021-05-21 | Resolved: 2021-11-19

## 2021-11-19 PROCEDURE — 90471 IMMUNIZATION ADMIN: CPT | Performed by: NURSE PRACTITIONER

## 2021-11-19 PROCEDURE — 90732 PPSV23 VACC 2 YRS+ SUBQ/IM: CPT | Performed by: NURSE PRACTITIONER

## 2021-11-19 PROCEDURE — 99396 PREV VISIT EST AGE 40-64: CPT | Performed by: NURSE PRACTITIONER

## 2021-11-19 PROCEDURE — 90472 IMMUNIZATION ADMIN EACH ADD: CPT | Performed by: NURSE PRACTITIONER

## 2021-11-19 PROCEDURE — 90715 TDAP VACCINE 7 YRS/> IM: CPT | Performed by: NURSE PRACTITIONER

## 2021-11-19 RX ORDER — FERROUS SULFATE TAB EC 324 MG (65 MG FE EQUIVALENT) 324 (65 FE) MG
324 TABLET DELAYED RESPONSE ORAL
Qty: 180 TABLET | Refills: 1 | Status: SHIPPED | OUTPATIENT
Start: 2021-11-19 | End: 2022-03-01 | Stop reason: SDUPTHER

## 2021-11-19 RX ORDER — MONTELUKAST SODIUM 10 MG/1
10 TABLET ORAL DAILY
Qty: 90 TABLET | Refills: 1 | Status: SHIPPED | OUTPATIENT
Start: 2021-11-19 | End: 2021-11-26 | Stop reason: ALTCHOICE

## 2021-11-19 RX ORDER — ALBUTEROL SULFATE 2.5 MG/3ML
2.5 SOLUTION RESPIRATORY (INHALATION) EVERY 6 HOURS PRN
Qty: 225 ML | Refills: 1 | Status: SHIPPED | OUTPATIENT
Start: 2021-11-19 | End: 2021-12-19

## 2021-11-19 RX ORDER — HYDROXYZINE HYDROCHLORIDE 25 MG/1
25 TABLET, FILM COATED ORAL 2 TIMES DAILY
Qty: 60 TABLET | Refills: 0 | Status: SHIPPED | OUTPATIENT
Start: 2021-11-19 | End: 2022-03-01 | Stop reason: SDUPTHER

## 2021-11-19 RX ORDER — KETOTIFEN FUMARATE 0.35 MG/ML
1 SOLUTION/ DROPS OPHTHALMIC 2 TIMES DAILY
Qty: 1 ML | Refills: 0 | Status: SHIPPED | OUTPATIENT
Start: 2021-11-19 | End: 2022-03-01

## 2021-11-19 RX ORDER — ATORVASTATIN CALCIUM 40 MG/1
40 TABLET, FILM COATED ORAL DAILY
Qty: 90 TABLET | Refills: 1 | Status: SHIPPED | OUTPATIENT
Start: 2021-11-19 | End: 2021-11-29 | Stop reason: SDUPTHER

## 2021-11-19 RX ORDER — BUDESONIDE AND FORMOTEROL FUMARATE DIHYDRATE 160; 4.5 UG/1; UG/1
2 AEROSOL RESPIRATORY (INHALATION) 2 TIMES DAILY
Qty: 10.2 G | Refills: 3 | Status: SHIPPED | OUTPATIENT
Start: 2021-11-19 | End: 2022-03-01 | Stop reason: SDUPTHER

## 2021-11-19 RX ORDER — PANTOPRAZOLE SODIUM 20 MG/1
20 TABLET, DELAYED RELEASE ORAL
Qty: 30 TABLET | Refills: 5 | Status: SHIPPED | OUTPATIENT
Start: 2021-11-19 | End: 2022-03-01 | Stop reason: SDUPTHER

## 2021-11-19 RX ORDER — BENZONATATE 200 MG/1
200 CAPSULE ORAL 2 TIMES DAILY PRN
Qty: 20 CAPSULE | Refills: 1 | Status: SHIPPED | OUTPATIENT
Start: 2021-11-19 | End: 2021-11-29

## 2021-11-26 ENCOUNTER — APPOINTMENT (OUTPATIENT)
Dept: LAB | Facility: HOSPITAL | Age: 56
End: 2021-11-26
Payer: COMMERCIAL

## 2021-11-26 ENCOUNTER — OFFICE VISIT (OUTPATIENT)
Dept: PULMONOLOGY | Facility: CLINIC | Age: 56
End: 2021-11-26
Payer: COMMERCIAL

## 2021-11-26 VITALS
HEIGHT: 59 IN | TEMPERATURE: 96.9 F | DIASTOLIC BLOOD PRESSURE: 78 MMHG | SYSTOLIC BLOOD PRESSURE: 126 MMHG | OXYGEN SATURATION: 98 % | WEIGHT: 154 LBS | HEART RATE: 91 BPM | BODY MASS INDEX: 31.04 KG/M2

## 2021-11-26 DIAGNOSIS — R10.33 PERIUMBILICAL ABDOMINAL PAIN: ICD-10-CM

## 2021-11-26 DIAGNOSIS — E78.00 HYPERCHOLESTEREMIA: ICD-10-CM

## 2021-11-26 DIAGNOSIS — I26.99 ACUTE PULMONARY EMBOLISM WITHOUT ACUTE COR PULMONALE, UNSPECIFIED PULMONARY EMBOLISM TYPE (HCC): ICD-10-CM

## 2021-11-26 DIAGNOSIS — D86.9 SARCOIDOSIS: ICD-10-CM

## 2021-11-26 DIAGNOSIS — U07.1 ACUTE HYPOXEMIC RESPIRATORY FAILURE DUE TO COVID-19 (HCC): Primary | ICD-10-CM

## 2021-11-26 DIAGNOSIS — J96.01 ACUTE HYPOXEMIC RESPIRATORY FAILURE DUE TO COVID-19 (HCC): Primary | ICD-10-CM

## 2021-11-26 DIAGNOSIS — R11.2 NAUSEA AND VOMITING, INTRACTABILITY OF VOMITING NOT SPECIFIED, UNSPECIFIED VOMITING TYPE: ICD-10-CM

## 2021-11-26 DIAGNOSIS — D50.8 OTHER IRON DEFICIENCY ANEMIA: ICD-10-CM

## 2021-11-26 LAB
ALBUMIN SERPL BCP-MCNC: 4.6 G/DL (ref 3.4–4.8)
ALP SERPL-CCNC: 81.9 U/L (ref 35–140)
ALT SERPL W P-5'-P-CCNC: 33 U/L (ref 5–54)
AMYLASE SERPL-CCNC: 67 IU/L (ref 28–100)
ANION GAP SERPL CALCULATED.3IONS-SCNC: 6 MMOL/L (ref 4–13)
AST SERPL W P-5'-P-CCNC: 29 U/L (ref 15–41)
BASOPHILS # BLD MANUAL: 0.06 THOUSAND/UL (ref 0–0.1)
BASOPHILS NFR MAR MANUAL: 1 % (ref 0–1)
BILIRUB SERPL-MCNC: 0.76 MG/DL (ref 0.3–1.2)
BILIRUB UR QL STRIP: NEGATIVE
BUN SERPL-MCNC: 15 MG/DL (ref 6–20)
CALCIUM SERPL-MCNC: 10.3 MG/DL (ref 8.4–10.2)
CHLORIDE SERPL-SCNC: 102 MMOL/L (ref 96–108)
CHOLEST SERPL-MCNC: 239 MG/DL
CLARITY UR: CLEAR
CO2 SERPL-SCNC: 29 MMOL/L (ref 22–33)
COLOR UR: YELLOW
CREAT SERPL-MCNC: 0.79 MG/DL (ref 0.4–1.1)
CRP SERPL QL: 0.2 MG/DL (ref 0–1)
EOSINOPHIL # BLD MANUAL: 0.26 THOUSAND/UL (ref 0–0.4)
EOSINOPHIL NFR BLD MANUAL: 4 % (ref 0–6)
ERYTHROCYTE [DISTWIDTH] IN BLOOD BY AUTOMATED COUNT: 15 % (ref 11.6–15.1)
ERYTHROCYTE [SEDIMENTATION RATE] IN BLOOD: 16 MM/HOUR (ref 0–30)
GFR SERPL CREATININE-BSD FRML MDRD: 97 ML/MIN/1.73SQ M
GLUCOSE P FAST SERPL-MCNC: 85 MG/DL (ref 70–105)
GLUCOSE UR STRIP-MCNC: NEGATIVE MG/DL
HCT VFR BLD AUTO: 44.4 % (ref 34.8–46.1)
HDLC SERPL-MCNC: 71 MG/DL
HGB BLD-MCNC: 14 G/DL (ref 11.5–15.4)
HGB UR QL STRIP.AUTO: NEGATIVE
KETONES UR STRIP-MCNC: NEGATIVE MG/DL
LDLC SERPL CALC-MCNC: 140 MG/DL (ref 0–100)
LEUKOCYTE ESTERASE UR QL STRIP: NEGATIVE
LIPASE SERPL-CCNC: 42 U/L (ref 13–60)
LYMPHOCYTES # BLD AUTO: 2.9 THOUSAND/UL (ref 0.6–4.47)
LYMPHOCYTES # BLD AUTO: 45 % (ref 14–44)
MCH RBC QN AUTO: 28.6 PG (ref 26.8–34.3)
MCHC RBC AUTO-ENTMCNC: 31.5 G/DL (ref 31.4–37.4)
MCV RBC AUTO: 91 FL (ref 82–98)
MONOCYTES # BLD AUTO: 0.39 THOUSAND/UL (ref 0–1.22)
MONOCYTES NFR BLD: 6 % (ref 4–12)
NEUTROPHILS # BLD MANUAL: 2.84 THOUSAND/UL (ref 1.85–7.62)
NEUTS SEG NFR BLD AUTO: 44 % (ref 43–75)
NITRITE UR QL STRIP: NEGATIVE
NONHDLC SERPL-MCNC: 168 MG/DL
PH UR STRIP.AUTO: 6.5 [PH]
PLATELET # BLD AUTO: 356 THOUSANDS/UL (ref 149–390)
PLATELET BLD QL SMEAR: ADEQUATE
PMV BLD AUTO: 11.1 FL (ref 8.9–12.7)
POTASSIUM SERPL-SCNC: 4.6 MMOL/L (ref 3.5–5)
PROT SERPL-MCNC: 7.9 G/DL (ref 6.4–8.3)
PROT UR STRIP-MCNC: NEGATIVE MG/DL
RBC # BLD AUTO: 4.89 MILLION/UL (ref 3.81–5.12)
RBC MORPH BLD: NORMAL
SODIUM SERPL-SCNC: 137 MMOL/L (ref 133–145)
SP GR UR STRIP.AUTO: 1.02 (ref 1–1.03)
TRIGL SERPL-MCNC: 138.1 MG/DL
TSH SERPL DL<=0.05 MIU/L-ACNC: 0.45 UIU/ML (ref 0.34–5.6)
UROBILINOGEN UR QL STRIP.AUTO: 0.2 E.U./DL
WBC # BLD AUTO: 6.45 THOUSAND/UL (ref 4.31–10.16)

## 2021-11-26 PROCEDURE — 80061 LIPID PANEL: CPT

## 2021-11-26 PROCEDURE — 99214 OFFICE O/P EST MOD 30 MIN: CPT | Performed by: INTERNAL MEDICINE

## 2021-11-26 PROCEDURE — 85652 RBC SED RATE AUTOMATED: CPT

## 2021-11-26 PROCEDURE — 83690 ASSAY OF LIPASE: CPT

## 2021-11-26 PROCEDURE — 36415 COLL VENOUS BLD VENIPUNCTURE: CPT

## 2021-11-26 PROCEDURE — 1036F TOBACCO NON-USER: CPT | Performed by: INTERNAL MEDICINE

## 2021-11-26 PROCEDURE — 85027 COMPLETE CBC AUTOMATED: CPT

## 2021-11-26 PROCEDURE — 85007 BL SMEAR W/DIFF WBC COUNT: CPT

## 2021-11-26 PROCEDURE — 80053 COMPREHEN METABOLIC PANEL: CPT

## 2021-11-26 PROCEDURE — 86140 C-REACTIVE PROTEIN: CPT

## 2021-11-26 PROCEDURE — 84443 ASSAY THYROID STIM HORMONE: CPT

## 2021-11-26 PROCEDURE — 3008F BODY MASS INDEX DOCD: CPT | Performed by: INTERNAL MEDICINE

## 2021-11-26 PROCEDURE — 81003 URINALYSIS AUTO W/O SCOPE: CPT | Performed by: NURSE PRACTITIONER

## 2021-11-26 PROCEDURE — 82150 ASSAY OF AMYLASE: CPT

## 2021-11-29 DIAGNOSIS — U07.1 ACUTE HYPOXEMIC RESPIRATORY FAILURE DUE TO COVID-19 (HCC): ICD-10-CM

## 2021-11-29 DIAGNOSIS — U07.1 PNEUMONIA DUE TO COVID-19 VIRUS: ICD-10-CM

## 2021-11-29 DIAGNOSIS — E78.00 HYPERCHOLESTEREMIA: ICD-10-CM

## 2021-11-29 DIAGNOSIS — J12.82 PNEUMONIA DUE TO COVID-19 VIRUS: ICD-10-CM

## 2021-11-29 DIAGNOSIS — J96.01 ACUTE HYPOXEMIC RESPIRATORY FAILURE DUE TO COVID-19 (HCC): ICD-10-CM

## 2021-11-29 RX ORDER — MELATONIN
2000 DAILY
Qty: 90 TABLET | Refills: 1 | Status: SHIPPED | OUTPATIENT
Start: 2021-11-29 | End: 2022-03-01 | Stop reason: SDUPTHER

## 2021-11-29 RX ORDER — ATORVASTATIN CALCIUM 40 MG/1
40 TABLET, FILM COATED ORAL DAILY
Qty: 90 TABLET | Refills: 1 | Status: SHIPPED | OUTPATIENT
Start: 2021-11-29 | End: 2022-03-01 | Stop reason: SDUPTHER

## 2021-12-15 ENCOUNTER — TELEPHONE (OUTPATIENT)
Dept: FAMILY MEDICINE CLINIC | Facility: CLINIC | Age: 56
End: 2021-12-15

## 2021-12-22 ENCOUNTER — TELEPHONE (OUTPATIENT)
Dept: FAMILY MEDICINE CLINIC | Facility: CLINIC | Age: 56
End: 2021-12-22

## 2022-02-03 ENCOUNTER — TELEPHONE (OUTPATIENT)
Dept: PULMONOLOGY | Facility: CLINIC | Age: 57
End: 2022-02-03

## 2022-02-09 ENCOUNTER — TELEPHONE (OUTPATIENT)
Dept: PULMONOLOGY | Facility: CLINIC | Age: 57
End: 2022-02-09

## 2022-02-17 ENCOUNTER — HOSPITAL ENCOUNTER (OUTPATIENT)
Dept: PULMONOLOGY | Facility: HOSPITAL | Age: 57
Discharge: HOME | End: 2022-02-17
Payer: MEDICARE

## 2022-02-17 DIAGNOSIS — D86.9 SARCOIDOSIS: ICD-10-CM

## 2022-02-17 DIAGNOSIS — J96.01 ACUTE HYPOXEMIC RESPIRATORY FAILURE DUE TO COVID-19 (HCC): ICD-10-CM

## 2022-02-17 DIAGNOSIS — U07.1 ACUTE HYPOXEMIC RESPIRATORY FAILURE DUE TO COVID-19 (HCC): ICD-10-CM

## 2022-02-17 PROCEDURE — 94060 EVALUATION OF WHEEZING: CPT | Performed by: INTERNAL MEDICINE

## 2022-02-17 PROCEDURE — 94729 DIFFUSING CAPACITY: CPT

## 2022-02-17 PROCEDURE — 94060 EVALUATION OF WHEEZING: CPT

## 2022-02-17 PROCEDURE — 94760 N-INVAS EAR/PLS OXIMETRY 1: CPT

## 2022-02-17 PROCEDURE — 94726 PLETHYSMOGRAPHY LUNG VOLUMES: CPT | Performed by: INTERNAL MEDICINE

## 2022-02-17 PROCEDURE — 94729 DIFFUSING CAPACITY: CPT | Performed by: INTERNAL MEDICINE

## 2022-02-17 PROCEDURE — 94726 PLETHYSMOGRAPHY LUNG VOLUMES: CPT

## 2022-02-17 RX ORDER — ALBUTEROL SULFATE 2.5 MG/3ML
2.5 SOLUTION RESPIRATORY (INHALATION) ONCE
Status: COMPLETED | OUTPATIENT
Start: 2022-02-17 | End: 2022-02-17

## 2022-02-17 RX ADMIN — ALBUTEROL SULFATE 2.5 MG: 2.5 SOLUTION RESPIRATORY (INHALATION) at 13:19

## 2022-02-19 ENCOUNTER — TELEPHONE (OUTPATIENT)
Dept: FAMILY MEDICINE CLINIC | Facility: CLINIC | Age: 57
End: 2022-02-19

## 2022-02-22 ENCOUNTER — HOSPITAL ENCOUNTER (OUTPATIENT)
Dept: ULTRASOUND IMAGING | Facility: HOSPITAL | Age: 57
Discharge: HOME | End: 2022-02-22
Payer: MEDICARE

## 2022-02-22 DIAGNOSIS — R10.33 PERIUMBILICAL ABDOMINAL PAIN: ICD-10-CM

## 2022-02-22 DIAGNOSIS — R11.2 NAUSEA AND VOMITING, INTRACTABILITY OF VOMITING NOT SPECIFIED, UNSPECIFIED VOMITING TYPE: ICD-10-CM

## 2022-02-22 PROCEDURE — 76700 US EXAM ABDOM COMPLETE: CPT

## 2022-03-01 ENCOUNTER — OFFICE VISIT (OUTPATIENT)
Dept: FAMILY MEDICINE CLINIC | Facility: CLINIC | Age: 57
End: 2022-03-01
Payer: MEDICARE

## 2022-03-01 VITALS
HEART RATE: 98 BPM | OXYGEN SATURATION: 94 % | TEMPERATURE: 97.2 F | HEIGHT: 59 IN | DIASTOLIC BLOOD PRESSURE: 76 MMHG | WEIGHT: 165.2 LBS | SYSTOLIC BLOOD PRESSURE: 134 MMHG | BODY MASS INDEX: 33.3 KG/M2 | RESPIRATION RATE: 17 BRPM

## 2022-03-01 DIAGNOSIS — R11.11 NON-INTRACTABLE VOMITING WITHOUT NAUSEA, UNSPECIFIED VOMITING TYPE: ICD-10-CM

## 2022-03-01 DIAGNOSIS — U07.1 PNEUMONIA DUE TO COVID-19 VIRUS: ICD-10-CM

## 2022-03-01 DIAGNOSIS — J96.01 ACUTE HYPOXEMIC RESPIRATORY FAILURE DUE TO COVID-19 (HCC): ICD-10-CM

## 2022-03-01 DIAGNOSIS — F91.3: ICD-10-CM

## 2022-03-01 DIAGNOSIS — J30.2 SEASONAL ALLERGIES: ICD-10-CM

## 2022-03-01 DIAGNOSIS — E78.00 HYPERCHOLESTEROLEMIA: ICD-10-CM

## 2022-03-01 DIAGNOSIS — R10.84 GENERALIZED ABDOMINAL PAIN: Primary | ICD-10-CM

## 2022-03-01 DIAGNOSIS — K21.9 GASTROESOPHAGEAL REFLUX DISEASE, UNSPECIFIED WHETHER ESOPHAGITIS PRESENT: ICD-10-CM

## 2022-03-01 DIAGNOSIS — E78.00 HYPERCHOLESTEREMIA: ICD-10-CM

## 2022-03-01 DIAGNOSIS — D86.9 SARCOIDOSIS: ICD-10-CM

## 2022-03-01 DIAGNOSIS — Z02.4 ENCOUNTER FOR DRIVER'S LICENSE HISTORY AND PHYSICAL: ICD-10-CM

## 2022-03-01 DIAGNOSIS — U07.1 ACUTE HYPOXEMIC RESPIRATORY FAILURE DUE TO COVID-19 (HCC): ICD-10-CM

## 2022-03-01 DIAGNOSIS — L29.9 PRURITUS: ICD-10-CM

## 2022-03-01 DIAGNOSIS — L81.9 DISCOLORATION OF SKIN OF FACE: ICD-10-CM

## 2022-03-01 DIAGNOSIS — J12.82 PNEUMONIA DUE TO COVID-19 VIRUS: ICD-10-CM

## 2022-03-01 DIAGNOSIS — J06.9 VIRAL URI WITH COUGH: ICD-10-CM

## 2022-03-01 DIAGNOSIS — R11.0 NAUSEA: ICD-10-CM

## 2022-03-01 DIAGNOSIS — R05.9 COUGH: ICD-10-CM

## 2022-03-01 DIAGNOSIS — D50.8 OTHER IRON DEFICIENCY ANEMIA: ICD-10-CM

## 2022-03-01 PROCEDURE — 99214 OFFICE O/P EST MOD 30 MIN: CPT | Performed by: NURSE PRACTITIONER

## 2022-03-01 RX ORDER — FLUTICASONE PROPIONATE 50 MCG
1 SPRAY, SUSPENSION (ML) NASAL DAILY
Qty: 18.2 ML | Refills: 1 | Status: SHIPPED | OUTPATIENT
Start: 2022-03-01 | End: 2022-05-30

## 2022-03-01 RX ORDER — HYDROXYZINE HYDROCHLORIDE 25 MG/1
25 TABLET, FILM COATED ORAL 2 TIMES DAILY
Qty: 60 TABLET | Refills: 0 | Status: SHIPPED | OUTPATIENT
Start: 2022-03-01 | End: 2022-03-31

## 2022-03-01 RX ORDER — FERROUS SULFATE TAB EC 324 MG (65 MG FE EQUIVALENT) 324 (65 FE) MG
324 TABLET DELAYED RESPONSE ORAL
Qty: 180 TABLET | Refills: 1 | Status: SHIPPED | OUTPATIENT
Start: 2022-03-01 | End: 2022-05-30

## 2022-03-01 RX ORDER — ATORVASTATIN CALCIUM 40 MG/1
40 TABLET, FILM COATED ORAL DAILY
Qty: 90 TABLET | Refills: 1 | Status: SHIPPED | OUTPATIENT
Start: 2022-03-01 | End: 2022-05-30

## 2022-03-01 RX ORDER — ECHINACEA PURPUREA EXTRACT 125 MG
1 TABLET ORAL AS NEEDED
Qty: 60 ML | Refills: 3 | Status: SHIPPED | OUTPATIENT
Start: 2022-03-01

## 2022-03-01 RX ORDER — MELATONIN
2000 DAILY
Qty: 90 TABLET | Refills: 1 | Status: SHIPPED | OUTPATIENT
Start: 2022-03-01 | End: 2022-05-30

## 2022-03-01 RX ORDER — MULTIVITAMIN/IRON/FOLIC ACID 18MG-0.4MG
1 TABLET ORAL DAILY
Refills: 0
Start: 2022-03-01

## 2022-03-01 RX ORDER — PETROLATUM,WHITE/LANOLIN
OINTMENT (GRAM) TOPICAL AS NEEDED
Qty: 45 G | Refills: 1 | Status: SHIPPED | OUTPATIENT
Start: 2022-03-01 | End: 2022-03-31

## 2022-03-01 RX ORDER — PANTOPRAZOLE SODIUM 20 MG/1
20 TABLET, DELAYED RELEASE ORAL
Qty: 30 TABLET | Refills: 5 | Status: SHIPPED | OUTPATIENT
Start: 2022-03-01

## 2022-03-01 RX ORDER — AMOXICILLIN 250 MG
1 CAPSULE ORAL
Qty: 100 TABLET | Refills: 1 | Status: SHIPPED | OUTPATIENT
Start: 2022-03-01 | End: 2022-05-30

## 2022-03-01 RX ORDER — ALBUTEROL SULFATE 90 UG/1
2 AEROSOL, METERED RESPIRATORY (INHALATION) EVERY 6 HOURS PRN
Qty: 18 G | Refills: 1 | Status: SHIPPED | OUTPATIENT
Start: 2022-03-01

## 2022-03-01 RX ORDER — BUDESONIDE AND FORMOTEROL FUMARATE DIHYDRATE 160; 4.5 UG/1; UG/1
2 AEROSOL RESPIRATORY (INHALATION) 2 TIMES DAILY
Qty: 10.2 G | Refills: 3 | Status: SHIPPED | OUTPATIENT
Start: 2022-03-01

## 2022-03-01 NOTE — PATIENT INSTRUCTIONS
Abdominal Pain   WHAT YOU NEED TO KNOW:   Abdominal pain can be dull, achy, or sharp  You may have pain in one area of your abdomen, or in your entire abdomen  Your pain may be caused by a condition such as constipation, food sensitivity or poisoning, infection, or a blockage  Abdominal pain can also be from a hernia, appendicitis, or an ulcer  Liver, gallbladder, or kidney conditions can also cause abdominal pain  The cause of your abdominal pain may not be known  DISCHARGE INSTRUCTIONS:   Call your local emergency number (911 in the [address] [facility] [facility]) if:   · You have new chest pain or shortness of breath  Return to the emergency department if:   · You have pulsing pain in your upper abdomen or lower back that suddenly becomes constant  · Your pain is in the right lower abdominal area and worsens with movement  · You have a fever over 100 4°F (38°C) or shaking chills  · You are vomiting and cannot keep food or liquids down  · Your pain does not improve or gets worse over the next 8 to 12 hours  · You see blood in your vomit or bowel movements, or they look black and tarry  · Your skin or the whites of your eyes turn yellow  · You are a woman and have a large amount of vaginal bleeding that is not your monthly period  Call your doctor if:   · You have pain in your lower back  · You are a man and have pain in your testicles  · You have pain when you urinate  · You have questions or concerns about your condition or care  Medicines:   · Prescription pain medicine  may be given  Ask your healthcare provider how to take this medicine safely  Some prescription pain medicines contain acetaminophen  Do not take other medicines that contain acetaminophen without talking to your healthcare provider  Too much acetaminophen may cause liver damage  Prescription pain medicine may cause constipation  Ask your healthcare provider how to prevent or treat constipation       · Medicines  may be given to calm your stomach or prevent vomiting  · Take your medicine as directed  Contact your healthcare provider if you think your medicine is not helping or if you have side effects  Tell him of her if you are allergic to any medicine  Keep a list of the medicines, vitamins, and herbs you take  Include the amounts, and when and why you take them  Bring the list or the pill bottles to follow-up visits  Carry your medicine list with you in case of an emergency  Manage your symptoms:   · Apply heat  on your abdomen for 20 to 30 minutes every 2 hours for as many days as directed  Heat helps decrease pain and muscle spasms  · Make changes to the food you eat, if needed  Do not eat foods that cause abdominal pain or other symptoms  Eat small meals more often  The following changes may also help:    ? Eat more high-fiber foods if you are constipated  High-fiber foods include fruits, vegetables, whole-grain foods, and legumes  ? Do not eat foods that cause gas if you have bloating  Examples include broccoli, cabbage, and cauliflower  Do not drink soda or carbonated drinks  These may also cause gas  ? Do not eat foods or drinks that contain sorbitol or fructose if you have diarrhea and bloating  Some examples are fruit juices, candy, jelly, and sugar-free gum  ? Do not eat high-fat foods  Examples include fried foods, cheeseburgers, hot dogs, and desserts  ? Limit or do not have caffeine  Caffeine may make symptoms such as heartburn or nausea worse  ? Drink more liquids to prevent dehydration from diarrhea or vomiting  Ask your healthcare provider how much liquid to drink each day and which liquids are best for you  · Keep a diary of your abdominal pain  A diary may help your healthcare provider learn what is causing your abdominal pain  Include when the pain happens, how long it lasts, and what the pain feels like  Write down any other symptoms you have with abdominal pain   Also write down what you eat, and what symptoms you have after you eat  · Manage your stress  Stress may cause abdominal pain  Your healthcare provider may recommend relaxation techniques and deep breathing exercises to help decrease your stress  Your healthcare provider may recommend you talk to someone about your stress or anxiety, such as a counselor or a trusted friend  Get plenty of sleep and exercise regularly  · Limit or do not drink alcohol  Alcohol can make your abdominal pain worse  Ask your healthcare provider if it is safe for you to drink alcohol  Also ask how much is safe for you to drink  · Do not smoke  Nicotine and other chemicals in cigarettes can damage your esophagus and stomach  Ask your healthcare provider for information if you currently smoke and need help to quit  E-cigarettes or smokeless tobacco still contain nicotine  Talk to your healthcare provider before you use these products  Follow up with your doctor within 24 hours or as directed:  Write down your questions so you remember to ask them during your visits  © [employer] 2022 Information is for End User's use only and may not be sold, redistributed or otherwise used for commercial purposes  All illustrations and images included in CareNotes® are the copyrighted property of [employer]  or [facility]a [facility]   The above information is an  only  It is not intended as medical advice for individual conditions or treatments  Talk to your doctor, nurse or pharmacist before following any medical regimen to see if it is safe and effective for you

## 2022-03-01 NOTE — PROGRESS NOTES
BMI Counseling: Body mass index is 33 37 kg/m²  The BMI is above normal  Nutrition recommendations include decreasing portion sizes, encouraging healthy choices of fruits and vegetables, decreasing fast food intake, consuming healthier snacks, limiting drinks that contain sugar, moderation in carbohydrate intake, increasing intake of lean protein, reducing intake of saturated and trans fat and reducing intake of cholesterol  Patient referred to PCP  Rationale for BMI follow-up plan is due to patient being overweight or obese  Depression Screening and Follow-up Plan: Patient was screened for depression during today's encounter  They screened negative with a PHQ-2 score of 0  Assessment/Plan:    Patient is a 14-year-old female presents in office for follow-up abdominal issues she has had a generalized abdominal pain recurrent nausea and vomiting in the morning 4 months  She has also had some localized discomfort to the mid abdominal area ultrasound was done which showed some fat containing hernia  Blood work was done on this at the end of November of 2021 lipase and amylase were normal C-reactive protein was normal sed rate was negative kidney liver function and electrolytes were good and her blood counts were normal      She continues to have the issues so at this point I will refer her over to gastroenterology for further evaluation  She is to repeat blood work in about 4-6 weeks and I will see her back for follow-up after she sees GI  She needs a permit physical today lost her license at some point for his criminal issues that he has she has done for which she has served time  She is currently not under care of Psychiatry does have some history when she was younger of some behavioral issues however no psych issues currently concerns       Denies any history of seizures denies any history of diabetes or any cognitive issues denies any alcohol use occasional marijuana use however she states that she has not been using it since she got sick with COVID and has been seen by a lung specialist who told her to stop  Denies any other drug use     Paperwork filled out and provided she also had paperwork filled out and signed by her eye doctor too       Problem List Items Addressed This Visit        Respiratory    Acute hypoxemic respiratory failure due to COVID-19 [facility])    Relevant Medications    cholecalciferol (VITAMIN D3) 1,000 units tablet    multivitamin-minerals (CENTRUM ADULTS) tablet    senna-docusate sodium (SENOKOT S) 8 6-50 mg per tablet       Other    Seasonal allergies    Relevant Medications    fluticasone (FLONASE) 50 mcg/act nasal spray    Hypercholesterolemia    Relevant Medications    atorvastatin (LIPITOR) 40 mg tablet    Other Relevant Orders    Comprehensive metabolic panel    CBC and differential    TSH, 3rd generation    Lipid panel    UA (URINE) with reflex to Scope    Ambulatory Referral to Gastroenterology    Iron deficiency anemia    Relevant Medications    ferrous sulfate 324 (65 Fe) mg    Other Relevant Orders    Comprehensive metabolic panel    CBC and differential    TSH, 3rd generation    Lipid panel    UA (URINE) with reflex to Scope    Oppositional defiant disorder without chronic irritability and anger    Relevant Medications    hydrOXYzine HCL (ATARAX) 25 mg tablet    Other Relevant Orders    Comprehensive metabolic panel    CBC and differential    TSH, 3rd generation    Lipid panel    UA (URINE) with reflex to Scope    Sarcoidosis    Relevant Medications    albuterol (ProAir HFA) 90 mcg/act inhaler    budesonide-formoterol (Symbicort) 160-4 5 mcg/act inhaler    Vitamins A & D (vitamin A & D) ointment      Other Visit Diagnoses     Generalized abdominal pain    -  Primary    Relevant Orders    Comprehensive metabolic panel    CBC and differential    TSH, 3rd generation    Lipid panel    UA (URINE) with reflex to Scope    Ambulatory Referral to Gastroenterology    Nausea Relevant Orders    Ambulatory Referral to Gastroenterology    Non-intractable vomiting without nausea, unspecified vomiting type        Relevant Orders    Ambulatory Referral to Gastroenterology    Encounter for 's license history and physical        Pneumonia due to COVID-19 virus        Relevant Medications    albuterol (ProAir HFA) 90 mcg/act inhaler    budesonide-formoterol (Symbicort) 160-4 5 mcg/act inhaler    cholecalciferol (VITAMIN D3) 1,000 units tablet    fluticasone (FLONASE) 50 mcg/act nasal spray    multivitamin-minerals (CENTRUM ADULTS) tablet    sodium chloride (OCEAN) 0 65 % nasal spray    Hypercholesteremia        Relevant Medications    atorvastatin (LIPITOR) 40 mg tablet    Cough        Relevant Medications    budesonide-formoterol (Symbicort) 160-4 5 mcg/act inhaler    Pruritus        Relevant Medications    hydrOXYzine HCL (ATARAX) 25 mg tablet    Vitamins A & D (vitamin A & D) ointment    Gastroesophageal reflux disease, unspecified whether esophagitis present        Relevant Medications    pantoprazole (PROTONIX) 20 mg tablet    Discoloration of skin of face        Relevant Medications    Vitamins A & D (vitamin A & D) ointment    Viral URI with cough        Relevant Medications    sodium chloride (OCEAN) 0 65 % nasal spray            Subjective:      Patient ID: Wero Hutchins is a 64 y o  female  HPI    The following portions of the patient's history were reviewed and updated as appropriate:   Past Medical History:  She has a past medical history of Anxiety, Asthma, Back pain, Sarcoidosis, and Vertigo  ,  _______________________________________________________________________  Medical Problems:  does not have any pertinent problems on file ,  _______________________________________________________________________  Past Surgical History:   has a past surgical history that includes Mammo (historical) (05/02/2018)  ,  _______________________________________________________________________  Family History:  family history includes Asthma in her mother; Hypertension in her mother; Mental illness in her mother ,  _______________________________________________________________________  Social History:   reports that she has never smoked  She has never used smokeless tobacco  She reports previous alcohol use  She reports previous drug use  Drug: Marijuana  ,  _______________________________________________________________________  Allergies:  is allergic to carbamazepine     _______________________________________________________________________  Current Outpatient Medications   Medication Sig Dispense Refill    acetaminophen (TYLENOL) 325 mg tablet Take 2 tablets (650 mg total) by mouth every 6 (six) hours as needed for mild pain (Patient not taking: Reported on 11/19/2021 )  0    albuterol (ProAir HFA) 90 mcg/act inhaler Inhale 2 puffs every 6 (six) hours as needed for wheezing or shortness of breath 18 g 1    atorvastatin (LIPITOR) 40 mg tablet Take 1 tablet (40 mg total) by mouth daily 90 tablet 1    budesonide-formoterol (Symbicort) 160-4 5 mcg/act inhaler Inhale 2 puffs 2 (two) times a day Rinse mouth after use 10 2 g 3    cholecalciferol (VITAMIN D3) 1,000 units tablet Take 2 tablets (2,000 Units total) by mouth daily 90 tablet 1    ferrous sulfate 324 (65 Fe) mg Take 1 tablet (324 mg total) by mouth 2 (two) times a day before meals 180 tablet 1    fluticasone (FLONASE) 50 mcg/act nasal spray 1 spray into each nostril daily 18 2 mL 1    hydrOXYzine HCL (ATARAX) 25 mg tablet Take 1 tablet (25 mg total) by mouth 2 (two) times a day 60 tablet 0    latanoprost (XALATAN) 0 005 % ophthalmic solution place 1 drop into both eyes at bedtime      meclizine (ANTIVERT) 12 5 MG tablet Take 1 tablet (12 5 mg total) by mouth every 8 (eight) hours as needed for dizziness 90 tablet 1    multivitamin-minerals (CENTRUM ADULTS) tablet Take 1 tablet by mouth daily  0    pantoprazole (PROTONIX) 20 mg tablet Take 1 tablet (20 mg total) by mouth daily before breakfast 30 tablet 5    senna-docusate sodium (SENOKOT S) 8 6-50 mg per tablet Take 1 tablet by mouth daily at bedtime as needed for constipation 100 tablet 1    sodium chloride (OCEAN) 0 65 % nasal spray 1 spray into each nostril as needed for congestion for up to 12 doses 60 mL 3    Vitamins A & D (vitamin A & D) ointment Apply topically as needed for dry skin (to face) 45 g 1     No current facility-administered medications for this visit      _______________________________________________________________________  Review of Systems   Constitutional: Negative for chills, fatigue and fever  HENT: Negative for congestion, sinus pain and sore throat  Eyes: Negative  Respiratory: Negative for cough and shortness of breath  Under care of PULM for sarcoid issues    Cardiovascular: Negative for chest pain, palpitations and leg swelling  Gastrointestinal: Positive for abdominal pain (mid abdominal pain and epigastric discomfort - intermittent ), nausea and vomiting (intermittent )  Negative for diarrhea  Endocrine: Negative  Genitourinary: Negative for difficulty urinating and flank pain  Musculoskeletal: Positive for myalgias  Negative for arthralgias  Skin: Negative for rash  Allergic/Immunologic: Positive for environmental allergies  Neurological: Negative for headaches  Psychiatric/Behavioral: Negative for sleep disturbance and suicidal ideas  The patient is nervous/anxious  Objective:  Vitals:    03/01/22 0840   BP: 134/76   BP Location: Left arm   Patient Position: Sitting   Cuff Size: Standard   Pulse: 98   Resp: 17   Temp: (!) 97 2 °F (36 2 °C)   TempSrc: Temporal   SpO2: 94%   Weight: 74 9 kg (165 lb 3 2 oz)   Height: 4' 11" (1 499 m)     Body mass index is 33 37 kg/m²  Physical Exam  Vitals and nursing note reviewed  Constitutional:       Appearance: Normal appearance  Comments: BMI 31 35    HENT:      Head: Atraumatic  Mouth/Throat:      Mouth: Mucous membranes are dry  Eyes:      Pupils: Pupils are equal, round, and reactive to light  Cardiovascular:      Rate and Rhythm: Normal rate and regular rhythm  Heart sounds: Normal heart sounds  Pulmonary:      Effort: Pulmonary effort is normal       Comments: Decreased lung sounds   Abdominal:      General: There is distension  Palpations: Abdomen is soft  There is mass (patinet feels a mass to mid adbomen at time )  Tenderness: There is abdominal tenderness  There is no guarding or rebound  Musculoskeletal:         General: Normal range of motion  Cervical back: Normal range of motion  Skin:     General: Skin is warm  Capillary Refill: Capillary refill takes less than 2 seconds  Neurological:      Mental Status: She is alert and oriented to person, place, and time  Psychiatric:         Mood and Affect: Mood normal          Behavior: Behavior normal               Study Result    Narrative & Impression   ABDOMINAL WALL ULTRASOUND     INDICATION:   N61 75: Periumbilical pain  X25 1: Nausea with vomiting, unspecified      COMPARISON:  None     TECHNIQUE:   Real-time ultrasound of the supraumbilical abdominal wall was performed with a transducer with both volumetric sweeps and still imaging techniques      FINDINGS:  Fat-containing supraumbilical hernia is visualized  Wall defect measures 6 x 7 mm  Herniated fat measures 3 3 x 1 5 x 2 6 cm         IMPRESSION:     Fat-containing supraumbilical hernia         Workstation performed: [id number]        Imaging    US abdomen limited (Order: [id number]) - 2/22/2022    Manual Differential(PHLEBS Do Not Order)  Order: [id number] - Reflex for Order [id number]   Status: Final result     Visible to patient: No (inaccessible in [facility])     Next appt: 03/08/2022 at 01:20 PM in Pulmonology Priti Moore MD)     Dx: Sarcoidosis; Hypercholesteremia; Michelle        2 Result Notes    Component Ref Range & Units 11/26/21  8:18 AM 6/18/21 11:29 AM 5/19/21  5:47 AM 5/17/21  4:59 AM 5/16/21  3:55 AM 5/15/21  5:14 AM 5/10/21  5:41 AM   Segmented % 43 - 75 % 44       63    Lymphocytes % 14 - 44 % 45 High        17    Monocytes % 4 - 12 % 6       12    Eosinophils, % 0 - 6 % 4  4  0  0  0  0  0    Basophils % 0 - 1 % 1  0  0  0  0  0  0    Absolute Neutrophils 1 85 - 7 62 Thousand/uL 2 84       5 40    Lymphocytes Absolute 0 60 - 4 47 Thousand/uL 2 90       1 33    Monocytes Absolute 0 00 - 1 22 Thousand/uL 0 39       0 94    Eosinophils Absolute 0 00 - 0 40 Thousand/uL 0 26  0 34 R  0 00 R  0 00 R  0 00 R  0 00 R  0 00    Basophils Absolute 0 00 - 0 10 Thousand/uL 0 06  0 04 R  0 03 R  0 02 R  0 02 R  0 01 R  0 00    Total Counted        100    RBC Morphology  Normal       Present    Platelet Estimate Adequate Adequate               C-reactive protein  Order: [id number]   Status: Final result     Visible to patient: No (inaccessible in [facility])     Next appt: 03/08/2022 at 01:20 PM in Pulmonology Priti Moore MD)     Dx: Sarcoidosis; Hypercholesteremia; Michelle        2 Result Notes    Component Ref Range & Units 11/26/21  8:18 AM 5/19/21  5:47 AM 5/17/21  4:59 AM 5/14/21  4:48 AM 5/11/21  4:55 AM 5/9/21  4:54 AM 5/8/21 12:34 PM   CRP 0 0 - 1 0 mg/dL 0 2  <3 0 R  3 9 High  R  2 5           Sedimentation rate, automated  Order: [id number]   Status: Final result     [facility] patient[facility]     Next appt: 03/08/2022 at 01:20 PM in Pulmonology Priti Moore MD)     Dx: Sarcoidosis; Hypercholesteremia; Michelle        2 Result Notes     Ref Range & Units 11/26/21  8:18 AM   Sed Rate 0 - 30 mm/hour 16               Specimen Collected: 11/26/21  8:18 AM Last Resulted: 11/26/21 10:46 AM              Amylase  Order: [id number]   Status: Final result     Visible to patient: No (inaccessible in [facility])     Next appt: 03/08/2022 at 01:20 PM in Pulmonology Kaitlin Tobias MD)     Dx: Sarcoidosis; Hypercholesteremia; Michelle        2 Result Notes     Ref Range & Units 11/26/21  8:18 AM   Amylase 28 - 100 IU/L 67 0         Lipase  Order: [id number]   Status: Final result     Visible to patient: No (inaccessible in [facility])     Next appt: 03/08/2022 at 01:20 PM in Pulmonology Kaitlin Tobias MD)     Dx: Sarcoidosis; Hypercholesteremia; Michelle        2 Result Notes     Ref Range & Units 11/26/21  8:18 AM   Lipase 13 - 60 u/L 42       Lipid panel  Order: [id number]   Status: Final result     Visible to patient: No (inaccessible in [facility])     Next appt: 03/08/2022 at 01:20 PM in Pulmonology Kaitlin Tobias MD)     Dx: Sarcoidosis; Hypercholesteremia; Michelle        2 Result Notes    Component Ref Range & Units 11/26/21  8:18 AM 5/9/21  4:54 AM 5/8/21 12:34 PM   Cholesterol See Comment mg/dL 239 High   175 R, CM     Comment: Cholesterol:         Pediatric <18 Years         Desirable          <170 mg/dL       Borderline High    170-199 mg/dL       High               >=200 mg/dL         Adult >=18 Years             Desirable         <200 mg/dL       Borderline High   200-239 mg/dL       High             >239 mg/dL    Triglycerides See Comment mg/dL 138 1  142 0 R, CM  196 9 High  R, CM    Comment: Triglyceride:      0-9Y            <75mg/dL      10Y-17Y         <90 mg/dL        >=18Y      Normal          <150 mg/dL      Borderline High 150-199 mg/dL      High            200-499 mg/dL        Very High       >499 mg/dL     Specimen collection should occur prior to N-Acetylcysteine or Metamizole administration due to the potential for falsely depressed results     HDL, Direct >=50 mg/dL 71  42 Low  CM     LDL Calculated 0 - 100 mg/dL 140 High   105 High  CM     Comment: LDL Cholesterol:     Optimal           <100 mg/dl     Near Optimal      100-129 mg/dl     Above Optimal       Borderline High 130-159 mg/dl       High            160-189 mg/dl       Very High       >189 mg/dl           Lipid panel  Order: [id number]   Status: Final result     Visible to patient: No (inaccessible in [facility])     Next appt: 03/08/2022 at 01:20 PM in Pulmonology Candie De La Torre MD)     Dx: Sarcoidosis; Hypercholesteremia; Michelle        2 Result Notes    Component Ref Range & Units 11/26/21  8:18 AM 5/9/21  4:54 AM 5/8/21 12:34 PM   Cholesterol See Comment mg/dL 239 High   175 R, CM     Comment: Cholesterol:         Pediatric <18 Years         Desirable          <170 mg/dL       Borderline High    170-199 mg/dL       High               >=200 mg/dL         Adult >=18 Years             Desirable         <200 mg/dL       Borderline High   200-239 mg/dL       High             >239 mg/dL    Triglycerides See Comment mg/dL 138 1  142 0 R, CM  196 9 High  R, CM    Comment: Triglyceride:      0-9Y            <75mg/dL      10Y-17Y         <90 mg/dL        >=18Y      Normal          <150 mg/dL      Borderline High 150-199 mg/dL      High            200-499 mg/dL        Very High       >499 mg/dL     Specimen collection should occur prior to N-Acetylcysteine or Metamizole administration due to the potential for falsely depressed results  HDL, Direct >=50 mg/dL 71  42 Low  CM     LDL Calculated 0 - 100 mg/dL 140 High   105 High  CM     Comment: LDL Cholesterol:     Optimal           <100 mg/dl     Near Optimal      100-129 mg/dl     Above Optimal       Borderline High 130-159 mg/dl       High            160-189 mg/dl       Very High       >189 mg/dl             CBC and differential  Order: [id number]   Status: Final result     Visible to patient: No (inaccessible in [facility])     Next appt: 03/08/2022 at 01:20 PM in Pulmonology Candie De La Torre MD)     Dx: Sarcoidosis; Hypercholesteremia; Michelle        3 Result Notes    Component Ref Range & Units 11/26/21  8:18 AM 6/18/21 11:29 AM 5/21/21  6:17 AM 5/20/21 11:16 AM 5/19/21  5:47 AM 5/18/21  4:42 AM 5/17/21  4:59 AM   WBC 4 31 - 10 16 Thousand/uL 6 45  9 20  14 81 High   17 12 High   18 66 High   19 39 High   15 48 High     RBC 3 81 - 5 12 Million/uL 4 89  3 98  4 41  4 56  4 52  5 01  4 53    Hemoglobin 11 5 - 15 4 g/dL 14 0  11 6  13 1  13 4  13 4  14 6  13 4    Hematocrit 34 8 - 46 1 % 44 4  37 4  40 1  41 8  41 1  45 4  41 4    MCV 82 - 98 fL 91  94  91  92  91  91  91    MCH 26 8 - 34 3 pg 28 6  29 1  29 7  29 4  29 6  29 1  29 6    MCHC 31 4 - 37 4 g/dL 31 5  31 0 Low   32 7  32 1  32 6  32 2  32 4    RDW 11 6 - 15 1 % 15 0  15 9 High   13 8  13 8  13 7  13 6  13 8    MPV 8 9 - 12 7 fL 11 1  10 5  10 1  10 0  9 8  9 9  9 8    Platelets 845 - 477 Thousands/uL 356  576 High   420 High   438 High   470 High   537 High   498 High     Neutrophils Relative   61 R    85 High  R   87 High  R    Monocytes Absolute   1 02 R    1 03 R   0 57 R    Eosinophils Absolute   0 34 R    0 00 R   0 00 R    Basophils Absolute   0 04 R    0 03 R   0 02 R    nRBC      0 R   0 R    Immat GRANS %   1 R    1 R   1 R    Lymphocytes Relative   23 R    8 Low  R   8 Low  R    Monocytes Relative   11 R    6 R   4 R    Eosinophils Relative   4 R    0 R   0 R    Basophils Relative   0 R    0 R   0 R    Neutrophils Absolute   5 62 R    15 92 High  R   13 44 High  R    Immature Grans Absolute   0 06 R    0 22 High  R   0 22 High  R    Lymphocytes Absolute   2 12 R                Contains abnormal data Comprehensive metabolic panel  Order: [id number]   Status: Final result     [facility] patient: No [facility]     Next appt: 03/08/2022 at 01:20 PM in Pulmonology Keenan Wolfe MD)     Dx: Sarcoidosis; Hypercholesteremia; Michelle        2 Result Notes    Component Ref Range & Units 11/26/21  8:18 AM 9/15/21 11:30 AM 6/18/21 11:29 AM 5/21/21  6:17 AM 5/20/21 11:16 AM 5/19/21  5:47 AM 5/18/21  4:42 AM   Sodium 133 - 145 mmol/L 137   136  133 Low  R 130 Low  R  134 Low  R  135 Low  R    Potassium 3 5 - 5 0 mmol/L 4 6   4 5 CM  4 5 R  4 2 R  4 8 R  4 4 R    Chloride 96 - 108 mmol/L 102   100  99 Low  R  98 Low  R  100 R  99 Low  R    CO2 22 - 33 mmol/L 29   29  29 R  26 R  28 R  29 R    ANION GAP 4 - 13 mmol/L 6   7  5  6  6  7    BUN 6 - 20 mg/dL 15   7  21 R  19 R  21 R  22 R    Creatinine 0 40 - 1 10 mg/dL 0 79  0 80 CM  0 59 CM  0 67 R, CM  0 67 R, CM  0 64 R, CM  0 82 R, CM    Comment: Standardized to IDMS reference method   Glucose, Fasting 70 - 105 mg/dL 85   115 High  CM        Comment: Specimen collection should occur prior to Sulfasalazine administration due to the potential for falsely depressed results  Specimen collection should occur prior to Sulfapyridine administration due to the potential for falsely elevated results  Calcium 8 4 - 10 2 mg/dL 10 3 High    10 4 High   9 0 R  9 6 R  9 9 R  10 1 R    AST 15 - 41 U/L 29   17 CM    17 R, CM  29 R, CM    Comment: Specimen collection should occur prior to Sulfasalazine administration due to the potential for falsely depressed results  ALT 5 - 54 U/L 33   11 CM    45 R, CM  57 R, CM    Comment: Specimen collection should occur prior to Sulfasalazine administration due to the potential for falsely depressed results      Alkaline Phosphatase 35 - 140 U/L 81 9   81 2    71 R  84 R    Total Protein 6 4 - 8 3 g/dL 7 9   7 5    6 8 R  7 7 R    Albumin 3 4 - 4 8 g/dL 4 6   3 9    3 0 Low  R  3 3 Low  R    Total Bilirubin 0 30 - 1 20 mg/dL 0 76   0 60    0 62 R, CM  0 61 R, CM    eGFR ml/min/1 73sq m 97  95  119  114

## 2022-03-08 PROBLEM — J84.9 ILD (INTERSTITIAL LUNG DISEASE) (HCC): Status: ACTIVE | Noted: 2022-03-08

## 2022-04-07 ENCOUNTER — TELEPHONE (OUTPATIENT)
Dept: PULMONOLOGY | Facility: CLINIC | Age: 57
End: 2022-04-07

## 2022-04-07 NOTE — TELEPHONE ENCOUNTER
Pt called to reschedule No show appt from 3/29  Rescheduled her to 7/1, pt was also asking about her results and was asking if Dr Jinger Phoenix could just call her with the results  Please advise  Thank you!

## 2022-04-11 NOTE — TELEPHONE ENCOUNTER
The patient was scheduled to get a CT scan of the chest in February 2022  This was ordered but not done  She had a pulmonary function test showed restriction with diffusion defect  I have called the patient and left message  Advised to follow up

## 2022-04-25 ENCOUNTER — CONSULT (OUTPATIENT)
Dept: GASTROENTEROLOGY | Facility: AMBULARY SURGERY CENTER | Age: 57
End: 2022-04-25
Payer: MEDICARE

## 2022-04-25 VITALS
HEART RATE: 79 BPM | WEIGHT: 167 LBS | SYSTOLIC BLOOD PRESSURE: 128 MMHG | BODY MASS INDEX: 33.67 KG/M2 | DIASTOLIC BLOOD PRESSURE: 82 MMHG | OXYGEN SATURATION: 96 % | HEIGHT: 59 IN

## 2022-04-25 DIAGNOSIS — Z12.11 SCREEN FOR COLON CANCER: ICD-10-CM

## 2022-04-25 DIAGNOSIS — K21.9 GASTROESOPHAGEAL REFLUX DISEASE WITHOUT ESOPHAGITIS: ICD-10-CM

## 2022-04-25 DIAGNOSIS — R11.11 NON-INTRACTABLE VOMITING WITHOUT NAUSEA: Primary | ICD-10-CM

## 2022-04-25 DIAGNOSIS — K43.9 SUPRAUMBILICAL HERNIA: ICD-10-CM

## 2022-04-25 PROCEDURE — 99244 OFF/OP CNSLTJ NEW/EST MOD 40: CPT | Performed by: INTERNAL MEDICINE

## 2022-04-25 RX ORDER — SODIUM PICOSULFATE, MAGNESIUM OXIDE, AND ANHYDROUS CITRIC ACID 10; 3.5; 12 MG/160ML; G/160ML; G/160ML
1 LIQUID ORAL SEE ADMIN INSTRUCTIONS
Qty: 320 ML | Refills: 0 | Status: SHIPPED | OUTPATIENT
Start: 2022-04-25

## 2022-04-25 NOTE — PROGRESS NOTES
Consultation - [facility]) [facility]  Lavonne Parthhenrry 1965 female         Chief Complaint:  Vomiting, umbilical hernia    HPI:  71-year-old female with history of sarcoidosis reports having symptoms of vomiting after meal   She was hospitalized about a month last year because of COVID and pneumonia  Complaining about coughing spells when she vomits but denies any nausea or fullness in the stomach occasional bloating  She has problems with acid reflux for which she takes pantoprazole 20 mg daily  Denies any difficulty swallowing  Good appetite, no recent weight loss  She is also concerned about small stool fat containing supraumbilical hernia that was noted on recent ultrasound also  She complains about swelling in that area when she has to push it back  Regular bowel movements and denies any blood or mucus in the stool  Good appetite, no recent weight loss  She never had colonoscopy in the past   Denies any abdominal pain  Chaperon: Ms Zara Woodall: Review of Systems   Constitutional: Negative for activity change, appetite change, chills, diaphoresis, fatigue, fever and unexpected weight change  HENT: Negative for ear discharge, ear pain, facial swelling, hearing loss, nosebleeds, sore throat, tinnitus and voice change  Eyes: Negative for pain, discharge, redness, itching and visual disturbance  Respiratory: Positive for cough and shortness of breath  Negative for apnea, chest tightness and wheezing  Cardiovascular: Negative for chest pain and palpitations  Gastrointestinal:        As noted in HPI   Endocrine: Negative for cold intolerance, heat intolerance and polyuria  Genitourinary: Negative for difficulty urinating, dysuria, flank pain, hematuria and urgency  Musculoskeletal: Negative for arthralgias, back pain, gait problem, joint swelling and myalgias  Skin: Negative for rash and wound     Neurological: Negative for dizziness, tremors, seizures, speech difficulty, light-headedness, numbness and headaches  Hematological: Negative for adenopathy  Does not bruise/bleed easily  Psychiatric/Behavioral: Negative for agitation, behavioral problems and confusion  The patient is not nervous/anxious           Past Medical History:   Diagnosis Date    Anxiety     Asthma     Back pain     Sarcoidosis     Vertigo       Past Surgical History:   Procedure Laterality Date    MAMMO (HISTORICAL)  2018     Social History     Socioeconomic History    Marital status: Single     Spouse name: Not on file    Number of children: Not on file    Years of education: Not on file    Highest education level: Not on file   Occupational History    Not on file   Tobacco Use    Smoking status: Never Smoker    Smokeless tobacco: Never Used   Vaping Use    Vaping Use: Never used   Substance and Sexual Activity    Alcohol use: Not Currently     Comment: social    Drug use: Not Currently     Types: Marijuana    Sexual activity: Not Currently   Other Topics Concern    Not on file   Social History Narrative    · Most recent tobacco use screenin2019      · Do you currently or have you served in [employer]:   No      · Were you activated, into active duty, as a member of the [employer] or as a Reservist:   No      Social Determinants of Health     Financial Resource Strain: Not on file   Food Insecurity: Not on file   Transportation Needs: Not on file   Physical Activity: Not on file   Stress: Not on file   Social Connections: Not on file   Intimate Partner Violence: Not on file   Housing Stability: Not on file     Family History   Problem Relation Age of Onset    Hypertension Mother     Mental illness Mother     Asthma Mother     Sarcoidosis Sister     Colon cancer Brother     Cancer Brother      Tegretol [carbamazepine]  Current Outpatient Medications   Medication Sig Dispense Refill    albuterol (ProAir HFA) 90 mcg/act inhaler Inhale 2 puffs every 6 (six) hours as needed for wheezing or shortness of breath 18 g 1    atorvastatin (LIPITOR) 40 mg tablet Take 1 tablet (40 mg total) by mouth daily 90 tablet 1    budesonide-formoterol (Symbicort) 160-4 5 mcg/act inhaler Inhale 2 puffs 2 (two) times a day Rinse mouth after use 10 2 g 3    cholecalciferol (VITAMIN D3) 1,000 units tablet Take 2 tablets (2,000 Units total) by mouth daily 90 tablet 1    ferrous sulfate 324 (65 Fe) mg Take 1 tablet (324 mg total) by mouth 2 (two) times a day before meals 180 tablet 1    fluticasone (FLONASE) 50 mcg/act nasal spray 1 spray into each nostril daily 18 2 mL 1    latanoprost (XALATAN) 0 005 % ophthalmic solution place 1 drop into both eyes at bedtime      multivitamin-minerals (CENTRUM ADULTS) tablet Take 1 tablet by mouth daily  0    pantoprazole (PROTONIX) 20 mg tablet Take 1 tablet (20 mg total) by mouth daily before breakfast 30 tablet 5    senna-docusate sodium (SENOKOT S) 8 6-50 mg per tablet Take 1 tablet by mouth daily at bedtime as needed for constipation 100 tablet 1    sodium chloride (OCEAN) 0 65 % nasal spray 1 spray into each nostril as needed for congestion for up to 12 doses 60 mL 3    acetaminophen (TYLENOL) 325 mg tablet Take 2 tablets (650 mg total) by mouth every 6 (six) hours as needed for mild pain (Patient not taking: Reported on 11/19/2021 )  0    hydrOXYzine HCL (ATARAX) 25 mg tablet Take 1 tablet (25 mg total) by mouth 2 (two) times a day 60 tablet 0    meclizine (ANTIVERT) 12 5 MG tablet Take 1 tablet (12 5 mg total) by mouth every 8 (eight) hours as needed for dizziness 90 tablet 1    Sod Picosulfate-Mag Ox-Cit Acd (Clenpiq) 10-3 5-12 MG-GM -GM/160ML SOLN Take 1 Package by mouth see administration instructions 320 mL 0    Vitamins A & D (vitamin A & D) ointment Apply topically as needed for dry skin (to face) 45 g 1     No current facility-administered medications for this visit         Blood pressure 128/82, pulse 79, height 4' 11" (1 499 m), weight 75 8 kg (167 lb), SpO2 96 %  PHYSICAL EXAM: Physical Exam  Constitutional:       Appearance: Normal appearance  She is well-developed  HENT:      Head: Normocephalic and atraumatic  Nose: Nose normal    Eyes:      Conjunctiva/sclera: Conjunctivae normal    Neck:      Thyroid: No thyromegaly  Vascular: No JVD  Trachea: No tracheal deviation  Cardiovascular:      Rate and Rhythm: Normal rate and regular rhythm  Heart sounds: Normal heart sounds  No murmur heard  No friction rub  No gallop  Pulmonary:      Effort: Pulmonary effort is normal  No respiratory distress  Breath sounds: Normal breath sounds  No wheezing or rales  Abdominal:      General: Bowel sounds are normal  There is no distension  Palpations: Abdomen is soft  There is no mass  Tenderness: There is no abdominal tenderness  There is no guarding  Hernia: A hernia (Supraumbilical) is present  Musculoskeletal:         General: No tenderness or deformity  Cervical back: Neck supple  Right lower leg: No edema  Left lower leg: No edema  Lymphadenopathy:      Cervical: No cervical adenopathy  Skin:     General: Skin is warm and dry  Findings: No erythema or rash  Neurological:      Mental Status: She is alert and oriented to person, place, and time  Psychiatric:         Mood and Affect: Mood normal          Behavior: Behavior normal          Thought Content:  Thought content normal           Lab Results   Component Value Date    WBC 6 45 11/26/2021    HGB 14 0 11/26/2021    HCT 44 4 11/26/2021    MCV 91 11/26/2021     11/26/2021     Lab Results   Component Value Date    CALCIUM 10 3 (H) 11/26/2021    K 4 6 11/26/2021    CO2 29 11/26/2021     11/26/2021    BUN 15 11/26/2021    CREATININE 0 79 11/26/2021     Lab Results   Component Value Date    ALT 33 11/26/2021    AST 29 11/26/2021    ALKPHOS 81 9 11/26/2021     Lab Results Component Value Date    INR 0 91 05/08/2021    PROTIME 10 3 05/08/2021       US abdomen complete    Result Date: 2/24/2022  Impression: Normal  Workstation performed: [id number]     US abdomen limited    Result Date: 2/25/2022  Impression: Fat-containing supraumbilical hernia  Workstation performed: [id number]       ASSESSMENT & PLAN:    Non-intractable vomiting without nausea  Patient reports having symptoms of vomiting during the cough spells but denies any fullness or nausea  Occasional bloating, probable H pylori gastritis  -schedule for upper endoscopy    -advised her to follow up with her pulmonologist    Gastroesophageal reflux disease without esophagitis  Gastroesophageal reflux disease - Patient has the symptoms of chronic acid reflux for a long time  Possible hiatal hernia or LES weakness  Should rule out Cox's esophagus because of chronic symptoms         -Patient was explained about the lifestyle and dietary modifications  Advised to avoid fatty foods, chocolates, caffeine, alcohol and any other triggering foods  Avoid eating for at least 3 hours before going to bed         -discussed about long-term side effects from pantoprazole    -schedule for EGD    Screen for colon cancer  Screening for colon cancer - patient is at average risk for colon cancer screening  Rule out colorectal lesions including polyps or malignancy         -Schedule for colonoscopy  -High-fiber diet     -Patient was given instructions about the colonoscopy prep     -Patient was explained about  the risks and benefits of the procedure  Risks including but not limited to bleeding, infection, perforation were explained in detail  Also explained about less than 100% sensitivity with the exam and other alternatives  Supraumbilical hernia  Patient reports having symptoms of discomfort from the hernia      -refer for surgical evaluation

## 2022-04-25 NOTE — ASSESSMENT & PLAN NOTE
Patient reports having symptoms of vomiting during the cough spells but denies any fullness or nausea  Occasional bloating, probable H pylori gastritis      -schedule for upper endoscopy    -advised her to follow up with her pulmonologist

## 2022-04-25 NOTE — ASSESSMENT & PLAN NOTE
Gastroesophageal reflux disease - Patient has the symptoms of chronic acid reflux for a long time  Possible hiatal hernia or LES weakness  Should rule out Cox's esophagus because of chronic symptoms         -Patient was explained about the lifestyle and dietary modifications  Advised to avoid fatty foods, chocolates, caffeine, alcohol and any other triggering foods    Avoid eating for at least 3 hours before going to bed         -discussed about long-term side effects from pantoprazole    -schedule for EGD

## 2022-04-25 NOTE — PATIENT INSTRUCTIONS
Scheduled date of EGD/colonoscopy (as of today):7/25/2022  Physician performing EGD/colonoscopy:dr rea  Location of EGD/colonoscopy:[facility]  Desired bowel prep reviewed with patient:JOSE PER DR Demond Rodriguez  Instructions reviewed with patient by:VALERIE SCHNEIDER  Clearances:

## 2022-04-29 ENCOUNTER — TELEPHONE (OUTPATIENT)
Dept: FAMILY MEDICINE CLINIC | Facility: CLINIC | Age: 57
End: 2022-04-29

## 2022-06-07 ENCOUNTER — TELEPHONE (OUTPATIENT)
Dept: FAMILY MEDICINE CLINIC | Facility: CLINIC | Age: 57
End: 2022-06-07

## 2022-06-10 ENCOUNTER — RA CDI HCC (OUTPATIENT)
Dept: OTHER | Facility: HOSPITAL | Age: 57
End: 2022-06-10

## 2022-06-10 NOTE — PROGRESS NOTES
[address] [place]  sarcoidosis  No documentation supports further classification of the dx   [facility] 75  coding opportunities       Chart reviewed, no opportunity found:   [address]        Patients Insurance     Medicare Insurance: [employer]

## 2022-06-13 ENCOUNTER — RA CDI HCC (OUTPATIENT)
Dept: OTHER | Facility: HOSPITAL | Age: 57
End: 2022-06-13

## 2022-06-13 NOTE — PROGRESS NOTES
[facility] 75  coding opportunities     [address] [place]  Sarcoidosis  No documentation supports further classification of the dx     Chart reviewed, no opportunity found:   [address]        Patients Insurance     Medicare Insurance: [employer]

## 2022-06-16 DIAGNOSIS — J84.9 ILD (INTERSTITIAL LUNG DISEASE) (HCC): ICD-10-CM

## 2022-06-16 DIAGNOSIS — J96.01 ACUTE RESPIRATORY FAILURE WITH HYPOXIA (HCC): Primary | ICD-10-CM

## 2022-06-30 ENCOUNTER — TELEPHONE (OUTPATIENT)
Dept: PULMONOLOGY | Facility: CLINIC | Age: 57
End: 2022-06-30

## 2022-06-30 NOTE — TELEPHONE ENCOUNTER
Called pt to confirm appt for tomorrow with Dr Margy Fong and she stated that she needed a refill of the albuterol for her neubulizer  When I looked in her medications it was not prescribed from Dr Margy Fong it was prescribed by Waleska Jerry

## 2022-07-07 ENCOUNTER — RA CDI HCC (OUTPATIENT)
Dept: OTHER | Facility: HOSPITAL | Age: 57
End: 2022-07-07

## 2022-07-07 NOTE — PROGRESS NOTES
[address] [place]  Sarcoidosis   Unspecificied code on problem list/billed - no further specification noted on DOS - MEAT in note      [facility] 75  coding opportunities       Chart reviewed, no opportunity found:   [address]        Patients Insurance     Medicare Insurance: [employer]

## 2022-07-19 ENCOUNTER — RA CDI HCC (OUTPATIENT)
Dept: OTHER | Facility: HOSPITAL | Age: 57
End: 2022-07-19

## 2022-07-19 NOTE — PROGRESS NOTES
DOS 8/4/21  [address] [place]  Sarcoidosis   Unspecificied code on problem list/billed - no further specification noted on DOS - NO MEAT in note  [facility] 75  coding opportunities       Chart reviewed, no opportunity found:   [address]        Patients Insurance     Medicare Insurance: [employer]

## 2022-07-19 NOTE — PROGRESS NOTES
DOS 8/10/21  [address] [place]  Sarcoidosis   Unspecificied code on problem list/billed - no further specification noted on DOS - MEAT in note  [facility] 75  coding opportunities       Chart reviewed, no opportunity found:   [address]        Patients Insurance     Medicare Insurance: [employer]

## 2022-07-22 ENCOUNTER — TELEPHONE (OUTPATIENT)
Dept: PULMONOLOGY | Facility: CLINIC | Age: 57
End: 2022-07-22

## 2022-09-13 ENCOUNTER — TELEPHONE (OUTPATIENT)
Dept: FAMILY MEDICINE CLINIC | Facility: CLINIC | Age: 57
End: 2022-09-13

## 2022-09-13 NOTE — TELEPHONE ENCOUNTER
Patient no - showed appt on 9/7  We have had a really hard time getting a hold of her  She is due for multiple care gaps   LMOM for pt to call back and let us know if she will be following [facility]

## 2022-09-14 NOTE — TELEPHONE ENCOUNTER
I called and spoke to the patient  She does not want to drive all the way up here  She is also not happy about changing doctors either  I did advise Arvie Harada does highly recommend and trust the other PCPs in the [place] location if she wanted to stay there   Patient states she is going to take a day to think about what she would like to do and get back to us

## 2022-09-16 NOTE — TELEPHONE ENCOUNTER
Patient does not wish to follow [facility], would like to stay in [facility] [place]  Please call and make appt

## 2022-10-04 ENCOUNTER — OFFICE VISIT (OUTPATIENT)
Dept: FAMILY MEDICINE CLINIC | Facility: CLINIC | Age: 57
End: 2022-10-04
Payer: MEDICARE

## 2022-10-04 VITALS
WEIGHT: 166.2 LBS | OXYGEN SATURATION: 98 % | DIASTOLIC BLOOD PRESSURE: 80 MMHG | BODY MASS INDEX: 33.51 KG/M2 | SYSTOLIC BLOOD PRESSURE: 122 MMHG | HEART RATE: 78 BPM | HEIGHT: 59 IN | TEMPERATURE: 97.7 F

## 2022-10-04 DIAGNOSIS — R53.83 OTHER FATIGUE: ICD-10-CM

## 2022-10-04 DIAGNOSIS — R13.12 DYSPHAGIA, OROPHARYNGEAL PHASE: ICD-10-CM

## 2022-10-04 DIAGNOSIS — U07.1 ACUTE HYPOXEMIC RESPIRATORY FAILURE DUE TO COVID-19 (HCC): ICD-10-CM

## 2022-10-04 DIAGNOSIS — E78.00 HYPERCHOLESTEROLEMIA: ICD-10-CM

## 2022-10-04 DIAGNOSIS — K43.9 SUPRAUMBILICAL HERNIA: ICD-10-CM

## 2022-10-04 DIAGNOSIS — R05.9 COUGH: ICD-10-CM

## 2022-10-04 DIAGNOSIS — J96.01 ACUTE HYPOXEMIC RESPIRATORY FAILURE DUE TO COVID-19 (HCC): ICD-10-CM

## 2022-10-04 DIAGNOSIS — J30.2 SEASONAL ALLERGIES: ICD-10-CM

## 2022-10-04 DIAGNOSIS — J84.9 ILD (INTERSTITIAL LUNG DISEASE) (HCC): ICD-10-CM

## 2022-10-04 DIAGNOSIS — I26.99 ACUTE PULMONARY EMBOLISM WITHOUT ACUTE COR PULMONALE, UNSPECIFIED PULMONARY EMBOLISM TYPE (HCC): ICD-10-CM

## 2022-10-04 DIAGNOSIS — U07.1 PNEUMONIA DUE TO COVID-19 VIRUS: ICD-10-CM

## 2022-10-04 DIAGNOSIS — D50.8 OTHER IRON DEFICIENCY ANEMIA: ICD-10-CM

## 2022-10-04 DIAGNOSIS — D86.9 SARCOIDOSIS: Primary | ICD-10-CM

## 2022-10-04 DIAGNOSIS — J96.01 ACUTE RESPIRATORY FAILURE WITH HYPOXIA (HCC): ICD-10-CM

## 2022-10-04 DIAGNOSIS — J12.82 PNEUMONIA DUE TO COVID-19 VIRUS: ICD-10-CM

## 2022-10-04 DIAGNOSIS — E78.00 HYPERCHOLESTEREMIA: ICD-10-CM

## 2022-10-04 DIAGNOSIS — I25.10 ATHEROSCLEROSIS OF NATIVE CORONARY ARTERY OF NATIVE HEART WITHOUT ANGINA PECTORIS: ICD-10-CM

## 2022-10-04 PROCEDURE — 99214 OFFICE O/P EST MOD 30 MIN: CPT | Performed by: FAMILY MEDICINE

## 2022-10-04 RX ORDER — AMOXICILLIN 250 MG
1 CAPSULE ORAL
Qty: 100 TABLET | Refills: 1 | Status: SHIPPED | OUTPATIENT
Start: 2022-10-04 | End: 2022-10-28

## 2022-10-04 RX ORDER — ATORVASTATIN CALCIUM 40 MG/1
40 TABLET, FILM COATED ORAL DAILY
Qty: 90 TABLET | Refills: 1 | Status: SHIPPED | OUTPATIENT
Start: 2022-10-04 | End: 2023-01-02

## 2022-10-04 RX ORDER — ALBUTEROL SULFATE 90 UG/1
2 AEROSOL, METERED RESPIRATORY (INHALATION) EVERY 6 HOURS PRN
Qty: 18 G | Refills: 1 | Status: SHIPPED | OUTPATIENT
Start: 2022-10-04

## 2022-10-04 RX ORDER — FLUTICASONE PROPIONATE 50 MCG
1 SPRAY, SUSPENSION (ML) NASAL DAILY
Qty: 18.2 ML | Refills: 1 | Status: SHIPPED | OUTPATIENT
Start: 2022-10-04 | End: 2023-01-02

## 2022-10-04 RX ORDER — MELATONIN
2000 DAILY
Qty: 90 TABLET | Refills: 1 | Status: SHIPPED | OUTPATIENT
Start: 2022-10-04 | End: 2023-01-02

## 2022-10-04 RX ORDER — FERROUS SULFATE TAB EC 324 MG (65 MG FE EQUIVALENT) 324 (65 FE) MG
324 TABLET DELAYED RESPONSE ORAL
Qty: 90 TABLET | Refills: 1 | Status: SHIPPED | OUTPATIENT
Start: 2022-10-04 | End: 2023-01-02

## 2022-10-04 RX ORDER — BUDESONIDE AND FORMOTEROL FUMARATE DIHYDRATE 160; 4.5 UG/1; UG/1
2 AEROSOL RESPIRATORY (INHALATION) 2 TIMES DAILY
Qty: 10.2 G | Refills: 3 | Status: SHIPPED | OUTPATIENT
Start: 2022-10-04

## 2022-10-04 NOTE — ASSESSMENT & PLAN NOTE
Stable  Now  No  Sob or  Cp  She  Has  Been  In  Care of  Cardiologist   No  Complaints  Return  Parameters  discussed

## 2022-10-04 NOTE — ASSESSMENT & PLAN NOTE
Amna  Is  In  Care  Of  Pulmonologist asked  Her  To  Follow up  With  Them  As directed   Her  CTA  Chest reviewed    This  Has  Been   Going  On  In  Terms of  Her  Symptoms    Since  She  Had  covid  One  year  Ago  No  Sob  Cp  Or  Any  Thing  New    Since  Last  1  Year   She   Gets  Recurrent  Cough  She  Says  She  Ran out of  Her  Inhalers will refill  Them she  Denies  Any  Cp or  sob

## 2022-10-04 NOTE — ASSESSMENT & PLAN NOTE
Reviewed  The  Notes  From  pulmonologist  And    CTA  No  evidence  Of  PE  As  Per  Recommendations  From  pulmonologist  She is off  The  Blood  Thinners

## 2022-10-04 NOTE — ASSESSMENT & PLAN NOTE
Feels  Like  A lump  She  wa s evaluated  And  was told  That  Is  A hernia    reff to  gen  Surgery  For  Evaluation   Return  Parameters  discussed

## 2022-10-04 NOTE — PROGRESS NOTES
Assessment/Plan:as  below         Problem List Items Addressed This Visit        Digestive    Dysphagia, oropharyngeal phase     In  Care of  GI  Had  EGD  and  Colonoscopy  No  Dysphagia  anymore            Respiratory    Acute hypoxemic respiratory failure due to COVID-19 [facility])    Relevant Medications    cholecalciferol (VITAMIN D3) 1,000 units tablet    senna-docusate sodium (SENOKOT S) 8 6-50 mg per tablet    ILD (interstitial lung disease) (HCC)    Relevant Medications    albuterol (5 mg/mL) 0 5 % nebulizer solution    albuterol (ProAir HFA) 90 mcg/act inhaler    budesonide-formoterol (Symbicort) 160-4 5 mcg/act inhaler    fluticasone (FLONASE) 50 mcg/act nasal spray       Cardiovascular and Mediastinum    Acute pulmonary embolism without acute cor pulmonale (HCC)     Reviewed  The  Notes  From  pulmonologist  And    CTA  No  evidence  Of  PE  As  Per  Recommendations  From  pulmonologist  She is off  The  Blood  Thinners          Atherosclerotic heart disease of native coronary artery without angina pectoris     Stable  Now  No  Sob or  Cp  She  Has  Been  In  Care of  Cardiologist   No  Complaints  Return  Parameters  discussed         Relevant Orders    CBC and differential    Comprehensive metabolic panel    Lipid panel    UA (URINE) with reflex to Scope       Other    Seasonal allergies    Relevant Medications    fluticasone (FLONASE) 50 mcg/act nasal spray    Hypercholesterolemia     Will  F/u  With  Labs discussed  diet         Relevant Medications    atorvastatin (LIPITOR) 40 mg tablet    Other Relevant Orders    Lipid panel    Iron deficiency anemia     Will  F/u  With  Labs  She  Takes  Iron pills         Relevant Medications    ferrous sulfate 324 (65 Fe) mg    Sarcoidosis - Primary     Stable  Is  In  Care  Of  Pulmonologist asked  Her  To  Follow up  With  Them  As directed   Her  CTA  Chest reviewed    This  Has  Been   Going  On  In  Terms of  Her  Symptoms    Since  She  Had  covid  One  year Ago  No  Sob  Cp  Or  Any  Thing  New    Since  Last  1  Year   She   Gets  Recurrent  Cough  She  Says  She  Ran out of  Her  Inhalers will refill  Them she  Denies  Any  Cp or  sob              Relevant Medications    albuterol (ProAir HFA) 90 mcg/act inhaler    budesonide-formoterol (Symbicort) 160-4 5 mcg/act inhaler    Supraumbilical hernia     Feels  Like  A lump  She  wa s evaluated  And  was told  That  Is  A hernia    reff to  gen  Surgery  For  Evaluation   Return  Parameters  discussed         Relevant Orders    Ambulatory Referral to General Surgery      Other Visit Diagnoses     Acute respiratory failure with hypoxia (HCC)        Relevant Medications    albuterol (5 mg/mL) 0 5 % nebulizer solution    Pneumonia due to COVID-19 virus        Relevant Medications    albuterol (5 mg/mL) 0 5 % nebulizer solution    albuterol (ProAir HFA) 90 mcg/act inhaler    budesonide-formoterol (Symbicort) 160-4 5 mcg/act inhaler    cholecalciferol (VITAMIN D3) 1,000 units tablet    fluticasone (FLONASE) 50 mcg/act nasal spray    Hypercholesteremia        Relevant Medications    atorvastatin (LIPITOR) 40 mg tablet    Cough        Relevant Medications    budesonide-formoterol (Symbicort) 160-4 5 mcg/act inhaler    Other fatigue        Relevant Orders    TSH, 3rd generation    Vitamin D 25 hydroxy            Subjective:      Patient ID: Shiv Mayen is a 62 y o  female      ANDRA rosas's  Patient  Came  In  To  Get  Established   She  Says  She  Ran out of  Her  inhalers  And  Ha s been  Coughing  Lately  H/o  Sarcoidosis   I  Refilled  All  Her  meds   She  Has multiple  Health  Conditions    As  Listed  Above    She  Has   H/o  PE GERD  And   Fat  containing  Umbilical  Hernia    ,  Hyperlipidemia      No  Acute  Complaints  Today  Except  For   Cough    Due  To  Change  In  Weather  And  She  Ran out of  Her  inhalers   No  Sob or  cp    The following portions of the patient's history were reviewed and updated as appropriate:   Past Medical History:  She has a past medical history of Anxiety, Asthma, Back pain, Sarcoidosis, and Vertigo  ,  _______________________________________________________________________  Medical Problems:  does not have any pertinent problems on file ,  _______________________________________________________________________  Past Surgical History:   has a past surgical history that includes Mammo (historical) (05/02/2018)  ,  _______________________________________________________________________  Family History:  family history includes Asthma in her mother; Cancer in her brother; Colon cancer in her brother; Hypertension in her mother; Mental illness in her mother; Sarcoidosis in her sister  ,  _______________________________________________________________________  Social History:   reports that she has never smoked  She has never used smokeless tobacco  She reports previous alcohol use  She reports previous drug use  Drug: Marijuana  ,  _______________________________________________________________________  Allergies:  is allergic to tegretol [carbamazepine]     _______________________________________________________________________  Current Outpatient Medications   Medication Sig Dispense Refill    albuterol (5 mg/mL) 0 5 % nebulizer solution Take 0 5 mL (2 5 mg total) by nebulization every 6 (six) hours as needed for wheezing or shortness of breath 20 mL 0    albuterol (ProAir HFA) 90 mcg/act inhaler Inhale 2 puffs every 6 (six) hours as needed for wheezing or shortness of breath 18 g 1    atorvastatin (LIPITOR) 40 mg tablet Take 1 tablet (40 mg total) by mouth daily 90 tablet 1    budesonide-formoterol (Symbicort) 160-4 5 mcg/act inhaler Inhale 2 puffs 2 (two) times a day Rinse mouth after use 10 2 g 3    cholecalciferol (VITAMIN D3) 1,000 units tablet Take 2 tablets (2,000 Units total) by mouth daily 90 tablet 1    ferrous sulfate 324 (65 Fe) mg Take 1 tablet (324 mg total) by mouth daily before breakfast 90 tablet 1    fluticasone (FLONASE) 50 mcg/act nasal spray 1 spray into each nostril daily 18 2 mL 1    hydrOXYzine HCL (ATARAX) 25 mg tablet Take 1 tablet (25 mg total) by mouth 2 (two) times a day 60 tablet 0    meclizine (ANTIVERT) 12 5 MG tablet Take 1 tablet (12 5 mg total) by mouth every 8 (eight) hours as needed for dizziness 90 tablet 1    multivitamin-minerals (CENTRUM ADULTS) tablet Take 1 tablet by mouth daily  0    senna-docusate sodium (SENOKOT S) 8 6-50 mg per tablet Take 1 tablet by mouth daily at bedtime as needed for constipation 100 tablet 1    Sod Picosulfate-Mag Ox-Cit Acd (Clenpiq) 10-3 5-12 MG-GM -GM/160ML SOLN Take 1 Package by mouth see administration instructions 320 mL 0    sodium chloride (OCEAN) 0 65 % nasal spray 1 spray into each nostril as needed for congestion for up to 12 doses 60 mL 3    Vitamins A & D (vitamin A & D) ointment Apply topically as needed for dry skin (to face) 45 g 1    acetaminophen (TYLENOL) 325 mg tablet Take 2 tablets (650 mg total) by mouth every 6 (six) hours as needed for mild pain (Patient not taking: Reported on 11/19/2021 )  0    latanoprost (XALATAN) 0 005 % ophthalmic solution place 1 drop into both eyes at bedtime (Patient not taking: Reported on 10/4/2022)      pantoprazole (PROTONIX) 20 mg tablet Take 1 tablet (20 mg total) by mouth daily before breakfast (Patient not taking: Reported on 10/4/2022) 30 tablet 5     No current facility-administered medications for this visit      _______________________________________________________________________  Review of Systems   Constitutional: Negative for chills and fatigue  HENT: Negative for congestion and sinus pain  Eyes: Negative for pain, discharge, redness and itching  Respiratory: Negative for cough and shortness of breath  Sarcoidosis   Cardiovascular: Negative for chest pain, palpitations and leg swelling          Hyperlipidemia   Gastrointestinal: Negative for abdominal distention and abdominal pain  Gerd   Endocrine: Negative for cold intolerance, heat intolerance and polydipsia  Genitourinary: Negative for dysuria, flank pain and pelvic pain  Musculoskeletal: Negative for arthralgias and back pain  Skin: Negative for rash  Psychiatric/Behavioral: Negative for sleep disturbance and suicidal ideas  The patient is not nervous/anxious  Objective:  Vitals:    10/04/22 1811   BP: 122/80   BP Location: Right arm   Patient Position: Sitting   Cuff Size: Standard   Pulse: 78   Temp: 97 7 °F (36 5 °C)   TempSrc: Temporal   SpO2: 98%   Weight: 75 4 kg (166 lb 3 2 oz)   Height: 4' 11" (1 499 m)     Body mass index is 33 57 kg/m²  Physical Exam  Constitutional:       General: She is not in acute distress  Appearance: Normal appearance  She is not ill-appearing or toxic-appearing  HENT:      Head: Normocephalic and atraumatic  Nose: Nose normal  No congestion  Mouth/Throat:      Mouth: Mucous membranes are moist       Pharynx: No oropharyngeal exudate  Eyes:      Extraocular Movements: Extraocular movements intact  Conjunctiva/sclera: Conjunctivae normal       Pupils: Pupils are equal, round, and reactive to light  Cardiovascular:      Rate and Rhythm: Normal rate and regular rhythm  Pulses: Normal pulses  Heart sounds: Normal heart sounds  No murmur heard  No gallop  Pulmonary:      Effort: Pulmonary effort is normal       Breath sounds: Normal breath sounds  No wheezing  Abdominal:      General: There is no distension  Palpations: Abdomen is soft  Tenderness: There is no guarding or rebound  Hernia: A hernia is present  Comments: Small  Mass / hernia  Above  The  umbilicus   Musculoskeletal:      Cervical back: Normal range of motion and neck supple  Right lower leg: No edema  Left lower leg: No edema  Skin:     Findings: No erythema or rash     Neurological:      General: No focal deficit present  Mental Status: She is alert and oriented to person, place, and time     Psychiatric:         Mood and Affect: Mood normal          Behavior: Behavior normal

## 2022-10-04 NOTE — LETTER
October 4, 2022     Patient: Makeda Delgado  YOB: 1965  Date of Visit: 10/4/2022      To Whom it May Concern:    Kate Chaney is under my professional care  Barrie Jessica was seen in my office on 10/4/2022  Barrie Jessica she  Can  Return  To  Work on Monday 10/10/22If you have any questions or concerns, please don't hesitate to call           Sincerely,          Basil Amador MD        CC: No Recipients

## 2022-10-11 PROBLEM — Z12.11 SCREEN FOR COLON CANCER: Status: RESOLVED | Noted: 2022-04-25 | Resolved: 2022-10-11

## 2022-10-13 ENCOUNTER — TELEPHONE (OUTPATIENT)
Dept: SURGERY | Facility: CLINIC | Age: 57
End: 2022-10-13

## 2022-10-13 ENCOUNTER — CONSULT (OUTPATIENT)
Dept: SURGERY | Facility: CLINIC | Age: 57
End: 2022-10-13
Payer: MEDICARE

## 2022-10-13 VITALS
BODY MASS INDEX: 31.85 KG/M2 | DIASTOLIC BLOOD PRESSURE: 68 MMHG | HEIGHT: 59 IN | RESPIRATION RATE: 18 BRPM | TEMPERATURE: 98.5 F | WEIGHT: 158 LBS | OXYGEN SATURATION: 98 % | SYSTOLIC BLOOD PRESSURE: 112 MMHG | HEART RATE: 75 BPM

## 2022-10-13 DIAGNOSIS — K43.9 SUPRAUMBILICAL HERNIA: ICD-10-CM

## 2022-10-13 PROCEDURE — 99214 OFFICE O/P EST MOD 30 MIN: CPT | Performed by: SURGERY

## 2022-10-13 RX ORDER — CEFAZOLIN SODIUM 2 G/50ML
2000 SOLUTION INTRAVENOUS ONCE
Status: CANCELLED | OUTPATIENT
Start: 2022-10-25 | End: 2022-10-13

## 2022-10-13 NOTE — PROGRESS NOTES
Assessment/Plan:  She has a tender periumbilical hernia  This will be repaired in a few week's time    No problem-specific Assessment & Plan notes found for this encounter  Diagnoses and all orders for this visit:    Supraumbilical hernia  -     Ambulatory Referral to General Surgery  -     CBC and Platelet; Future  -     Comprehensive metabolic panel; Future  -     EKG 12 lead; Future  -     Case request operating room: [facility]; Standing  -     Case request operating room: [place] [id number]    Other orders  -     Incentive spirometry; Standing  -     Insert and maintain IV line; Standing  -     Void On-Call to O R ; Standing  -     Place sequential compression device; Standing  -     ceFAZolin (ANCEF) 2,000 mg in dextrose 5 % 100 mL IVPB          Subjective:      Patient ID: Leanne Esparza is a 62 y o  female  The patient is a 49-year-old black female who complains bitterly of a supraumbilical ventral hernia  This actually was noted in an ultrasound which was done last February  The patient states that she has difficulty after she eats in that her stomach gets swollen and the hernia gets incarcerated  She is able to push it back in but states the real problem is this causing her to have emesis  Even though she has been complaining of this for 9 months, she has not lost weight at all  She has never had any abdominal surgery      The following portions of the patient's history were reviewed and updated as appropriate: allergies, current medications, past family history, past medical history, past social history, past surgical history and problem list     Review of Systems   Constitutional:        Had covid 5/22   worsened sarcoid, had a blood clot and was on blood thinners  Was intubated for covid   HENT: Negative      Eyes:        Glasses  glaucoma   Respiratory:        10 year at 1ppd, quit in 30's  Pulmonary sarcoid  Can walk a few miles   Cardiovascular:        She is on a statin   Gastrointestinal:        Hernia on US  She does claims everything but I notice she is on Protonix as well as iron supplementation   Endocrine: Negative  Genitourinary: Negative  Musculoskeletal: Negative  Skin: Negative  Neurological: Negative  Hematological: Negative  Psychiatric/Behavioral:        Bipolar         Objective:      /68 (BP Location: Left arm, Patient Position: Sitting, Cuff Size: Standard)   Pulse 75   Temp 98 5 °F (36 9 °C) (Tympanic)   Resp 18   Ht 4' 11" (1 499 m)   Wt 71 7 kg (158 lb)   SpO2 98%   BMI 31 91 kg/m²          Physical Exam  Vitals reviewed  Constitutional:       Appearance: Normal appearance  Comments: She is overweight to obese   HENT:      Head: Normocephalic and atraumatic  Eyes:      General: No scleral icterus  Conjunctiva/sclera: Conjunctivae normal    Cardiovascular:      Rate and Rhythm: Normal rate and regular rhythm  Heart sounds: Normal heart sounds  No murmur heard  Pulmonary:      Effort: No respiratory distress  Breath sounds: Normal breath sounds  No stridor  No wheezing, rhonchi or rales  Comments: She has no abnormal breath sounds but she definitely does not move enough air  Abdominal:      General: There is no distension  Tenderness: There is abdominal tenderness  Hernia: A hernia is present  Comments: She has a palpable supraumbilical hernia about 2 cm up from the umbilicus  It is tender  There is no other defects   Musculoskeletal:         General: Normal range of motion  Cervical back: Normal range of motion  Lymphadenopathy:      Cervical: No cervical adenopathy  Skin:     General: Skin is warm  Coloration: Skin is not jaundiced  Neurological:      Mental Status: She is alert and oriented to person, place, and time  Psychiatric:         Mood and Affect: Mood normal          Behavior: Behavior normal          Thought Content:  Thought content normal  Judgment: Judgment normal

## 2022-10-13 NOTE — PATIENT INSTRUCTIONS
If the patient has a supraumbilical hernia  Given her pulmonary status, the safest way to repair this would be to place an LMA and local anesthesia  This is what we will be done    She knows to go for testing

## 2022-10-13 NOTE — H&P (VIEW-ONLY)
Assessment/Plan:  She has a tender periumbilical hernia  This will be repaired in a few week's time    No problem-specific Assessment & Plan notes found for this encounter  Diagnoses and all orders for this visit:    Supraumbilical hernia  -     Ambulatory Referral to General Surgery  -     CBC and Platelet; Future  -     Comprehensive metabolic panel; Future  -     EKG 12 lead; Future  -     Case request operating room: [facility]; Standing  -     Case request operating room: [place] [id number]    Other orders  -     Incentive spirometry; Standing  -     Insert and maintain IV line; Standing  -     Void On-Call to O R ; Standing  -     Place sequential compression device; Standing  -     ceFAZolin (ANCEF) 2,000 mg in dextrose 5 % 100 mL IVPB          Subjective:      Patient ID: Keith Hernandez is a 62 y o  female  The patient is a 59-year-old black female who complains bitterly of a supraumbilical ventral hernia  This actually was noted in an ultrasound which was done last February  The patient states that she has difficulty after she eats in that her stomach gets swollen and the hernia gets incarcerated  She is able to push it back in but states the real problem is this causing her to have emesis  Even though she has been complaining of this for 9 months, she has not lost weight at all  She has never had any abdominal surgery      The following portions of the patient's history were reviewed and updated as appropriate: allergies, current medications, past family history, past medical history, past social history, past surgical history and problem list     Review of Systems   Constitutional:        Had covid 5/22   worsened sarcoid, had a blood clot and was on blood thinners  Was intubated for covid   HENT: Negative      Eyes:        Glasses  glaucoma   Respiratory:        10 year at 1ppd, quit in 30's  Pulmonary sarcoid  Can walk a few miles   Cardiovascular:        She is on a Problem: Nutrition, Enteral (Adult)  Goal: Signs and Symptoms of Listed Potential Problems Will be Absent, Minimized or Managed (Nutrition, Enteral)  Signs and symptoms of listed potential problems will be absent, minimized or managed by discharge/transition of care (reference Nutrition, Enteral (Adult) CPG).  Outcome: Ongoing (interventions implemented as appropriate)  Recommendations  Recommendation/Intervention: 1.) Discontinue PPN, and advance TF.  Continue with Diabetisource AC advancing to goal rate 65 mls/hr continuous providing 1872 kcals/day, 94 g protein/day, 156 g CHO/day, and 1276 mls water/day. Hold TF x4 hrs for residuals >250 mls. Flush 100 mls free water Q4 hrs or per MD.      Goals: 1.) maintained meal intake >50% 2.) patient will meet >80% of EEN while admitted. 3.) patient will tolerate Tf at goal rate   Nutrition Goal Status: 1.) goal discontinued 2.) new 3.) new   Communication of RD Recs: (sticky note)               statin   Gastrointestinal:        Hernia on US  She does claims everything but I notice she is on Protonix as well as iron supplementation   Endocrine: Negative  Genitourinary: Negative  Musculoskeletal: Negative  Skin: Negative  Neurological: Negative  Hematological: Negative  Psychiatric/Behavioral:        Bipolar         Objective:      /68 (BP Location: Left arm, Patient Position: Sitting, Cuff Size: Standard)   Pulse 75   Temp 98 5 °F (36 9 °C) (Tympanic)   Resp 18   Ht 4' 11" (1 499 m)   Wt 71 7 kg (158 lb)   SpO2 98%   BMI 31 91 kg/m²          Physical Exam  Vitals reviewed  Constitutional:       Appearance: Normal appearance  Comments: She is overweight to obese   HENT:      Head: Normocephalic and atraumatic  Eyes:      General: No scleral icterus  Conjunctiva/sclera: Conjunctivae normal    Cardiovascular:      Rate and Rhythm: Normal rate and regular rhythm  Heart sounds: Normal heart sounds  No murmur heard  Pulmonary:      Effort: No respiratory distress  Breath sounds: Normal breath sounds  No stridor  No wheezing, rhonchi or rales  Comments: She has no abnormal breath sounds but she definitely does not move enough air  Abdominal:      General: There is no distension  Tenderness: There is abdominal tenderness  Hernia: A hernia is present  Comments: She has a palpable supraumbilical hernia about 2 cm up from the umbilicus  It is tender  There is no other defects   Musculoskeletal:         General: Normal range of motion  Cervical back: Normal range of motion  Lymphadenopathy:      Cervical: No cervical adenopathy  Skin:     General: Skin is warm  Coloration: Skin is not jaundiced  Neurological:      Mental Status: She is alert and oriented to person, place, and time  Psychiatric:         Mood and Affect: Mood normal          Behavior: Behavior normal          Thought Content:  Thought content normal  Judgment: Judgment normal

## 2022-10-20 ENCOUNTER — TELEPHONE (OUTPATIENT)
Dept: SURGERY | Facility: CLINIC | Age: 57
End: 2022-10-20

## 2022-10-20 NOTE — TELEPHONE ENCOUNTER
I called Nel Fonseca to touch base about her upcoming surgery and to see if she had any questions  We went over the showering instructions since she wasn't sure how to use the Hibiclens  She said she has not heard from a PAT nurse as of yet  She has questions about anesthesia and is afraid she isn't going to wake up  This is her first surgery  Also worried about this being a major surgery and what expectations should she have and if there a chance of bleeding out  She is very anxious about this

## 2022-10-21 ENCOUNTER — TELEPHONE (OUTPATIENT)
Dept: ADMINISTRATIVE | Facility: HOSPITAL | Age: 57
End: 2022-10-21

## 2022-10-21 NOTE — TELEPHONE ENCOUNTER
Anisa Kuhn from [facility] called in stating that the med listed below is on a back order and unsure of when that will come in   Anisa Kuhn stated that they do have individual bottles with saline solution in them but if you do feel comfortable with this she would need a new script called in       albuterol (5 mg/mL) 0 5 % nebulizer solution

## 2022-10-24 ENCOUNTER — APPOINTMENT (OUTPATIENT)
Dept: LAB | Facility: HOSPITAL | Age: 57
End: 2022-10-24
Attending: SURGERY

## 2022-10-24 ENCOUNTER — LAB (OUTPATIENT)
Dept: LAB | Facility: HOSPITAL | Age: 57
End: 2022-10-24
Attending: SURGERY
Payer: MEDICARE

## 2022-10-24 DIAGNOSIS — K43.9 SUPRAUMBILICAL HERNIA: ICD-10-CM

## 2022-10-24 LAB
ALBUMIN SERPL BCP-MCNC: 4.2 G/DL (ref 3.5–5)
ALP SERPL-CCNC: 85 U/L (ref 34–104)
ALT SERPL W P-5'-P-CCNC: 26 U/L (ref 7–52)
ANION GAP SERPL CALCULATED.3IONS-SCNC: 6 MMOL/L (ref 4–13)
AST SERPL W P-5'-P-CCNC: 28 U/L (ref 13–39)
BACTERIA UR QL AUTO: ABNORMAL /HPF
BILIRUB SERPL-MCNC: 0.31 MG/DL (ref 0.2–1)
BILIRUB UR QL STRIP: NEGATIVE
BUN SERPL-MCNC: 10 MG/DL (ref 5–25)
CALCIUM SERPL-MCNC: 9.8 MG/DL (ref 8.4–10.2)
CHLORIDE SERPL-SCNC: 103 MMOL/L (ref 96–108)
CLARITY UR: CLEAR
CO2 SERPL-SCNC: 29 MMOL/L (ref 21–32)
COLOR UR: YELLOW
CREAT SERPL-MCNC: 0.75 MG/DL (ref 0.6–1.3)
ERYTHROCYTE [DISTWIDTH] IN BLOOD BY AUTOMATED COUNT: 13.8 % (ref 11.6–15.1)
GFR SERPL CREATININE-BSD FRML MDRD: 88 ML/MIN/1.73SQ M
GLUCOSE P FAST SERPL-MCNC: 90 MG/DL (ref 65–99)
GLUCOSE UR STRIP-MCNC: NEGATIVE MG/DL
HCT VFR BLD AUTO: 37 % (ref 34.8–46.1)
HGB BLD-MCNC: 11.6 G/DL (ref 11.5–15.4)
HGB UR QL STRIP.AUTO: NEGATIVE
KETONES UR STRIP-MCNC: NEGATIVE MG/DL
LEUKOCYTE ESTERASE UR QL STRIP: ABNORMAL
MCH RBC QN AUTO: 29.8 PG (ref 26.8–34.3)
MCHC RBC AUTO-ENTMCNC: 31.4 G/DL (ref 31.4–37.4)
MCV RBC AUTO: 95 FL (ref 82–98)
MUCOUS THREADS UR QL AUTO: ABNORMAL
NITRITE UR QL STRIP: NEGATIVE
NON-SQ EPI CELLS URNS QL MICRO: ABNORMAL /HPF
PH UR STRIP.AUTO: 6 [PH]
PLATELET # BLD AUTO: 280 THOUSANDS/UL (ref 149–390)
PMV BLD AUTO: 10.9 FL (ref 8.9–12.7)
POTASSIUM SERPL-SCNC: 4.3 MMOL/L (ref 3.5–5.3)
PROT SERPL-MCNC: 7.2 G/DL (ref 6.4–8.4)
PROT UR STRIP-MCNC: NEGATIVE MG/DL
RBC # BLD AUTO: 3.89 MILLION/UL (ref 3.81–5.12)
RBC #/AREA URNS AUTO: ABNORMAL /HPF
SODIUM SERPL-SCNC: 138 MMOL/L (ref 135–147)
SP GR UR STRIP.AUTO: 1.01 (ref 1–1.03)
UROBILINOGEN UR QL STRIP.AUTO: 0.2 E.U./DL
WBC # BLD AUTO: 6.04 THOUSAND/UL (ref 4.31–10.16)
WBC #/AREA URNS AUTO: ABNORMAL /HPF

## 2022-10-24 PROCEDURE — 85027 COMPLETE CBC AUTOMATED: CPT

## 2022-10-24 PROCEDURE — 80053 COMPREHEN METABOLIC PANEL: CPT

## 2022-10-24 PROCEDURE — 36415 COLL VENOUS BLD VENIPUNCTURE: CPT

## 2022-10-24 PROCEDURE — 93005 ELECTROCARDIOGRAM TRACING: CPT

## 2022-10-24 NOTE — PRE-PROCEDURE INSTRUCTIONS
Pre-Surgery Instructions:   Medication Instructions   • albuterol (5 mg/mL) 0 5 % nebulizer solution PRN   • albuterol (ProAir HFA) 90 mcg/act inhaler Use as directed   • budesonide-formoterol (Symbicort) 160-4 5 mcg/act inhaler Use as directed      My Surgical Experience    The following information was developed to assist you to prepare for your operation  What do I need to do before coming to the hospital?  • Arrange for a responsible person to drive you to and from the hospital   • Arrange care for your children at home  Children are not allowed in the recovery areas of the hospital  • Plan to wear clothing that is easy to put on and take off  If you are having shoulder surgery, wear a shirt that buttons or zippers in the front  Bathing  o Shower the evening before and the morning of your surgery with an antibacterial soap  Please refer to the Pre Op Showering Instructions for Surgery Patients Sheet   o Remove nail polish and all body piercing jewelry  o Do not shave any body part for at least 24 hours before surgery-this includes face, arms, legs and upper body  Food  o Nothing to eat or drink after midnight the night before your surgery  This includes candy and chewing gum  o Exception: If your surgery is after 12:00pm (noon), you may have clear liquids such as 7-Up®, ginger ale, apple or cranberry juice, Jell-O®, water, or clear broth until 8:00 am  o Do not drink milk or juice with pulp on the morning before surgery  o Do not drink alcohol 24 hours before surgery  Medicine  o Follow instructions you received from your surgeon about which medicines you may take on the day of surgery  o If instructed to take medicine on the morning of surgery, take pills with just a small sip of water   Call your prescribing doctor for specific infroamtion on what to do if you take insulin    What should I bring to the hospital?    Bring:  • Crutches or a walker, if you have them, for foot or knee surgery  • A list of the daily medicines, vitamins, minerals, herbals and nutritional supplements you take  Include the dosages of medicines and the time you take them each day  • Glasses, dentures or hearing aids  • Minimal clothing; you will be wearing hospital sleepwear  • Photo ID; required to verify your identity  • If you have a Living Will or Power of , bring a copy of the documents  • If you have an ostomy, bring an extra pouch and any supplies you use    Do not bring  • Medicines or inhalers  • Money, valuables or jewelry    What other information should I know about the day of surgery? • Notify your surgeons if you develop a cold, sore throat, cough, fever, rash or any other illness  • Report to the Ambulatory Surgical/Same Day Surgery Unit  • You will be instructed to stop at Registration only if you have not been pre-registered  • Inform your  fi they do not stay that they will be asked by the staff to leave a phone number where they can be reached  • Be available to be reached before surgery  In the event the operating room schedule changes, you may be asked to come in earlier or later than expected    *It is important to tell your doctor and others involved in your health care if you are taking or have been taking any non-prescription drugs, vitamins, minerals, herbals or other nutritional supplements   Any of these may interact with some food or medicines and cause a reaction

## 2022-10-25 ENCOUNTER — HOSPITAL ENCOUNTER (OUTPATIENT)
Facility: HOSPITAL | Age: 57
Setting detail: OUTPATIENT SURGERY
Discharge: HOME | End: 2022-10-25
Attending: SURGERY | Admitting: SURGERY

## 2022-10-25 ENCOUNTER — ANESTHESIA EVENT (OUTPATIENT)
Dept: PERIOP | Facility: HOSPITAL | Age: 57
End: 2022-10-25
Payer: MEDICARE

## 2022-10-25 ENCOUNTER — ANESTHESIA (OUTPATIENT)
Dept: PERIOP | Facility: HOSPITAL | Age: 57
End: 2022-10-25
Payer: MEDICARE

## 2022-10-25 VITALS
BODY MASS INDEX: 32.62 KG/M2 | DIASTOLIC BLOOD PRESSURE: 82 MMHG | SYSTOLIC BLOOD PRESSURE: 132 MMHG | TEMPERATURE: 97.2 F | HEART RATE: 75 BPM | WEIGHT: 161.8 LBS | HEIGHT: 59 IN | RESPIRATION RATE: 12 BRPM | OXYGEN SATURATION: 94 %

## 2022-10-25 DIAGNOSIS — G89.18 POSTOPERATIVE PAIN: ICD-10-CM

## 2022-10-25 DIAGNOSIS — K43.9 SUPRAUMBILICAL HERNIA: Primary | ICD-10-CM

## 2022-10-25 DEVICE — POLYPROPYLENE NONABSORBABLE SYNTHETIC SURGICAL MESH
Type: IMPLANTABLE DEVICE | Site: ABDOMEN | Status: FUNCTIONAL
Brand: PROLENE

## 2022-10-25 RX ORDER — LIDOCAINE HYDROCHLORIDE 20 MG/ML
INJECTION, SOLUTION EPIDURAL; INFILTRATION; INTRACAUDAL; PERINEURAL AS NEEDED
Status: DISCONTINUED | OUTPATIENT
Start: 2022-10-25 | End: 2022-10-25

## 2022-10-25 RX ORDER — SODIUM CHLORIDE, SODIUM LACTATE, POTASSIUM CHLORIDE, CALCIUM CHLORIDE 600; 310; 30; 20 MG/100ML; MG/100ML; MG/100ML; MG/100ML
INJECTION, SOLUTION INTRAVENOUS CONTINUOUS PRN
Status: DISCONTINUED | OUTPATIENT
Start: 2022-10-25 | End: 2022-10-25

## 2022-10-25 RX ORDER — SODIUM CHLORIDE, SODIUM LACTATE, POTASSIUM CHLORIDE, CALCIUM CHLORIDE 600; 310; 30; 20 MG/100ML; MG/100ML; MG/100ML; MG/100ML
125 INJECTION, SOLUTION INTRAVENOUS CONTINUOUS
Status: DISCONTINUED | OUTPATIENT
Start: 2022-10-25 | End: 2022-10-28 | Stop reason: HOSPADM

## 2022-10-25 RX ORDER — FENTANYL CITRATE/PF 50 MCG/ML
50 SYRINGE (ML) INJECTION
Status: DISCONTINUED | OUTPATIENT
Start: 2022-10-25 | End: 2022-10-25 | Stop reason: HOSPADM

## 2022-10-25 RX ORDER — ONDANSETRON 2 MG/ML
INJECTION INTRAMUSCULAR; INTRAVENOUS AS NEEDED
Status: DISCONTINUED | OUTPATIENT
Start: 2022-10-25 | End: 2022-10-25

## 2022-10-25 RX ORDER — CEFAZOLIN SODIUM 2 G/50ML
2000 SOLUTION INTRAVENOUS ONCE
Status: COMPLETED | OUTPATIENT
Start: 2022-10-25 | End: 2022-10-25

## 2022-10-25 RX ORDER — OXYCODONE HYDROCHLORIDE 5 MG/1
5 TABLET ORAL EVERY 6 HOURS PRN
Qty: 8 TABLET | Refills: 0 | Status: SHIPPED | OUTPATIENT
Start: 2022-10-25 | End: 2022-11-01

## 2022-10-25 RX ORDER — ALBUTEROL SULFATE 2.5 MG/3ML
2.5 SOLUTION RESPIRATORY (INHALATION) ONCE
Status: COMPLETED | OUTPATIENT
Start: 2022-10-25 | End: 2022-10-25

## 2022-10-25 RX ORDER — ROCURONIUM BROMIDE 10 MG/ML
INJECTION, SOLUTION INTRAVENOUS AS NEEDED
Status: DISCONTINUED | OUTPATIENT
Start: 2022-10-25 | End: 2022-10-25

## 2022-10-25 RX ORDER — BUPIVACAINE HYDROCHLORIDE AND EPINEPHRINE 5; 5 MG/ML; UG/ML
INJECTION, SOLUTION PERINEURAL AS NEEDED
Status: DISCONTINUED | OUTPATIENT
Start: 2022-10-25 | End: 2022-10-25 | Stop reason: HOSPADM

## 2022-10-25 RX ORDER — DEXAMETHASONE SODIUM PHOSPHATE 10 MG/ML
INJECTION, SOLUTION INTRAMUSCULAR; INTRAVENOUS AS NEEDED
Status: DISCONTINUED | OUTPATIENT
Start: 2022-10-25 | End: 2022-10-25

## 2022-10-25 RX ORDER — FENTANYL CITRATE 50 UG/ML
INJECTION, SOLUTION INTRAMUSCULAR; INTRAVENOUS AS NEEDED
Status: DISCONTINUED | OUTPATIENT
Start: 2022-10-25 | End: 2022-10-25

## 2022-10-25 RX ORDER — ONDANSETRON 2 MG/ML
4 INJECTION INTRAMUSCULAR; INTRAVENOUS ONCE AS NEEDED
Status: DISCONTINUED | OUTPATIENT
Start: 2022-10-25 | End: 2022-10-25 | Stop reason: HOSPADM

## 2022-10-25 RX ORDER — MIDAZOLAM HYDROCHLORIDE 2 MG/2ML
INJECTION, SOLUTION INTRAMUSCULAR; INTRAVENOUS AS NEEDED
Status: DISCONTINUED | OUTPATIENT
Start: 2022-10-25 | End: 2022-10-25

## 2022-10-25 RX ORDER — PROPOFOL 10 MG/ML
INJECTION, EMULSION INTRAVENOUS AS NEEDED
Status: DISCONTINUED | OUTPATIENT
Start: 2022-10-25 | End: 2022-10-25

## 2022-10-25 RX ADMIN — PROPOFOL 200 MG: 10 INJECTION, EMULSION INTRAVENOUS at 09:52

## 2022-10-25 RX ADMIN — SODIUM CHLORIDE, SODIUM LACTATE, POTASSIUM CHLORIDE, AND CALCIUM CHLORIDE: .6; .31; .03; .02 INJECTION, SOLUTION INTRAVENOUS at 09:00

## 2022-10-25 RX ADMIN — DEXAMETHASONE SODIUM PHOSPHATE 8 MG: 10 INJECTION, SOLUTION INTRAMUSCULAR; INTRAVENOUS at 10:30

## 2022-10-25 RX ADMIN — ONDANSETRON 4 MG: 2 INJECTION INTRAMUSCULAR; INTRAVENOUS at 10:30

## 2022-10-25 RX ADMIN — FENTANYL CITRATE 50 MCG: 50 INJECTION, SOLUTION INTRAMUSCULAR; INTRAVENOUS at 09:52

## 2022-10-25 RX ADMIN — LIDOCAINE HYDROCHLORIDE 100 MG: 20 INJECTION, SOLUTION EPIDURAL; INFILTRATION; INTRACAUDAL at 09:52

## 2022-10-25 RX ADMIN — SUGAMMADEX 200 MG: 100 INJECTION, SOLUTION INTRAVENOUS at 10:34

## 2022-10-25 RX ADMIN — FENTANYL CITRATE 50 MCG: 50 INJECTION, SOLUTION INTRAMUSCULAR; INTRAVENOUS at 10:12

## 2022-10-25 RX ADMIN — MIDAZOLAM HYDROCHLORIDE 2 MG: 1 INJECTION, SOLUTION INTRAMUSCULAR; INTRAVENOUS at 09:44

## 2022-10-25 RX ADMIN — CEFAZOLIN SODIUM 2000 MG: 2 SOLUTION INTRAVENOUS at 09:55

## 2022-10-25 RX ADMIN — ALBUTEROL SULFATE 2.5 MG: 2.5 SOLUTION RESPIRATORY (INHALATION) at 10:55

## 2022-10-25 RX ADMIN — ROCURONIUM BROMIDE 50 MG: 10 INJECTION, SOLUTION INTRAVENOUS at 09:53

## 2022-10-25 NOTE — ANESTHESIA POSTPROCEDURE EVALUATION
Post-Op Assessment Note    CV Status:  Stable    Pain management: adequate     Mental Status:  Alert and awake   Hydration Status:  Euvolemic   PONV Controlled:  Controlled   Airway Patency:  Patent      Post Op Vitals Reviewed: Yes      Staff: CRNA, Anesthesiologist         No complications documented      BP      Temp 97 6 °F (36 4 °C) (10/25/22 1046)    Pulse 94 (10/25/22 1046)   Resp 18 (10/25/22 1046)    SpO2 95 % (10/25/22 1046)

## 2022-10-25 NOTE — DISCHARGE INSTRUCTIONS
Postoperative Care Instructions      1  General: You may feel pulling sensations around the wound or funny aches and pains around the incisions  This is normal  Even minor surgery is a change in your body and this is your body's reaction to it  If you have had abdominal surgery, it may help to support the incision with a small pillow or blanket for comfort when moving or coughing  2  Wound care: The glue over the incisions will fall off over the next week or two      3  Showering: You may shower again starting 10/26  Please do not soak wound in standing water such as a bath, hot tub, pool, lake, etc  Do not scrub or use exfoliants on the surgical wounds  4  Activity: You may go up and down stairs, walk as much as you are comfortable, but walk at least 3 times each day  If you have had abdominal surgery, do not perform any strenuous exercise or lift anything heavier than 15-20 pounds for at least 4 weeks, unless cleared by your physician  5  Diet: You may resume your regular diet  Please drink lots of water  6  Medications: Resume all of your previous medications, unless told otherwise by the doctor  A good option for pain control is to start with acetaminophen(Tylenol) 650mg and ibuprofen(Advil) 400mg and alternate taking them every 3 hours  If this is not sufficient then you make take the narcotic pain medicine as prescribed  You do not need to take the narcotic pain medication unless you are having significant pain and discomfort  Please take the narcotic medication with food  Insure that you do not take more than 4000 mg of Tylenol per day  7  Driving: You will need someone to drive you home on the day of surgery  Do not drive or make any important decisions while on narcotic pain medication  Generally, you may drive 48 hours after you've stopped taking all narcotic pain medications  8  Upset Stomach: You may take Maalox, Tums, or similar items for an upset stomach   If your narcotic pain medication causes an upset stomach, do not take it on an empty stomach  Try taking it with at least some crackers or toast      9  Constipation: Patients often experience constipation after surgery  We recommend starting an over-the-counter medication for this, such as Metamucil, Senokot, Colace, milk of magnesia, etc  You may stop taking these medications a couple days after your last dose of narcotic medication  If you experience significant nausea or vomiting after abdominal surgery, call the office before trying any of these medications  10  Call the office: If you are experiencing any of the following: fevers above 101 5°, significant nausea or vomiting, if the wound develops drainage and/or excessive redness around the wound, or if you have significant diarrhea or other worsening symptoms      11  Pain: A prescription for narcotic pain medication will be sent to your pharmacy upon discharge from the hospital

## 2022-10-25 NOTE — OP NOTE
OPERATIVE REPORT  PATIENT NAME: Maria Guadalupe Moyer    :  1965  MRN: [id number]  Pt Location: [facility] OR ROOM 02    SURGERY DATE: 10/25/2022    Surgeon(s) and Role:     * Milind Chacon MD - Primary     * Dee Hunter PA-C - Assisting    Preop Diagnosis:  Supraumbilical hernia [W38 2]    Post-Op Diagnosis Codes:     * Supraumbilical hernia [H26 6]    Procedure(s) (LRB):  Incarcerated VENTRAL HERNIA REPAIR with mesh (N/A)    Specimen(s):  * No specimens in log *    Estimated Blood Loss:   Minimal    Drains:  * No LDAs found *    Anesthesia Type:   General    Operative Indications:  Supraumbilical hernia [L82 6]      Operative Findings:  Same, incarcerated    Complications:   None    Procedure and Technique:  The patient was identified by me and placed in the supine position upon the operating room table  General endotracheal anesthesia was induced and her abdomen was prepped and draped in a normal surgical manner  Time-out was now done  I now marked the incision and did a field block of local as well as overlying the incision itself  The incision was to be a small midline incision just above the umbilicus  I could feel the hernia underneath my marks  Incision was made with knife and continued with Bovie  I now was able to put in self-retaining retractors and free the hernia sharply from its fascial edges  I was eventually able to get the hernia to decompress underneath the fascia  This hernia was simply preperitoneal fat  The fascia was freed for over cm circumferentially and I cut an [employer] hernia systems mesh to size  This was placed such that the underlie portion was underneath the fascia and the overlie portion was above the fascia  This is now sewn in with 5 sutures of Prolene  Wounds irrigated and closed  Closure was interrupted Vicryl followed by running Monocryl followed by Exofin  There was no qualified resident to assist   Accordingly, Dee mena  Was the 1st assistant    She was essential for help in the dissection and the closure   I was present for the entire procedure    Patient Disposition:  PACU         SIGNATURE: Jazmine Melendez MD  DATE: October 25, 2022  TIME: 10:43 AM

## 2022-10-25 NOTE — ANESTHESIA POSTPROCEDURE EVALUATION
Patient with intense coughing after procedure which is not unexpected given history of sarcoidosis, ILD and post COVID lung damage  Patient was given a nebulizer treatment to assist with coughing and bringing up secretions and placed on humidified hair in Phase II    Symptoms mostly resolved at discharge

## 2022-10-26 LAB
ATRIAL RATE: 67 BPM
P AXIS: 55 DEGREES
PR INTERVAL: 168 MS
QRS AXIS: 25 DEGREES
QRSD INTERVAL: 76 MS
QT INTERVAL: 408 MS
QTC INTERVAL: 431 MS
T WAVE AXIS: 35 DEGREES
VENTRICULAR RATE: 67 BPM

## 2022-10-26 PROCEDURE — 93010 ELECTROCARDIOGRAM REPORT: CPT | Performed by: INTERNAL MEDICINE

## 2022-10-28 ENCOUNTER — TELEPHONE (OUTPATIENT)
Dept: FAMILY MEDICINE CLINIC | Facility: CLINIC | Age: 57
End: 2022-10-28

## 2022-10-28 ENCOUNTER — TELEPHONE (OUTPATIENT)
Dept: PULMONOLOGY | Facility: CLINIC | Age: 57
End: 2022-10-28

## 2022-10-28 NOTE — TELEPHONE ENCOUNTER
10/28/2022 - Note to chart:   Pt called and said she had just had surgery a few days ago (Dr Susi Morales), and wanted to know if she was ok to have the scheduled CT of the chest done today  KL checked with Anirudh Torres in the CT scan dept, and she said the CT scan was w/o contrast, so as long as the patient was comfortable with it, she could have it done  Relayed this information to the pt and she said she will have the CT scan done

## 2022-10-28 NOTE — TELEPHONE ENCOUNTER
Received a medication refill fax authorization [address] [facility] in  [facility]) and stated medication back ordered for "Albuterol sulfate (5mg)"

## 2022-10-31 ENCOUNTER — TELEMEDICINE (OUTPATIENT)
Dept: FAMILY MEDICINE CLINIC | Facility: CLINIC | Age: 57
End: 2022-10-31

## 2022-10-31 DIAGNOSIS — K43.9 SUPRAUMBILICAL HERNIA: Primary | ICD-10-CM

## 2022-10-31 DIAGNOSIS — J84.9 ILD (INTERSTITIAL LUNG DISEASE) (HCC): ICD-10-CM

## 2022-10-31 DIAGNOSIS — E78.00 HYPERCHOLESTEROLEMIA: ICD-10-CM

## 2022-10-31 DIAGNOSIS — D50.8 OTHER IRON DEFICIENCY ANEMIA: ICD-10-CM

## 2022-10-31 DIAGNOSIS — R53.83 OTHER FATIGUE: ICD-10-CM

## 2022-10-31 DIAGNOSIS — B33.24 VIRAL CARDIOMYOPATHY (HCC): ICD-10-CM

## 2022-10-31 NOTE — ASSESSMENT & PLAN NOTE
No  Symptoms  Or  Complaints  Anymore  She  States  That  She  Was  Evaluated  For  That  And  Was  Told  She is  Doing  Well  No  Need  To  Follow  Up   Return  Parameters  Discussed

## 2022-10-31 NOTE — ASSESSMENT & PLAN NOTE
Including  sarcoidosis    Is  Been  In  Care  Of  pulmonologist  Follows  Up  With  Them  Regularly  No  Sob  Cp  Or  Any  Acute  Symptoms

## 2022-10-31 NOTE — ASSESSMENT & PLAN NOTE
Her  Last  Labs    For  Lipid  Profile  Was  Done   In  2021   Will repeat  The  Labs  Then  Her  Cholesterol  Was  High      Discussed  Diet  Exercise  And  Life  Style  modifications

## 2022-10-31 NOTE — PROGRESS NOTES
Virtual Brief Visit    Patient is located in the following state in which I hold an active license [place]      Assessment/Plan:as  below    Problem List Items Addressed This Visit        Respiratory    ILD (interstitial lung disease) ([facility] [address] )     Including  sarcoidosis    Is  Been  In  Care  Of  pulmonologist  Follows  Up  With  Them  Regularly  No  Sob  Cp  Or  Any  Acute  Symptoms             Cardiovascular and Mediastinum    Viral cardiomyopathy ([facility] [address] )     No  Symptoms  Or  Complaints  Anymore  She  States  That  She  Was  Evaluated  For  That  And  Was  Told  She is  Doing  Well  No  Need  To  Follow  Up   Return  Parameters  Discussed             Other    Hypercholesterolemia     Her  Last  Labs    For  Lipid  Profile  Was  Done   In  2021   Will repeat  The  Labs  Then  Her  Cholesterol  Was  High      Discussed  Diet  Exercise  And  Life  Style  modifications         Relevant Orders    Lipid panel    Iron deficiency anemia     Labs  Reviewed   Hb 11 6  counseled  To  Take   Oral  Iron  Which  She  Had  Stopped  She  Says          Supraumbilical hernia - Primary     S/p surgery for  This  Hernia     Doing  Well    Is  In  Care  Of  Surgeon   Will  F/u  With  Them  As  Directed   No  Complaints   Currently  No    Fever  Pain  Or  Any  Discomfort return  Parameters  Discussed  Her  Labs  Reviewed   Normal  Cbc   cmp            Other Visit Diagnoses     Other fatigue        Relevant Orders    TSH, 3rd generation          Recent Visits  Date Type Provider Dept   10/28/22 Telephone [facility] recent visits within past 7 days and meeting all other requirements  Today's Visits  Date Type Provider Dept   10/31/22 Telemedicine [facility]atinder Grant MD [facility]   Showing today's visits and meeting all other requirements  Future Appointments  No visits were found meeting these conditions    Showing future appointments within next 150 days and meeting all other requirements Visit Time    Visit Start Time: 5 30  Visit Stop Time: 6 pm  Total Visit Duration: NA minutes

## 2022-10-31 NOTE — ASSESSMENT & PLAN NOTE
S/p surgery for  This  Hernia     Doing  Well    Is  In  Care  Of  Surgeon   Will  F/u  With  Them  As  Directed   No  Complaints   Currently  No    Fever  Pain  Or  Any  Discomfort return  Parameters  Discussed  Her  Labs  Reviewed   Normal  Cbc   cmp

## 2022-10-31 NOTE — TELEPHONE ENCOUNTER
Patient had an appt  With you on 10/4 and said that is when she switched providers as she was a [facility] patient  She also has a virtual with you this evening

## 2022-11-10 ENCOUNTER — OFFICE VISIT (OUTPATIENT)
Dept: SURGERY | Facility: CLINIC | Age: 57
End: 2022-11-10

## 2022-11-10 ENCOUNTER — TELEPHONE (OUTPATIENT)
Dept: SURGERY | Facility: CLINIC | Age: 57
End: 2022-11-10

## 2022-11-10 VITALS
RESPIRATION RATE: 18 BRPM | OXYGEN SATURATION: 98 % | HEIGHT: 59 IN | WEIGHT: 162 LBS | SYSTOLIC BLOOD PRESSURE: 128 MMHG | BODY MASS INDEX: 32.66 KG/M2 | TEMPERATURE: 98.1 F | DIASTOLIC BLOOD PRESSURE: 82 MMHG | HEART RATE: 78 BPM

## 2022-11-10 DIAGNOSIS — K43.9 SUPRAUMBILICAL HERNIA: Primary | ICD-10-CM

## 2022-11-10 NOTE — LETTER
November 14, 2022     Patient: Leanne Esparza  YOB: 1965  Date of Visit: 11/10/2022      To Whom it May Concern:    Yamini Rawls is under my professional care  Simeon Hernandez was seen in my office on 11/10/2022  Simeon Hernandez may return to work on 11/14/2022  Without restrictions       If you have any questions or concerns, please don't hesitate to call           Sincerely,          Jenna Hollingsworth MD        CC: No Recipients

## 2022-11-10 NOTE — PROGRESS NOTES
Postoperative visit  The patient had a little bit of discomfort the other day at work and saw her manager and was here and get another clearance  The reason for the clearances the patient stated she still had stitches present  On examination there are no stitches present  What she has as a subcuticular closure which is intact  The hernia repairs packed and there is no infection, hematoma, seroma or reherniation    She can go to work

## 2022-11-10 NOTE — TELEPHONE ENCOUNTER
Patient called and LM about needing a note for work  Called patient back on 11/10 she stated she already came to the office and spoke to Seema hahn

## 2022-11-17 PROBLEM — Z86.2 HISTORY OF SARCOIDOSIS: Status: ACTIVE | Noted: 2020-04-24

## 2022-11-17 PROBLEM — Z86.711 HISTORY OF PULMONARY EMBOLISM: Status: ACTIVE | Noted: 2021-05-15

## 2022-12-07 ENCOUNTER — OFFICE VISIT (OUTPATIENT)
Dept: PULMONOLOGY | Facility: CLINIC | Age: 57
End: 2022-12-07

## 2022-12-07 VITALS
SYSTOLIC BLOOD PRESSURE: 118 MMHG | BODY MASS INDEX: 32.13 KG/M2 | WEIGHT: 159.38 LBS | DIASTOLIC BLOOD PRESSURE: 78 MMHG | HEIGHT: 59 IN | TEMPERATURE: 97.2 F | HEART RATE: 88 BPM | OXYGEN SATURATION: 97 %

## 2022-12-07 DIAGNOSIS — Z86.2 HISTORY OF SARCOIDOSIS: ICD-10-CM

## 2022-12-07 DIAGNOSIS — Z86.711 HISTORY OF PULMONARY EMBOLISM: ICD-10-CM

## 2022-12-07 DIAGNOSIS — J45.40 MODERATE PERSISTENT ASTHMA WITHOUT COMPLICATION: ICD-10-CM

## 2022-12-07 DIAGNOSIS — J84.9 ILD (INTERSTITIAL LUNG DISEASE) (HCC): Primary | ICD-10-CM

## 2022-12-07 RX ORDER — LATANOPROST 50 UG/ML
SOLUTION/ DROPS OPHTHALMIC
COMMUNITY
Start: 2022-11-04

## 2022-12-07 NOTE — ASSESSMENT & PLAN NOTE
She had acute pulmonary embolism with right heart strain  She was on systemic anticoagulation with Eliquis before  This was in the context of COVID infection  Currently she remains off anticoagulation  He is not on any oxygen

## 2022-12-07 NOTE — ASSESSMENT & PLAN NOTE
She has a history of allergies  Her shortness of breath is better and wheezing is no more present  However she has chronic cough with clear phlegm  Chest auscultation did not reveal any rhonchi  She has been on treatment with Symbicort regularly and albuterol as needed  I have advised her to continue with these inhalations for now

## 2022-12-07 NOTE — ASSESSMENT & PLAN NOTE
No history of shortness of breath or joint pain or rashes  She had mediastinal and hilar lymphadenopathy which had resolved on the previous CT scan

## 2022-12-07 NOTE — PROGRESS NOTES
Assessment/Plan:    ILD (interstitial lung disease) ([facility] )  Trever Mao had COVID before and her last CT scan showed bilateral groundglass opacities, scarring and bibasilar cystic changes  Her previous consolidative changes are not gotten better and on the last CT scan from September 2021  I had ordered a repeat CT scan which was not done  Currently she does not have any significant shortness of breath but has cough  No wheeze  Clinical examination her chest auscultation revealed occasional crackles at both lung bases  No rhonchi  I have ordered a repeat PFT and a CT scan  Her previous PFT showed moderate restriction with diffusion defect  I had a long discussion with her and answered all her questions  Moderate persistent asthma without complication  She has a history of allergies  Her shortness of breath is better and wheezing is no more present  However she has chronic cough with clear phlegm  Chest auscultation did not reveal any rhonchi  She has been on treatment with Symbicort regularly and albuterol as needed  I have advised her to continue with these inhalations for now  History of pulmonary embolism  She had acute pulmonary embolism with right heart strain  She was on systemic anticoagulation with Eliquis before  This was in the context of COVID infection  Currently she remains off anticoagulation  He is not on any oxygen  History of sarcoidosis  No history of shortness of breath or joint pain or rashes  She had mediastinal and hilar lymphadenopathy which had resolved on the previous CT scan  Diagnoses and all orders for this visit:    ILD (interstitial lung disease) ([facility] )  -     CT chest high resolution; Future  -     Complete PFT with post bronchodilator; Future    Moderate persistent asthma without complication  -     Complete PFT with post bronchodilator; Future    History of sarcoidosis  -     Complete PFT with post bronchodilator;  Future    History of pulmonary embolism    Other orders  -     latanoprost (XALATAN) 0 005 % ophthalmic solution; instill 1 drop into both eyes at bedtime          Subjective:      Patient ID: Gold Henriquez is a 62 y o  female  Madelin Stevenson came for follow-up for her post-COVID pulmonary fibrosis and asthma  She has been on treatment with Symbicort regularly and albuterol as needed  Currently she does not have any significant shortness of breath or wheeze  She however continues to have cough with clear phlegm mostly in the morning  She has no hoarseness of voice or significant dysphagia  No fever or chills  She had  hiatal hernia and underwent surgery per Dr Diana Randolph  This did not help her cough  She still has some heartburn  Her exercise tolerance is good  She has a history of allergies  No chest pain no palpitations no swelling of feet  No headache or dizziness  Her previous PFT showed moderate restriction with diffusion defect  Her previous CT scan showed evidence of scarring  Her appetite is good  No fever or chills  She works in [employer]  She admits to working in lenin environment  He does not take seasonal flu shot  She is being treated for asthma  He has postnasal drip  She previously used to use Flonase and antihistamine  The following portions of the patient's history were reviewed and updated as appropriate: allergies, current medications, past family history, past medical history, past social history, past surgical history and problem list     Review of Systems   Constitutional: Negative for chills, fatigue and fever  HENT: Positive for postnasal drip  Negative for hearing loss, rhinorrhea, sneezing, sore throat and trouble swallowing  Eyes: Negative for visual disturbance  Respiratory: Positive for cough  Negative for chest tightness, shortness of breath and wheezing  Cardiovascular: Negative for chest pain, palpitations and leg swelling  Gastrointestinal: Positive for vomiting  Negative for abdominal pain, constipation, diarrhea and nausea  Heart burn   Genitourinary: Negative for dysuria, frequency and urgency  Musculoskeletal: Negative for arthralgias and gait problem  Skin: Negative for rash  Allergic/Immunologic: Positive for environmental allergies  Neurological: Negative for dizziness, syncope and light-headedness  Psychiatric/Behavioral: Negative for agitation, confusion and sleep disturbance  The patient is nervous/anxious  Objective:      /78 (BP Location: Right arm, Patient Position: Sitting, Cuff Size: Adult)   Pulse 88   Temp (!) 97 2 °F (36 2 °C) (Tympanic)   Ht 4' 11" (1 499 m)   Wt 72 3 kg (159 lb 6 oz)   LMP  (LMP Unknown)   SpO2 97%   BMI 32 19 kg/m²          Physical Exam  Vitals reviewed  Constitutional:       General: She is not in acute distress  Appearance: She is obese  She is not ill-appearing, toxic-appearing or diaphoretic  HENT:      Head: Normocephalic  Mouth/Throat:      Mouth: Mucous membranes are moist    Eyes:      General: No scleral icterus  Conjunctiva/sclera: Conjunctivae normal    Cardiovascular:      Rate and Rhythm: Normal rate and regular rhythm  Heart sounds: Normal heart sounds  No murmur heard  Pulmonary:      Effort: Pulmonary effort is normal  No respiratory distress  Breath sounds: Normal breath sounds  No stridor  No wheezing, rhonchi or rales (crackles lung bases)  Abdominal:      General: Bowel sounds are normal       Palpations: Abdomen is soft  Tenderness: There is no abdominal tenderness  There is no guarding  Musculoskeletal:      Cervical back: No rigidity  Right lower leg: No edema  Left lower leg: No edema  Lymphadenopathy:      Cervical: No cervical adenopathy  Skin:     Coloration: Skin is not jaundiced or pale  Findings: No rash  Neurological:      Mental Status: She is alert and oriented to person, place, and time        Gait: Gait normal    Psychiatric:         Mood and Affect: Mood normal          Behavior: Behavior normal          Thought Content: Thought content normal          Judgment: Judgment normal        I spent 30 minutes of time taking care of this patient with complex pulmonary issues  The majority of this time was spent directly with the patient counseling as well as coordinating care

## 2022-12-07 NOTE — ASSESSMENT & PLAN NOTE
Sharon Flores had COVID before and her last CT scan showed bilateral groundglass opacities, scarring and bibasilar cystic changes  Her previous consolidative changes are not gotten better and on the last CT scan from September 2021  I had ordered a repeat CT scan which was not done  Currently she does not have any significant shortness of breath but has cough  No wheeze  Clinical examination her chest auscultation revealed occasional crackles at both lung bases  No rhonchi  I have ordered a repeat PFT and a CT scan  Her previous PFT showed moderate restriction with diffusion defect  I had a long discussion with her and answered all her questions

## 2022-12-22 ENCOUNTER — HOSPITAL ENCOUNTER (OUTPATIENT)
Dept: CT IMAGING | Facility: HOSPITAL | Age: 57
End: 2022-12-22
Attending: INTERNAL MEDICINE

## 2022-12-22 ENCOUNTER — HOSPITAL ENCOUNTER (OUTPATIENT)
Dept: PULMONOLOGY | Facility: HOSPITAL | Age: 57
End: 2022-12-22
Attending: INTERNAL MEDICINE

## 2022-12-22 DIAGNOSIS — J84.9 ILD (INTERSTITIAL LUNG DISEASE) (HCC): ICD-10-CM

## 2022-12-22 DIAGNOSIS — J45.40 MODERATE PERSISTENT ASTHMA WITHOUT COMPLICATION: ICD-10-CM

## 2022-12-22 DIAGNOSIS — Z86.2 HISTORY OF SARCOIDOSIS: ICD-10-CM

## 2022-12-22 RX ORDER — ALBUTEROL SULFATE 2.5 MG/3ML
2.5 SOLUTION RESPIRATORY (INHALATION) ONCE
Status: COMPLETED | OUTPATIENT
Start: 2022-12-22 | End: 2022-12-22

## 2022-12-22 RX ADMIN — ALBUTEROL SULFATE 2.5 MG: 2.5 SOLUTION RESPIRATORY (INHALATION) at 12:38

## 2023-01-30 ENCOUNTER — TELEPHONE (OUTPATIENT)
Dept: PULMONOLOGY | Facility: CLINIC | Age: 58
End: 2023-01-30

## 2023-01-30 NOTE — TELEPHONE ENCOUNTER
Pt resched her appt for today about her results due to her not feeling well  Pt is sched for 2/14/23 and would like to know her results she was wondering if someone could call her in the mean time

## 2023-02-08 ENCOUNTER — OFFICE VISIT (OUTPATIENT)
Dept: PULMONOLOGY | Facility: CLINIC | Age: 58
End: 2023-02-08

## 2023-02-08 VITALS
WEIGHT: 167.8 LBS | DIASTOLIC BLOOD PRESSURE: 90 MMHG | TEMPERATURE: 97.5 F | BODY MASS INDEX: 33.83 KG/M2 | OXYGEN SATURATION: 98 % | HEIGHT: 59 IN | HEART RATE: 81 BPM | SYSTOLIC BLOOD PRESSURE: 132 MMHG

## 2023-02-08 DIAGNOSIS — Z86.711 HISTORY OF PULMONARY EMBOLISM: ICD-10-CM

## 2023-02-08 DIAGNOSIS — Z86.2 HISTORY OF SARCOIDOSIS: ICD-10-CM

## 2023-02-08 DIAGNOSIS — J45.40 MODERATE PERSISTENT ASTHMA WITHOUT COMPLICATION: ICD-10-CM

## 2023-02-08 DIAGNOSIS — J84.9 ILD (INTERSTITIAL LUNG DISEASE) (HCC): Primary | ICD-10-CM

## 2023-02-08 DIAGNOSIS — G47.33 OSA (OBSTRUCTIVE SLEEP APNEA): ICD-10-CM

## 2023-02-08 NOTE — ASSESSMENT & PLAN NOTE
Ms Tamy Gonzalez had COVID before and her last CT scan showed bilateral groundglass opacities, scarring and bibasilar cystic changes  Her previous consolidative changes had gotten better and on the last CT scan from September 2021  Repeat CT scan showed continued scarring as well as features of interstitial lung disease  However it overall looked better  Currently she does not have any significant shortness of breath but has cough  No wheeze  Clinical examination her chest auscultation revealed occasional crackles at both lung bases  No rhonchi  Her previous PFT showed moderate restriction with diffusion defect  Her repeat PFT showed improvement in diffusion capacity as well as total lung capacity   I had a long discussion with her and answered all her questions

## 2023-02-08 NOTE — ASSESSMENT & PLAN NOTE
She has history of disturbed sleep and she wakes up not refreshed  She also has history of snoring and frequent waking up episodes during sleep  On clinical examination she had oropharyngeal crowding  She needs a sleep study for further evaluation and I ordered a home sleep study  I had a long discussion with her and answered all questions

## 2023-02-08 NOTE — PROGRESS NOTES
Assessment/Plan:    ILD (interstitial lung disease) ([facility] )  Sharri Martínez had COVID before and her last CT scan showed bilateral groundglass opacities, scarring and bibasilar cystic changes  Her previous consolidative changes had gotten better and on the last CT scan from September 2021  Repeat CT scan showed continued scarring as well as features of interstitial lung disease  However it overall looked better  Currently she does not have any significant shortness of breath but has cough  No wheeze  Clinical examination her chest auscultation revealed occasional crackles at both lung bases  No rhonchi  Her previous PFT showed moderate restriction with diffusion defect  Her repeat PFT showed improvement in diffusion capacity as well as total lung capacity  I had a long discussion with her and answered all her questions    Moderate persistent asthma without complication  She has a history of allergies  Her shortness of breath is better and wheezing is no more present  However she has chronic cough with clear phlegm  Chest auscultation did not reveal any rhonchi  She has been on treatment with Symbicort regularly and albuterol as needed  I have advised her to continue with these inhalations for now  History of sarcoidosis  No history of shortness of breath or joint pain or rashes  She had mediastinal and hilar lymphadenopathy which had resolved on the previous CT scan    KATHY (obstructive sleep apnea)  She has history of disturbed sleep and she wakes up not refreshed  She also has history of snoring and frequent waking up episodes during sleep  On clinical examination she had oropharyngeal crowding  She needs a sleep study for further evaluation and I ordered a home sleep study  I had a long discussion with her and answered all questions         Diagnoses and all orders for this visit:    ILD (interstitial lung disease) ([facility] )    Moderate persistent asthma without complication    History of sarcoidosis    KATHY (obstructive sleep apnea)  -     Home Study; Future    History of pulmonary embolism          Subjective:      Patient ID: Judy Matthews is a 62 y o  female  Gabriela Becerra came for follow-up for her interstitial lung disease and asthma  She had COVID-pneumonia before and this left her with some scarring in the lungs  She also had sarcoidosis in the past   Currently her exercise tolerance is more than 2 blocks  She has occasional cough clear phlegm and occasional wheezing  No palpitations  No chest pain no swelling of the feet  She uses Symbicort 160/4 52 puffs twice a day and albuterol inhaler as needed about 2 times a day  She has no fever or chills  No hoarseness of voice or dysphagia  She has gained more weight  Her appetite has been good  She had a pulmonary function test recently which showed improved total lung capacity and diffusion capacity  Her high-resolution CT scan showed bilateral interstitial changes in an known IPF pattern  She previously had mediastinal lymphadenopathy which had resolved  She denied any skin symptoms or significant eye problems  She has been seeing eye doctor  She stated that her sleep is disturbed and is unrefreshing  She has a history of snoring and frequent waking up episodes during sleep  Is not been sleep tested before  The following portions of the patient's history were reviewed and updated as appropriate: allergies, current medications, past family history, past medical history, past social history, past surgical history and problem list     Review of Systems   Constitutional: Positive for fatigue  Negative for activity change, chills, fever and unexpected weight change  HENT: Negative for hearing loss, rhinorrhea, sore throat and trouble swallowing  Eyes: Negative for visual disturbance  Respiratory: Positive for cough, chest tightness, shortness of breath and wheezing      Cardiovascular: Negative for chest pain, palpitations and leg swelling  Gastrointestinal: Negative for abdominal pain, constipation, diarrhea, nausea and vomiting  Genitourinary: Negative for dysuria, frequency and urgency  Musculoskeletal: Positive for arthralgias  Negative for gait problem  Skin: Negative for rash  Allergic/Immunologic: Positive for environmental allergies  Neurological: Negative for dizziness, syncope, light-headedness and headaches  Psychiatric/Behavioral: Positive for sleep disturbance  Negative for agitation and confusion  The patient is nervous/anxious  Objective:      /90   Pulse 81   Temp 97 5 °F (36 4 °C)   Ht 4' 11" (1 499 m)   Wt 76 1 kg (167 lb 12 8 oz)   LMP  (LMP Unknown)   SpO2 98%   BMI 33 89 kg/m²          Physical Exam  Vitals reviewed  Constitutional:       General: She is not in acute distress  Appearance: She is obese  She is not ill-appearing, toxic-appearing or diaphoretic  HENT:      Head: Normocephalic  Mouth/Throat:      Mouth: Mucous membranes are moist       Pharynx: Oropharynx is clear  No oropharyngeal exudate  Comments: mallampatti class 3 oropharyngeal crowding  Eyes:      General: No scleral icterus  Conjunctiva/sclera: Conjunctivae normal    Cardiovascular:      Rate and Rhythm: Normal rate and regular rhythm  Heart sounds: Normal heart sounds  No murmur heard  Pulmonary:      Effort: Pulmonary effort is normal  No respiratory distress  Breath sounds: No stridor  Rales (occasional crackles coarse at lung bases) present  No wheezing or rhonchi  Chest:      Chest wall: No tenderness  Abdominal:      General: Bowel sounds are normal       Palpations: Abdomen is soft  Tenderness: There is no abdominal tenderness  There is no guarding  Musculoskeletal:      Cervical back: No rigidity  Right lower leg: No edema  Left lower leg: No edema  Lymphadenopathy:      Cervical: No cervical adenopathy     Skin:     Coloration: Skin is not jaundiced or pale  Findings: No rash  Neurological:      Mental Status: She is alert and oriented to person, place, and time  Gait: Gait normal    Psychiatric:         Mood and Affect: Mood normal          Behavior: Behavior normal          Thought Content:  Thought content normal          Judgment: Judgment normal

## 2023-02-27 ENCOUNTER — OFFICE VISIT (OUTPATIENT)
Dept: FAMILY MEDICINE CLINIC | Facility: CLINIC | Age: 58
End: 2023-02-27

## 2023-02-27 VITALS
HEIGHT: 59 IN | TEMPERATURE: 97.1 F | HEART RATE: 77 BPM | BODY MASS INDEX: 33.55 KG/M2 | RESPIRATION RATE: 18 BRPM | WEIGHT: 166.4 LBS | SYSTOLIC BLOOD PRESSURE: 124 MMHG | DIASTOLIC BLOOD PRESSURE: 80 MMHG | OXYGEN SATURATION: 98 %

## 2023-02-27 DIAGNOSIS — Z12.31 ENCOUNTER FOR SCREENING MAMMOGRAM FOR BREAST CANCER: ICD-10-CM

## 2023-02-27 DIAGNOSIS — U07.1 ACUTE HYPOXEMIC RESPIRATORY FAILURE DUE TO COVID-19 (HCC): ICD-10-CM

## 2023-02-27 DIAGNOSIS — J30.2 SEASONAL ALLERGIES: ICD-10-CM

## 2023-02-27 DIAGNOSIS — D86.9 SARCOIDOSIS: ICD-10-CM

## 2023-02-27 DIAGNOSIS — Z00.00 ROUTINE MEDICAL EXAM: ICD-10-CM

## 2023-02-27 DIAGNOSIS — J12.82 PNEUMONIA DUE TO COVID-19 VIRUS: ICD-10-CM

## 2023-02-27 DIAGNOSIS — Z00.00 ANNUAL PHYSICAL EXAM: ICD-10-CM

## 2023-02-27 DIAGNOSIS — E78.5 HYPERLIPIDEMIA, UNSPECIFIED HYPERLIPIDEMIA TYPE: ICD-10-CM

## 2023-02-27 DIAGNOSIS — Z12.11 SCREENING FOR COLON CANCER: ICD-10-CM

## 2023-02-27 DIAGNOSIS — J06.9 VIRAL URI WITH COUGH: ICD-10-CM

## 2023-02-27 DIAGNOSIS — R53.83 OTHER FATIGUE: ICD-10-CM

## 2023-02-27 DIAGNOSIS — Z02.83 ENCOUNTER FOR DRUG SCREENING: Primary | ICD-10-CM

## 2023-02-27 DIAGNOSIS — H40.89 OTHER GLAUCOMA OF BOTH EYES: ICD-10-CM

## 2023-02-27 DIAGNOSIS — U07.1 PNEUMONIA DUE TO COVID-19 VIRUS: ICD-10-CM

## 2023-02-27 DIAGNOSIS — J96.01 ACUTE HYPOXEMIC RESPIRATORY FAILURE DUE TO COVID-19 (HCC): ICD-10-CM

## 2023-02-27 DIAGNOSIS — J84.9 ILD (INTERSTITIAL LUNG DISEASE) (HCC): ICD-10-CM

## 2023-02-27 DIAGNOSIS — L81.9 DISCOLORATION OF SKIN OF FACE: ICD-10-CM

## 2023-02-27 DIAGNOSIS — Z13.89: ICD-10-CM

## 2023-02-27 DIAGNOSIS — R05.3 CHRONIC COUGH: ICD-10-CM

## 2023-02-27 RX ORDER — MELATONIN
2000 DAILY
Qty: 90 TABLET | Refills: 1 | Status: SHIPPED | OUTPATIENT
Start: 2023-02-27 | End: 2023-05-28

## 2023-02-27 RX ORDER — LATANOPROST 50 UG/ML
SOLUTION/ DROPS OPHTHALMIC
Qty: 2.5 ML | Refills: 10 | Status: SHIPPED | OUTPATIENT
Start: 2023-02-27

## 2023-02-27 RX ORDER — MECLIZINE HCL 12.5 MG/1
12.5 TABLET ORAL EVERY 8 HOURS PRN
Qty: 90 TABLET | Refills: 1 | Status: SHIPPED | OUTPATIENT
Start: 2023-02-27 | End: 2023-03-29

## 2023-02-27 RX ORDER — PETROLATUM,WHITE/LANOLIN
OINTMENT (GRAM) TOPICAL AS NEEDED
Qty: 45 G | Refills: 1 | Status: SHIPPED | OUTPATIENT
Start: 2023-02-27 | End: 2023-03-29

## 2023-02-27 RX ORDER — BENZONATATE 100 MG/1
100 CAPSULE ORAL 3 TIMES DAILY PRN
Qty: 20 CAPSULE | Refills: 0 | Status: SHIPPED | OUTPATIENT
Start: 2023-02-27

## 2023-02-27 RX ORDER — ECHINACEA PURPUREA EXTRACT 125 MG
1 TABLET ORAL AS NEEDED
Qty: 60 ML | Refills: 3 | Status: SHIPPED | OUTPATIENT
Start: 2023-02-27

## 2023-02-28 NOTE — PATIENT INSTRUCTIONS
Wellness Visit for Adults   AMBULATORY CARE:   A wellness visit  is when you see your healthcare provider to get screened for health problems  Your healthcare provider will also give you advice on how to stay healthy  Write down your questions so you remember to ask them  Ask your healthcare provider how often you should have a wellness visit  What happens at a wellness visit:  Your healthcare provider will ask about your health, and your family history of health problems  This includes high blood pressure, heart disease, and cancer  He or she will ask if you have symptoms that concern you, if you smoke, and about your mood  You may also be asked about your intake of medicines, supplements, food, and alcohol  Any of the following may be done:  • Your weight  will be checked  Your height may also be checked so your body mass index (BMI) can be calculated  Your BMI shows if you are at a healthy weight  • Your blood pressure  and heart rate will be checked  Your temperature may also be checked  • Blood and urine tests  may be done  Blood tests may be done to check your cholesterol levels  Abnormal cholesterol levels increase your risk for heart disease and stroke  You may also need a blood or urine test to check for diabetes if you are at increased risk  Urine tests may be done to look for signs of an infection or kidney disease  • A physical exam  includes checking your heartbeat and lungs with a stethoscope  Your healthcare provider may also check your skin to look for sun damage  • Screening tests  may be recommended  A screening test is done to check for diseases that may not cause symptoms  The screening tests you may need depend on your age, gender, family history, and lifestyle habits  For example, colorectal screening may be recommended if you are 48years old or older  Screening tests you need if you are a woman:   • A Pap smear  is used to screen for cervical cancer   Pap smears are usually done every 3 to 5 years depending on your age  You may need them more often if you have had abnormal Pap smear test results in the past  Ask your healthcare provider how often you should have a Pap smear  • A mammogram  is an x-ray of your breasts to screen for breast cancer  Experts recommend mammograms every 2 years starting at age 48 years  You may need a mammogram at age 52 years or younger if you have an increased risk for breast cancer  Talk to your healthcare provider about when you should start having mammograms and how often you need them  Vaccines you may need:   • Get an influenza vaccine  every year  The influenza vaccine protects you from the flu  Several types of viruses cause the flu  The viruses change over time, so new vaccines are made each year  • Get a tetanus-diphtheria (Td) booster vaccine  every 10 years  This vaccine protects you against tetanus and diphtheria  Tetanus is a severe infection that may cause painful muscle spasms and lockjaw  Diphtheria is a severe bacterial infection that causes a thick covering in the back of your mouth and throat  • Get a human papillomavirus (HPV) vaccine  if you are female and aged 23 to 32 or male 23 to 24 and never received it  This vaccine protects you from HPV infection  HPV is the most common infection spread by sexual contact  HPV may also cause vaginal, penile, and anal cancers  • Get a pneumococcal vaccine  if you are aged 72 years or older  The pneumococcal vaccine is an injection given to protect you from pneumococcal disease  Pneumococcal disease is an infection caused by pneumococcal bacteria  The infection may cause pneumonia, meningitis, or an ear infection  • Get a shingles vaccine  if you are 60 or older, even if you have had shingles before  The shingles vaccine is an injection to protect you from the varicella-zoster virus  This is the same virus that causes chickenpox   Shingles is a painful rash that develops in people who had chickenpox or have been exposed to the virus  How to eat healthy:  My Plate is a model for planning healthy meals  It shows the types and amounts of foods that should go on your plate  Fruits and vegetables make up about half of your plate, and grains and protein make up the other half  A serving of dairy is included on the side of your plate  The amount of calories and serving sizes you need depends on your age, gender, weight, and height  Examples of healthy foods are listed below:  • Eat a variety of vegetables  such as dark green, red, and orange vegetables  You can also include canned vegetables low in sodium (salt) and frozen vegetables without added butter or sauces  • Eat a variety of fresh fruits , canned fruit in 100% juice, frozen fruit, and dried fruit  • Include whole grains  At least half of the grains you eat should be whole grains  Examples include whole-wheat bread, wheat pasta, brown rice, and whole-grain cereals such as oatmeal     • Eat a variety of protein foods such as seafood (fish and shellfish), lean meat, and poultry without skin (turkey and chicken)  Examples of lean meats include pork leg, shoulder, or tenderloin, and beef round, sirloin, tenderloin, and extra lean ground beef  Other protein foods include eggs and egg substitutes, beans, peas, soy products, nuts, and seeds  • Choose low-fat dairy products such as skim or 1% milk or low-fat yogurt, cheese, and cottage cheese  • Limit unhealthy fats  such as butter, hard margarine, and shortening  Exercise:  Exercise at least 30 minutes per day on most days of the week  Some examples of exercise include walking, biking, dancing, and swimming  You can also fit in more physical activity by taking the stairs instead of the elevator or parking farther away from stores  Include muscle strengthening activities 2 days each week  Regular exercise provides many health benefits   It helps you manage your weight, and decreases your risk for type 2 diabetes, heart disease, stroke, and high blood pressure  Exercise can also help improve your mood  Ask your healthcare provider about the best exercise plan for you  General health and safety guidelines:   • Do not smoke  Nicotine and other chemicals in cigarettes and cigars can cause lung damage  Ask your healthcare provider for information if you currently smoke and need help to quit  E-cigarettes or smokeless tobacco still contain nicotine  Talk to your healthcare provider before you use these products  • Limit alcohol  A drink of alcohol is 12 ounces of beer, 5 ounces of wine, or 1½ ounces of liquor  • Lose weight, if needed  Being overweight increases your risk of certain health conditions  These include heart disease, high blood pressure, type 2 diabetes, and certain types of cancer  • Protect your skin  Do not sunbathe or use tanning beds  Use sunscreen with a SPF 15 or higher  Apply sunscreen at least 15 minutes before you go outside  Reapply sunscreen every 2 hours  Wear protective clothing, hats, and sunglasses when you are outside  • Drive safely  Always wear your seatbelt  Make sure everyone in your car wears a seatbelt  A seatbelt can save your life if you are in an accident  Do not use your cell phone when you are driving  This could distract you and cause an accident  Pull over if you need to make a call or send a text message  • Practice safe sex  Use latex condoms if are sexually active and have more than one partner  Your healthcare provider may recommend screening tests for sexually transmitted infections (STIs)  • Wear helmets, lifejackets, and protective gear  Always wear a helmet when you ride a bike or motorcycle, go skiing, or play sports that could cause a head injury  Wear protective equipment when you play sports  Wear a lifejacket when you are on a boat or doing water sports      © Copyright [employer] 2022 Information is for End User's use only and may not be sold, redistributed or otherwise used for commercial purposes  The above information is an  only  It is not intended as medical advice for individual conditions or treatments  Talk to your doctor, nurse or pharmacist before following any medical regimen to see if it is safe and effective for you  Cholesterol and Your Health   AMBULATORY CARE:   Cholesterol  is a waxy, fat-like substance  Your body uses cholesterol to make hormones and new cells, and to protect nerves  Cholesterol is made by your body  It also comes from certain foods you eat, such as meat and dairy products  Your healthcare provider can help you set goals for your cholesterol levels  He or she can help you create a plan to meet your goals  Cholesterol level goals: Your cholesterol level goals depend on your risk for heart disease, your age, and your other health conditions  The following are general guidelines:  • Total cholesterol  includes low-density lipoprotein (LDL), high-density lipoprotein (HDL), and triglyceride levels  The total cholesterol level should be lower than 200 mg/dL and is best at about 150 mg/dL  • LDL cholesterol  is called bad cholesterol  because it forms plaque in your arteries  As plaque builds up, your arteries become narrow, and less blood flows through  When plaque decreases blood flow to your heart, you may have chest pain  If plaque completely blocks an artery that brings blood to your heart, you may have a heart attack  Plaque can break off and form blood clots  Blood clots may block arteries in your brain and cause a stroke  The level should be less than 130 mg/dL and is best at about 100 mg/dL  • HDL cholesterol  is called good cholesterol  because it helps remove LDL cholesterol from your arteries  It does this by attaching to LDL cholesterol and carrying it to your liver  Your liver breaks down LDL cholesterol so your body can get rid of it   High levels of HDL cholesterol can help prevent a heart attack and stroke  Low levels of HDL cholesterol can increase your risk for heart disease, heart attack, and stroke  The level should be 60 mg/dL or higher  • Triglycerides  are a type of fat that store energy from foods you eat  High levels of triglycerides also cause plaque buildup  This can increase your risk for a heart attack or stroke  If your triglyceride level is high, your LDL cholesterol level may also be high  The level should be less than 150 mg/dL  Any of the following can increase your risk for high cholesterol:   • Smoking cigarettes    • Being overweight or obese, or not getting enough exercise    • Drinking large amounts of alcohol    • A medical condition such as hypertension (high blood pressure) or diabetes    • Certain genes passed from your parents to you    • Age older than 65 years    What you need to know about having your cholesterol levels checked: Adults 21to 39years of age should have their cholesterol levels checked every 4 to 6 years  Adults 45 years or older should have their cholesterol checked every 1 to 2 years  You may need your cholesterol checked more often, or at a younger age, if you have risk factors for heart disease  You may also need to have your cholesterol checked more often if you have other health conditions, such as diabetes  Blood tests are used to check cholesterol levels  Blood tests measure your levels of triglycerides, LDL cholesterol, and HDL cholesterol  How healthy fats affect your cholesterol levels:  Healthy fats, also called unsaturated fats, help lower LDL cholesterol and triglyceride levels  Healthy fats include the following:  • Monounsaturated fats  are found in foods such as olive oil, canola oil, avocado, nuts, and olives  • Polyunsaturated fats,  such as omega 3 fats, are found in fish, such as salmon, trout, and tuna   They can also be found in plant foods such as flaxseed, walnuts, and soybeans  How unhealthy fats affect your cholesterol levels:  Unhealthy fats increase LDL cholesterol and triglyceride levels  They are found in foods high in cholesterol, saturated fat, and trans fat:  • Cholesterol  is found in eggs, dairy, and meat  • Saturated fat  is found in butter, cheese, ice cream, whole milk, and coconut oil  Saturated fat is also found in meat, such as sausage, hot dogs, and bologna  • Trans fat  is found in liquid oils and is used in fried and baked foods  Foods that contain trans fats include chips, crackers, muffins, sweet rolls, microwave popcorn, and cookies  Treatment  for high cholesterol will also decrease your risk of heart disease, heart attack, and stroke  Treatment may include any of the following:  • Lifestyle changes  may include food, exercise, weight loss, and quitting smoking  You may also need to decrease the amount of alcohol you drink  Your healthcare provider will want you to start with lifestyle changes  Other treatment may be added if lifestyle changes are not enough  Your healthcare provider may recommend you work with a team to manage hyperlipidemia  The team may include medical experts such as a dietitian, an exercise or physical therapist, and a behavior therapist  Your family members may be included in helping you create lifestyle changes  • Medicines  may be given to lower your LDL cholesterol, triglyceride levels, or total cholesterol level  You may need medicines to lower your cholesterol if any of the following is true:    ? You have a history of stroke, TIA, unstable angina, or a heart attack  ? Your LDL cholesterol level is 190 mg/dL or higher  ? You are age 36 to 76 years, have diabetes or heart disease risk factors, and your LDL cholesterol is 70 mg/dL or higher  • Supplements  include fish oil, red yeast rice, and garlic  Fish oil may help lower your triglyceride and LDL cholesterol levels   It may also increase your HDL cholesterol level  Red yeast rice may help decrease your total cholesterol level and LDL cholesterol level  Garlic may help lower your total cholesterol level  Do not take any supplements without talking to your healthcare provider  Food changes you can make to lower your cholesterol levels:  A dietitian can help you create a healthy eating plan  He or she can show you how to read food labels and choose foods low in saturated fat, trans fats, and cholesterol  • Decrease the total amount of fat you eat  Choose lean meats, fat-free or 1% fat milk, and low-fat dairy products, such as yogurt and cheese  Try to limit or avoid red meats  Limit or do not eat fried foods or baked goods, such as cookies  • Replace unhealthy fats with healthy fats  Cook foods in olive oil or canola oil  Choose soft margarines that are low in saturated fat and trans fat  Seeds, nuts, and avocados are other examples of healthy fats  • Eat foods with omega-3 fats  Examples include salmon, tuna, mackerel, walnuts, and flaxseed  Eat fish 2 times per week  Pregnant women should not eat fish that have high levels of mercury, such as shark, swordfish, and malcom mackerel  • Increase the amount of high-fiber foods you eat  High-fiber foods can help lower your LDL cholesterol  Aim to get between 20 and 30 grams of fiber each day  Fruits and vegetables are high in fiber  Eat at least 5 servings each day  Other high-fiber foods are whole-grain or whole-wheat breads, pastas, or cereals, and brown rice  Eat 3 ounces of whole-grain foods each day  Increase fiber slowly  You may have abdominal discomfort, bloating, and gas if you add fiber to your diet too quickly  • Eat healthy protein foods  Examples include low-fat dairy products, skinless chicken and turkey, fish, and nuts  • Limit foods and drinks that are high in sugar    Your dietitian or healthcare provider can help you create daily limits for high-sugar foods and drinks  The limit may be lower if you have diabetes or another health condition  Limits can also help you lose weight if needed  Lifestyle changes you can make to lower your cholesterol levels:   • Maintain a healthy weight  Ask your healthcare provider what a healthy weight is for you  Ask him or her to help you create a weight loss plan if needed  Weight loss can decrease your total cholesterol and triglyceride levels  Weight loss may also help keep your blood pressure at a healthy level  • Be physically active throughout the day  Physical activity, such as exercise, can help lower your total cholesterol level and maintain a healthy weight  Physical activity can also help increase your HDL cholesterol level  Work with your healthcare provider to create an program that is right for you  Get at least 30 to 40 minutes of moderate physical activity most days of the week  Examples of exercise include brisk walking, swimming, or biking  Also include strength training at least 2 times each week  Your healthcare providers can help you create a physical activity plan  • Do not smoke  Nicotine and other chemicals in cigarettes and cigars can raise your cholesterol levels  Ask your healthcare provider for information if you currently smoke and need help to quit  E-cigarettes or smokeless tobacco still contain nicotine  Talk to your healthcare provider before you use these products  • Limit or do not drink alcohol  Alcohol can increase your triglyceride levels  Ask your healthcare provider before you drink alcohol  Ask how much is okay for you to drink in 24 hours or 1 week  Follow up with your doctor as directed:  Write down your questions so you remember to ask them during your visits  © Copyright [facility] 2022 Information is for End User's use only and may not be sold, redistributed or otherwise used for commercial purposes  The above information is an  only   It is not intended as medical advice for individual conditions or treatments  Talk to your doctor, nurse or pharmacist before following any medical regimen to see if it is safe and effective for you

## 2023-02-28 NOTE — PROGRESS NOTES
ADULT ANNUAL [address] [place],  [address] [facility]IMARY [facility]ARE [place]    NAME: Hector Blackwell  AGE: 62 y o   SEX: female  : 1965     DATE: 2023     Assessment and Plan:routeine medical  Exam  And  Other  Health  Conditions      Problem List Items Addressed This Visit        Respiratory    Acute hypoxemic respiratory failure due to COVID-19 [facility])    Relevant Medications    cholecalciferol (VITAMIN D3) 1,000 units tablet    ILD (interstitial lung disease) ([facility] )     In  Care of  pulmonologist  Seen  Them  2  Weeks  Ago   No  Sob or  Cp   asked  Her to  Follow up  With  Them  As directed           Relevant Medications    sodium chloride (OCEAN) 0 65 % nasal spray    benzonatate (TESSALON PERLES) 100 mg capsule       Other    Seasonal allergies    Relevant Medications    meclizine (ANTIVERT) 12 5 MG tablet    Routine medical exam     She is  Here  For  The physical  She  Also  Wants  To  Get the [facility]  Physical  Forms    She  Says   Her  's  License  Is  Suspended   4 years  Ago  She  Says   She  Was  A  Drug  Dealer  4  Years  Ago  When  Her  License   was suspended   She  Says  It  was suspended  For  4  Years  She  Is  Now  Out of  That period  She  Says   And  Going through a  Legal  Way to  Get it  Back    she  Declined  Taking  Any  Drugs or  Any  Narcotics    will get a  Urine  Drug  Screen  Discussed  With  Patient  And  Will complete the  Form     Her  Form  Was  Reviewed  And  Discussed  With her      For  Her  routine    Physical  Discussed  Diet  Exercise  And life  Style  Modifications      will order the pap and  Mammogram reff  For  Colonoscopy   will  F/u  With  labs            Other Visit Diagnoses     Encounter for screening mammogram for breast cancer    -  Primary    Relevant Orders    Mammo screening bilateral w 3d & cad    Sarcoidosis        Relevant Medications    Vitamins A & D (vitamin A & D) ointment    Discoloration of skin of face        Relevant Medications    Vitamins A & D (vitamin A & D) ointment    Viral URI with cough        Relevant Medications    sodium chloride (OCEAN) 0 65 % nasal spray    Pneumonia due to COVID-19 virus        Relevant Medications    sodium chloride (OCEAN) 0 65 % nasal spray    cholecalciferol (VITAMIN D3) 1,000 units tablet    benzonatate (TESSALON PERLES) 100 mg capsule    Other glaucoma of both eyes        Relevant Medications    latanoprost (XALATAN) 0 005 % ophthalmic solution    Chronic cough        Relevant Medications    benzonatate (TESSALON PERLES) 100 mg capsule    Screening, multiphasic        Relevant Orders    POCT urine drug screen    Hyperlipidemia, unspecified hyperlipidemia type        Relevant Orders    CBC and differential    Comprehensive metabolic panel    Lipid panel    Other fatigue        Relevant Orders    TSH, 3rd generation          Immunizations and preventive care screenings were discussed with patient today  Appropriate education was printed on patient's after visit summary  Counseling:  · Alcohol/drug use: discussed moderation in alcohol intake, the recommendations for healthy alcohol use, and avoidance of illicit drug use  No follow-ups on file  Chief Complaint:     Chief Complaint   Patient presents with   • Physical Exam      license forms       History of Present Illness:     Adult Annual Physical   Patient here for a comprehensive physical exam  The patient reports no problems  Diet and Physical Activity  · Diet/Nutrition: well balanced diet  · Exercise: walking  Depression Screening  PHQ-2/9 Depression Screening         General Health  · Sleep: sleeps well  · Hearing: normal - bilateral   · Vision: no vision problems  · Dental: regular dental visits         /GYN Health  · Patient is: postmenopausal  · Last menstrual period: 3 years  ago  · Contraceptive method: na      Review of Systems:     Review of Systems   Constitutional: Negative for fatigue and fever    HENT: Negative for congestion and sinus pressure  Eyes: Negative for pain, discharge and itching  H/o  Glaucoma    Respiratory: Positive for cough  Negative for shortness of breath and wheezing  H/o lung fibrosis / /  Sarcoidosis in  Care of  pulmonary   Cardiovascular: Negative for chest pain, palpitations and leg swelling  Hyperlipidemia   Gastrointestinal: Negative for abdominal distention and constipation  Endocrine: Negative for cold intolerance, heat intolerance and polydipsia  Genitourinary: Negative for dysuria and flank pain  Musculoskeletal: Negative for arthralgias and back pain  Skin: Negative for rash  Neurological: Negative for dizziness and headaches  Psychiatric/Behavioral: Negative for self-injury, sleep disturbance and suicidal ideas  The patient is not nervous/anxious  Past Medical History:     Past Medical History:   Diagnosis Date   • Anxiety    • Asthma    • Back pain    • Hyperlipidemia    • Sarcoidosis    • Vertigo       Past Surgical History:     Past Surgical History:   Procedure Laterality Date   • MAMMO (HISTORICAL)  2018   • MA REPAIR FIRST ABDOMINAL WALL HERNIA N/A 10/25/2022    Procedure:  Incarcerated VENTRAL HERNIA REPAIR with mesh;  Surgeon: Dorian Garcia MD;  Location: [facility];  Service: General      Social History:     Social History     Socioeconomic History   • Marital status: Single     Spouse name: None   • Number of children: None   • Years of education: None   • Highest education level: None   Occupational History   • None   Tobacco Use   • Smoking status: Never   • Smokeless tobacco: Never   Vaping Use   • Vaping Use: Never used   Substance and Sexual Activity   • Alcohol use: Not Currently     Comment: social   • Drug use: Not Currently     Types: Marijuana   • Sexual activity: Not Currently   Other Topics Concern   • None   Social History Narrative    · Most recent tobacco use screenin2019      · Do you currently or have you served in the [employer]:   No      · Were you activated, into active duty, as a member of the [employer] or as a Reservist:   No      Social Determinants of Health     Financial Resource Strain: Not on file   Food Insecurity: Not on file   Transportation Needs: Not on file   Physical Activity: Not on file   Stress: Not on file   Social Connections: Not on file   Intimate Partner Violence: Not on file   Housing Stability: Not on file      Family History:     Family History   Problem Relation Age of Onset   • Hypertension Mother    • Mental illness Mother    • Asthma Mother    • Sarcoidosis Sister    • Colon cancer Brother    • Cancer Brother       Current Medications:     Current Outpatient Medications   Medication Sig Dispense Refill   • albuterol (5 mg/mL) 0 5 % nebulizer solution Take 0 5 mL (2 5 mg total) by nebulization every 6 (six) hours as needed for wheezing or shortness of breath 20 mL 0   • albuterol (ProAir HFA) 90 mcg/act inhaler Inhale 2 puffs every 6 (six) hours as needed for wheezing or shortness of breath 18 g 1   • benzonatate (TESSALON PERLES) 100 mg capsule Take 1 capsule (100 mg total) by mouth 3 (three) times a day as needed for cough 20 capsule 0   • budesonide-formoterol (Symbicort) 160-4 5 mcg/act inhaler Inhale 2 puffs 2 (two) times a day Rinse mouth after use 10 2 g 3   • cholecalciferol (VITAMIN D3) 1,000 units tablet Take 2 tablets (2,000 Units total) by mouth daily 90 tablet 1   • ferrous sulfate 324 (65 Fe) mg Take 1 tablet (324 mg total) by mouth daily before breakfast 90 tablet 1   • fluticasone (FLONASE) 50 mcg/act nasal spray 1 spray into each nostril daily 18 2 mL 1   • latanoprost (XALATAN) 0 005 % ophthalmic solution 1 drop into both eyes at bedtime 2 5 mL 10   • meclizine (ANTIVERT) 12 5 MG tablet Take 1 tablet (12 5 mg total) by mouth every 8 (eight) hours as needed for dizziness 90 tablet 1   • sodium chloride (OCEAN) 0 65 % nasal spray 1 spray into each nostril as needed for congestion for up to 12 doses 60 mL 3   • Vitamins A & D (vitamin A & D) ointment Apply topically as needed for dry skin (to face) 45 g 1   • acetaminophen (TYLENOL) 325 mg tablet Take 2 tablets (650 mg total) by mouth every 6 (six) hours as needed for mild pain (Patient not taking: Reported on 12/7/2022)  0   • atorvastatin (LIPITOR) 40 mg tablet Take 1 tablet (40 mg total) by mouth daily (Patient not taking: Reported on 11/10/2022) 90 tablet 1   • multivitamin-minerals (CENTRUM ADULTS) tablet Take 1 tablet by mouth daily (Patient not taking: Reported on 12/7/2022)  0     No current facility-administered medications for this visit  Allergies: Allergies   Allergen Reactions   • Tegretol [Carbamazepine] Itching, Rash and Vomiting      Physical Exam:     /80 (BP Location: Right arm, Patient Position: Sitting, Cuff Size: Adult)   Pulse 77   Temp (!) 97 1 °F (36 2 °C) (Temporal)   Resp 18   Ht 4' 11" (1 499 m)   Wt 75 5 kg (166 lb 6 4 oz)   LMP  (LMP Unknown)   SpO2 98%   BMI 33 61 kg/m²     Physical Exam  Vitals and nursing note reviewed  Constitutional:       General: She is not in acute distress  Appearance: Normal appearance  She is not ill-appearing, toxic-appearing or diaphoretic  HENT:      Head: Normocephalic  Nose: Nose normal  No congestion  Mouth/Throat:      Mouth: Mucous membranes are moist       Pharynx: No oropharyngeal exudate or posterior oropharyngeal erythema  Eyes:      Extraocular Movements: Extraocular movements intact  Conjunctiva/sclera: Conjunctivae normal       Pupils: Pupils are equal, round, and reactive to light  Cardiovascular:      Rate and Rhythm: Normal rate and regular rhythm  Heart sounds: Normal heart sounds  No murmur heard  No gallop  Pulmonary:      Effort: Pulmonary effort is normal       Breath sounds: Normal breath sounds  No wheezing, rhonchi or rales     Abdominal:      General: There is no distension  Palpations: Abdomen is soft  There is no mass  Tenderness: There is no abdominal tenderness  There is no rebound  Musculoskeletal:      Cervical back: Normal range of motion and neck supple  Right lower leg: No edema  Left lower leg: No edema  Skin:     Findings: No erythema or rash  Neurological:      General: No focal deficit present  Mental Status: She is oriented to person, place, and time     Psychiatric:         Mood and Affect: Mood normal          Behavior: Behavior normal           Radha Zhang MD  [facility] PRIMARY CARE [facility]

## 2023-02-28 NOTE — ASSESSMENT & PLAN NOTE
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

## 2023-02-28 NOTE — ASSESSMENT & PLAN NOTE
In  Care of  pulmonologist  Seen  Them  2  Weeks  Ago   No  Sob or  Cp   asked  Her to  Follow up  With  Them  As directed

## 2023-02-28 NOTE — PROGRESS NOTES
BMI Counseling: Body mass index is 33 61 kg/m²  The BMI is above normal  Nutrition recommendations include decreasing portion sizes, encouraging healthy choices of fruits and vegetables, decreasing fast food intake, consuming healthier snacks, limiting drinks that contain sugar, moderation in carbohydrate intake and reducing intake of saturated and trans fat  Rationale for BMI follow-up plan is due to patient being overweight or obese       WILL FOLLOW UP WITH UDS AND THEN  SIGN  HER  PHYSICAL  FORM FOR  [facility] CONSIDERING  THE  REASON  FOR  REVOKING  HER Esteban Gary

## 2023-03-03 LAB

## 2023-03-07 ENCOUNTER — TELEPHONE (OUTPATIENT)
Dept: FAMILY MEDICINE CLINIC | Facility: CLINIC | Age: 58
End: 2023-03-07

## 2023-03-07 NOTE — TELEPHONE ENCOUNTER
Patient called she is very upset she is looking for her physical papers/drivers permit she was here on the 27th    Please call her she would like these asap

## 2023-03-08 NOTE — TELEPHONE ENCOUNTER
Called patient and went straight to voicemail, left a detailed message in regards form being signed and mailed  I stated office hours she can  the form if she would like

## 2023-03-08 NOTE — TELEPHONE ENCOUNTER
Pt called , upset wanting to know if the document was sent   Pt mentioned she will pass by the office tomorrow to retreive documents

## 2023-04-28 PROBLEM — Z00.00 ROUTINE MEDICAL EXAM: Status: RESOLVED | Noted: 2023-02-27 | Resolved: 2023-04-28

## 2023-05-26 ENCOUNTER — OFFICE VISIT (OUTPATIENT)
Dept: FAMILY MEDICINE CLINIC | Facility: CLINIC | Age: 58
End: 2023-05-26

## 2023-05-26 VITALS
RESPIRATION RATE: 16 BRPM | WEIGHT: 166 LBS | HEIGHT: 59 IN | SYSTOLIC BLOOD PRESSURE: 140 MMHG | TEMPERATURE: 98.7 F | HEART RATE: 100 BPM | BODY MASS INDEX: 33.47 KG/M2 | OXYGEN SATURATION: 97 % | DIASTOLIC BLOOD PRESSURE: 80 MMHG

## 2023-05-26 DIAGNOSIS — J01.00 ACUTE NON-RECURRENT MAXILLARY SINUSITIS: ICD-10-CM

## 2023-05-26 DIAGNOSIS — E66.09 CLASS 1 OBESITY DUE TO EXCESS CALORIES WITHOUT SERIOUS COMORBIDITY WITH BODY MASS INDEX (BMI) OF 32.0 TO 32.9 IN ADULT: ICD-10-CM

## 2023-05-26 DIAGNOSIS — J45.40 MODERATE PERSISTENT ASTHMA WITHOUT COMPLICATION: ICD-10-CM

## 2023-05-26 DIAGNOSIS — R13.12 DYSPHAGIA, OROPHARYNGEAL PHASE: Primary | ICD-10-CM

## 2023-05-26 DIAGNOSIS — I25.10 CORONARY ARTERY DISEASE INVOLVING NATIVE CORONARY ARTERY WITHOUT ANGINA PECTORIS, UNSPECIFIED WHETHER NATIVE OR TRANSPLANTED HEART: ICD-10-CM

## 2023-05-26 DIAGNOSIS — I25.10 ATHEROSCLEROSIS OF NATIVE CORONARY ARTERY OF NATIVE HEART WITHOUT ANGINA PECTORIS: ICD-10-CM

## 2023-05-26 DIAGNOSIS — K21.9 GASTROESOPHAGEAL REFLUX DISEASE WITHOUT ESOPHAGITIS: ICD-10-CM

## 2023-05-26 PROBLEM — E66.811 CLASS 1 OBESITY DUE TO EXCESS CALORIES IN ADULT: Status: ACTIVE | Noted: 2023-05-26

## 2023-05-26 RX ORDER — OMEPRAZOLE 20 MG/1
20 CAPSULE, DELAYED RELEASE ORAL
Qty: 30 CAPSULE | Refills: 5 | Status: SHIPPED | OUTPATIENT
Start: 2023-05-26 | End: 2023-11-22

## 2023-05-26 RX ORDER — AMOXICILLIN 500 MG/1
500 CAPSULE ORAL EVERY 8 HOURS SCHEDULED
Qty: 30 CAPSULE | Refills: 0 | Status: SHIPPED | OUTPATIENT
Start: 2023-05-26 | End: 2023-06-05

## 2023-05-26 NOTE — ASSESSMENT & PLAN NOTE
She  Had  The  egd  And  Colonoscopy  Last  Year   She  Still feels  Her  gerd  Is  Acting  Up  Asked  Her to  Follow up with  Her  Gi  Added  Omeprazole return  Parameters  discussed

## 2023-05-26 NOTE — PROGRESS NOTES
Assessment/Plan:  As  below       Problem List Items Addressed This Visit        Digestive    Dysphagia, oropharyngeal phase - Primary     She  Had  The  egd  And  Colonoscopy  Last  Year   She  Still feels  Her  gerd  Is  Acting  Up  Asked  Her to  Follow up with  Her  Gi  Added  Omeprazole return  Parameters  discussed          Gastroesophageal reflux disease without esophagitis    Relevant Medications    omeprazole (PriLOSEC) 20 mg delayed release capsule       Respiratory    Moderate persistent asthma without complication     Stable  No  wheezing         Acute non-recurrent maxillary sinusitis     Discussed  Symptomatic  And  supportive  Treatment    Started  On  Amoxicillin  Will  f/u          Relevant Medications    amoxicillin (AMOXIL) 500 mg capsule       Cardiovascular and Mediastinum    Atherosclerotic heart disease of native coronary artery without angina pectoris     Asked her  To  Follow up  With  Her  Cardiologist  As  Directed  By them  She  Has   She  Has  No  Sob  Or  Cp  However  She  Says  She is not  Sure  Who  Her  Cardiologist  Was   Will reff her  again            Other    Class 1 obesity due to excess calories in adult     Discussed   Diet  Exercise  And  Life  Style  Modifications  Portion  Control           Relevant Orders    Ambulatory Referral to Weight Management   Other Visit Diagnoses     Coronary artery disease involving native coronary artery without angina pectoris, unspecified whether native or transplanted heart        Relevant Orders    Ambulatory Referral to Cardiology            Subjective:      Patient ID: Gregorio Izquierdo is a 62 y o  female      HPI-came  In  To  Follow up  On  Her  Multiple  Health  Conditions    She  Is  C/o  Sinus pain last  3  Weeks    No  Fever her  Asthma  And  Sarcoidosis  Is  Stable  She  Ha s h/o  Cad  But  No  Cp or  Sob  Stopped  Seeing  Her  Cardiologist  reff  Her  Again    she  Has gerd   Has  Gi on  Board    htn  And  Hyperlipidemia  Stable No other  Complaints  Today     The following portions of the patient's history were reviewed and updated as appropriate:   Past Medical History:  She has a past medical history of Anxiety, Asthma, Back pain, Hyperlipidemia, Sarcoidosis, and Vertigo  ,  _______________________________________________________________________  Medical Problems:  does not have any pertinent problems on file ,  _______________________________________________________________________  Past Surgical History:   has a past surgical history that includes Mammo (historical) (05/02/2018) and pr repair first abdominal wall hernia (N/A, 10/25/2022)  ,  _______________________________________________________________________  Family History:  family history includes Asthma in her mother; Cancer in her brother; Colon cancer in her brother; Hypertension in her mother; Mental illness in her mother; Sarcoidosis in her sister  ,  _______________________________________________________________________  Social History:   reports that she has never smoked  She has never used smokeless tobacco  She reports that she does not currently use alcohol  She reports that she does not currently use drugs after having used the following drugs: Marijuana  ,  _______________________________________________________________________  Allergies:  is allergic to benadryl [diphenhydramine], strawberry (diagnostic) - food allergy, and tegretol [carbamazepine]     _______________________________________________________________________  Current Outpatient Medications   Medication Sig Dispense Refill   • albuterol (5 mg/mL) 0 5 % nebulizer solution Take 0 5 mL (2 5 mg total) by nebulization every 6 (six) hours as needed for wheezing or shortness of breath 20 mL 0   • albuterol (ProAir HFA) 90 mcg/act inhaler Inhale 2 puffs every 6 (six) hours as needed for wheezing or shortness of breath 18 g 1   • amoxicillin (AMOXIL) 500 mg capsule Take 1 capsule (500 mg total) by mouth every 8 (eight) hours for 10 days 30 capsule 0   • budesonide-formoterol (Symbicort) 160-4 5 mcg/act inhaler Inhale 2 puffs 2 (two) times a day Rinse mouth after use 10 2 g 3   • cholecalciferol (VITAMIN D3) 1,000 units tablet Take 2 tablets (2,000 Units total) by mouth daily 90 tablet 1   • ferrous sulfate 324 (65 Fe) mg Take 1 tablet (324 mg total) by mouth daily before breakfast 90 tablet 1   • fluticasone (FLONASE) 50 mcg/act nasal spray 1 spray into each nostril daily 18 2 mL 1   • latanoprost (XALATAN) 0 005 % ophthalmic solution 1 drop into both eyes at bedtime 2 5 mL 10   • omeprazole (PriLOSEC) 20 mg delayed release capsule Take 1 capsule (20 mg total) by mouth daily before breakfast 30 capsule 5   • sodium chloride (OCEAN) 0 65 % nasal spray 1 spray into each nostril as needed for congestion for up to 12 doses 60 mL 3   • Vitamins A & D (vitamin A & D) ointment Apply topically as needed for dry skin (to face) 45 g 1   • acetaminophen (TYLENOL) 325 mg tablet Take 2 tablets (650 mg total) by mouth every 6 (six) hours as needed for mild pain (Patient not taking: Reported on 12/7/2022)  0   • albuterol (2 5 mg/3 mL) 0 083 % nebulizer solution inhale contents of 1 vial ( 3 milliliters ) in nebulizer by mouth and INTO THE LUNGS every 6 hours if needed for wheezing or shortness of breath 225 mL 1   • atorvastatin (LIPITOR) 40 mg tablet Take 1 tablet (40 mg total) by mouth daily (Patient not taking: Reported on 11/10/2022) 90 tablet 1   • benzonatate (TESSALON PERLES) 100 mg capsule Take 1 capsule (100 mg total) by mouth 3 (three) times a day as needed for cough (Patient not taking: Reported on 5/26/2023) 20 capsule 0   • meclizine (ANTIVERT) 12 5 MG tablet Take 1 tablet (12 5 mg total) by mouth every 8 (eight) hours as needed for dizziness 90 tablet 1   • multivitamin-minerals (CENTRUM ADULTS) tablet Take 1 tablet by mouth daily (Patient not taking: Reported on 12/7/2022)  0     No current facility-administered "medications for this visit      _______________________________________________________________________  Review of Systems   Constitutional: Negative for fatigue and fever  HENT: Positive for postnasal drip, sinus pressure and sinus pain  Negative for congestion  Eyes: Negative for pain, discharge and itching  Respiratory: Negative for cough and shortness of breath  Positive asthma  And   sarcoidosis   Cardiovascular: Negative for chest pain, palpitations and leg swelling  Cad  htn  hyperlipidemia   Gastrointestinal: Negative for abdominal distention and abdominal pain  Pos  gerd   Endocrine: Negative for cold intolerance, heat intolerance and polydipsia  Genitourinary: Negative for dysuria, flank pain and pelvic pain  Musculoskeletal: Negative for arthralgias and back pain  Skin: Negative for rash  Neurological: Negative for dizziness and headaches  Psychiatric/Behavioral: Negative for self-injury, sleep disturbance and suicidal ideas  The patient is not nervous/anxious  Objective:  Vitals:    05/26/23 1255   BP: 140/80   BP Location: Left arm   Patient Position: Sitting   Cuff Size: Standard   Pulse: 100   Resp: 16   Temp: 98 7 °F (37 1 °C)   TempSrc: Temporal   SpO2: 97%   Weight: 75 3 kg (166 lb)   Height: 4' 11\" (1 499 m)     Body mass index is 33 53 kg/m²  Physical Exam  Constitutional:       General: She is not in acute distress  Appearance: Normal appearance  She is obese  She is not ill-appearing, toxic-appearing or diaphoretic  HENT:      Head: Normocephalic  Nose: Nose normal  No congestion  Mouth/Throat:      Mouth: Mucous membranes are moist       Pharynx: Oropharynx is clear  Eyes:      Extraocular Movements: Extraocular movements intact  Pupils: Pupils are equal, round, and reactive to light  Neck:      Vascular: No carotid bruit  Cardiovascular:      Rate and Rhythm: Normal rate and regular rhythm        Pulses: Normal " pulses  Heart sounds: Normal heart sounds  No murmur heard  No gallop  Pulmonary:      Effort: Pulmonary effort is normal  No respiratory distress  Breath sounds: Normal breath sounds  No wheezing, rhonchi or rales  Abdominal:      General: There is no distension  Palpations: Abdomen is soft  There is no mass  Tenderness: There is no abdominal tenderness  Hernia: No hernia is present  Musculoskeletal:      Cervical back: Normal range of motion and neck supple  No rigidity or tenderness  Right lower leg: No edema  Left lower leg: No edema  Lymphadenopathy:      Cervical: No cervical adenopathy  Skin:     Findings: No erythema or rash  Neurological:      General: No focal deficit present  Mental Status: She is alert and oriented to person, place, and time     Psychiatric:         Mood and Affect: Mood normal          Behavior: Behavior normal

## 2023-05-26 NOTE — ASSESSMENT & PLAN NOTE
Asked her  To  Follow up  With  Her  Cardiologist  As  Directed  By them  She  Has   She  Has  No  Sob  Or  Cp  However  She  Says  She is not  Sure  Who  Her  Cardiologist  Was   Will reff her  again

## 2023-07-25 PROBLEM — J01.00 ACUTE NON-RECURRENT MAXILLARY SINUSITIS: Status: RESOLVED | Noted: 2023-05-26 | Resolved: 2023-07-25

## 2023-08-24 ENCOUNTER — TELEPHONE (OUTPATIENT)
Dept: PULMONOLOGY | Facility: CLINIC | Age: 58
End: 2023-08-24

## 2023-08-24 NOTE — TELEPHONE ENCOUNTER
Pt missed her f/u with Dr. Shavon Reyes on 8/24/23 and says that she cannot wait until November for an appt. Says her cough and breathing is really bad. I offered to add her to the wait list but she said that is not good enough. She wants to know if there is anywhere we can squeeze her in ASAP.

## 2023-08-29 ENCOUNTER — OFFICE VISIT (OUTPATIENT)
Dept: PULMONOLOGY | Facility: CLINIC | Age: 58
End: 2023-08-29
Payer: MEDICARE

## 2023-08-29 ENCOUNTER — TELEPHONE (OUTPATIENT)
Dept: ADMINISTRATIVE | Facility: HOSPITAL | Age: 58
End: 2023-08-29

## 2023-08-29 VITALS
SYSTOLIC BLOOD PRESSURE: 108 MMHG | OXYGEN SATURATION: 95 % | HEART RATE: 70 BPM | TEMPERATURE: 98 F | BODY MASS INDEX: 33.87 KG/M2 | WEIGHT: 168 LBS | DIASTOLIC BLOOD PRESSURE: 72 MMHG | HEIGHT: 59 IN

## 2023-08-29 DIAGNOSIS — Z86.2 HISTORY OF SARCOIDOSIS: ICD-10-CM

## 2023-08-29 DIAGNOSIS — G47.33 OSA (OBSTRUCTIVE SLEEP APNEA): Primary | ICD-10-CM

## 2023-08-29 DIAGNOSIS — J84.9 ILD (INTERSTITIAL LUNG DISEASE) (HCC): ICD-10-CM

## 2023-08-29 DIAGNOSIS — J45.40 MODERATE PERSISTENT ASTHMA WITHOUT COMPLICATION: ICD-10-CM

## 2023-08-29 PROCEDURE — 99214 OFFICE O/P EST MOD 30 MIN: CPT | Performed by: INTERNAL MEDICINE

## 2023-08-29 RX ORDER — ALBUTEROL SULFATE 90 UG/1
2 AEROSOL, METERED RESPIRATORY (INHALATION) EVERY 6 HOURS PRN
Qty: 18 G | Refills: 1 | Status: SHIPPED | OUTPATIENT
Start: 2023-08-29

## 2023-08-29 RX ORDER — BUDESONIDE AND FORMOTEROL FUMARATE DIHYDRATE 160; 4.5 UG/1; UG/1
2 AEROSOL RESPIRATORY (INHALATION) 2 TIMES DAILY
Qty: 10.2 G | Refills: 3 | Status: SHIPPED | OUTPATIENT
Start: 2023-08-29

## 2023-08-29 NOTE — PROGRESS NOTES
Assessment/Plan:    ILD (interstitial lung disease) ([address])  Sherman Marrufo had COVID before and her last CT scan showed bilateral groundglass opacities, scarring and bibasilar cystic changes.  Her previous consolidative changes had gotten better and on the last CT scan from September 2021.    Repeat CT scan showed continued scarring as well as features of interstitial lung disease. However it overall looked better. Currently she does not have any significant shortness of breath but has cough.  No wheeze.  Clinical examination her chest auscultation revealed occasional crackles at both lung bases.  No rhonchi. Her previous PFT showed moderate restriction with diffusion defect. Her repeat PFT showed improvement in diffusion capacity as well as total lung capacity. I had a long discussion with her and answered all her questions       Moderate persistent asthma without complication  She has a history of allergies.  Her shortness of breath is better and wheezing is no more present. Asa Lager she has chronic cough with clear phlegm.  Chest auscultation did not reveal any rhonchi.  She has been on treatment with Symbicort regularly and albuterol as needed.  I have advised her to continue with these inhalations for now. KATHY (obstructive sleep apnea)  She has history of disturbed sleep and she wakes up not refreshed. She also has history of snoring and frequent waking up episodes during sleep. On clinical examination she had oropharyngeal crowding. She needs a sleep study for further evaluation and I have reordered the sleep study. I had a long discussion with her and answered all questions. History of sarcoidosis  No history of shortness of breath or joint pain or rashes.  She had mediastinal and hilar lymphadenopathy which had resolved on the previous CT scan          Diagnoses and all orders for this visit:    KATHY (obstructive sleep apnea)  -     Home Study;  Future  -     Diagnostic Sleep Study; Future    ILD (interstitial lung disease) (HCC)    Moderate persistent asthma without complication  -     albuterol (5 mg/mL) 0.5 % nebulizer solution; Take 0.5 mL (2.5 mg total) by nebulization every 6 (six) hours as needed for wheezing or shortness of breath  -     albuterol (ProAir HFA) 90 mcg/act inhaler; Inhale 2 puffs every 6 (six) hours as needed for wheezing or shortness of breath  -     budesonide-formoterol (Symbicort) 160-4.5 mcg/act inhaler; Inhale 2 puffs 2 (two) times a day Rinse mouth after use    History of sarcoidosis          Subjective:      Patient ID: Adis Barlow is a 62 y.o. female. Mrs. Lynette Rhoades came for follow-up for her asthma. She has lung fibrosis from her previous COVID infection. Currently she is on treatment with Symbicort and albuterol. Her exercise tolerance is at least 2 blocks and she does not have any significant wheezing. She has cough with clear phlegm. No weight loss or anorexia. No fever or chills. No hoarseness of voice or dysphagia. No chest pain no palpitations no swelling of feet. Her appetite has been good. I had ordered a sleep study for suspected obstructive sleep apnea but she has not done that yet. Her previous PFT showed mild restriction with moderate diffusion defect. She is not on any oxygen supplementation. The following portions of the patient's history were reviewed and updated as appropriate: allergies, current medications, past family history, past medical history, past social history, past surgical history and problem list.    Review of Systems   Constitutional: Negative for appetite change, chills, fatigue and fever. HENT: Negative for hearing loss, rhinorrhea, sneezing, sore throat and trouble swallowing. Eyes: Negative for visual disturbance. Respiratory: Positive for cough and shortness of breath. Negative for chest tightness and wheezing. Cardiovascular: Negative for chest pain, palpitations and leg swelling. Gastrointestinal: Positive for vomiting. Negative for abdominal pain, constipation, diarrhea and nausea. Genitourinary: Negative for dysuria, frequency and urgency. Musculoskeletal: Positive for arthralgias and back pain. Negative for joint swelling. Skin: Negative for rash. Allergic/Immunologic: Positive for environmental allergies. Neurological: Positive for light-headedness. Negative for dizziness, seizures, syncope and headaches. Psychiatric/Behavioral: Positive for sleep disturbance. Negative for agitation and confusion. The patient is not nervous/anxious. Objective:      /72   Pulse 70   Temp 98 °F (36.7 °C)   Ht 4' 11" (1.499 m)   Wt 76.2 kg (168 lb)   LMP  (LMP Unknown)   SpO2 95%   BMI 33.93 kg/m²          Physical Exam  Vitals reviewed. Constitutional:       General: She is not in acute distress. Appearance: She is not ill-appearing, toxic-appearing or diaphoretic. HENT:      Head: Normocephalic. Mouth/Throat:      Mouth: Mucous membranes are moist.   Eyes:      General: No scleral icterus. Conjunctiva/sclera: Conjunctivae normal.   Cardiovascular:      Rate and Rhythm: Normal rate and regular rhythm. Heart sounds: Normal heart sounds. No murmur heard. Pulmonary:      Effort: Pulmonary effort is normal. No respiratory distress. Breath sounds: No stridor. Rales (occasional crackles at lung bases) present. No wheezing or rhonchi. Chest:      Chest wall: No tenderness. Abdominal:      General: Bowel sounds are normal.      Tenderness: There is no abdominal tenderness. There is no guarding. Musculoskeletal:      Cervical back: No rigidity. Right lower leg: No edema. Left lower leg: No edema. Lymphadenopathy:      Cervical: No cervical adenopathy. Skin:     Coloration: Skin is not jaundiced or pale. Neurological:      Mental Status: She is alert and oriented to person, place, and time.       Gait: Gait normal.   Psychiatric: Mood and Affect: Mood normal.         Behavior: Behavior normal.         Thought Content: Thought content normal.       I Spent 30 minutes of time taking care of this patient with complex medical issues. The majority of this time was spent directly with the patient counseling as well as coordinating care.

## 2023-08-29 NOTE — ASSESSMENT & PLAN NOTE
Ms.Jacqueline Matthew Casanova had COVID before and her last CT scan showed bilateral groundglass opacities, scarring and bibasilar cystic changes.  Her previous consolidative changes had gotten better and on the last CT scan from September 2021.    Repeat CT scan showed continued scarring as well as features of interstitial lung disease. However it overall looked better. Currently she does not have any significant shortness of breath but has cough.  No wheeze.  Clinical examination her chest auscultation revealed occasional crackles at both lung bases.  No rhonchi. Her previous PFT showed moderate restriction with diffusion defect.   Her repeat PFT showed improvement in diffusion capacity as well as total lung capacity. I had a long discussion with her and answered all her questions

## 2023-08-29 NOTE — TELEPHONE ENCOUNTER
Good Afternoon! Patient was order a home study this afternoon and has [email or web address] [facility] care as insurance. This insurance does not cover home study devices. Please place order for in facility sleep study if you feel necessary for pt. Thanks!   Saira Cevallos

## 2023-09-01 NOTE — ASSESSMENT & PLAN NOTE
No history of shortness of breath or joint pain or rashes.  She had mediastinal and hilar lymphadenopathy which had resolved on the previous CT scan

## 2023-09-01 NOTE — ASSESSMENT & PLAN NOTE
She has a history of allergies.  Her shortness of breath is better and wheezing is no more present. Vero Herring she has chronic cough with clear phlegm.  Chest auscultation did not reveal any rhonchi.  She has been on treatment with Symbicort regularly and albuterol as needed.  I have advised her to continue with these inhalations for now.

## 2023-09-01 NOTE — ASSESSMENT & PLAN NOTE
She has history of disturbed sleep and she wakes up not refreshed. She also has history of snoring and frequent waking up episodes during sleep. On clinical examination she had oropharyngeal crowding. She needs a sleep study for further evaluation and I have reordered the sleep study. I had a long discussion with her and answered all questions.

## 2023-11-08 ENCOUNTER — TELEPHONE (OUTPATIENT)
Dept: PULMONOLOGY | Facility: CLINIC | Age: 58
End: 2023-11-08

## 2023-11-08 NOTE — TELEPHONE ENCOUNTER
Pt left v/m to sched appt and to call back #[phone]. Called and v/m is not set up yet. no deformity, full passive and active ROM/BRUISING

## 2024-03-08 ENCOUNTER — HOSPITAL ENCOUNTER (EMERGENCY)
Facility: HOSPITAL | Age: 59
Discharge: HOME | End: 2024-03-08
Attending: EMERGENCY MEDICINE
Payer: MEDICARE

## 2024-03-08 ENCOUNTER — APPOINTMENT (EMERGENCY)
Dept: CT IMAGING | Facility: HOSPITAL | Age: 59
End: 2024-03-08
Payer: MEDICARE

## 2024-03-08 ENCOUNTER — APPOINTMENT (EMERGENCY)
Dept: RADIOLOGY | Facility: HOSPITAL | Age: 59
End: 2024-03-08
Payer: MEDICARE

## 2024-03-08 VITALS
SYSTOLIC BLOOD PRESSURE: 111 MMHG | HEART RATE: 89 BPM | TEMPERATURE: 97.5 F | RESPIRATION RATE: 18 BRPM | DIASTOLIC BLOOD PRESSURE: 77 MMHG | OXYGEN SATURATION: 99 %

## 2024-03-08 DIAGNOSIS — R41.82 ALTERED MENTAL STATUS: Primary | ICD-10-CM

## 2024-03-08 DIAGNOSIS — R11.2 NAUSEA AND VOMITING: ICD-10-CM

## 2024-03-08 DIAGNOSIS — F10.929 ALCOHOL INTOXICATION (HCC): ICD-10-CM

## 2024-03-08 DIAGNOSIS — E87.6 HYPOKALEMIA: ICD-10-CM

## 2024-03-08 DIAGNOSIS — S01.511A LIP LACERATION, INITIAL ENCOUNTER: ICD-10-CM

## 2024-03-08 LAB
ALBUMIN SERPL BCP-MCNC: 4.5 G/DL (ref 3.5–5)
ALP SERPL-CCNC: 88 U/L (ref 34–104)
ALT SERPL W P-5'-P-CCNC: 19 U/L (ref 7–52)
AMMONIA PLAS-SCNC: 46 UMOL/L (ref 18–72)
ANION GAP SERPL CALCULATED.3IONS-SCNC: 9 MMOL/L
APAP SERPL-MCNC: <2 UG/ML (ref 10–20)
AST SERPL W P-5'-P-CCNC: 22 U/L (ref 13–39)
ATRIAL RATE: 75 BPM
BASE EX.OXY STD BLDV CALC-SCNC: 71.2 % (ref 60–80)
BASE EXCESS BLDV CALC-SCNC: -2.6 MMOL/L
BASOPHILS # BLD AUTO: 0.02 THOUSANDS/ÂΜL (ref 0–0.1)
BASOPHILS NFR BLD AUTO: 0 % (ref 0–1)
BILIRUB DIRECT SERPL-MCNC: 0.08 MG/DL (ref 0–0.2)
BILIRUB SERPL-MCNC: 0.45 MG/DL (ref 0.2–1)
BUN SERPL-MCNC: 8 MG/DL (ref 5–25)
CALCIUM SERPL-MCNC: 9.9 MG/DL (ref 8.4–10.2)
CHLORIDE SERPL-SCNC: 102 MMOL/L (ref 96–108)
CK SERPL-CCNC: 626 U/L (ref 26–192)
CO2 SERPL-SCNC: 27 MMOL/L (ref 21–32)
CREAT SERPL-MCNC: 0.74 MG/DL (ref 0.6–1.3)
EOSINOPHIL # BLD AUTO: 0.1 THOUSAND/ÂΜL (ref 0–0.61)
EOSINOPHIL NFR BLD AUTO: 2 % (ref 0–6)
ERYTHROCYTE [DISTWIDTH] IN BLOOD BY AUTOMATED COUNT: 14.4 % (ref 11.6–15.1)
ETHANOL SERPL-MCNC: 236 MG/DL
GFR SERPL CREATININE-BSD FRML MDRD: 89 ML/MIN/1.73SQ M
GLUCOSE SERPL-MCNC: 128 MG/DL (ref 65–140)
HCO3 BLDV-SCNC: 25 MMOL/L (ref 24–30)
HCT VFR BLD AUTO: 39 % (ref 34.8–46.1)
HGB BLD-MCNC: 12.2 G/DL (ref 11.5–15.4)
IMM GRANULOCYTES # BLD AUTO: 0.02 THOUSAND/UL (ref 0–0.2)
IMM GRANULOCYTES NFR BLD AUTO: 0 % (ref 0–2)
LYMPHOCYTES # BLD AUTO: 2.24 THOUSANDS/ÂΜL (ref 0.6–4.47)
LYMPHOCYTES NFR BLD AUTO: 37 % (ref 14–44)
MAGNESIUM SERPL-MCNC: 2.2 MG/DL (ref 1.9–2.7)
MCH RBC QN AUTO: 29.5 PG (ref 26.8–34.3)
MCHC RBC AUTO-ENTMCNC: 31.3 G/DL (ref 31.4–37.4)
MCV RBC AUTO: 94 FL (ref 82–98)
MONOCYTES # BLD AUTO: 0.42 THOUSAND/ÂΜL (ref 0.17–1.22)
MONOCYTES NFR BLD AUTO: 7 % (ref 4–12)
NEUTROPHILS # BLD AUTO: 3.31 THOUSANDS/ÂΜL (ref 1.85–7.62)
NEUTS SEG NFR BLD AUTO: 54 % (ref 43–75)
NRBC BLD AUTO-RTO: 0 /100 WBCS
O2 CT BLDV-SCNC: 13.4 ML/DL
P AXIS: 64 DEGREES
PCO2 BLDV: 55.5 MM HG (ref 42–50)
PH BLDV: 7.27 [PH] (ref 7.3–7.4)
PLATELET # BLD AUTO: 329 THOUSANDS/UL (ref 149–390)
PMV BLD AUTO: 10.5 FL (ref 8.9–12.7)
PO2 BLDV: 43.4 MM HG (ref 35–45)
POTASSIUM SERPL-SCNC: 3.4 MMOL/L (ref 3.5–5.3)
PR INTERVAL: 180 MS
PROT SERPL-MCNC: 7.8 G/DL (ref 6.4–8.4)
QRS AXIS: 43 DEGREES
QRSD INTERVAL: 80 MS
QT INTERVAL: 452 MS
QTC INTERVAL: 504 MS
RBC # BLD AUTO: 4.13 MILLION/UL (ref 3.81–5.12)
SALICYLATES SERPL-MCNC: <5 MG/DL (ref 3–20)
SODIUM SERPL-SCNC: 138 MMOL/L (ref 135–147)
T WAVE AXIS: 57 DEGREES
VENTRICULAR RATE: 75 BPM
WBC # BLD AUTO: 6.11 THOUSAND/UL (ref 4.31–10.16)

## 2024-03-08 PROCEDURE — 85025 COMPLETE CBC W/AUTO DIFF WBC: CPT | Performed by: EMERGENCY MEDICINE

## 2024-03-08 PROCEDURE — 70450 CT HEAD/BRAIN W/O DYE: CPT

## 2024-03-08 PROCEDURE — 96372 THER/PROPH/DIAG INJ SC/IM: CPT

## 2024-03-08 PROCEDURE — 99285 EMERGENCY DEPT VISIT HI MDM: CPT

## 2024-03-08 PROCEDURE — 80179 DRUG ASSAY SALICYLATE: CPT | Performed by: EMERGENCY MEDICINE

## 2024-03-08 PROCEDURE — 80143 DRUG ASSAY ACETAMINOPHEN: CPT | Performed by: EMERGENCY MEDICINE

## 2024-03-08 PROCEDURE — 99291 CRITICAL CARE FIRST HOUR: CPT | Performed by: EMERGENCY MEDICINE

## 2024-03-08 PROCEDURE — 70486 CT MAXILLOFACIAL W/O DYE: CPT

## 2024-03-08 PROCEDURE — 82550 ASSAY OF CK (CPK): CPT | Performed by: EMERGENCY MEDICINE

## 2024-03-08 PROCEDURE — 83735 ASSAY OF MAGNESIUM: CPT | Performed by: EMERGENCY MEDICINE

## 2024-03-08 PROCEDURE — 93005 ELECTROCARDIOGRAM TRACING: CPT

## 2024-03-08 PROCEDURE — 82140 ASSAY OF AMMONIA: CPT | Performed by: EMERGENCY MEDICINE

## 2024-03-08 PROCEDURE — 72125 CT NECK SPINE W/O DYE: CPT

## 2024-03-08 PROCEDURE — 96365 THER/PROPH/DIAG IV INF INIT: CPT

## 2024-03-08 PROCEDURE — 82077 ASSAY SPEC XCP UR&BREATH IA: CPT | Performed by: EMERGENCY MEDICINE

## 2024-03-08 PROCEDURE — 71045 X-RAY EXAM CHEST 1 VIEW: CPT

## 2024-03-08 PROCEDURE — 36415 COLL VENOUS BLD VENIPUNCTURE: CPT | Performed by: EMERGENCY MEDICINE

## 2024-03-08 PROCEDURE — 82805 BLOOD GASES W/O2 SATURATION: CPT | Performed by: EMERGENCY MEDICINE

## 2024-03-08 PROCEDURE — 80076 HEPATIC FUNCTION PANEL: CPT | Performed by: EMERGENCY MEDICINE

## 2024-03-08 PROCEDURE — 96375 TX/PRO/DX INJ NEW DRUG ADDON: CPT

## 2024-03-08 PROCEDURE — 80048 BASIC METABOLIC PNL TOTAL CA: CPT | Performed by: EMERGENCY MEDICINE

## 2024-03-08 RX ORDER — MIDAZOLAM HYDROCHLORIDE 2 MG/2ML
2 INJECTION, SOLUTION INTRAMUSCULAR; INTRAVENOUS ONCE
Status: COMPLETED | OUTPATIENT
Start: 2024-03-08 | End: 2024-03-08

## 2024-03-08 RX ORDER — ONDANSETRON 2 MG/ML
4 INJECTION INTRAMUSCULAR; INTRAVENOUS ONCE
Status: COMPLETED | OUTPATIENT
Start: 2024-03-08 | End: 2024-03-08

## 2024-03-08 RX ORDER — DROPERIDOL 2.5 MG/ML
2.5 INJECTION, SOLUTION INTRAMUSCULAR; INTRAVENOUS ONCE
Status: COMPLETED | OUTPATIENT
Start: 2024-03-08 | End: 2024-03-08

## 2024-03-08 RX ORDER — POTASSIUM CHLORIDE 14.9 MG/ML
20 INJECTION INTRAVENOUS ONCE
Status: COMPLETED | OUTPATIENT
Start: 2024-03-08 | End: 2024-03-08

## 2024-03-08 RX ORDER — DROPERIDOL 2.5 MG/ML
1.25 INJECTION, SOLUTION INTRAMUSCULAR; INTRAVENOUS ONCE
Status: DISCONTINUED | OUTPATIENT
Start: 2024-03-08 | End: 2024-03-08

## 2024-03-08 RX ADMIN — MIDAZOLAM 2 MG: 1 INJECTION INTRAMUSCULAR; INTRAVENOUS at 18:32

## 2024-03-08 RX ADMIN — ONDANSETRON 4 MG: 2 INJECTION INTRAMUSCULAR; INTRAVENOUS at 18:32

## 2024-03-08 RX ADMIN — MIDAZOLAM 2 MG: 1 INJECTION INTRAMUSCULAR; INTRAVENOUS at 17:55

## 2024-03-08 RX ADMIN — DROPERIDOL 2.5 MG: 2.5 INJECTION, SOLUTION INTRAMUSCULAR; INTRAVENOUS at 17:54

## 2024-03-08 RX ADMIN — POTASSIUM CHLORIDE 20 MEQ: 14.9 INJECTION, SOLUTION INTRAVENOUS at 21:39

## 2024-03-08 NOTE — ED PROVIDER NOTES
History  Chief Complaint   Patient presents with    Altered Mental Status     Brought in by EMS, pt found on the floor, pt unable to state events, irritable upon arrival. Pt does have lac on upper lip     58-year-old female previous history of oppositional defiant disorder, sarcoidosis, interstitial lung disease, asthma, anxiety, back pain.  Patient presents for altered mental status.     Reportedly found on the floor somewhere by police.  Ambulance was called.    Patient is irritable and unable to give significant history.  Denies any illicit substance use or alcohol abuse.    She says she does not want any workup but is unwilling to tell me why she does not want a workup and does not recall falling or hitting her head and is angry.      Altered Mental Status  Presenting symptoms: combativeness and confusion        Prior to Admission Medications   Prescriptions Last Dose Informant Patient Reported? Taking?   Vitamins A & D (vitamin A & D) ointment   No No   Sig: Apply topically as needed for dry skin (to face)   acetaminophen (TYLENOL) 325 mg tablet   No No   Sig: Take 2 tablets (650 mg total) by mouth every 6 (six) hours as needed for mild pain   Patient not taking: Reported on 12/7/2022   albuterol (2.5 mg/3 mL) 0.083 % nebulizer solution   No No   Sig: inhale contents of 1 vial ( 3 milliliters ) in nebulizer by mouth and INTO THE LUNGS every 6 hours if needed for wheezing or shortness of breath   albuterol (5 mg/mL) 0.5 % nebulizer solution   No No   Sig: Take 0.5 mL (2.5 mg total) by nebulization every 6 (six) hours as needed for wheezing or shortness of breath   albuterol (ProAir HFA) 90 mcg/act inhaler   No No   Sig: Inhale 2 puffs every 6 (six) hours as needed for wheezing or shortness of breath   atorvastatin (LIPITOR) 40 mg tablet   No No   Sig: Take 1 tablet (40 mg total) by mouth daily   Patient not taking: Reported on 11/10/2022   benzonatate (TESSALON PERLES) 100 mg capsule   No No   Sig: Take 1  capsule (100 mg total) by mouth 3 (three) times a day as needed for cough   Patient not taking: Reported on 2023   budesonide-formoterol (Symbicort) 160-4.5 mcg/act inhaler   No No   Sig: Inhale 2 puffs 2 (two) times a day Rinse mouth after use   cholecalciferol (VITAMIN D3) 1,000 units tablet   No No   Sig: Take 2 tablets (2,000 Units total) by mouth daily   ferrous sulfate 324 (65 Fe) mg   No No   Sig: Take 1 tablet (324 mg total) by mouth daily before breakfast   fluticasone (FLONASE) 50 mcg/act nasal spray   No No   Si spray into each nostril daily   latanoprost (XALATAN) 0.005 % ophthalmic solution   No No   Si drop into both eyes at bedtime   meclizine (ANTIVERT) 12.5 MG tablet   No No   Sig: Take 1 tablet (12.5 mg total) by mouth every 8 (eight) hours as needed for dizziness   multivitamin-minerals (CENTRUM ADULTS) tablet  Self No No   Sig: Take 1 tablet by mouth daily   Patient not taking: Reported on 2022   omeprazole (PriLOSEC) 20 mg delayed release capsule   No No   Sig: Take 1 capsule (20 mg total) by mouth daily before breakfast   sodium chloride (OCEAN) 0.65 % nasal spray   No No   Si spray into each nostril as needed for congestion for up to 12 doses      Facility-Administered Medications: None       Past Medical History:   Diagnosis Date    Anxiety     Asthma     Back pain     Hyperlipidemia     Sarcoidosis     Vertigo        Past Surgical History:   Procedure Laterality Date    MAMMO (HISTORICAL)  2018    IL REPAIR FIRST ABDOMINAL WALL HERNIA N/A 10/25/2022    Procedure: Incarcerated VENTRAL HERNIA REPAIR with mesh;  Surgeon: Robert Bloch, MD;  Location:  MAIN OR;  Service: General       Family History   Problem Relation Age of Onset    Hypertension Mother     Mental illness Mother     Asthma Mother     Sarcoidosis Sister     Colon cancer Brother     Cancer Brother      I have reviewed and agree with the history as documented.    E-Cigarette/Vaping    E-Cigarette Use  Never User      E-Cigarette/Vaping Substances    Nicotine No     THC No     CBD No     Flavoring No     Other No     Unknown No      Social History     Tobacco Use    Smoking status: Never    Smokeless tobacco: Never   Vaping Use    Vaping status: Never Used   Substance Use Topics    Alcohol use: Not Currently     Comment: social    Drug use: Not Currently     Types: Marijuana       Review of Systems   Unable to perform ROS: Acuity of condition   Psychiatric/Behavioral:  Positive for confusion.        Physical Exam  Physical Exam  Vitals and nursing note reviewed.   Constitutional:       General: She is not in acute distress.     Appearance: Normal appearance. She is not ill-appearing.      Comments: Agitated, angry, answering occasional questions.   HENT:      Head:      Comments: Small laceration in the upper lip.  Not through and through.  Not gaping.  Does not cross the vermilion border.    No identifiable dental fractures or laceration to the tongue though patient is not complaining of exam.     Right Ear: External ear normal.      Left Ear: External ear normal.      Nose: Nose normal.      Mouth/Throat:      Mouth: Mucous membranes are moist.   Eyes:      General:         Right eye: No discharge.         Left eye: No discharge.      Conjunctiva/sclera: Conjunctivae normal.   Cardiovascular:      Rate and Rhythm: Normal rate and regular rhythm.      Pulses: Normal pulses.      Heart sounds: No murmur heard.  Pulmonary:      Effort: Pulmonary effort is normal.      Breath sounds: Normal breath sounds.   Abdominal:      General: Abdomen is flat. There is no distension.      Tenderness: There is no abdominal tenderness.   Musculoskeletal:         General: Normal range of motion.      Cervical back: Normal range of motion.   Skin:     General: Skin is warm.      Capillary Refill: Capillary refill takes less than 2 seconds.      Findings: No rash.   Neurological:      General: No focal deficit present.      Cranial  Nerves: No facial asymmetry.      Coordination: Coordination normal.   Psychiatric:         Behavior: Behavior is agitated.      Comments: Confused and agitated         Vital Signs  ED Triage Vitals [03/08/24 1740]   Temperature Pulse Respirations Blood Pressure SpO2   97.5 °F (36.4 °C) 68 18 113/65 94 %      Temp Source Heart Rate Source Patient Position - Orthostatic VS BP Location FiO2 (%)   Oral Monitor Lying Right arm --      Pain Score       --           Vitals:    03/08/24 1915 03/08/24 1945 03/08/24 1957 03/08/24 1959   BP: 125/81 116/71     Pulse: (!) 107 63 93 85   Patient Position - Orthostatic VS:             Visual Acuity      ED Medications  Medications   potassium chloride 20 mEq IVPB (premix) (has no administration in time range)   droperidol (INAPSINE) injection 2.5 mg (2.5 mg Intramuscular Given 3/8/24 1754)   midazolam (VERSED) injection 2 mg (2 mg Intramuscular Given 3/8/24 1755)   ondansetron (ZOFRAN) injection 4 mg (4 mg Intravenous Given 3/8/24 1832)   midazolam (VERSED) injection 2 mg (2 mg Intramuscular Given 3/8/24 1832)       Diagnostic Studies  Results Reviewed       Procedure Component Value Units Date/Time    Magnesium [[id number]]     Lab Status: No result Specimen: Blood     Ammonia [[id number]]  (Normal) Collected: 03/08/24 1818    Lab Status: Final result Specimen: Blood from Arm, Right Updated: 03/08/24 1851     Ammonia 46 umol/L     Salicylate level [[id number]]  (Normal) Collected: 03/08/24 1818    Lab Status: Final result Specimen: Blood from Arm, Right Updated: 03/08/24 1851     Salicylate Lvl <5 mg/dL     Acetaminophen level-If concentration is detectable, please discuss with medical  on call. [[id number]]  (Abnormal) Collected: 03/08/24 1818    Lab Status: Final result Specimen: Blood from Arm, Right Updated: 03/08/24 1851     Acetaminophen Level <2 ug/mL     Basic metabolic panel [[id number]]  (Abnormal) Collected: 03/08/24 1818    Lab Status: Final result Specimen:  Blood from Arm, Right Updated: 03/08/24 1850     Sodium 138 mmol/L      Potassium 3.4 mmol/L      Chloride 102 mmol/L      CO2 27 mmol/L      ANION GAP 9 mmol/L      BUN 8 mg/dL      Creatinine 0.74 mg/dL      Glucose 128 mg/dL      Calcium 9.9 mg/dL      eGFR 89 ml/min/1.73sq m     Narrative:      National Kid[employer] Foundation guidelines for Chronic Kidney Disease (CKD):     Stage 1 with normal or high GFR (GFR > 90 mL/min/1.73 square meters)    Stage 2 Mild CKD (GFR = 60-89 mL/min/1.73 square meters)    Stage 3A Moderate CKD (GFR = 45-59 mL/min/1.73 square meters)    Stage 3B Moderate CKD (GFR = 30-44 mL/min/1.73 square meters)    Stage 4 Severe CKD (GFR = 15-29 mL/min/1.73 square meters)    Stage 5 End Stage CKD (GFR <15 mL/min/1.73 square meters)  Note: GFR calculation is accurate only with a steady state creatinine    Hepatic function panel [[id number]]  (Normal) Collected: 03/08/24 1818    Lab Status: Final result Specimen: Blood from Arm, Right Updated: 03/08/24 1850     Total Bilirubin 0.45 mg/dL      Bilirubin, Direct 0.08 mg/dL      Alkaline Phosphatase 88 U/L      AST 22 U/L      ALT 19 U/L      Total Protein 7.8 g/dL      Albumin 4.5 g/dL     CK [[id number]]  (Abnormal) Collected: 03/08/24 1818    Lab Status: Final result Specimen: Blood from Arm, Right Updated: 03/08/24 1850     Total  U/L     Ethanol [[id number]]  (Abnormal) Collected: 03/08/24 1818    Lab Status: Final result Specimen: Blood from Arm, Right Updated: 03/08/24 1848     Ethanol Lvl 236 mg/dL     Blood gas, venous [[id number]]  (Abnormal) Collected: 03/08/24 1818    Lab Status: Final result Specimen: Blood from Arm, Right Updated: 03/08/24 1833     pH, Mc 7.272     pCO2, Mc 55.5 mm Hg      pO2, Mc 43.4 mm Hg      HCO3, Mc 25.0 mmol/L      Base Excess, Mc -2.6 mmol/L      O2 Content, Mc 13.4 ml/dL      O2 HGB, VENOUS 71.2 %     CBC and differential [[id number]]  (Abnormal) Collected: 03/08/24 9465    Lab Status: Final result  Specimen: Blood from Arm, Right Updated: 03/08/24 1827     WBC 6.11 Thousand/uL      RBC 4.13 Million/uL      Hemoglobin 12.2 g/dL      Hematocrit 39.0 %      MCV 94 fL      MCH 29.5 pg      MCHC 31.3 g/dL      RDW 14.4 %      MPV 10.5 fL      Platelets 329 Thousands/uL      nRBC 0 /100 WBCs      Neutrophils Relative 54 %      Immat GRANS % 0 %      Lymphocytes Relative 37 %      Monocytes Relative 7 %      Eosinophils Relative 2 %      Basophils Relative 0 %      Neutrophils Absolute 3.31 Thousands/µL      Immature Grans Absolute 0.02 Thousand/uL      Lymphocytes Absolute 2.24 Thousands/µL      Monocytes Absolute 0.42 Thousand/µL      Eosinophils Absolute 0.10 Thousand/µL      Basophils Absolute 0.02 Thousands/µL                    XR chest 1 view portable   ED Interpretation by Alexandre Gaming DO (03/08 1937)   Chronic scarring.  No pneumonia.      CT facial bones without contrast   Final Result by Nelly Jc MD (03/08 1944)      No findings of acute traumatic injury to the facial bones.               Workstation performed: [id number]         CT spine cervical without contrast   Final Result by Nelly Jc MD (03/08 1940)      No cervical spine fracture or traumatic malalignment.                  Workstation performed: [id number]         CT head without contrast   Final Result by Nelly Jc MD (03/08 1937)      No acute intracranial abnormality.                  Workstation performed: [id number]                    Procedures  CriticalCare Time    Date/Time: 3/8/2024 9:00 PM    Performed by: Alexandre Gaming DO  Authorized by: Alexandre Gaming DO    Critical care provider statement:     Critical care time (minutes):  30    Critical care start time:  3/8/2024 6:00 PM    Critical care end time:  3/8/2024 6:30 PM    Critical care time was exclusive of:  Separately billable procedures and treating other patients and teaching time    Critical care was necessary to treat or prevent  imminent or life-threatening deterioration of the following conditions: ams.    Critical care was time spent personally by me on the following activities:  Ordering and performing treatments and interventions, ordering and review of laboratory studies, ordering and review of radiographic studies, re-evaluation of patient's condition, examination of patient, blood draw for specimens and evaluation of patient's response to treatment  Comments:      Agitation, altered mental status requiring chemical sedation with 2 medications.           ED Course  ED Course as of 03/08/24 2052   Fri Mar 08, 2024   1746 Agitated. Yelling. Not speaking linearly.  Cannot explain why she would not want workup and does not appear competent to make medical decisions at this point.  Will give one dosage of droperidol and versed to facilitate imaging, bloodwork   1820 Procedure Note: EKG  Date/Time: 03/08/24 6:22 PM   Interpreted by: Alexandre Gaming  Indications / Diagnosis: AMS  ECG reviewed by me, the ED Provider: yes   The EKG demonstrates:  Rhythm: normal sinus 75 bpm  Intervals: normal intervals except   Axis: normal axis  QRS/Blocks: normal QRS  ST Changes: No acute ST Changes, no STD/HARVEY.     1834 Patient had an episode of emesis while on the CT table.  I went to the CAT scan.  Patient is attempting to jump off table, she is attempting to jump out of the bed after remove her back to the bed to bring her back to the room.  Oxygen saturation is normal.  Will hold off momentarily on CT scan until patient is more calm and no longer having emesis.   1836 More calm at this point.  Will get back to CT scan.   1849 ETHANOL(!): 236   1913 28 [place] nasopharyngeal tube placed due to intermittent hypoxia.   1932 Total CK(!): 626  Not greater than 5 x normal   1933 ANION GAP: 9                                             Medical Decision Making  Agitated, angry patient that is unable to tell me what is happening today.    She has a small  laceration to the lip.    Unable to give me any history and denies that she has had any illicit substances or alcohol today.  She is not competent to make medical decisions at this point.    Will evaluate for intracranial bleed, calvarial fracture, facial bone fracture, cervical spine fracture, electrolyte derangement, MYRTLE, rhabdo, arrhythmia.    Patient agitated unwilling to allow evaluation.  Required chemical sedation for evaluation.    CK elevated but not greater than 5 times normal.  Not consistent with rhabdomyolysis.    Laceration does not need to be closed.  Small.    CT of the head and facial bones without significant findings.  ECG without signs of ischemia or arrhythmia.    Patient had an episode of nausea and vomiting while going over to the CAT scanner.  Concern for possible aspiration.    Workup also shows alcoholic intoxication.    Likely the cause of her symptoms.  No intracranial bleed or calvarial fracture.    No significant anion gap to suggest other infectious process or ketosis.    X-ray shows scarring without an obvious pneumonia.  Poor inspiration.    Signed out to oncoming physician pending patient sobriety.    Problems Addressed:  Alcohol intoxication (HCC): acute illness or injury  Altered mental status: acute illness or injury  Hypokalemia: acute illness or injury  Lip laceration, initial encounter: acute illness or injury  Nausea and vomiting: acute illness or injury    Amount and/or Complexity of Data Reviewed  Labs: ordered. Decision-making details documented in ED Course.  Radiology: ordered and independent interpretation performed.    Risk  Prescription drug management.             Disposition  Final diagnoses:   Altered mental status   Hypokalemia   Nausea and vomiting   Alcohol intoxication (HCC)   Lip laceration, initial encounter     Time reflects when diagnosis was documented in both MDM as applicable and the Disposition within this note       Time User Action Codes Description  Comment    3/8/2024  8:08 PM Alexandre Gaming Add [R41.82] Altered mental status     3/8/2024  8:08 PM Alexandre Gaming Add [N17.9] MYRTLE (acute kidney injury) ([facility]ly [facility])     3/8/2024  8:08 PM Alexandre Gaming Remove [N17.9] MYRTLE (acute kidney injury) (Formerly [facility])     3/8/2024  8:08 PM Edson Gamingus Add [E87.6] Hypokalemia     3/8/2024  8:08 PM Edson Gamingus Add [R11.2] Nausea and vomiting     3/8/2024  8:08 PM Edson Gamingus Add [F10.929] Alcohol intoxication ([facility])     3/8/2024  8:50 PM Alexandre Gaming Add [S01.511A] Lip laceration, initial encounter           ED Disposition       None          Follow-up Information       Follow up With Specialties Details Why Contact Info Additional Information    Radha Ryan MD Family Medicine Schedule an appointment as soon as possible for a visit  For re-evaluation as soon as possible [address] [facility] [address] [phone]       [facility] Emergency Department Emergency Medicine  If symptoms worsen [address] [address] [phone] [facility] Emergency Department, [address] [address]            Patient's Medications   Discharge Prescriptions    No medications on file       No discharge procedures on file.    PDMP Review       None            ED Provider  Electronically Signed by             Alexandre Gaming DO  03/08/24 2053       Alexandre Gaming DO  03/08/24 2103

## 2024-03-09 NOTE — ED CARE HANDOFF
Emergency Department Sign Out Note        Signout and transfer of care from my colleague, Dr. Gaming. See Separate Emergency Department note.     The patient, Tamy Kim, was evaluated by the previous provider for agitation, altered mental status, apparent alcohol intoxication.    Labs Reviewed   CBC AND DIFFERENTIAL - Abnormal       Result Value Ref Range Status    WBC 6.11  4.31 - 10.16 Thousand/uL Final    RBC 4.13  3.81 - 5.12 Million/uL Final    Hemoglobin 12.2  11.5 - 15.4 g/dL Final    Hematocrit 39.0  34.8 - 46.1 % Final    MCV 94  82 - 98 fL Final    MCH 29.5  26.8 - 34.3 pg Final    MCHC 31.3 (*) 31.4 - 37.4 g/dL Final    RDW 14.4  11.6 - 15.1 % Final    MPV 10.5  8.9 - 12.7 fL Final    Platelets 329  149 - 390 Thousands/uL Final    nRBC 0  /100 WBCs Final    Neutrophils Relative 54  43 - 75 % Final    Immat GRANS % 0  0 - 2 % Final    Lymphocytes Relative 37  14 - 44 % Final    Monocytes Relative 7  4 - 12 % Final    Eosinophils Relative 2  0 - 6 % Final    Basophils Relative 0  0 - 1 % Final    Neutrophils Absolute 3.31  1.85 - 7.62 Thousands/µL Final    Immature Grans Absolute 0.02  0.00 - 0.20 Thousand/uL Final    Lymphocytes Absolute 2.24  0.60 - 4.47 Thousands/µL Final    Monocytes Absolute 0.42  0.17 - 1.22 Thousand/µL Final    Eosinophils Absolute 0.10  0.00 - 0.61 Thousand/µL Final    Basophils Absolute 0.02  0.00 - 0.10 Thousands/µL Final   BASIC METABOLIC PANEL - Abnormal    Sodium 138  135 - 147 mmol/L Final    Potassium 3.4 (*) 3.5 - 5.3 mmol/L Final    Chloride 102  96 - 108 mmol/L Final    CO2 27  21 - 32 mmol/L Final    ANION GAP 9  mmol/L Final    BUN 8  5 - 25 mg/dL Final    Creatinine 0.74  0.60 - 1.30 mg/dL Final    Comment: Standardized to IDMS reference method    Glucose 128  65 - 140 mg/dL Final    Comment: If the patient is fasting, the ADA then defines impaired fasting glucose as > 100 mg/dL and diabetes as > or equal to 123 mg/dL.    Calcium 9.9  8.4 - 10.2 mg/dL Final     eGFR 89  ml/min/1.73sq m Final    Narrative:     National Kidney Disease Foundation guidelines for Chronic Kidney Disease (CKD):     Stage 1 with normal or high GFR (GFR > 90 mL/min/1.73 square meters)    Stage 2 Mild CKD (GFR = 60-89 mL/min/1.73 square meters)    Stage 3A Moderate CKD (GFR = 45-59 mL/min/1.73 square meters)    Stage 3B Moderate CKD (GFR = 30-44 mL/min/1.73 square meters)    Stage 4 Severe CKD (GFR = 15-29 mL/min/1.73 square meters)    Stage 5 End Stage CKD (GFR <15 mL/min/1.73 square meters)  Note: GFR calculation is accurate only with a steady state creatinine   BLOOD GAS, VENOUS - Abnormal    pH, Mc 7.272 (*) 7.300 - 7.400 Final    pCO2, Mc 55.5 (*) 42.0 - 50.0 mm Hg Final    pO2, Mc 43.4  35.0 - 45.0 mm Hg Final    HCO3, Mc 25.0  24 - 30 mmol/L Final    Base Excess, Mc -2.6  mmol/L Final    O2 Content, Mc 13.4  ml/dL Final    O2 HGB, VENOUS 71.2  60.0 - 80.0 % Final   CK - Abnormal    Total  (*) 26 - 192 U/L Final   MEDICAL ALCOHOL - Abnormal    Ethanol Lvl 236 (*) <10 mg/dL Final   ACETAMINOPHEN LEVEL - Abnormal    Acetaminophen Level <2 (*) 10 - 20 ug/mL Final   HEPATIC FUNCTION PANEL - Normal    Total Bilirubin 0.45  0.20 - 1.00 mg/dL Final    Comment: Use of this assay is not recommended for patients undergoing treatment with eltrombopag due to the potential for falsely elevated results.  N-acetyl-p-benzoquinone imine (metabolite of Acetaminophen) will generate erroneously low results in samples for patients that have taken an overdose of Acetaminophen.    Bilirubin, Direct 0.08  0.00 - 0.20 mg/dL Final    Alkaline Phosphatase 88  34 - 104 U/L Final    AST 22  13 - 39 U/L Final    ALT 19  7 - 52 U/L Final    Comment: Specimen collection should occur prior to Sulfasalazine administration due to the potential for falsely depressed results.     Total Protein 7.8  6.4 - 8.4 g/dL Final    Albumin 4.5  3.5 - 5.0 g/dL Final   AMMONIA - Normal    Ammonia 46  18 - 72 umol/L Final     Comment: Slightly Hemolyzed:Results may be affected.   SALICYLATE LEVEL - Normal    Salicylate Lvl <5  3 - 20 mg/dL Final   MAGNESIUM - Normal    Magnesium 2.2  1.9 - 2.7 mg/dL Final   COMA PANEL    Narrative:     The following orders were created for panel order Coma Panel.  Procedure                               Abnormality         Status                     ---------                               -----------         ------                     Ethanol[[id number]]                      Abnormal            Final result               Salicylate level[[id number]]             Normal              Final result               Acetaminophen level-If c...[[id number]]  Abnormal            Final result                 Please view results for these tests on the individual orders.         Plan for sober reevaluation.  Patient was monitored in the emergency department until clinically sober, ambulating with steady gait, with clear speech, and stable for discharge with friend.                         Procedures  Medical Decision Making  Amount and/or Complexity of Data Reviewed  Labs: ordered.  Radiology: ordered and independent interpretation performed.    Risk  Prescription drug management.            Disposition  Final diagnoses:   Altered mental status   Hypokalemia   Nausea and vomiting   Alcohol intoxication (HCC)   Lip laceration, initial encounter     Time reflects when diagnosis was documented in both MDM as applicable and the Disposition within this note       Time User Action Codes Description Comment    3/8/2024  8:08 PM Alexandre Gaming Add [R41.82] Altered mental status     3/8/2024  8:08 PM Alexandre Gaming Add [N17.9] MYRTLE (acute kidney injury) (HCC)     3/8/2024  8:08 PM Alexandre Gaming Remove [N17.9] MYRTLE (acute kidney injury) (HCC)     3/8/2024  8:08 PM Alexandre Gaming Add [E87.6] Hypokalemia     3/8/2024  8:08 PM Alexandre Gaming Add [R11.2] Nausea and vomiting     3/8/2024  8:08 PM Alexandre Gaming Add [F10.929]  Alcohol intoxication (HCC)     3/8/2024  8:50 PM RonnellchaitanyaAlexandre Add [S01.511A] Lip laceration, initial encounter           ED Disposition       ED Disposition   Discharge    Condition   Stable    Date/Time   Fri Mar 8, 2024 10:50 PM    Comment   Tamy Kim discharge to home/self care.                   Follow-up Information       Follow up With Specialties Details Why Contact Info Additional Information    Radha Ryan MD Family Medicine Schedule an appointment as soon as possible for a visit  For re-evaluation as soon as possible [address] [facility] [address] [phone]       [facility] Emergency Department Emergency Medicine  If symptoms worsen [address] [place] [id number] [phone] [facility] Emergency Department, [address] [place] [id number]          Patient's Medications   Discharge Prescriptions    No medications on file     No discharge procedures on file.       ED Provider  Electronically Signed by     Saurabh Knox MD  03/08/24 2048       Saurabh Knox MD  03/08/24 2145       Saurabh Knox MD  03/08/24 3263

## 2024-03-09 NOTE — DISCHARGE INSTRUCTIONS
Workup today showed no abnormalities on the CT of your head or cervical spine or facial bones.    You have a small laceration to your upper lip.  This should heal on its own over the course of the next few days.  Did not require sutures.  Come back for signs of infection including spreading redness, pus discharge.    Follow up with your primary care doctor.    Workup today showed a mild decrease in your potassium level and alcohol intoxication.          CT BRAIN - WITHOUT CONTRAST     INDICATION:   ams - found down - lip lac.     COMPARISON:  None.     TECHNIQUE:  CT examination of the brain was performed.  Multiplanar 2D reformatted images were created from the source data.     Radiation dose length product (DLP) for this visit:  804 mGy-cm .  This examination, like all CT scans performed in the [facility], was performed utilizing techniques to minimize radiation dose exposure, including the use of iterative   reconstruction and automated exposure control.     IMAGE QUALITY:  Diagnostic.     FINDINGS:     PARENCHYMA:  No intracranial mass, mass effect or midline shift. No CT signs of acute infarction.  No acute parenchymal hemorrhage.     VENTRICLES AND EXTRA-AXIAL SPACES:  Normal for the patient's age.     VISUALIZED ORBITS: Normal visualized orbits.     PARANASAL SINUSES: Normal visualized paranasal sinuses.     CALVARIUM AND EXTRACRANIAL SOFT TISSUES:  Normal.     IMPRESSION:     No acute intracranial abnormality.         CT FACIAL BONES WITHOUT INTRAVENOUS CONTRAST     INDICATION:   fall - altered.     COMPARISON: None.     TECHNIQUE:  Axial CT images were obtained through the facial bones with additional sagittal and coronal reconstructions.     Radiation dose length product (DLP) for this visit:  570 mGy-cm .  This examination, like all CT scans performed in the [facility], was performed utilizing techniques to minimize radiation dose exposure, including the use of iterative    reconstruction and automated exposure control.     IMAGE QUALITY:  Diagnostic.     FINDINGS: Image degradation by motion artifact.     FACIAL BONES:   No facial bone fracture identified.  Normal alignment of the temporomandibular joints.  No lytic or blastic lesion. Mild degenerative changes of right TMJ.     ORBITS:  Orbital globes, optic nerves, and extraocular muscles appear symmetric and normal. There is no evidence of retrobulbar mass, abscess, or hematoma.     SINUSES:  Normal.     SOFT TISSUES:  Normal.     IMPRESSION:     No findings of acute traumatic injury to the facial bones.        CT CERVICAL SPINE - WITHOUT CONTRAST     INDICATION:   fall.     COMPARISON:  None.     TECHNIQUE:  CT examination of the cervical spine was performed without intravenous contrast.  Contiguous axial images were obtained. Multiplanar 2D reformatted images were created from the source data.     Radiation dose length product (DLP) for this visit:  425 mGy-cm .  This examination, like all CT scans performed in the [facility], was performed utilizing techniques to minimize radiation dose exposure, including the use of iterative   reconstruction and automated exposure control.     IMAGE QUALITY:  Diagnostic.     FINDINGS:     ALIGNMENT:  Normal alignment of the cervical spine. No subluxation.     VERTEBRAE:  No fracture.     DEGENERATIVE CHANGES:  No significant cervical degenerative changes are noted.     PREVERTEBRAL AND PARASPINAL SOFT TISSUES: Unremarkable     THORACIC INLET:  Normal.     IMPRESSION:     No cervical spine fracture or traumatic malalignment.

## 2024-03-11 ENCOUNTER — VBI (OUTPATIENT)
Dept: FAMILY MEDICINE CLINIC | Facility: CLINIC | Age: 59
End: 2024-03-11

## 2024-03-11 LAB
ATRIAL RATE: 75 BPM
P AXIS: 64 DEGREES
PR INTERVAL: 180 MS
QRS AXIS: 43 DEGREES
QRSD INTERVAL: 80 MS
QT INTERVAL: 452 MS
QTC INTERVAL: 504 MS
T WAVE AXIS: 57 DEGREES
VENTRICULAR RATE: 75 BPM

## 2024-03-11 PROCEDURE — 93010 ELECTROCARDIOGRAM REPORT: CPT | Performed by: INTERNAL MEDICINE

## 2024-03-11 NOTE — TELEPHONE ENCOUNTER
03/11/24 4:01 PM    Patient contacted post ED visit, outreach attempt made but message could not be left. Additional outreach attempt will be made.     Thank you.  Sim Arroyo MA  [facility]

## 2024-03-12 NOTE — TELEPHONE ENCOUNTER
03/12/24 1:42 PM    Patient contacted post ED visit, outreach attempt made but message could not be left. Additional outreach attempt will be made.     Thank you.  Sim Arroyo MA  [facility]

## 2024-03-13 NOTE — TELEPHONE ENCOUNTER
03/13/24 2:03 PM    Patient contacted post ED visit, outreach attempt made but message could not be left. Additional outreach attempt will be made.     Thank you.  Sim Arroyo MA  [facility]

## 2024-03-18 ENCOUNTER — TELEPHONE (OUTPATIENT)
Age: 59
End: 2024-03-18

## 2024-03-18 NOTE — TELEPHONE ENCOUNTER
Patient called to schedule urgent follow up with Dr. Peck, got her in for 4-3 in [place]. She needs a Lyft set up for this visit.

## 2024-03-18 NOTE — TELEPHONE ENCOUNTER
Pt calling to set up ED fu and wants to have annual well get some things checked.  Warm transferred to office.

## 2024-03-25 ENCOUNTER — TELEPHONE (OUTPATIENT)
Dept: OTHER | Facility: OTHER | Age: 59
End: 2024-03-25

## 2024-03-25 ENCOUNTER — TELEMEDICINE (OUTPATIENT)
Dept: FAMILY MEDICINE CLINIC | Facility: CLINIC | Age: 59
End: 2024-03-25
Payer: MEDICARE

## 2024-03-25 DIAGNOSIS — R40.4 TRANSIENT ALTERATION OF AWARENESS: Primary | ICD-10-CM

## 2024-03-25 DIAGNOSIS — J45.40 MODERATE PERSISTENT ASTHMA WITHOUT COMPLICATION: ICD-10-CM

## 2024-03-25 DIAGNOSIS — H40.89 OTHER GLAUCOMA OF BOTH EYES: ICD-10-CM

## 2024-03-25 PROCEDURE — 99212 OFFICE O/P EST SF 10 MIN: CPT | Performed by: FAMILY MEDICINE

## 2024-03-25 RX ORDER — ALBUTEROL SULFATE 90 UG/1
2 AEROSOL, METERED RESPIRATORY (INHALATION) EVERY 6 HOURS PRN
Qty: 18 G | Refills: 1 | Status: SHIPPED | OUTPATIENT
Start: 2024-03-25

## 2024-03-25 RX ORDER — LATANOPROST 50 UG/ML
SOLUTION/ DROPS OPHTHALMIC
Qty: 2.5 ML | Refills: 10 | Status: SHIPPED | OUTPATIENT
Start: 2024-03-25

## 2024-03-25 RX ORDER — BUDESONIDE AND FORMOTEROL FUMARATE DIHYDRATE 160; 4.5 UG/1; UG/1
2 AEROSOL RESPIRATORY (INHALATION) 2 TIMES DAILY
Qty: 10.2 G | Refills: 3 | Status: SHIPPED | OUTPATIENT
Start: 2024-03-25

## 2024-03-25 NOTE — TELEPHONE ENCOUNTER
Pt is calling bc her  just cancelled her ride to the office, she called bc  they were at the wrong address and she states they did not speak english and couldn't understand her and then cancelled the ride.     Could you please call pt to advise on getting to her appt at 620pm tonight, I tried calling the backline #'s in [facility] but they no longer work.

## 2024-03-25 NOTE — TELEPHONE ENCOUNTER
LM for pt to please call the office to have appt rescheduled, as we cannot set up another RS this evening- they are closed for set-ups after 4:30 pm.

## 2024-03-25 NOTE — TELEPHONE ENCOUNTER
Pt called back. Says she missed a phone call from the office. Says she is hoping to try to get in for appt today since she was told there is no availability until July. Pt says she cannot wait that long because she needs her medications refilled. Please call pt back to discuss. Is there a possibility of even a tele visit for today?

## 2024-03-26 ENCOUNTER — NURSE TRIAGE (OUTPATIENT)
Dept: OTHER | Facility: OTHER | Age: 59
End: 2024-03-26

## 2024-03-26 ENCOUNTER — TELEPHONE (OUTPATIENT)
Age: 59
End: 2024-03-26

## 2024-03-26 NOTE — TELEPHONE ENCOUNTER
Reason for call:   [x] Prior Auth  [] Other:     Caller:  [] Patient  [x] Pharmacy      Medication:  albuterol (5 mg/mL) 0.5 % nebulizer solution     Dose/Frequency: Take 0.5 mL (2.5 mg total) by nebulization every 6 (six) hours as needed for wheezing or shortness of breath     Quantity: 20 mL    Ordering Provider:   [x] PCP/Provider - [place]  [] Speciality/Provider -     Has the patient tried other medications and failed? If failed, which medications did they fail?    [] No   [] Yes -     Is the patient's insurance updated in EPIC?   [] Yes   [] No     Is a copy of the patient's insurance scanned in EPIC?   [x] Yes   [] No

## 2024-03-26 NOTE — ASSESSMENT & PLAN NOTE
Patient could not make it to the visit today because her uber did not come   Video visit could not be established  I  did the  detailed  phone visit  she  says  she was  in the ER  for  altered  mental state  reviewed the ER  records   showing  that  she was  found on the floor  by police  diya alcohol intoxication  was taken to the ER   her  work up was  normal except  for  high ck  chronic  and  mildly low K  this  happened  1  week  ago  currently  she  feels  good   she  ha s multiple  chronic health  conditions   old female previous history of oppositional defiant disorder, sarcoidosis, interstitial lung disease, asthma, anxiety, back pain.she  denies any  complaints  currently  advised  her to eat  a banana  daily  discussed  precautions and  care  advised  her to go to the ER if any  alarming symptoms  occur  she  sounded  calm and  focused   during conversation today no distress  advised her to  schedule  her appointment  soon  also  consult her specialists  if  any  concerns  related to her ongoing medical issues

## 2024-03-26 NOTE — PROGRESS NOTES
Virtual Brief Visit    This Visit is being completed by telephone. The Patient is located at Home and in the following state in which I hold an active license [place]    The patient was identified by name and date of birth. Tamy Kim was informed that this is a telemedicine visit and that the visit is being conducted through Telephone.  My office door was closed. No one else was in the room.  She acknowledged consent and understanding of privacy and security of the video platform. The patient has agreed to participate and understands they can discontinue the visit at any time.    Patient is aware this is a billable service.       Assessment/Plan:    Problem List Items Addressed This Visit          Respiratory    Moderate persistent asthma without complication    Relevant Medications    albuterol (5 mg/mL) 0.5 % nebulizer solution    albuterol (ProAir HFA) 90 mcg/act inhaler    budesonide-formoterol (Symbicort) 160-4.5 mcg/act inhaler       Neurology/Sleep    Transient alteration of awareness - Primary     Patient could not make it to the visit today because her uber did not come   Video visit could not be established  I  did the  detailed  phone visit  she  says  she was  in the ER  for  altered  mental state  reviewed the ER  records   showing  that  she was  found on the floor  by police  diya alcohol intoxication  was taken to the ER   her  work up was  normal except  for  high ck  chronic  and  mildly low K  this  happened  1  week  ago  currently  she  feels  good   she  ha s multiple  chronic health  conditions   old female previous history of oppositional defiant disorder, sarcoidosis, interstitial lung disease, asthma, anxiety, back pain.she  denies any  complaints  currently  advised  her to eat  a banana  daily  discussed  precautions and  care  advised  her to go to the ER if any  alarming symptoms  occur  she  sounded  calm and  focused   during conversation today no distress  advised her to  schedule   her appointment  soon  also  consult her specialists  if  any  concerns  related to her ongoing medical issues             Other Visit Diagnoses       Other glaucoma of both eyes        Relevant Medications    latanoprost (XALATAN) 0.005 % ophthalmic solution            Recent Visits  No visits were found meeting these conditions.  Showing recent visits within past 7 days and meeting all other requirements  Today's Visits  Date Type Provider Dept   03/25/24 Telemedicine Radha Ryan MD  [facility] [place]   Showing today's visits and meeting all other requirements  Future Appointments  No visits were found meeting these conditions.  Showing future appointments within next 150 days and meeting all other requirements         Visit Time  Total Visit Duration: 30 minutes

## 2024-03-26 NOTE — TELEPHONE ENCOUNTER
Regarding: albuterol solution/sent to [facility] on [address]  ----- Message from Cece Slater sent at 3/26/2024  5:44 PM EDT -----  Medication Refill Request     Name albuterol (2.5 mg/3 mL) 0.083 % nebulizer solution      Dose/Frequency 2.5 mg/3 mL/inhale contents of 1 vial ( 3 milliliters ) in nebulizer by mouth and INTO THE LUNGS every 6 hours if needed for wheezing or shortness of breath  Quantity 225 mL  Verified pharmacy   [x ]  Verified ordering Provider   [x ]  Does patient have enough for the next 3 days? Yes [ ] No [x ]

## 2024-03-26 NOTE — TELEPHONE ENCOUNTER
Patient calling stating that she did not hear back from the office. She did not accept the albuterol solution from the pharmacy, because it is not the right way that she gets this solution. She does not have to measure it out-she states that she gets the individual vials.  Please call her back in the morning.

## 2024-03-27 ENCOUNTER — TELEPHONE (OUTPATIENT)
Age: 59
End: 2024-03-27

## 2024-03-27 RX ORDER — IPRATROPIUM BROMIDE AND ALBUTEROL SULFATE 2.5; .5 MG/3ML; MG/3ML
3 SOLUTION RESPIRATORY (INHALATION) 4 TIMES DAILY
Status: CANCELLED | OUTPATIENT
Start: 2024-03-27

## 2024-03-27 NOTE — TELEPHONE ENCOUNTER
Reason for call:   [] Refill   [] Prior Auth  [x] Other:       Pharmacist (Maryjane) called, requesting if provider can send albuterol vials instead of the albuterol neb solution, which is covered by the insurance

## 2024-04-03 ENCOUNTER — OFFICE VISIT (OUTPATIENT)
Dept: PULMONOLOGY | Facility: CLINIC | Age: 59
End: 2024-04-03
Payer: MEDICARE

## 2024-04-03 VITALS
OXYGEN SATURATION: 100 % | TEMPERATURE: 98.2 F | DIASTOLIC BLOOD PRESSURE: 90 MMHG | HEART RATE: 66 BPM | WEIGHT: 166 LBS | HEIGHT: 59 IN | SYSTOLIC BLOOD PRESSURE: 140 MMHG | BODY MASS INDEX: 33.47 KG/M2

## 2024-04-03 DIAGNOSIS — J45.40 MODERATE PERSISTENT ASTHMA WITHOUT COMPLICATION: ICD-10-CM

## 2024-04-03 DIAGNOSIS — J84.9 ILD (INTERSTITIAL LUNG DISEASE) (HCC): Primary | ICD-10-CM

## 2024-04-03 DIAGNOSIS — G47.33 OSA (OBSTRUCTIVE SLEEP APNEA): ICD-10-CM

## 2024-04-03 DIAGNOSIS — Z86.2 HISTORY OF SARCOIDOSIS: ICD-10-CM

## 2024-04-03 PROCEDURE — 99214 OFFICE O/P EST MOD 30 MIN: CPT | Performed by: INTERNAL MEDICINE

## 2024-04-03 RX ORDER — ALBUTEROL SULFATE 2.5 MG/3ML
2.5 SOLUTION RESPIRATORY (INHALATION) EVERY 6 HOURS PRN
Qty: 360 ML | Refills: 1 | Status: SHIPPED | OUTPATIENT
Start: 2024-04-03

## 2024-04-03 NOTE — PROGRESS NOTES
Assessment/Plan:    ILD (interstitial lung disease) ([facility])  Ms.Jacqueline Kim had COVID before and her previous CT scan showed bilateral groundglass opacities, scarring and bibasilar cystic changes.  Her previous consolidative changes had gotten better on the last CT scan from September 2021.   The repeat CT scan showed continued scarring as well as features of interstitial lung disease.  However it looked improved. Currently she has no significant shortness of breath but has cough.  No wheeze.  On clinical examination her chest auscultation revealed occasional crackles at both lung bases. Her previous PFT showed moderate restriction with diffusion defect.  Her repeat PFT showed improvement in diffusion capacity as well as total lung capacity. I had a long discussion with her and answered all her questions     Moderate persistent asthma without complication  She has history of allergies.  Her shortness of breath improved and wheezing resolved.  However she has chronic cough with clear phlegm.  Her chest auscultation did not reveal any rhonchi.  She has been on treatment with Symbicort regularly and albuterol as needed.  I have advised her to continue with these medications for now.  She had previous history of sarcoidosis       KATHY (obstructive sleep apnea)  She has history of disturbed sleep and she wakes up not refreshed.  She also has history of snoring and frequent waking up episodes during sleep.  On clinical examination she had oropharyngeal crowding.  She needs a sleep study for further evaluation and I have reordered the sleep study.  I had a long discussion with her and answered all questions.       History of sarcoidosis  He had no shortness of breath or joint pain or rashes.  She had mediastinal and hilar lymphadenopathy which had resolved on repeat  CT scan        Diagnoses and all orders for this visit:    ILD (interstitial lung disease) ([facility])    Moderate persistent asthma without complication  -      albuterol (2.5 mg/3 mL) 0.083 % nebulizer solution; Take 3 mL (2.5 mg total) by nebulization every 6 (six) hours as needed for wheezing or shortness of breath    History of sarcoidosis    KATHY (obstructive sleep apnea)          Subjective:      Patient ID: Tamy Kim is a 58 y.o. female.    Ms. Tamy Kim came for follow-up for her asthma and interstitial lung disease.  She has bilateral pulmonary fibrosis from her previous COVID.  Currently she is not on any oxygen.  Her exercise tolerance is good.  She has occasional cough with clear phlegm.  No chest pain no palpitations.  She has arthritic pain involving multiple joints which is bothering her.  No fever or chills.  No hoarseness of voice or dysphagia.  She has been on significant regularly and albuterol as needed.  She denied any swelling of feet.  I had ordered a sleep study which is still waiting to be done.  She has previous history of sarcoidosis.        The following portions of the patient's history were reviewed and updated as appropriate: allergies, current medications, past family history, past medical history, past social history, past surgical history, and problem list.    Review of Systems   Constitutional:  Negative for appetite change, chills, fatigue and fever.   HENT:  Negative for hearing loss, rhinorrhea, sneezing, sore throat and trouble swallowing.    Eyes:  Negative for visual disturbance.   Respiratory:  Positive for cough and shortness of breath. Negative for wheezing.    Cardiovascular:  Negative for chest pain, palpitations and leg swelling.   Gastrointestinal:  Positive for nausea. Negative for abdominal pain, constipation, diarrhea and vomiting.   Genitourinary:  Negative for dysuria, frequency and urgency.   Musculoskeletal:  Positive for arthralgias and back pain.   Skin:  Negative for rash.   Allergic/Immunologic: Negative for environmental allergies.   Neurological:  Positive for headaches. Negative for dizziness,  "seizures, syncope and light-headedness.   Psychiatric/Behavioral:  Positive for sleep disturbance. Negative for agitation and confusion. The patient is nervous/anxious.          Objective:      /90 (BP Location: Left arm, Patient Position: Sitting, Cuff Size: Standard)   Pulse 66   Temp 98.2 °F (36.8 °C) (Tympanic)   Ht 4' 11\" (1.499 m)   Wt 75.3 kg (166 lb)   LMP  (LMP Unknown)   SpO2 100%   BMI 33.53 kg/m²          Physical Exam  Vitals reviewed.   Constitutional:       General: She is not in acute distress.     Appearance: She is obese. She is not ill-appearing, toxic-appearing or diaphoretic.   HENT:      Head: Normocephalic.      Mouth/Throat:      Mouth: Mucous membranes are moist.   Eyes:      General: No scleral icterus.     Conjunctiva/sclera: Conjunctivae normal.   Cardiovascular:      Rate and Rhythm: Normal rate.      Heart sounds: Normal heart sounds. No murmur heard.  Pulmonary:      Effort: No respiratory distress.      Breath sounds: No stridor. Rales (occasional crackles back) present. No wheezing or rhonchi.   Abdominal:      General: Bowel sounds are normal.      Palpations: Abdomen is soft.      Tenderness: There is no abdominal tenderness. There is no guarding.   Musculoskeletal:      Cervical back: Neck supple. No rigidity.      Right lower leg: No edema.      Left lower leg: No edema.   Lymphadenopathy:      Cervical: No cervical adenopathy.   Skin:     Coloration: Skin is not jaundiced or pale.      Findings: No rash.   Neurological:      Mental Status: She is alert and oriented to person, place, and time.      Gait: Gait normal.   Psychiatric:         Mood and Affect: Mood normal.         Behavior: Behavior normal.         Thought Content: Thought content normal.         Judgment: Judgment normal.           "

## 2024-04-04 ENCOUNTER — TELEPHONE (OUTPATIENT)
Age: 59
End: 2024-04-04

## 2024-04-04 NOTE — TELEPHONE ENCOUNTER
Provided pt with out of network pain center. Pt looking for medication for pain. I advised pt that our providers do not prescribe pain medication as the first line of treatment. Read office Disclaimer

## 2024-04-04 NOTE — TELEPHONE ENCOUNTER
Pt said she got a referral for spine/ back  But she says she wants a referral for pain management for pain medication. And can Dr. Peck put in a referral for pain management .     Pt appreciate a call back for updates

## 2024-04-07 NOTE — ASSESSMENT & PLAN NOTE
He had no shortness of breath or joint pain or rashes.  She had mediastinal and hilar lymphadenopathy which had resolved on repeat  CT scan

## 2024-04-07 NOTE — ASSESSMENT & PLAN NOTE
She has history of allergies.  Her shortness of breath improved and wheezing resolved.  However she has chronic cough with clear phlegm.  Her chest auscultation did not reveal any rhonchi.  She has been on treatment with Symbicort regularly and albuterol as needed.  I have advised her to continue with these medications for now.  She had previous history of sarcoidosis

## 2024-04-07 NOTE — ASSESSMENT & PLAN NOTE
Ms.Jacqueline Kim had COVID before and her previous CT scan showed bilateral groundglass opacities, scarring and bibasilar cystic changes.  Her previous consolidative changes had gotten better on the last CT scan from September 2021.   The repeat CT scan showed continued scarring as well as features of interstitial lung disease.  However it looked improved. Currently she has no significant shortness of breath but has cough.  No wheeze.  On clinical examination her chest auscultation revealed occasional crackles at both lung bases. Her previous PFT showed moderate restriction with diffusion defect.  Her repeat PFT showed improvement in diffusion capacity as well as total lung capacity. I had a long discussion with her and answered all her questions

## 2024-04-09 ENCOUNTER — NURSE TRIAGE (OUTPATIENT)
Dept: OTHER | Facility: OTHER | Age: 59
End: 2024-04-09

## 2024-04-09 NOTE — TELEPHONE ENCOUNTER
"Reason for Disposition  • MODERATE swelling of both ankles (e.g., swelling extends up to the knees) AND new-onset or worsening    Answer Assessment - Initial Assessment Questions  1. ONSET: \"When did the swelling start?\" (e.g., minutes, hours, days)      Chronic   2. LOCATION: \"What part of the leg is swollen?\"  \"Are both legs swollen or just one leg?\"      Both legs   3. SEVERITY: \"How bad is the swelling?\" (e.g., localized; mild, moderate, severe)   - Localized - small area of swelling localized to one leg   - MILD pedal edema - swelling limited to foot and ankle, pitting edema < 1/4 inch (6 mm) deep, rest and elevation eliminate most or all swelling   - MODERATE edema - swelling of lower leg to knee, pitting edema > 1/4 inch (6 mm) deep, rest and elevation only partially reduce swelling   - SEVERE edema - swelling extends above knee, facial or hand swelling present       Moderate   4. REDNESS: \"Does the swelling look red or infected?\"      Unable to assess.   5. PAIN: \"Is the swelling painful to touch?\" If Yes, ask: \"How painful is it?\"   (Scale 1-10; mild, moderate or severe)      10 out of 10.   7. CAUSE: \"What do you think is causing the leg swelling?\"      Chronic   8. MEDICAL HISTORY: \"Do you have a history of heart failure, kidney disease, liver failure, or cancer?\"      Sarcoidosis   9. RECURRENT SYMPTOM: \"Have you had leg swelling before?\" If Yes, ask: \"When was the last time?\" \"What happened that time?\"      Recurrent   10. OTHER SYMPTOMS: \"Do you have any other symptoms?\" (e.g., chest pain, difficulty breathing)        No   11. PREGNANCY: \"Is there any chance you are pregnant?\" \"When was your last menstrual period?\"        N/A    Protocols used: Leg Swelling and Edema-ADULT-[place]    "

## 2024-04-09 NOTE — TELEPHONE ENCOUNTER
No available appointments for the next week. Patient refused to go to [facility] or ER. Per patient's request, patient confirmed an appointment for 4/17 at 1520.

## 2024-05-07 ENCOUNTER — TELEPHONE (OUTPATIENT)
Dept: PULMONOLOGY | Facility: CLINIC | Age: 59
End: 2024-05-07

## 2024-05-07 NOTE — TELEPHONE ENCOUNTER
Please schedule patient for home study, diagnostic study cancelled/removed. Home study order is in.

## 2024-05-15 ENCOUNTER — TELEPHONE (OUTPATIENT)
Age: 59
End: 2024-05-15

## 2024-05-15 NOTE — TELEPHONE ENCOUNTER
2nd attempt, Pt picked up and said I'm on the phone call later, or I'll call you later then call ended    Need to confirm would like consult with rheum or to follow up with Dr. Peck as he sees her for KATHY and Hx of Sarcoid.

## 2024-07-25 ENCOUNTER — CONSULT (OUTPATIENT)
Dept: RHEUMATOLOGY | Facility: CLINIC | Age: 59
End: 2024-07-25
Payer: MEDICARE

## 2024-07-25 VITALS
HEIGHT: 59 IN | TEMPERATURE: 97.5 F | SYSTOLIC BLOOD PRESSURE: 124 MMHG | WEIGHT: 159 LBS | HEART RATE: 87 BPM | BODY MASS INDEX: 32.05 KG/M2 | OXYGEN SATURATION: 98 % | DIASTOLIC BLOOD PRESSURE: 80 MMHG

## 2024-07-25 DIAGNOSIS — J84.9 ILD (INTERSTITIAL LUNG DISEASE) (HCC): ICD-10-CM

## 2024-07-25 DIAGNOSIS — L81.9 HYPERPIGMENTATION OF SKIN: ICD-10-CM

## 2024-07-25 DIAGNOSIS — M79.18 MYOFASCIAL PAIN SYNDROME, CERVICAL: ICD-10-CM

## 2024-07-25 DIAGNOSIS — D86.9 SARCOIDOSIS: Primary | ICD-10-CM

## 2024-07-25 PROCEDURE — 99204 OFFICE O/P NEW MOD 45 MIN: CPT | Performed by: PHYSICIAN ASSISTANT

## 2024-07-25 NOTE — PROGRESS NOTES
Assessment and Plan:  Ms. Kim is a 59-year-old female with past medical history significant for sarcoidosis, interstitial lung disease, history of pulmonary embolism, iron deficiency anemia, KATHY, viral cardiomyopathy, dysphagia, and GERD who presents for rheumatology evaluation of sarcoidosis.    The patient presents today with symptoms consistent with myofascial pain syndrome specifically of the upper neck and back area as well as some myalgias of the legs at times.  She does not have any signs or symptoms concerning for inflammatory arthritis which would be related to her history of sarcoidosis or other extrapulmonary features of sarcoidosis which would warrant immunosuppression from a rheumatic standpoint.  I have placed referral for physical therapy in the chart to help with the myofascial pain and I have also placed referral per patient request for dermatology due to hyperpigmentation around the eyes which is bothersome to her.    No further rheumatology workup or follow-up needed at this time.  We discussed in detail the signs or symptoms to look out for which would warrant return to rheumatology including stiffness, pain, and/or swelling of the peripheral joints or inflammatory eye manifestations.    Thank you very much for this consultation    Plan:  Diagnoses and all orders for this visit:    Sarcoidosis  -     Ambulatory Referral to Rheumatology    ILD (interstitial lung disease) ([facility])    Hyperpigmentation of skin  -     Ambulatory referral to Dermatology    Myofascial pain syndrome, cervical  -     Ambulatory referral to Physical Therapy        I have personally reviewed prior notes, recent laboratory results, and pertinent films in PACS.     Activities as tolerated.   Exercise: try to maintain a low impact exercise regimen as much as possible. Walk for 30 minutes a day for at least 3 days a week.   Continue other medications as prescribed by PCP and other specialists.         Follow-up plan: As  needed        Chief Complaint  No chief complaint on file.        HPI  Tamy Kim is a 59 y.o.  female who presents as a Rheumatology consult referred by Sophia Torres CRNP for evaluation of sarcoidosis.    The patient complains of pain in the neck and upper back area which is most noticeable at nighttime.  She also sometimes has muscle pains in the thighs as well.    No pain, stiffness, or swelling of the small or other peripheral joints on a routine basis.    + Dry eyes, darkening of the skin around the eyes which she reports is very bothersome to her, intermittent glandular swelling, coughing with white sputum since COVID    History of sarcoidosis in her sister with alleged inflammatory eye manifestation.    Denies fevers, dry mouth, inflammatory eye disease, persistent/recurrent skin rash, psoriasis, photosensitivity, mouth/nose ulcers, pleuritic chest pain, abdominal pain, vomiting, diarrhea, blood in stools, inflammatory bowel disease, blood clots, miscarriages (G0), or Raynaud's symptoms.      Review of Systems  Review of Systems  Constitutional: Negative for weight change, fevers, chills, night sweats   ENT/Mouth: Negative for hearing changes, ear pain, nasal congestion, sinus pain, hoarseness, sore throat, rhinorrhea   Eyes: Negative for pain, redness, discharge, vision changes.   Cardiovascular: Negative for chest pain, SOB, palpitations.   Respiratory: +cough, sputum.  No dyspnea.   Gastrointestinal: + History of dysphagia and GERD negative for nausea, vomiting, diarrhea, constipation, pain   Genitourinary: Negative for dysuria, urinary frequency, hematuria.   Musculoskeletal: As per HPI.  Skin: Negative for skin rash, color changes.   Neuro: Negative for weakness, numbness, tingling, loss of consciousness.   Heme/Lymph: + Intermittent LAD    Allergies  Allergies   Allergen Reactions    Benadryl [Diphenhydramine] Chest Pain    Strawberry (Diagnostic) - Food Allergy Chest Pain    Tegretol  [Carbamazepine] Itching, Rash and Vomiting       Home Medications    Current Outpatient Medications:     acetaminophen (TYLENOL) 325 mg tablet, Take 2 tablets (650 mg total) by mouth every 6 (six) hours as needed for mild pain (Patient not taking: Reported on 12/7/2022), Disp:  , Rfl: 0    albuterol (2.5 mg/3 mL) 0.083 % nebulizer solution, Take 3 mL (2.5 mg total) by nebulization every 6 (six) hours as needed for wheezing or shortness of breath, Disp: 360 mL, Rfl: 1    albuterol (ProAir HFA) 90 mcg/act inhaler, Inhale 2 puffs every 6 (six) hours as needed for wheezing or shortness of breath, Disp: 18 g, Rfl: 1    atorvastatin (LIPITOR) 40 mg tablet, Take 1 tablet (40 mg total) by mouth daily (Patient not taking: Reported on 11/10/2022), Disp: 90 tablet, Rfl: 1    benzonatate (TESSALON PERLES) 100 mg capsule, Take 1 capsule (100 mg total) by mouth 3 (three) times a day as needed for cough (Patient not taking: Reported on 5/26/2023), Disp: 20 capsule, Rfl: 0    budesonide-formoterol (Symbicort) 160-4.5 mcg/act inhaler, Inhale 2 puffs 2 (two) times a day Rinse mouth after use, Disp: 10.2 g, Rfl: 3    cholecalciferol (VITAMIN D3) 1,000 units tablet, Take 2 tablets (2,000 Units total) by mouth daily, Disp: 90 tablet, Rfl: 1    ferrous sulfate 324 (65 Fe) mg, Take 1 tablet (324 mg total) by mouth daily before breakfast, Disp: 90 tablet, Rfl: 1    fluticasone (FLONASE) 50 mcg/act nasal spray, 1 spray into each nostril daily, Disp: 18.2 mL, Rfl: 1    latanoprost (XALATAN) 0.005 % ophthalmic solution, 1 drop into both eyes at bedtime, Disp: 2.5 mL, Rfl: 10    meclizine (ANTIVERT) 12.5 MG tablet, Take 1 tablet (12.5 mg total) by mouth every 8 (eight) hours as needed for dizziness, Disp: 90 tablet, Rfl: 1    multivitamin-minerals (CENTRUM ADULTS) tablet, Take 1 tablet by mouth daily (Patient not taking: Reported on 4/17/2024), Disp: , Rfl: 0    omeprazole (PriLOSEC) 20 mg delayed release capsule, Take 1 capsule (20 mg total)  "by mouth daily before breakfast, Disp: 30 capsule, Rfl: 5    sodium chloride (OCEAN) 0.65 % nasal spray, 1 spray into each nostril as needed for congestion for up to 12 doses, Disp: 60 mL, Rfl: 3    Vitamins A & D (vitamin A & D) ointment, Apply topically as needed for dry skin (to face), Disp: 45 g, Rfl: 1    Past Medical History  Past Medical History:   Diagnosis Date    Anxiety     Asthma     Back pain     Hyperlipidemia     Sarcoidosis     Vertigo        Past Surgical History   Past Surgical History:   Procedure Laterality Date    MAMMO (HISTORICAL)  05/02/2018    NH REPAIR FIRST ABDOMINAL WALL HERNIA N/A 10/25/2022    Procedure: Incarcerated VENTRAL HERNIA REPAIR with mesh;  Surgeon: Robert Bloch, MD;  Location:  MAIN OR;  Service: General       Family History    Family History   Problem Relation Age of Onset    Hypertension Mother     Mental illness Mother     Asthma Mother     Sarcoidosis Sister     Colon cancer Brother     Cancer Brother      Social History  Social History     Substance and Sexual Activity   Alcohol Use Not Currently    Comment: social     Social History     Substance and Sexual Activity   Drug Use Not Currently    Types: Marijuana     Social History     Tobacco Use   Smoking Status Never   Smokeless Tobacco Never       Objective:  Vitals:    07/25/24 0928   BP: 124/80   Pulse: 87   Temp: 97.5 °F (36.4 °C)   SpO2: 98%   Weight: 72.1 kg (159 lb)   Height: 4' 11\" (1.499 m)       Physical Exam  General: Well appearing, well nourished, in no acute distress. Oriented x 3, normal mood and affect.  Ambulating without difficulty.  Skin: Good turgor, no rash, unusual bruising or prominent lesions.  Hair: Normal texture and distribution.  Nails: Normal color, no deformities.  HEENT:  Head: Normocephalic, atraumatic.  Eyes: Conjunctiva clear, sclera non-icteric, EOM intact.  Nose: No external lesions, mucosa non-inflamed.  Mouth: Mucous membranes moist, no mucosal lesions.  Neck: " Supple  Musculoskeletal:   + Tenderness of the supraspinatus muscles and paraspinal muscles of the cervical spine with muscular hypertrophy appreciated  No asymmetry or deformities noted of bilateral upper and lower extremities.  No pain or swelling of joints bilaterally to include: shoulder, elbow, wrist, MCP I-V, PIP I-V, knee, ankle  Neurologic: Alert and oriented. No focal neurological deficits appreciated.   Psychiatric: Normal mood and affect.       Reviewed labs and imaging.    Imaging:   No results found.     Labs:   Admission on 03/08/2024, Discharged on 03/08/2024   Component Date Value Ref Range Status    WBC 03/08/2024 6.11  4.31 - 10.16 Thousand/uL Final    RBC 03/08/2024 4.13  3.81 - 5.12 Million/uL Final    Hemoglobin 03/08/2024 12.2  11.5 - 15.4 g/dL Final    Hematocrit 03/08/2024 39.0  34.8 - 46.1 % Final    MCV 03/08/2024 94  82 - 98 fL Final    MCH 03/08/2024 29.5  26.8 - 34.3 pg Final    MCHC 03/08/2024 31.3 (L)  31.4 - 37.4 g/dL Final    RDW 03/08/2024 14.4  11.6 - 15.1 % Final    MPV 03/08/2024 10.5  8.9 - 12.7 fL Final    Platelets 03/08/2024 329  149 - 390 Thousands/uL Final    nRBC 03/08/2024 0  /100 WBCs Final    Segmented % 03/08/2024 54  43 - 75 % Final    Immature Grans % 03/08/2024 0  0 - 2 % Final    Lymphocytes % 03/08/2024 37  14 - 44 % Final    Monocytes % 03/08/2024 7  4 - 12 % Final    Eosinophils Relative 03/08/2024 2  0 - 6 % Final    Basophils Relative 03/08/2024 0  0 - 1 % Final    Absolute Neutrophils 03/08/2024 3.31  1.85 - 7.62 Thousands/µL Final    Absolute Immature Grans 03/08/2024 0.02  0.00 - 0.20 Thousand/uL Final    Absolute Lymphocytes 03/08/2024 2.24  0.60 - 4.47 Thousands/µL Final    Absolute Monocytes 03/08/2024 0.42  0.17 - 1.22 Thousand/µL Final    Eosinophils Absolute 03/08/2024 0.10  0.00 - 0.61 Thousand/µL Final    Basophils Absolute 03/08/2024 0.02  0.00 - 0.10 Thousands/µL Final    Sodium 03/08/2024 138  135 - 147 mmol/L Final    Potassium 03/08/2024 3.4  (L)  3.5 - 5.3 mmol/L Final    Chloride 03/08/2024 102  96 - 108 mmol/L Final    CO2 03/08/2024 27  21 - 32 mmol/L Final    ANION GAP 03/08/2024 9  mmol/L Final    BUN 03/08/2024 8  5 - 25 mg/dL Final    Creatinine 03/08/2024 0.74  0.60 - 1.30 mg/dL Final    Standardized to IDMS reference method    Glucose 03/08/2024 128  65 - 140 mg/dL Final    If the patient is fasting, the ADA then defines impaired fasting glucose as > 100 mg/dL and diabetes as > or equal to 123 mg/dL.    Calcium 03/08/2024 9.9  8.4 - 10.2 mg/dL Final    eGFR 03/08/2024 89  ml/min/1.73sq m Final    Total Bilirubin 03/08/2024 0.45  0.20 - 1.00 mg/dL Final    Use of this assay is not recommended for patients undergoing treatment with eltrombopag due to the potential for falsely elevated results.  N-acetyl-p-benzoquinone imine (metabolite of Acetaminophen) will generate erroneously low results in samples for patients that have taken an overdose of Acetaminophen.    Bilirubin, Direct 03/08/2024 0.08  0.00 - 0.20 mg/dL Final    Alkaline Phosphatase 03/08/2024 88  34 - 104 U/L Final    AST 03/08/2024 22  13 - 39 U/L Final    ALT 03/08/2024 19  7 - 52 U/L Final    Specimen collection should occur prior to Sulfasalazine administration due to the potential for falsely depressed results.     Total Protein 03/08/2024 7.8  6.4 - 8.4 g/dL Final    Albumin 03/08/2024 4.5  3.5 - 5.0 g/dL Final    Ammonia 03/08/2024 46  18 - 72 umol/L Final    Slightly Hemolyzed:Results may be affected.    pH, Mc 03/08/2024 7.272 (L)  7.300 - 7.400 Final    pCO2, Mc 03/08/2024 55.5 (H)  42.0 - 50.0 mm Hg Final    pO2, Mc 03/08/2024 43.4  35.0 - 45.0 mm Hg Final    HCO3, Mc 03/08/2024 25.0  24 - 30 mmol/L Final    Base Excess, Mc 03/08/2024 -2.6  mmol/L Final    O2 Content, Mc 03/08/2024 13.4  ml/dL Final    O2 HGB, VENOUS 03/08/2024 71.2  60.0 - 80.0 % Final    Total CK 03/08/2024 626 (H)  26 - 192 U/L Final    Ethanol Lvl 03/08/2024 236 (H)  <10 mg/dL Final    Salicylate  Lvl 03/08/2024 <5  3 - 20 mg/dL Final    Acetaminophen Level 03/08/2024 <2 (L)  10 - 20 ug/mL Final    Ventricular Rate 03/08/2024 75  BPM Final    Atrial Rate 03/08/2024 75  BPM Final    NV Interval 03/08/2024 180  ms Final    QRSD Interval 03/08/2024 80  ms Final    QT Interval 03/08/2024 452  ms Final    QTC Interval 03/08/2024 504  ms Final    P Axis 03/08/2024 64  degrees Final    QRS Axis 03/08/2024 43  degrees Final    T Wave [place] 03/08/2024 57  degrees Final    Magnesium 03/08/2024 2.2  1.9 - 2.7 mg/dL Final         Rohan Woods PA-C  Rheumatology

## 2024-10-15 ENCOUNTER — HOSPITAL ENCOUNTER (EMERGENCY)
Facility: HOSPITAL | Age: 59
Discharge: HOME | End: 2024-10-15
Attending: EMERGENCY MEDICINE
Payer: MEDICARE

## 2024-10-15 VITALS
RESPIRATION RATE: 18 BRPM | SYSTOLIC BLOOD PRESSURE: 123 MMHG | DIASTOLIC BLOOD PRESSURE: 71 MMHG | OXYGEN SATURATION: 99 % | TEMPERATURE: 99.2 F | HEART RATE: 86 BPM

## 2024-10-15 DIAGNOSIS — J06.9 ACUTE URI: Primary | ICD-10-CM

## 2024-10-15 LAB
FLUAV RNA RESP QL NAA+PROBE: NEGATIVE
FLUBV RNA RESP QL NAA+PROBE: NEGATIVE
RSV RNA RESP QL NAA+PROBE: NEGATIVE
SARS-COV-2 RNA RESP QL NAA+PROBE: NEGATIVE

## 2024-10-15 PROCEDURE — 0241U HB NFCT DS VIR RESP RNA 4 TRGT: CPT | Performed by: EMERGENCY MEDICINE

## 2024-10-15 PROCEDURE — 99284 EMERGENCY DEPT VISIT MOD MDM: CPT | Performed by: EMERGENCY MEDICINE

## 2024-10-15 PROCEDURE — 99283 EMERGENCY DEPT VISIT LOW MDM: CPT

## 2024-10-15 RX ORDER — ALBUTEROL SULFATE 90 UG/1
2 INHALANT RESPIRATORY (INHALATION) EVERY 4 HOURS PRN
Qty: 18 G | Refills: 0 | Status: SHIPPED | OUTPATIENT
Start: 2024-10-15

## 2024-10-15 RX ORDER — BENZONATATE 100 MG/1
100 CAPSULE ORAL ONCE
Status: COMPLETED | OUTPATIENT
Start: 2024-10-15 | End: 2024-10-15

## 2024-10-15 RX ORDER — ACETAMINOPHEN 325 MG/1
975 TABLET ORAL ONCE
Status: COMPLETED | OUTPATIENT
Start: 2024-10-15 | End: 2024-10-15

## 2024-10-15 RX ADMIN — ACETAMINOPHEN 975 MG: 325 TABLET, FILM COATED ORAL at 21:07

## 2024-10-15 RX ADMIN — BENZONATATE 100 MG: 100 CAPSULE ORAL at 21:07

## 2024-10-16 ENCOUNTER — VBI (OUTPATIENT)
Dept: ADMINISTRATIVE | Facility: OTHER | Age: 59
End: 2024-10-16

## 2024-10-16 NOTE — TELEPHONE ENCOUNTER
10/16/24 10:46 AM    Patient contacted post ED visit, first outreach attempt made. Message was left for patient to return a call to the V[facility] Department at [facility]: Phone [phone].    Thank you.  Courtney Baca  [facility]

## 2024-10-16 NOTE — ED PROVIDER NOTES
Time reflects when diagnosis was documented in both MDM as applicable and the Disposition within this note       Time User Action Codes Description Comment    10/15/2024  9:04 PM Saurabh Knox Add [J06.9] Acute URI           ED Disposition       ED Disposition   Discharge    Condition   Stable    Date/Time   Tue Oct 15, 2024  9:04 PM    Comment   Tamy Kim discharge to home/self care.                   Assessment & Plan       Medical Decision Making  Patient is a 57-year-old female seen in the emergency department with concern for cough, runny nose, headache.  Patient was treated with medication for symptom control.  COVID-19/influenza/RSV swab was ordered in the emergency department, and these tests are pending.  Chest x-ray is not indicated based on evaluation.  Evaluation is not consistent with acute pneumonia or meningitis.  Evaluation is consistent with upper respiratory infection, likely viral in etiology.  Patient requested refill of home albuterol inhaler. Plan to have patient follow up with PCP/outpatient providers.  Patient stable for discharge home.  Discharge instructions were reviewed with patient.    Problems Addressed:  Acute URI: acute illness or injury    Amount and/or Complexity of Data Reviewed  Labs: ordered. Decision-making details documented in ED Course.    Risk  OTC drugs.  Prescription drug management.             Medications   acetaminophen (TYLENOL) tablet 975 mg (975 mg Oral Given 10/15/24 2107)   benzonatate (TESSALON PERLES) capsule 100 mg (100 mg Oral Given 10/15/24 2107)       ED Risk Strat Scores                           SBIRT 20yo+      Flowsheet Row Most Recent Value   Initial Alcohol Screen: US AUDIT-C     1. How often do you have a drink containing alcohol? 0 Filed at: 10/15/2024 2100   2. How many drinks containing alcohol do you have on a typical day you are drinking?  0 Filed at: 10/15/2024 2100   3b. FEMALE Any Age, or MALE 65+: How often do you have 4 or more  drinks on one occassion? 0 Filed at: 10/15/2024 2100   Audit-C Score 0 Filed at: 10/15/2024 2100   SON: How many times in the past year have you...    Used an illegal drug or used a prescription medication for non-medical reasons? Never Filed at: 10/15/2024 2100                            History of Present Illness       Chief Complaint   Patient presents with    Cold Like Symptoms     Pt reports cough, runny nose, headache starting yesterday. No fevers. Denies abd pain, SOB, and trouble breathing.        Past Medical History:   Diagnosis Date    Anxiety     Asthma     Back pain     Hyperlipidemia     Sarcoidosis     Vertigo       Past Surgical History:   Procedure Laterality Date    MAMMO (HISTORICAL)  05/02/2018    [place] REPAIR FIRST ABDOMINAL WALL HERNIA N/A 10/25/2022    Procedure: Incarcerated VENTRAL HERNIA REPAIR with mesh;  Surgeon: Robert Bloch, MD;  Location:  MAIN OR;  Service: General      Family History   Problem Relation Age of Onset    Hypertension Mother     Mental illness Mother     Asthma Mother     Sarcoidosis Sister     Colon cancer Brother     Cancer Brother       Social History     Tobacco Use    Smoking status: Never    Smokeless tobacco: Never   Vaping Use    Vaping status: Never Used   Substance Use Topics    Alcohol use: Not Currently     Comment: social    Drug use: Yes     Types: Marijuana      E-Cigarette/Vaping    E-Cigarette Use Never User       E-Cigarette/Vaping Substances    Nicotine No     THC No     CBD No     Flavoring No     Other No     Unknown No       I have reviewed and agree with the history as documented.     Patient is a 59-year-old female seen in the emergency department with concern for minimally productive cough, runny nose, headache which began 1 day prior to evaluation.  Patient states that her friend was recently ill with similar symptoms.  Patient notes minimal improvement with over-the-counter remedies at home.  Patient notes no fever, chest pain, shortness of  breath, abdominal pain, vomiting, weakness, numbness, tingling.  Patient notes no other definite clear exacerbating or alleviating factors for her symptoms.        Review of Systems   Constitutional:  Negative for chills and fever.   HENT:  Positive for rhinorrhea. Negative for trouble swallowing.    Eyes:  Negative for pain and visual disturbance.   Respiratory:  Positive for cough. Negative for shortness of breath.    Cardiovascular:  Negative for chest pain and palpitations.   Gastrointestinal:  Negative for abdominal pain and vomiting.   Genitourinary:  Negative for decreased urine volume and difficulty urinating.   Musculoskeletal:  Negative for gait problem and neck stiffness.   Skin:  Negative for color change and rash.   Neurological:  Positive for headaches. Negative for seizures and syncope.   Psychiatric/Behavioral:  Negative for agitation and confusion.            Objective       ED Triage Vitals [10/15/24 2058]   Temperature Pulse Blood Pressure Respirations SpO2 Patient Position - Orthostatic VS   99.2 °F (37.3 °C) 86 123/71 18 99 % Sitting      Temp Source Heart Rate Source BP Location FiO2 (%) Pain Score    Oral Monitor Left arm -- No Pain      Vitals      Date and Time Temp Pulse SpO2 Resp BP Pain Score FACES Pain Rating User   10/15/24 2058 99.2 °F (37.3 °C) 86 99 % 18 123/71 No Pain -- MS            Physical Exam  Vitals and nursing note reviewed.   Constitutional:       General: She is not in acute distress.     Appearance: She is well-developed.   HENT:      Head: Normocephalic and atraumatic.      Right Ear: External ear normal.      Left Ear: External ear normal.      Nose: Nose normal.      Mouth/Throat:      Pharynx: Oropharynx is clear.   Eyes:      General: No scleral icterus.     Conjunctiva/sclera: Conjunctivae normal.   Cardiovascular:      Rate and Rhythm: Normal rate and regular rhythm.      Heart sounds: No murmur heard.  Pulmonary:      Effort: Pulmonary effort is normal. No  respiratory distress.      Breath sounds: Normal breath sounds.   Abdominal:      General: Abdomen is flat. There is no distension.   Musculoskeletal:         General: No deformity or signs of injury.      Cervical back: Normal range of motion and neck supple.   Skin:     General: Skin is warm and dry.   Neurological:      General: No focal deficit present.      Mental Status: She is alert.      Cranial Nerves: No cranial nerve deficit.      Sensory: No sensory deficit.   Psychiatric:         Mood and Affect: Mood normal.         Thought Content: Thought content normal.         Results Reviewed       Procedure Component Value Units Date/Time    COVID19, Influenza A/B, RSV PCR, [facility] [[id number]] Collected: 10/15/24 2101    Lab Status: In process Specimen: Nares from Nose Updated: 10/15/24 2104            No orders to display       Procedures    ED Medication and Procedure Management   Prior to Admission Medications   Prescriptions Last Dose Informant Patient Reported? Taking?   Vitamins A & D (vitamin A & D) ointment   No No   Sig: Apply topically as needed for dry skin (to face)   acetaminophen (TYLENOL) 325 mg tablet   No No   Sig: Take 2 tablets (650 mg total) by mouth every 6 (six) hours as needed for mild pain   Patient not taking: Reported on 12/7/2022   albuterol (2.5 mg/3 mL) 0.083 % nebulizer solution   No No   Sig: Take 3 mL (2.5 mg total) by nebulization every 6 (six) hours as needed for wheezing or shortness of breath   albuterol (ProAir HFA) 90 mcg/act inhaler   No No   Sig: Inhale 2 puffs every 6 (six) hours as needed for wheezing or shortness of breath   atorvastatin (LIPITOR) 40 mg tablet   No No   Sig: Take 1 tablet (40 mg total) by mouth daily   Patient not taking: Reported on 11/10/2022   benzonatate (TESSALON PERLES) 100 mg capsule   No No   Sig: Take 1 capsule (100 mg total) by mouth 3 (three) times a day as needed for cough   Patient not taking: Reported on 5/26/2023   budesonide-formoterol  (Symbicort) 160-4.5 mcg/act inhaler   No No   Sig: Inhale 2 puffs 2 (two) times a day Rinse mouth after use   cholecalciferol (VITAMIN D3) 1,000 units tablet   No No   Sig: Take 2 tablets (2,000 Units total) by mouth daily   ferrous sulfate 324 (65 Fe) mg   No No   Sig: Take 1 tablet (324 mg total) by mouth daily before breakfast   fluticasone (FLONASE) 50 mcg/act nasal spray   No No   Si spray into each nostril daily   latanoprost (XALATAN) 0.005 % ophthalmic solution   No No   Si drop into both eyes at bedtime   meclizine (ANTIVERT) 12.5 MG tablet   No No   Sig: Take 1 tablet (12.5 mg total) by mouth every 8 (eight) hours as needed for dizziness   multivitamin-minerals (CENTRUM ADULTS) tablet  Self No No   Sig: Take 1 tablet by mouth daily   Patient not taking: Reported on 2024   omeprazole (PriLOSEC) 20 mg delayed release capsule   No No   Sig: Take 1 capsule (20 mg total) by mouth daily before breakfast   sodium chloride (OCEAN) 0.65 % nasal spray   No No   Si spray into each nostril as needed for congestion for up to 12 doses      Facility-Administered Medications: None     Patient's Medications   Discharge Prescriptions    ALBUTEROL (PROAIR HFA) 90 MCG/ACT INHALER    Inhale 2 puffs every 4 (four) hours as needed for wheezing or shortness of breath Dispense with spacer.  Use with spacer.       Start Date: 10/15/2024End Date: --       Order Dose: 2 puffs       Quantity: 18 g    Refills: 0     No discharge procedures on file.  ED SEPSIS DOCUMENTATION   Time reflects when diagnosis was documented in both MDM as applicable and the Disposition within this note       Time User Action Codes Description Comment    10/15/2024  9:04 PM Saurabh Knox Add [J06.9] Acute URI                  Saurabh Knox MD  10/15/24 5677

## 2024-10-16 NOTE — LETTER
[facility]  [address]  [place] [address]    Date: 10/18/24    Tamy Kim  [address]   [address]  [place] [address]    Dear Tamy:                                                                                                                                Thank you for choosing [facility] emergency department for care.  Your primary care provider wants to make sure that your ongoing medical care is being addressed. If you require follow up care as a result of your emergency department visit, there are a few things the practice would like you to know.                As part of the network's continuing commitment to caring for our patients, we have added more same day appointments and have extended office hours to meet your medical needs. After hours, on-call physicians are available via your primary care provider's main office line.               We encourage you to contact our office prior to seeking treatment to discuss your symptoms with the medical staff.  Together, we can determine the correct course of action.  A majority of non-emergent conditions such as: common cold, flu-like symptoms, fevers, strains/sprains, dislocations, minor burns, cuts and animal bites can be treated at [facility] facilities. Diagnostic testing is available at some sites.               Of course, if you are experiencing a life threatening medical emergency call 911 or proceed directly to the nearest emergency room.    Your nearest [facility] facility is conveniently located at:    [address] [address]  [phone]  SKIP THE WAIT  Conveniently offered at most [facility] locations  Tar Heel your spot online at [email or web address]/locations or on the [facility]    Sincerely,    [facility]  No information on file.

## 2024-10-17 NOTE — TELEPHONE ENCOUNTER
10/17/24 11:47 AM    Patient contacted post ED visit, second outreach attempt made. Message was left for patient to return a call to the VBI Department at [facility]: Phone [phone].    Thank you.  Courtney Baca  [facility]

## 2024-10-18 NOTE — TELEPHONE ENCOUNTER
10/18/24 12:34 PM    Patient contacted post ED visit, third outreach attempt made. Message was left for patient to return a call to either the VBI Department at [facility]: Phone [phone] or the PCP office.     Thank you.  Courtney Baca  [facility]

## 2024-12-08 ENCOUNTER — APPOINTMENT (EMERGENCY)
Dept: RADIOLOGY | Facility: HOSPITAL | Age: 59
End: 2024-12-08
Payer: MEDICARE

## 2024-12-08 ENCOUNTER — HOSPITAL ENCOUNTER (EMERGENCY)
Facility: HOSPITAL | Age: 59
Discharge: HOME | End: 2024-12-08
Attending: INTERNAL MEDICINE
Payer: MEDICARE

## 2024-12-08 VITALS
HEART RATE: 88 BPM | WEIGHT: 159.83 LBS | TEMPERATURE: 98.3 F | OXYGEN SATURATION: 98 % | HEIGHT: 59 IN | DIASTOLIC BLOOD PRESSURE: 88 MMHG | RESPIRATION RATE: 16 BRPM | BODY MASS INDEX: 32.22 KG/M2 | SYSTOLIC BLOOD PRESSURE: 137 MMHG

## 2024-12-08 DIAGNOSIS — M54.9 BACK PAIN: Primary | ICD-10-CM

## 2024-12-08 PROCEDURE — 71045 X-RAY EXAM CHEST 1 VIEW: CPT

## 2024-12-08 PROCEDURE — 72100 X-RAY EXAM L-S SPINE 2/3 VWS: CPT

## 2024-12-08 PROCEDURE — 99284 EMERGENCY DEPT VISIT MOD MDM: CPT | Performed by: INTERNAL MEDICINE

## 2024-12-08 PROCEDURE — 99284 EMERGENCY DEPT VISIT MOD MDM: CPT

## 2024-12-08 RX ORDER — NAPROXEN 250 MG/1
500 TABLET ORAL ONCE
Status: COMPLETED | OUTPATIENT
Start: 2024-12-08 | End: 2024-12-08

## 2024-12-08 RX ORDER — CYCLOBENZAPRINE HCL 10 MG
10 TABLET ORAL ONCE
Status: DISCONTINUED | OUTPATIENT
Start: 2024-12-08 | End: 2024-12-08 | Stop reason: HOSPADM

## 2024-12-08 RX ORDER — CYCLOBENZAPRINE HCL 10 MG
10 TABLET ORAL 2 TIMES DAILY PRN
Qty: 20 TABLET | Refills: 0 | Status: SHIPPED | OUTPATIENT
Start: 2024-12-08

## 2024-12-08 RX ADMIN — NAPROXEN 500 MG: 250 TABLET ORAL at 08:43

## 2024-12-08 NOTE — ED PROVIDER NOTES
Time reflects when diagnosis was documented in both MDM as applicable and the Disposition within this note       Time User Action Codes Description Comment    12/8/2024  9:13 AM Emely Graff Add [M54.9] Back pain           ED Disposition       ED Disposition   Discharge    Condition   Stable    Date/Time   Sun Dec 8, 2024  9:13 AM    Comment   Tamy Kim discharge to home/self care.                   Assessment & Plan       Medical Decision Making  This is a 59 years old came for having for few months.  Patient states that she take Tylenol or Motrin which does not help too much.  Patient was requesting Tylenol was called the evening of oxycodone for the back pain.  Patient has history of sarcoidosis and asthma.  Discussed with the patient that I am going to give her NSAID and then can give her naproxen she refused to take it and she stated that it does not work because he ran nothing work except Tylenol with codeine.  Offered muscle relaxant she refused to take it.  On the exam; Examination of the back has mild tenderness at the lower lumbar spine.  Bilateral SLR test is normal and full.  Patient gait is normal.  Examination of follow-up extremity is no numbness or tingling.  Neurovascular intact.  CXR shows no acute cardiopulmonary findings.  X-ray lumbar spine shows no acute osseous abnormalities.  Case discussed with the patient informed her about the back pain.  And again she asked for narcotics I told him not can give her narcotics.  I cannot give him NSAID patient declined to take any prescription for NSAID but she agreed to have muscle relaxant prescription.  Discharged home follow-up with her MD.      Amount and/or Complexity of Data Reviewed  Radiology: ordered and independent interpretation performed.     Details: CXR shows no acute cardiopulmonary findings.  X-ray lumbar spine shows no acute osseous abnormalities.      Risk  Prescription drug management.             Medications  "  cyclobenzaprine (FLEXERIL) tablet 10 mg (10 mg Oral Not Given 12/8/24 0845)   naproxen (NAPROSYN) tablet 500 mg (500 mg Oral Given 12/8/24 0843)       ED Risk Strat Scores                                               History of Present Illness       Chief Complaint   Patient presents with    Back Pain     \"I think my sarcoidosis is flaring up and attacking other organs in my body\" chronic, but worsening       Past Medical History:   Diagnosis Date    Anxiety     Asthma     Back pain     Hyperlipidemia     Sarcoidosis     Vertigo       Past Surgical History:   Procedure Laterality Date    MAMMO (HISTORICAL)  05/02/2018    NE REPAIR FIRST ABDOMINAL WALL HERNIA N/A 10/25/2022    Procedure: Incarcerated VENTRAL HERNIA REPAIR with mesh;  Surgeon: Robert Bloch, MD;  Location:  MAIN OR;  Service: General      Family History   Problem Relation Age of Onset    Hypertension Mother     Mental illness Mother     Asthma Mother     Sarcoidosis Sister     Colon cancer Brother     Cancer Brother       Social History     Tobacco Use    Smoking status: Never    Smokeless tobacco: Never   Vaping Use    Vaping status: Never Used   Substance Use Topics    Alcohol use: Not Currently     Comment: social    Drug use: Yes     Types: Marijuana      E-Cigarette/Vaping    E-Cigarette Use Never User       E-Cigarette/Vaping Substances    Nicotine No     THC No     CBD No     Flavoring No     Other No     Unknown No       I have reviewed and agree with the history as documented.     This is a 59 years old came for having back pain.  Patient stated that she has this for few months.  Patient stated that usually give her oxycodone or Tylenol with codeine to help with the pain.  Patient requesting oxycodone for the pain.  Patient denies any fall or trauma.  Patient denies numbness or tingling of lower extremities.  Patient denies any urinary or rectal symptoms.  Patient has history of sarcoidosis and asthma.  Patient denies CP, SOB, abdominal " pain, nausea vomiting.  Patient came walking in no distress sitting comfortably at the ER.  Patient has no red flags for her back pain.  Patient stated that she does not take any medication for sarcoidosis as she stated there is no treatment for it.  Patient stated that she take Tylenol or Motrin for the pain but she wanted to have Tylenol with codeine.      History provided by:  Patient   used: No    Back Pain  Location:  Lumbar spine  Quality:  Aching  Radiates to:  Does not radiate  Pain severity:  Moderate  Onset quality:  Unable to specify  Duration:  4 months  Progression:  Worsening  Chronicity:  Chronic  Relieved by:  Nothing  Worsened by:  Nothing  Associated symptoms: no abdominal pain, no chest pain, no dysuria and no fever        Review of Systems   Constitutional:  Negative for chills and fever.   HENT:  Negative for ear pain and sore throat.    Eyes:  Negative for pain and visual disturbance.   Respiratory:  Negative for cough and shortness of breath.    Cardiovascular:  Negative for chest pain and palpitations.   Gastrointestinal:  Negative for abdominal pain and vomiting.   Genitourinary:  Negative for dysuria and hematuria.   Musculoskeletal:  Positive for back pain. Negative for arthralgias.   Skin:  Negative for color change and rash.   Neurological:  Negative for seizures and syncope.   All other systems reviewed and are negative.          Objective       ED Triage Vitals [12/08/24 0800]   Temperature Pulse Blood Pressure Respirations SpO2 Patient Position - Orthostatic VS   98.3 °F (36.8 °C) 88 137/88 16 98 % Lying      Temp Source Heart Rate Source BP Location FiO2 (%) Pain Score    Oral Monitor Left arm -- 10 - Worst Possible Pain      Vitals      Date and Time Temp Pulse SpO2 Resp BP Pain Score FACES Pain Rating User   12/08/24 0843 -- -- -- -- -- 10 - Worst Possible Pain -- EJN   12/08/24 0800 98.3 °F (36.8 °C) 88 98 % 16 137/88 10 - Worst Possible Pain -- EJN             Physical Exam  Vitals and nursing note reviewed.   Constitutional:       General: She is not in acute distress.     Appearance: She is well-developed.   HENT:      Head: Normocephalic and atraumatic.      Right Ear: Tympanic membrane and ear canal normal.      Left Ear: Tympanic membrane and ear canal normal.      Nose: Nose normal. No congestion or rhinorrhea.      Mouth/Throat:      Mouth: Mucous membranes are moist.      Pharynx: No oropharyngeal exudate or posterior oropharyngeal erythema.   Eyes:      Conjunctiva/sclera: Conjunctivae normal.   Cardiovascular:      Rate and Rhythm: Normal rate and regular rhythm.      Heart sounds: No murmur heard.  Pulmonary:      Effort: Pulmonary effort is normal. No respiratory distress.      Breath sounds: Normal breath sounds.   Abdominal:      General: Abdomen is flat. There is no distension.      Palpations: Abdomen is soft.      Tenderness: There is no abdominal tenderness. There is no right CVA tenderness or guarding.   Musculoskeletal:         General: No swelling, tenderness, deformity or signs of injury.      Cervical back: Neck supple.      Right lower leg: No edema.      Left lower leg: No edema.      Comments: Examination of the back has mild tenderness at the lower lumbar spine.  Bilateral SLR test is normal and full.  Patient gait is normal.  Examination of follow-up extremity is no numbness or tingling.  Neurovascular intact.   Skin:     General: Skin is warm and dry.      Capillary Refill: Capillary refill takes less than 2 seconds.      Coloration: Skin is not jaundiced or pale.      Findings: No bruising, erythema, lesion or rash.   Neurological:      Mental Status: She is alert and oriented to person, place, and time.   Psychiatric:         Mood and Affect: Mood normal.         Results Reviewed       None            XR chest 1 view portable   ED Interpretation by Emely Graff MD (12/08 0907)   Cxr; no acute cardiopulmonary findings.      XR  spine lumbar 2 or 3 views injury   ED Interpretation by Emely Graff MD ([id number])   XR Lumbar spine; no fracture, subluxation ,no acute osseous abnormalities.            Procedures    ED Medication and Procedure Management   Prior to Admission Medications   Prescriptions Last Dose Informant Patient Reported? Taking?   Vitamins A & D (vitamin A & D) ointment   No No   Sig: Apply topically as needed for dry skin (to face)   acetaminophen (TYLENOL) 325 mg tablet   No No   Sig: Take 2 tablets (650 mg total) by mouth every 6 (six) hours as needed for mild pain   Patient not taking: Reported on 2022   albuterol (2.5 mg/3 mL) 0.083 % nebulizer solution   No No   Sig: Take 3 mL (2.5 mg total) by nebulization every 6 (six) hours as needed for wheezing or shortness of breath   albuterol (ProAir HFA) 90 mcg/act inhaler   No No   Sig: Inhale 2 puffs every 6 (six) hours as needed for wheezing or shortness of breath   albuterol (ProAir HFA) 90 mcg/act inhaler   No No   Sig: Inhale 2 puffs every 4 (four) hours as needed for wheezing or shortness of breath Dispense with spacer.  Use with spacer.   atorvastatin (LIPITOR) 40 mg tablet   No No   Sig: Take 1 tablet (40 mg total) by mouth daily   Patient not taking: Reported on 11/10/2022   benzonatate (TESSALON PERLES) 100 mg capsule   No No   Sig: Take 1 capsule (100 mg total) by mouth 3 (three) times a day as needed for cough   Patient not taking: Reported on 2023   budesonide-formoterol (Symbicort) 160-4.5 mcg/act inhaler   No No   Sig: Inhale 2 puffs 2 (two) times a day Rinse mouth after use   cholecalciferol (VITAMIN D3) 1,000 units tablet   No No   Sig: Take 2 tablets (2,000 Units total) by mouth daily   ferrous sulfate 324 (65 Fe) mg   No No   Sig: Take 1 tablet (324 mg total) by mouth daily before breakfast   fluticasone (FLONASE) 50 mcg/act nasal spray   No No   Si spray into each nostril daily   latanoprost (XALATAN) 0.005 % ophthalmic solution   No No    Si drop into both eyes at bedtime   meclizine (ANTIVERT) 12.5 MG tablet   No No   Sig: Take 1 tablet (12.5 mg total) by mouth every 8 (eight) hours as needed for dizziness   multivitamin-minerals (CENTRUM ADULTS) tablet  Self No No   Sig: Take 1 tablet by mouth daily   Patient not taking: Reported on 2024   omeprazole (PriLOSEC) 20 mg delayed release capsule   No No   Sig: Take 1 capsule (20 mg total) by mouth daily before breakfast   sodium chloride (OCEAN) 0.65 % nasal spray   No No   Si spray into each nostril as needed for congestion for up to 12 doses      Facility-Administered Medications: None     Discharge Medication List as of 2024  9:14 AM        START taking these medications    Details   cyclobenzaprine (FLEXERIL) 10 mg tablet Take 1 tablet (10 mg total) by mouth 2 (two) times a day as needed for muscle spasms, Starting Sun 2024, Normal           CONTINUE these medications which have NOT CHANGED    Details   acetaminophen (TYLENOL) 325 mg tablet Take 2 tablets (650 mg total) by mouth every 6 (six) hours as needed for mild pain, Starting 2021, No Print      albuterol (2.5 mg/3 mL) 0.083 % nebulizer solution Take 3 mL (2.5 mg total) by nebulization every 6 (six) hours as needed for wheezing or shortness of breath, Starting Wed 4/3/2024, Normal      !! albuterol (ProAir HFA) 90 mcg/act inhaler Inhale 2 puffs every 6 (six) hours as needed for wheezing or shortness of breath, Starting Mon 3/25/2024, Normal      !! albuterol (ProAir HFA) 90 mcg/act inhaler Inhale 2 puffs every 4 (four) hours as needed for wheezing or shortness of breath Dispense with spacer.  Use with spacer., Starting Tue 10/15/2024, Normal      atorvastatin (LIPITOR) 40 mg tablet Take 1 tablet (40 mg total) by mouth daily, Starting Tue 10/4/2022, Until 2023, Normal      benzonatate (TESSALON PERLES) 100 mg capsule Take 1 capsule (100 mg total) by mouth 3 (three) times a day as needed for cough,  Starting Mon 2/27/2023, Normal      budesonide-formoterol (Symbicort) 160-4.5 mcg/act inhaler Inhale 2 puffs 2 (two) times a day Rinse mouth after use, Starting Mon 3/25/2024, Normal      cholecalciferol (VITAMIN D3) 1,000 units tablet Take 2 tablets (2,000 Units total) by mouth daily, Starting Mon 2/27/2023, Until Wed 4/17/2024, Normal      ferrous sulfate 324 (65 Fe) mg Take 1 tablet (324 mg total) by mouth daily before breakfast, Starting Tue 10/4/2022, Until Wed 4/17/2024, Normal      fluticasone (FLONASE) 50 mcg/act nasal spray 1 spray into each nostril daily, Starting Tue 10/4/2022, Until Wed 4/17/2024, Normal      latanoprost (XALATAN) 0.005 % ophthalmic solution 1 drop into both eyes at bedtime, Normal      meclizine (ANTIVERT) 12.5 MG tablet Take 1 tablet (12.5 mg total) by mouth every 8 (eight) hours as needed for dizziness, Starting Mon 2/27/2023, Until Wed 4/17/2024 at 2359, Normal      multivitamin-minerals (CENTRUM ADULTS) tablet Take 1 tablet by mouth daily, Starting Tue 3/1/2022, No Print      omeprazole (PriLOSEC) 20 mg delayed release capsule Take 1 capsule (20 mg total) by mouth daily before breakfast, Starting Fri 5/26/2023, Until Wed 4/17/2024, Normal      sodium chloride (OCEAN) 0.65 % nasal spray 1 spray into each nostril as needed for congestion for up to 12 doses, Starting Mon 2/27/2023, Normal      Vitamins A & D (vitamin A & D) ointment Apply topically as needed for dry skin (to face), Starting Mon 2/27/2023, Until Wed 4/17/2024 at 2359, Normal       !! - Potential duplicate medications found. Please discuss with provider.        No discharge procedures on file.  ED SEPSIS DOCUMENTATION   Time reflects when diagnosis was documented in both MDM as applicable and the Disposition within this note       Time User Action Codes Description Comment    12/8/2024  9:13 AM Emely Graff Add [M54.9] Back pain                  Emely Graff MD  12/08/24 1011

## 2024-12-08 NOTE — DISCHARGE INSTRUCTIONS
Take medication as prescribed.  Follow-up with your PMD.  XR chest 1 view portable   ED Interpretation   Cxr; no acute cardiopulmonary findings.      XR spine lumbar 2 or 3 views injury   ED Interpretation   XR Lumbar spine; no fracture, subluxation ,no acute osseous abnormalities.

## 2024-12-09 ENCOUNTER — VBI (OUTPATIENT)
Dept: FAMILY MEDICINE CLINIC | Facility: CLINIC | Age: 59
End: 2024-12-09

## 2024-12-09 NOTE — TELEPHONE ENCOUNTER
12/09/24 10:30 AM    Patient contacted post ED visit, [facility] department spoke with patient/caregiver and outreach was successful.    Thank you.  Lara Beltran MA  [facility]

## 2025-01-13 ENCOUNTER — OFFICE VISIT (OUTPATIENT)
Dept: FAMILY MEDICINE CLINIC | Facility: CLINIC | Age: 60
End: 2025-01-13
Payer: MEDICARE

## 2025-01-13 VITALS
BODY MASS INDEX: 32.05 KG/M2 | WEIGHT: 159 LBS | HEIGHT: 59 IN | HEART RATE: 93 BPM | SYSTOLIC BLOOD PRESSURE: 122 MMHG | OXYGEN SATURATION: 95 % | RESPIRATION RATE: 17 BRPM | DIASTOLIC BLOOD PRESSURE: 84 MMHG | TEMPERATURE: 98.3 F

## 2025-01-13 DIAGNOSIS — R11.10 CHRONIC VOMITING: ICD-10-CM

## 2025-01-13 DIAGNOSIS — J84.9 ILD (INTERSTITIAL LUNG DISEASE) (HCC): ICD-10-CM

## 2025-01-13 DIAGNOSIS — J30.2 SEASONAL ALLERGIES: ICD-10-CM

## 2025-01-13 DIAGNOSIS — D50.8 OTHER IRON DEFICIENCY ANEMIA: ICD-10-CM

## 2025-01-13 DIAGNOSIS — H40.89 OTHER GLAUCOMA OF BOTH EYES: ICD-10-CM

## 2025-01-13 DIAGNOSIS — Z13.89 SCREENING FOR BLOOD OR PROTEIN IN URINE: ICD-10-CM

## 2025-01-13 DIAGNOSIS — E53.8 B12 DEFICIENCY: ICD-10-CM

## 2025-01-13 DIAGNOSIS — E55.9 VITAMIN D DEFICIENCY: ICD-10-CM

## 2025-01-13 DIAGNOSIS — Z86.2 HISTORY OF SARCOIDOSIS: ICD-10-CM

## 2025-01-13 DIAGNOSIS — Z12.31 BREAST CANCER SCREENING BY MAMMOGRAM: ICD-10-CM

## 2025-01-13 DIAGNOSIS — J06.9 VIRAL URI WITH COUGH: ICD-10-CM

## 2025-01-13 DIAGNOSIS — M54.9 BACK PAIN: ICD-10-CM

## 2025-01-13 DIAGNOSIS — Z13.29 SCREENING FOR THYROID DISORDER: ICD-10-CM

## 2025-01-13 DIAGNOSIS — J45.40 MODERATE PERSISTENT ASTHMA WITHOUT COMPLICATION: Primary | ICD-10-CM

## 2025-01-13 PROBLEM — U07.1 ACUTE HYPOXEMIC RESPIRATORY FAILURE DUE TO COVID-19 (HCC): Status: RESOLVED | Noted: 2021-05-08 | Resolved: 2025-01-13

## 2025-01-13 PROBLEM — N17.9 ACUTE KIDNEY FAILURE, UNSPECIFIED (HCC): Status: RESOLVED | Noted: 2021-05-21 | Resolved: 2025-01-13

## 2025-01-13 PROBLEM — R42 DISEQUILIBRIUM: Status: RESOLVED | Noted: 2021-05-21 | Resolved: 2025-01-13

## 2025-01-13 PROBLEM — R11.11 NON-INTRACTABLE VOMITING WITHOUT NAUSEA: Status: RESOLVED | Noted: 2022-04-25 | Resolved: 2025-01-13

## 2025-01-13 PROBLEM — J96.01 ACUTE HYPOXEMIC RESPIRATORY FAILURE DUE TO COVID-19 (HCC): Status: RESOLVED | Noted: 2021-05-08 | Resolved: 2025-01-13

## 2025-01-13 PROCEDURE — 99214 OFFICE O/P EST MOD 30 MIN: CPT | Performed by: NURSE PRACTITIONER

## 2025-01-13 RX ORDER — LATANOPROST 50 UG/ML
SOLUTION/ DROPS OPHTHALMIC
Qty: 2.5 ML | Refills: 10 | Status: SHIPPED | OUTPATIENT
Start: 2025-01-13

## 2025-01-13 RX ORDER — FERROUS SULFATE 324(65)MG
324 TABLET, DELAYED RELEASE (ENTERIC COATED) ORAL
Qty: 90 TABLET | Refills: 1 | Status: SHIPPED | OUTPATIENT
Start: 2025-01-13 | End: 2025-04-13

## 2025-01-13 RX ORDER — CYCLOBENZAPRINE HCL 10 MG
10 TABLET ORAL 2 TIMES DAILY PRN
Qty: 20 TABLET | Refills: 0 | Status: SHIPPED | OUTPATIENT
Start: 2025-01-13

## 2025-01-13 RX ORDER — ECHINACEA PURPUREA EXTRACT 125 MG
1 TABLET ORAL AS NEEDED
Qty: 60 ML | Refills: 3 | Status: SHIPPED | OUTPATIENT
Start: 2025-01-13

## 2025-01-13 RX ORDER — BUDESONIDE AND FORMOTEROL FUMARATE DIHYDRATE 160; 4.5 UG/1; UG/1
2 AEROSOL RESPIRATORY (INHALATION) 2 TIMES DAILY
Qty: 10.2 G | Refills: 3 | Status: SHIPPED | OUTPATIENT
Start: 2025-01-13

## 2025-01-13 RX ORDER — FLUTICASONE PROPIONATE 50 MCG
1 SPRAY, SUSPENSION (ML) NASAL DAILY
Qty: 18.2 ML | Refills: 1 | Status: SHIPPED | OUTPATIENT
Start: 2025-01-13 | End: 2025-04-13

## 2025-01-13 RX ORDER — CHOLECALCIFEROL (VITAMIN D3) 25 MCG
2000 TABLET ORAL DAILY
Qty: 90 TABLET | Refills: 1 | Status: SHIPPED | OUTPATIENT
Start: 2025-01-13 | End: 2025-04-13

## 2025-01-13 NOTE — PROGRESS NOTES
Name: Tamy Kim      : 1965      MRN: [id number]  Encounter Provider: FRED Rodgers  Encounter Date: 2025   Encounter department: [facility]  :  Assessment & Plan  Moderate persistent asthma without complication    Orders:    budesonide-formoterol (Symbicort) 160-4.5 mcg/act inhaler; Inhale 2 puffs 2 (two) times a day Rinse mouth after use    Ambulatory Referral to Pulmonology; Future    Other iron deficiency anemia    Orders:    ferrous sulfate 324 (65 Fe) mg; Take 1 tablet (324 mg total) by mouth daily before breakfast    CBC and differential; Future    Iron Panel (Includes Ferritin, Iron Sat%, Iron, and TIBC); Future    Back pain    Orders:    cyclobenzaprine (FLEXERIL) 10 mg tablet; Take 1 tablet (10 mg total) by mouth 2 (two) times a day as needed for muscle spasms    Seasonal allergies    Orders:    fluticasone (FLONASE) 50 mcg/act nasal spray; 1 spray into each nostril daily    sodium chloride (OCEAN) 0.65 % nasal spray; 1 spray into each nostril as needed for congestion for up to 12 doses    Other glaucoma of both eyes    Orders:    latanoprost (XALATAN) 0.005 % ophthalmic solution; 1 drop into both eyes at bedtime    B12 deficiency    Orders:    Vitamin B12; Future    Vitamin D deficiency    Orders:    cholecalciferol (VITAMIN D3) 1,000 units tablet; Take 2 tablets (2,000 Units total) by mouth daily    Vitamin D 25 hydroxy; Future    Chronic vomiting    Orders:    CBC and differential; Future    Comprehensive metabolic panel    Lipase; Future    Amylase; Future    Screening for thyroid disorder    Orders:    TSH, 3rd generation with Free T4 reflex    Screening for blood or protein in urine    Orders:    UA w Reflex to Microscopic w Reflex to Culture; Future    ILD (interstitial lung disease) (HCC)    Orders:    Ambulatory Referral to Pulmonology; Future    Breast cancer screening by mammogram    Orders:    Mammo screening bilateral w 3d and cad;  Future    History of sarcoidosis           The case discussed with patient using patient centered shared decision making.The patient was counseled regarding instructions for management,-- risk factor reductions,-- prognosis,-- impressions,-- risks and benefits of treatment options,-- importance of compliance with treatment. I have reviewed the instructions with the patient, answering all questions to her satisfaction.    Patient physically stable on exam today  She agrees to getting blood work as ordered  She will schedule with her specialists  She will return in 3 to 4 months for her annual physical    We discussed annual screenings which are due-mammogram, cervical cancer screening, colonoscopy-she seems agreeable to doing the same-I ordered them and gave her scheduling numbers       History of Present Illness     59-year-old female with complicated medical history presents to establish care  She complains of feeling fatigued, ongoing nausea vomiting at nighttime after eating.  She admits to lying down.  Chart review reveals this has been going on for years.  Ultrasound of abdomen in 2022 was normal  She is sarcoidosis, interstitial lung disease, asthma history-she is requesting refill of medications.  Chest x-ray performed in the emergency room in December 2024 showed chronic scarring.  She is due for pulmonary follow-up    She is chronic back pain.    History of chronic anemia-has not been taking her iron-is due for iron studies and CBC  History of depression she is cooperative today-she advises she is doing pretty well    We discussed treatment plan-from here will do comprehensive blood work, I reordered her medications.  Referral to pulmonology.      Review of Systems   Constitutional:  Positive for fatigue. Negative for fever and unexpected weight change.   HENT:  Negative for trouble swallowing.    Respiratory:  Positive for cough and shortness of breath (chronic PARIKH-due to see pulm).    Cardiovascular:   "Negative for chest pain, palpitations and leg swelling.   Gastrointestinal:  Positive for nausea and vomiting (chronic-occurs after eating and lying down at night-long standing many years). Negative for abdominal pain and blood in stool.   Genitourinary:  Negative for difficulty urinating, dysuria and hematuria.   Musculoskeletal:  Positive for arthralgias, back pain and neck pain.   Skin:  Negative for pallor, rash and wound.   Neurological:  Negative for dizziness, weakness and headaches.   Psychiatric/Behavioral:  Positive for dysphoric mood. Negative for sleep disturbance and suicidal ideas. The patient is nervous/anxious.        Objective   /84 (BP Location: Left arm, Patient Position: Sitting, Cuff Size: Large)   Pulse 93   Temp 98.3 °F (36.8 °C) (Temporal)   Resp 17   Ht 4' 11\" (1.499 m)   Wt 72.1 kg (159 lb)   LMP  (LMP Unknown)   SpO2 95%   BMI 32.11 kg/m²      Physical Exam  Vitals and nursing note reviewed.   Constitutional:       General: She is not in acute distress.     Appearance: Normal appearance.   Cardiovascular:      Rate and Rhythm: Normal rate and regular rhythm.      Pulses: Normal pulses.   Pulmonary:      Effort: Pulmonary effort is normal.      Breath sounds: Decreased breath sounds present.   Abdominal:      Palpations: Abdomen is soft.      Tenderness: There is no abdominal tenderness.   Musculoskeletal:      Right lower leg: No edema.      Left lower leg: No edema.   Lymphadenopathy:      Cervical: No cervical adenopathy.   Skin:     General: Skin is warm and dry.      Coloration: Skin is not pale.   Neurological:      General: No focal deficit present.      Mental Status: She is alert.      Motor: No weakness.      Gait: Gait normal.   Psychiatric:         Mood and Affect: Mood normal.         Behavior: Behavior is cooperative.         "

## 2025-01-13 NOTE — ASSESSMENT & PLAN NOTE
Orders:    fluticasone (FLONASE) 50 mcg/act nasal spray; 1 spray into each nostril daily    sodium chloride (OCEAN) 0.65 % nasal spray; 1 spray into each nostril as needed for congestion for up to 12 doses

## 2025-01-13 NOTE — ASSESSMENT & PLAN NOTE
Orders:    ferrous sulfate 324 (65 Fe) mg; Take 1 tablet (324 mg total) by mouth daily before breakfast    CBC and differential; Future    Iron Panel (Includes Ferritin, Iron Sat%, Iron, and TIBC); Future

## 2025-01-13 NOTE — ASSESSMENT & PLAN NOTE
Orders:    budesonide-formoterol (Symbicort) 160-4.5 mcg/act inhaler; Inhale 2 puffs 2 (two) times a day Rinse mouth after use    Ambulatory Referral to Pulmonology; Future

## 2025-01-15 ENCOUNTER — TELEPHONE (OUTPATIENT)
Age: 60
End: 2025-01-15

## 2025-01-15 ENCOUNTER — APPOINTMENT (OUTPATIENT)
Dept: LAB | Facility: HOSPITAL | Age: 60
End: 2025-01-15
Payer: MEDICARE

## 2025-01-15 DIAGNOSIS — E55.9 VITAMIN D DEFICIENCY: ICD-10-CM

## 2025-01-15 DIAGNOSIS — D50.8 OTHER IRON DEFICIENCY ANEMIA: ICD-10-CM

## 2025-01-15 DIAGNOSIS — R11.10 CHRONIC VOMITING: ICD-10-CM

## 2025-01-15 DIAGNOSIS — Z13.89 SCREENING FOR BLOOD OR PROTEIN IN URINE: ICD-10-CM

## 2025-01-15 DIAGNOSIS — E53.8 B12 DEFICIENCY: ICD-10-CM

## 2025-01-15 LAB
25(OH)D3 SERPL-MCNC: 9.8 NG/ML (ref 30–100)
ALBUMIN SERPL BCG-MCNC: 4.5 G/DL (ref 3.5–5)
ALP SERPL-CCNC: 92 U/L (ref 34–104)
ALT SERPL W P-5'-P-CCNC: 18 U/L (ref 7–52)
AMYLASE SERPL-CCNC: 106 IU/L (ref 29–103)
ANION GAP SERPL CALCULATED.3IONS-SCNC: 9 MMOL/L (ref 4–13)
AST SERPL W P-5'-P-CCNC: 23 U/L (ref 13–39)
BACTERIA UR QL AUTO: NORMAL /HPF
BASOPHILS # BLD AUTO: 0.02 THOUSANDS/ΜL (ref 0–0.1)
BASOPHILS NFR BLD AUTO: 0 % (ref 0–1)
BILIRUB SERPL-MCNC: 0.64 MG/DL (ref 0.2–1)
BILIRUB UR QL STRIP: NEGATIVE
BUN SERPL-MCNC: 18 MG/DL (ref 5–25)
CALCIUM SERPL-MCNC: 10.5 MG/DL (ref 8.4–10.2)
CHLORIDE SERPL-SCNC: 103 MMOL/L (ref 96–108)
CLARITY UR: CLEAR
CO2 SERPL-SCNC: 27 MMOL/L (ref 21–32)
COLOR UR: YELLOW
CREAT SERPL-MCNC: 0.76 MG/DL (ref 0.6–1.3)
EOSINOPHIL # BLD AUTO: 0.16 THOUSAND/ΜL (ref 0–0.61)
EOSINOPHIL NFR BLD AUTO: 3 % (ref 0–6)
ERYTHROCYTE [DISTWIDTH] IN BLOOD BY AUTOMATED COUNT: 14.2 % (ref 11.6–15.1)
FERRITIN SERPL-MCNC: 299 NG/ML (ref 11–307)
GFR SERPL CREATININE-BSD FRML MDRD: 86 ML/MIN/1.73SQ M
GLUCOSE P FAST SERPL-MCNC: 100 MG/DL (ref 65–99)
GLUCOSE UR STRIP-MCNC: NEGATIVE MG/DL
HCT VFR BLD AUTO: 40.5 % (ref 34.8–46.1)
HGB BLD-MCNC: 12.8 G/DL (ref 11.5–15.4)
HGB UR QL STRIP.AUTO: NEGATIVE
IMM GRANULOCYTES # BLD AUTO: 0.01 THOUSAND/UL (ref 0–0.2)
IMM GRANULOCYTES NFR BLD AUTO: 0 % (ref 0–2)
IRON SATN MFR SERPL: 30 % (ref 15–50)
IRON SERPL-MCNC: 106 UG/DL (ref 50–212)
KETONES UR STRIP-MCNC: NEGATIVE MG/DL
LEUKOCYTE ESTERASE UR QL STRIP: ABNORMAL
LIPASE SERPL-CCNC: 30 U/L (ref 11–82)
LYMPHOCYTES # BLD AUTO: 1.85 THOUSANDS/ΜL (ref 0.6–4.47)
LYMPHOCYTES NFR BLD AUTO: 39 % (ref 14–44)
MCH RBC QN AUTO: 29.2 PG (ref 26.8–34.3)
MCHC RBC AUTO-ENTMCNC: 31.6 G/DL (ref 31.4–37.4)
MCV RBC AUTO: 93 FL (ref 82–98)
MONOCYTES # BLD AUTO: 0.36 THOUSAND/ΜL (ref 0.17–1.22)
MONOCYTES NFR BLD AUTO: 8 % (ref 4–12)
NEUTROPHILS # BLD AUTO: 2.33 THOUSANDS/ΜL (ref 1.85–7.62)
NEUTS SEG NFR BLD AUTO: 50 % (ref 43–75)
NITRITE UR QL STRIP: NEGATIVE
NON-SQ EPI CELLS URNS QL MICRO: NORMAL /HPF
NRBC BLD AUTO-RTO: 0 /100 WBCS
PH UR STRIP.AUTO: 7 [PH]
PLATELET # BLD AUTO: 350 THOUSANDS/UL (ref 149–390)
PMV BLD AUTO: 10.5 FL (ref 8.9–12.7)
POTASSIUM SERPL-SCNC: 5 MMOL/L (ref 3.5–5.3)
PROT SERPL-MCNC: 7.9 G/DL (ref 6.4–8.4)
PROT UR STRIP-MCNC: NEGATIVE MG/DL
RBC # BLD AUTO: 4.38 MILLION/UL (ref 3.81–5.12)
RBC #/AREA URNS AUTO: NORMAL /HPF
SODIUM SERPL-SCNC: 139 MMOL/L (ref 135–147)
SP GR UR STRIP.AUTO: 1.01 (ref 1–1.03)
TIBC SERPL-MCNC: 358.4 UG/DL (ref 250–450)
TRANSFERRIN SERPL-MCNC: 256 MG/DL (ref 203–362)
TSH SERPL DL<=0.05 MIU/L-ACNC: 0.46 UIU/ML (ref 0.45–4.5)
UIBC SERPL-MCNC: 252 UG/DL (ref 155–355)
UROBILINOGEN UR QL STRIP.AUTO: 0.2 E.U./DL
VIT B12 SERPL-MCNC: 184 PG/ML (ref 180–914)
WBC # BLD AUTO: 4.73 THOUSAND/UL (ref 4.31–10.16)
WBC #/AREA URNS AUTO: NORMAL /HPF

## 2025-01-15 PROCEDURE — 81003 URINALYSIS AUTO W/O SCOPE: CPT

## 2025-01-15 PROCEDURE — 82306 VITAMIN D 25 HYDROXY: CPT

## 2025-01-15 PROCEDURE — 82150 ASSAY OF AMYLASE: CPT

## 2025-01-15 PROCEDURE — 81001 URINALYSIS AUTO W/SCOPE: CPT

## 2025-01-15 PROCEDURE — 36415 COLL VENOUS BLD VENIPUNCTURE: CPT

## 2025-01-15 PROCEDURE — 83540 ASSAY OF IRON: CPT

## 2025-01-15 PROCEDURE — 82728 ASSAY OF FERRITIN: CPT

## 2025-01-15 PROCEDURE — 84443 ASSAY THYROID STIM HORMONE: CPT | Performed by: NURSE PRACTITIONER

## 2025-01-15 PROCEDURE — 83690 ASSAY OF LIPASE: CPT

## 2025-01-15 PROCEDURE — 80053 COMPREHEN METABOLIC PANEL: CPT | Performed by: NURSE PRACTITIONER

## 2025-01-15 PROCEDURE — 83550 IRON BINDING TEST: CPT

## 2025-01-15 PROCEDURE — 82607 VITAMIN B-12: CPT

## 2025-01-15 PROCEDURE — 85025 COMPLETE CBC W/AUTO DIFF WBC: CPT

## 2025-01-15 NOTE — TELEPHONE ENCOUNTER
PA for Symbicort 160-4.5mcg/act inhaler [facility]    via    [x]CMM-KEY: [id number]  []Surescripts-Case ID #   []Availity-Auth ID # NDC #   []Faxed to plan   []Other website   []Phone call Case ID #     [x]PA sent as URGENT    All office notes, labs and other pertaining documents and studies sent. Clinical questions answered. Awaiting determination from insurance company.     Turnaround time for your insurance to make a decision on your Prior Authorization can take 7-21 business days.

## 2025-01-16 ENCOUNTER — RESULTS FOLLOW-UP (OUTPATIENT)
Dept: FAMILY MEDICINE CLINIC | Facility: CLINIC | Age: 60
End: 2025-01-16

## 2025-01-16 DIAGNOSIS — E53.8 B12 DEFICIENCY: ICD-10-CM

## 2025-01-16 DIAGNOSIS — E55.9 VITAMIN D DEFICIENCY: Primary | ICD-10-CM

## 2025-01-16 RX ORDER — LANOLIN ALCOHOL/MO/W.PET/CERES
1000 CREAM (GRAM) TOPICAL DAILY
Qty: 30 TABLET | Refills: 11 | Status: SHIPPED | OUTPATIENT
Start: 2025-01-16

## 2025-01-16 RX ORDER — ERGOCALCIFEROL 1.25 MG/1
50000 CAPSULE, LIQUID FILLED ORAL WEEKLY
Qty: 12 CAPSULE | Refills: 1 | Status: SHIPPED | OUTPATIENT
Start: 2025-01-16

## 2025-01-17 NOTE — TELEPHONE ENCOUNTER
LM on patients voicemail with lab results as well as PCP message. Patient advised to call the office with any questions.

## 2025-01-17 NOTE — TELEPHONE ENCOUNTER
----- Message from FRED Moseley sent at 1/16/2025  6:13 PM EST -----  Please advise vit d and vit b12 levels very low. She can consider vit b 12 injections in office   I sent rx for higher dose of vit d and I sent rx for vit b12

## 2025-01-21 ENCOUNTER — TELEPHONE (OUTPATIENT)
Age: 60
End: 2025-01-21

## 2025-01-21 DIAGNOSIS — K21.9 GASTROESOPHAGEAL REFLUX DISEASE WITHOUT ESOPHAGITIS: Primary | ICD-10-CM

## 2025-01-21 DIAGNOSIS — R11.10 CHRONIC VOMITING: ICD-10-CM

## 2025-01-21 DIAGNOSIS — R79.89 ABNORMAL LFTS: ICD-10-CM

## 2025-01-22 NOTE — TELEPHONE ENCOUNTER
Called and spoke w pt -- informed of message. She is agreeable to Abdominal US as well as referral to consult w GI regarding the chronic vomiting.   
Patient called back frustrated over still waiting on results of lab work and UA. Is requesting that someone call her back today to discuss the results.   
Please advise --remainder of her blood work is unremarkable (which I thought was indicated in message) urinalysis is normal  CBC and iron panel is normal  Thyroid function is normal  Liver function and kidney function are normal  Electrolytes are normal  Digestive enzymes 1 point above normal which is insignificant but I can order an ultrasound of her abdomen if she is interested since she has chronic vomiting.  I would like her to follow-up with gastroenterology  
Pt called inquiring about her recent UA results.     She is also inquiring when can she make nurse visit appt for the B12 injection?     Pt would like a return call. Please advise  
Referral and US ordered  
US faxed to central scheduling and pt aware to contact GI per my conversation w her yesterday.   
dentures/eyeglasses/walker

## 2025-02-20 ENCOUNTER — TELEPHONE (OUTPATIENT)
Age: 60
End: 2025-02-20

## 2025-02-20 NOTE — TELEPHONE ENCOUNTER
Pt called to reschedule missed appt. Advised pt she was not scheduled with GI, but she missed a cardiology appt. Provided pt with contact number

## 2025-02-25 DIAGNOSIS — J30.2 SEASONAL ALLERGIES: ICD-10-CM

## 2025-02-25 NOTE — TELEPHONE ENCOUNTER
Pt requested a refill for the   meclizine (ANTIVERT) 12.5 MG tablet. Please send to:   [id number] - [facility] - [address]       Thank you for your help.

## 2025-02-26 RX ORDER — MECLIZINE HCL 12.5 MG 12.5 MG/1
12.5 TABLET ORAL EVERY 8 HOURS PRN
Qty: 30 TABLET | Refills: 0 | Status: SHIPPED | OUTPATIENT
Start: 2025-02-26 | End: 2025-03-28

## 2025-03-03 ENCOUNTER — HOSPITAL ENCOUNTER (OUTPATIENT)
Dept: ULTRASOUND IMAGING | Facility: HOSPITAL | Age: 60
Discharge: HOME | End: 2025-03-03
Payer: MEDICARE

## 2025-03-03 DIAGNOSIS — R11.10 CHRONIC VOMITING: ICD-10-CM

## 2025-03-03 DIAGNOSIS — R79.89 ABNORMAL LFTS: ICD-10-CM

## 2025-03-03 DIAGNOSIS — K21.9 GASTROESOPHAGEAL REFLUX DISEASE WITHOUT ESOPHAGITIS: ICD-10-CM

## 2025-03-03 PROCEDURE — 76705 ECHO EXAM OF ABDOMEN: CPT

## 2025-03-07 ENCOUNTER — RESULTS FOLLOW-UP (OUTPATIENT)
Dept: FAMILY MEDICINE CLINIC | Facility: CLINIC | Age: 60
End: 2025-03-07

## 2025-03-07 NOTE — TELEPHONE ENCOUNTER
Unable to reach patient at number listed in chart or cell number on communication consent. Will mail patient a copy of her normal US result.

## 2025-03-07 NOTE — TELEPHONE ENCOUNTER
----- Message from FRED Moseley sent at 3/7/2025  9:42 AM EST -----  Please let Tamy know that her ultrasound is normal  I would like her to schedule her annual physical for later in the spring to follow-up several things we addressed at her last visit

## 2025-03-07 NOTE — TELEPHONE ENCOUNTER
Patient called in and was made aware of results. Patient was scheduled for May per last OV note. No further assistance needed at this time.

## 2025-05-12 ENCOUNTER — HOSPITAL ENCOUNTER (EMERGENCY)
Facility: HOSPITAL | Age: 60
Discharge: HOME | End: 2025-05-12
Attending: EMERGENCY MEDICINE
Payer: MEDICARE

## 2025-05-12 VITALS
TEMPERATURE: 98 F | DIASTOLIC BLOOD PRESSURE: 76 MMHG | HEIGHT: 59 IN | SYSTOLIC BLOOD PRESSURE: 125 MMHG | RESPIRATION RATE: 20 BRPM | BODY MASS INDEX: 29.43 KG/M2 | OXYGEN SATURATION: 96 % | WEIGHT: 146 LBS | HEART RATE: 84 BPM

## 2025-05-12 DIAGNOSIS — R51.9 HEADACHE: Primary | ICD-10-CM

## 2025-05-12 LAB
ALBUMIN SERPL BCG-MCNC: 4.3 G/DL (ref 3.5–5)
ALP SERPL-CCNC: 80 U/L (ref 34–104)
ALT SERPL W P-5'-P-CCNC: 19 U/L (ref 7–52)
ANION GAP SERPL CALCULATED.3IONS-SCNC: 5 MMOL/L (ref 4–13)
AST SERPL W P-5'-P-CCNC: 21 U/L (ref 13–39)
BASOPHILS # BLD AUTO: 0.03 THOUSANDS/ÂΜL (ref 0–0.1)
BASOPHILS NFR BLD AUTO: 1 % (ref 0–1)
BILIRUB SERPL-MCNC: 0.53 MG/DL (ref 0.2–1)
BUN SERPL-MCNC: 14 MG/DL (ref 5–25)
CALCIUM SERPL-MCNC: 10 MG/DL (ref 8.4–10.2)
CHLORIDE SERPL-SCNC: 105 MMOL/L (ref 96–108)
CO2 SERPL-SCNC: 30 MMOL/L (ref 21–32)
CREAT SERPL-MCNC: 0.66 MG/DL (ref 0.6–1.3)
EOSINOPHIL # BLD AUTO: 0.17 THOUSAND/ÂΜL (ref 0–0.61)
EOSINOPHIL NFR BLD AUTO: 4 % (ref 0–6)
ERYTHROCYTE [DISTWIDTH] IN BLOOD BY AUTOMATED COUNT: 13.7 % (ref 11.6–15.1)
GFR SERPL CREATININE-BSD FRML MDRD: 97 ML/MIN/1.73SQ M
GLUCOSE SERPL-MCNC: 100 MG/DL (ref 65–140)
HCT VFR BLD AUTO: 37.9 % (ref 34.8–46.1)
HGB BLD-MCNC: 11.9 G/DL (ref 11.5–15.4)
IMM GRANULOCYTES # BLD AUTO: 0.01 THOUSAND/UL (ref 0–0.2)
IMM GRANULOCYTES NFR BLD AUTO: 0 % (ref 0–2)
LYMPHOCYTES # BLD AUTO: 1.94 THOUSANDS/ÂΜL (ref 0.6–4.47)
LYMPHOCYTES NFR BLD AUTO: 41 % (ref 14–44)
MCH RBC QN AUTO: 29.5 PG (ref 26.8–34.3)
MCHC RBC AUTO-ENTMCNC: 31.4 G/DL (ref 31.4–37.4)
MCV RBC AUTO: 94 FL (ref 82–98)
MONOCYTES # BLD AUTO: 0.43 THOUSAND/ÂΜL (ref 0.17–1.22)
MONOCYTES NFR BLD AUTO: 9 % (ref 4–12)
NEUTROPHILS # BLD AUTO: 2.15 THOUSANDS/ÂΜL (ref 1.85–7.62)
NEUTS SEG NFR BLD AUTO: 45 % (ref 43–75)
NRBC BLD AUTO-RTO: 0 /100 WBCS
PLATELET # BLD AUTO: 317 THOUSANDS/UL (ref 149–390)
PMV BLD AUTO: 10.2 FL (ref 8.9–12.7)
POTASSIUM SERPL-SCNC: 4.8 MMOL/L (ref 3.5–5.3)
PROT SERPL-MCNC: 7.2 G/DL (ref 6.4–8.4)
RBC # BLD AUTO: 4.04 MILLION/UL (ref 3.81–5.12)
SODIUM SERPL-SCNC: 140 MMOL/L (ref 135–147)
WBC # BLD AUTO: 4.73 THOUSAND/UL (ref 4.31–10.16)

## 2025-05-12 PROCEDURE — 96374 THER/PROPH/DIAG INJ IV PUSH: CPT

## 2025-05-12 PROCEDURE — 36415 COLL VENOUS BLD VENIPUNCTURE: CPT | Performed by: PHYSICIAN ASSISTANT

## 2025-05-12 PROCEDURE — 99284 EMERGENCY DEPT VISIT MOD MDM: CPT

## 2025-05-12 PROCEDURE — 85025 COMPLETE CBC W/AUTO DIFF WBC: CPT | Performed by: PHYSICIAN ASSISTANT

## 2025-05-12 PROCEDURE — 99284 EMERGENCY DEPT VISIT MOD MDM: CPT | Performed by: PHYSICIAN ASSISTANT

## 2025-05-12 PROCEDURE — 80053 COMPREHEN METABOLIC PANEL: CPT | Performed by: PHYSICIAN ASSISTANT

## 2025-05-12 PROCEDURE — 96361 HYDRATE IV INFUSION ADD-ON: CPT

## 2025-05-12 RX ORDER — KETOROLAC TROMETHAMINE 30 MG/ML
15 INJECTION, SOLUTION INTRAMUSCULAR; INTRAVENOUS ONCE
Status: COMPLETED | OUTPATIENT
Start: 2025-05-12 | End: 2025-05-12

## 2025-05-12 RX ORDER — ONDANSETRON 2 MG/ML
4 INJECTION INTRAMUSCULAR; INTRAVENOUS ONCE
Status: DISCONTINUED | OUTPATIENT
Start: 2025-05-12 | End: 2025-05-12 | Stop reason: HOSPADM

## 2025-05-12 RX ADMIN — KETOROLAC TROMETHAMINE 15 MG: 30 INJECTION, SOLUTION INTRAMUSCULAR at 14:09

## 2025-05-12 RX ADMIN — SODIUM CHLORIDE 1000 ML: 0.9 INJECTION, SOLUTION INTRAVENOUS at 13:46

## 2025-05-12 NOTE — DISCHARGE INSTRUCTIONS
Use Tylenol 650mg every 4 hours or Ibuprofen 600mg every 6 hours; you can alternate the 2 medications taking something every 3 hours for pain or fever.      Follow-up with your doctor in next few days.

## 2025-05-12 NOTE — ED PROVIDER NOTES
Time reflects when diagnosis was documented in both MDM as applicable and the Disposition within this note       Time User Action Codes Description Comment    5/12/2025  2:35 PM Matheus Lena Add [R51.9] Headache           ED Disposition       ED Disposition   Discharge    Condition   Stable    Date/Time   Mon May 12, 2025  2:35 PM    Comment   Tamy Kim discharge to home/self care.                   Assessment & Plan       Medical Decision Making  Patient with 1 week of intermittent headaches.  Vital signs stable, exam is reassuring, no red flags, neuroexam normal  Will check basic labs, treat HA.  NO indication for CT imaging    1436, pt feeling better, HA gone and no floaters, states she wants to go home    Amount and/or Complexity of Data Reviewed  Labs: ordered. Decision-making details documented in ED Course.    Risk  OTC drugs.  Prescription drug management.        ED Course as of 05/12/25 1824   Mon May 12, 2025   1417 CBC unremarkable     1418 CMP unremarkable   1434 PT came to door and started talking, then cursing at nurse, states she wants to go         Medications   sodium chloride 0.9 % bolus 1,000 mL (0 mL Intravenous Stopped 5/12/25 1437)   ketorolac (TORADOL) injection 15 mg (15 mg Intravenous Given 5/12/25 1409)       ED Risk Strat Scores                    No data recorded        SBIRT 20yo+      Flowsheet Row Most Recent Value   Initial Alcohol Screen: US AUDIT-C     1. How often do you have a drink containing alcohol? 0 Filed at: 05/12/2025 1304   2. How many drinks containing alcohol do you have on a typical day you are drinking?  0 Filed at: 05/12/2025 1304   3b. FEMALE Any Age, or MALE 65+: How often do you have 4 or more drinks on one occassion? 0 Filed at: 05/12/2025 1304   Audit-C Score 0 Filed at: 05/12/2025 1304   SON: How many times in the past year have you...    Used an illegal drug or used a prescription medication for non-medical reasons? Never Filed at: 05/12/2025 1304  "                           History of Present Illness       Chief Complaint   Patient presents with    Headache     Pt reports headache for 3-4 days and reports seeing flashing spots in left eye       Past Medical History:   Diagnosis Date    Anxiety     Asthma     Back pain     Hyperlipidemia     Sarcoidosis     Vertigo       Past Surgical History:   Procedure Laterality Date    MAMMO (HISTORICAL)  05/02/2018    LA REPAIR FIRST ABDOMINAL WALL HERNIA N/A 10/25/2022    Procedure: Incarcerated VENTRAL HERNIA REPAIR with mesh;  Surgeon: Robert Bloch, MD;  Location:  MAIN OR;  Service: General      Family History   Problem Relation Age of Onset    Hypertension Mother     Mental illness Mother     Asthma Mother     Sarcoidosis Sister     Colon cancer Brother     Cancer Brother       Social History     Tobacco Use    Smoking status: Never    Smokeless tobacco: Never   Vaping Use    Vaping status: Never Used   Substance Use Topics    Alcohol use: Not Currently     Comment: social    Drug use: Yes     Types: Marijuana      E-Cigarette/Vaping    E-Cigarette Use Never User       E-Cigarette/Vaping Substances    Nicotine No     THC No     CBD No     Flavoring No     Other No     Unknown No       I have reviewed and agree with the history as documented.     PMH: Sarcoidosis  Pt wears glasses    Pt presents to ED c/o 1 Week h/o intermittent but persistent, 5/5, atraumatic, frontal headache with intermittent \"eye floaters\" that pt describes as seeing white things floating around; none at present.  Pt states she takes Tylenol and it helps some, but then HA returns  Pt also c/o her chronic back pain  Pt states sx started after she used Vicks nasal spray.  NO fever, cp, sob, photophobia, phonophobia, syncope, weakness, numbness/tinging, bowel changes, no rash.          Review of Systems   Constitutional:  Negative for fever.   Eyes:  Positive for visual disturbance. Negative for discharge.   Respiratory:  Negative for cough and " shortness of breath.    Gastrointestinal:  Negative for vomiting.   Skin:  Negative for rash.   Neurological:  Positive for headaches. Negative for weakness.   All other systems reviewed and are negative.          Objective       ED Triage Vitals   Temperature Pulse Blood Pressure Respirations SpO2 Patient Position - Orthostatic VS   05/12/25 1251 05/12/25 1251 05/12/25 1251 05/12/25 1251 05/12/25 1251 05/12/25 1251   98 °F (36.7 °C) 84 125/76 20 96 % Sitting      Temp Source Heart Rate Source BP Location FiO2 (%) Pain Score    05/12/25 1251 05/12/25 1251 05/12/25 1251 -- 05/12/25 1409    Oral Monitor Left arm  7      Vitals      Date and Time Temp Pulse SpO2 Resp BP Pain Score FACES Pain Rating User   05/12/25 1409 -- -- -- -- -- 7 -- DU   05/12/25 1251 98 °F (36.7 °C) 84 96 % 20 125/76 -- -- ALG            Physical Exam  Vitals and nursing note reviewed.   Constitutional:       General: She is not in acute distress.     Appearance: Normal appearance. She is well-developed.   HENT:      Head: Normocephalic and atraumatic.      Nose: Nose normal.      Mouth/Throat:      Mouth: Mucous membranes are moist.      Pharynx: Oropharynx is clear.   Eyes:      Extraocular Movements: Extraocular movements intact.      Conjunctiva/sclera: Conjunctivae normal.      Pupils: Pupils are equal, round, and reactive to light.      Funduscopic exam:     Right eye: No hemorrhage, arteriolar narrowing or papilledema.         Left eye: No hemorrhage, arteriolar narrowing or papilledema.   Cardiovascular:      Rate and Rhythm: Normal rate and regular rhythm.   Pulmonary:      Effort: Pulmonary effort is normal. No respiratory distress.      Breath sounds: Normal breath sounds.   Abdominal:      General: Bowel sounds are normal.      Palpations: Abdomen is soft.   Musculoskeletal:         General: Normal range of motion.      Cervical back: Normal range of motion.      Right lower leg: No edema.      Left lower leg: No edema.   Skin:      General: Skin is warm and dry.      Capillary Refill: Capillary refill takes less than 2 seconds.      Findings: No rash.   Neurological:      General: No focal deficit present.      Mental Status: She is alert and oriented to person, place, and time.      Cranial Nerves: No cranial nerve deficit.      Motor: No weakness.      Gait: Gait normal.   Psychiatric:         Behavior: Behavior normal.      Comments: Somewhat agitated         Results Reviewed       Procedure Component Value Units Date/Time    Comprehensive metabolic panel [[id number]] Collected: 05/12/25 1345    Lab Status: Final result Specimen: Blood from Arm, Right Updated: 05/12/25 1410     Sodium 140 mmol/L      Potassium 4.8 mmol/L      Chloride 105 mmol/L      CO2 30 mmol/L      ANION GAP 5 mmol/L      BUN 14 mg/dL      Creatinine 0.66 mg/dL      Glucose 100 mg/dL      Calcium 10.0 mg/dL      AST 21 U/L      ALT 19 U/L      Alkaline Phosphatase 80 U/L      Total Protein 7.2 g/dL      Albumin 4.3 g/dL      Total Bilirubin 0.53 mg/dL      eGFR 97 ml/min/1.73sq m     Narrative:      National Kidney Disease Foundation guidelines for Chronic Kidney Disease (CKD):     Stage 1 with normal or high GFR (GFR > 90 mL/min/1.73 square meters)    Stage 2 Mild CKD (GFR = 60-89 mL/min/1.73 square meters)    Stage 3A Moderate CKD (GFR = 45-59 mL/min/1.73 square meters)    Stage 3B Moderate CKD (GFR = 30-44 mL/min/1.73 square meters)    Stage 4 Severe CKD (GFR = 15-29 mL/min/1.73 square meters)    Stage 5 End Stage CKD (GFR <15 mL/min/1.73 square meters)  Note: GFR calculation is accurate only with a steady state creatinine    CBC and differential [[id number]] Collected: 05/12/25 1345    Lab Status: Final result Specimen: Blood from Arm, Right Updated: 05/12/25 1352     WBC 4.73 Thousand/uL      RBC 4.04 Million/uL      Hemoglobin 11.9 g/dL      Hematocrit 37.9 %      MCV 94 fL      MCH 29.5 pg      MCHC 31.4 g/dL      RDW 13.7 %      MPV 10.2 fL      Platelets 317  Thousands/uL      nRBC 0 /100 WBCs      Segmented % 45 %      Immature Grans % 0 %      Lymphocytes % 41 %      Monocytes % 9 %      Eosinophils Relative 4 %      Basophils Relative 1 %      Absolute Neutrophils 2.15 Thousands/µL      Absolute Immature Grans 0.01 Thousand/uL      Absolute Lymphocytes 1.94 Thousands/µL      Absolute Monocytes 0.43 Thousand/µL      Eosinophils Absolute 0.17 Thousand/µL      Basophils Absolute 0.03 Thousands/µL             No orders to display       Procedures    ED Medication and Procedure Management   Prior to Admission Medications   Prescriptions Last Dose Informant Patient Reported? Taking?   Vitamins A & D (vitamin A & D) ointment   No No   Sig: Apply topically as needed for dry skin (to face)   atorvastatin (LIPITOR) 40 mg tablet   No No   Sig: Take 1 tablet (40 mg total) by mouth daily   Patient not taking: Reported on 2025   budesonide-formoterol (Symbicort) 160-4.5 mcg/act inhaler   No No   Sig: Inhale 2 puffs 2 (two) times a day Rinse mouth after use   cholecalciferol (VITAMIN D3) 1,000 units tablet   No No   Sig: Take 2 tablets (2,000 Units total) by mouth daily   cyclobenzaprine (FLEXERIL) 10 mg tablet   No No   Sig: Take 1 tablet (10 mg total) by mouth 2 (two) times a day as needed for muscle spasms   ergocalciferol (VITAMIN D2) 50,000 units   No No   Sig: Take 1 capsule (50,000 Units total) by mouth once a week   ferrous sulfate 324 (65 Fe) mg   No No   Sig: Take 1 tablet (324 mg total) by mouth daily before breakfast   fluticasone (FLONASE) 50 mcg/act nasal spray   No No   Si spray into each nostril daily   latanoprost (XALATAN) 0.005 % ophthalmic solution   No No   Si drop into both eyes at bedtime   meclizine (ANTIVERT) 12.5 MG tablet   No No   Sig: Take 1 tablet (12.5 mg total) by mouth every 8 (eight) hours as needed for dizziness   omeprazole (PriLOSEC) 20 mg delayed release capsule   No No   Sig: Take 1 capsule (20 mg total) by mouth daily before  breakfast   Patient not taking: Reported on 2025   sodium chloride (OCEAN) 0.65 % nasal spray   No No   Si spray into each nostril as needed for congestion for up to 12 doses   vitamin B-12 (VITAMIN B-12) 1,000 mcg tablet   No No   Sig: Take 1 tablet (1,000 mcg total) by mouth daily      Facility-Administered Medications: None     Discharge Medication List as of 2025  2:36 PM        CONTINUE these medications which have NOT CHANGED    Details   budesonide-formoterol (Symbicort) 160-4.5 mcg/act inhaler Inhale 2 puffs 2 (two) times a day Rinse mouth after use, Starting 2025, Normal      cholecalciferol (VITAMIN D3) 1,000 units tablet Take 2 tablets (2,000 Units total) by mouth daily, Starting 2025, Until Sun 2025, Normal      cyclobenzaprine (FLEXERIL) 10 mg tablet Take 1 tablet (10 mg total) by mouth 2 (two) times a day as needed for muscle spasms, Starting 2025, Normal      ergocalciferol (VITAMIN D2) 50,000 units Take 1 capsule (50,000 Units total) by mouth once a week, Starting Thu 2025, Normal      ferrous sulfate 324 (65 Fe) mg Take 1 tablet (324 mg total) by mouth daily before breakfast, Starting 2025, Until Sun 2025, Normal      fluticasone (FLONASE) 50 mcg/act nasal spray 1 spray into each nostril daily, Starting 2025, Until Sun 2025, Normal      latanoprost (XALATAN) 0.005 % ophthalmic solution 1 drop into both eyes at bedtime, Normal      meclizine (ANTIVERT) 12.5 MG tablet Take 1 tablet (12.5 mg total) by mouth every 8 (eight) hours as needed for dizziness, Starting 2025, Until Fri 3/28/2025 at 2359, Normal      sodium chloride (OCEAN) 0.65 % nasal spray 1 spray into each nostril as needed for congestion for up to 12 doses, Starting 2025, Normal      vitamin B-12 (VITAMIN B-12) 1,000 mcg tablet Take 1 tablet (1,000 mcg total) by mouth daily, Starting Thu 2025, Normal      acetaminophen (TYLENOL) 325 mg  tablet Take 2 tablets (650 mg total) by mouth every 6 (six) hours as needed for mild pain, Starting Wed 6/9/2021, No Print      albuterol (2.5 mg/3 mL) 0.083 % nebulizer solution Take 3 mL (2.5 mg total) by nebulization every 6 (six) hours as needed for wheezing or shortness of breath, Starting Wed 4/3/2024, Normal      !! albuterol (ProAir HFA) 90 mcg/act inhaler Inhale 2 puffs every 6 (six) hours as needed for wheezing or shortness of breath, Starting Mon 3/25/2024, Normal      !! albuterol (ProAir HFA) 90 mcg/act inhaler Inhale 2 puffs every 4 (four) hours as needed for wheezing or shortness of breath Dispense with spacer.  Use with spacer., Starting Tue 10/15/2024, Normal      atorvastatin (LIPITOR) 40 mg tablet Take 1 tablet (40 mg total) by mouth daily, Starting Tue 10/4/2022, Until Mon 1/2/2023, Normal      benzonatate (TESSALON PERLES) 100 mg capsule Take 1 capsule (100 mg total) by mouth 3 (three) times a day as needed for cough, Starting Mon 2/27/2023, Normal      multivitamin-minerals (CENTRUM ADULTS) tablet Take 1 tablet by mouth daily, Starting Tue 3/1/2022, No Print      omeprazole (PriLOSEC) 20 mg delayed release capsule Take 1 capsule (20 mg total) by mouth daily before breakfast, Starting Fri 5/26/2023, Until Wed 4/17/2024, Normal      Vitamins A & D (vitamin A & D) ointment Apply topically as needed for dry skin (to face), Starting Mon 2/27/2023, Until Mon 1/13/2025 at 2359, Normal       !! - Potential duplicate medications found. Please discuss with provider.        No discharge procedures on file.  ED SEPSIS DOCUMENTATION   Time reflects when diagnosis was documented in both MDM as applicable and the Disposition within this note       Time User Action Codes Description Comment    5/12/2025  2:35 PM Lena Jarvis Add [R51.9] Headache                  Lena Jarvis PA-C  05/12/25 182

## 2025-05-12 NOTE — ED NOTES
"Reminded patient to try to keep arm straight d/t NS bolus, patient asking this writer, \"how long is this going to take? I'm not trying to be here all day, I thought the doctor was going to give me something to take for my headache.\"      Lupe Peters RN  05/12/25 8061    "

## 2025-05-12 NOTE — ED NOTES
Reviewed discharge instructions at bedside with patient, patient verbalized understanding. No futher questions or concerns at this time. Patient ambulated off unit with steady gait.       Lupe Peters RN  05/12/25 6808

## 2025-05-13 ENCOUNTER — VBI (OUTPATIENT)
Dept: FAMILY MEDICINE CLINIC | Facility: CLINIC | Age: 60
End: 2025-05-13

## 2025-05-13 NOTE — TELEPHONE ENCOUNTER
05/13/25 10:13 AM    Patient contacted post ED visit, outreach attempt made but message could not be left. Additional outreach attempt will be made.     Thank you.  Sim Arroyo MA  [facility]

## 2025-05-14 NOTE — TELEPHONE ENCOUNTER
05/14/25 11:59 AM    Patient contacted post ED visit, [facility] department spoke with patient/caregiver and outreach was successful.    Thank you.  Sim Arroyo MA  [facility]

## 2025-05-19 ENCOUNTER — OFFICE VISIT (OUTPATIENT)
Dept: FAMILY MEDICINE CLINIC | Facility: CLINIC | Age: 60
End: 2025-05-19
Payer: MEDICARE

## 2025-05-19 VITALS
TEMPERATURE: 97 F | OXYGEN SATURATION: 97 % | HEIGHT: 59 IN | WEIGHT: 159 LBS | SYSTOLIC BLOOD PRESSURE: 124 MMHG | DIASTOLIC BLOOD PRESSURE: 88 MMHG | HEART RATE: 73 BPM | BODY MASS INDEX: 32.05 KG/M2 | RESPIRATION RATE: 18 BRPM

## 2025-05-19 DIAGNOSIS — Z12.31 BREAST CANCER SCREENING BY MAMMOGRAM: ICD-10-CM

## 2025-05-19 DIAGNOSIS — Z12.11 COLON CANCER SCREENING: ICD-10-CM

## 2025-05-19 DIAGNOSIS — J30.2 SEASONAL ALLERGIES: ICD-10-CM

## 2025-05-19 DIAGNOSIS — Z23 NEED FOR SHINGLES VACCINE: ICD-10-CM

## 2025-05-19 DIAGNOSIS — J45.40 MODERATE PERSISTENT ASTHMA WITHOUT COMPLICATION: ICD-10-CM

## 2025-05-19 DIAGNOSIS — Z00.00 ANNUAL PHYSICAL EXAM: Primary | ICD-10-CM

## 2025-05-19 PROCEDURE — 99396 PREV VISIT EST AGE 40-64: CPT | Performed by: NURSE PRACTITIONER

## 2025-05-19 RX ORDER — ZOSTER VACCINE RECOMBINANT, ADJUVANTED 50 MCG/0.5
0.5 KIT INTRAMUSCULAR ONCE
Qty: 1 EACH | Refills: 1 | Status: SHIPPED | OUTPATIENT
Start: 2025-05-19 | End: 2025-05-19

## 2025-05-19 RX ORDER — MECLIZINE HCL 12.5 MG 12.5 MG/1
12.5 TABLET ORAL EVERY 12 HOURS PRN
Qty: 30 TABLET | Refills: 1 | Status: SHIPPED | OUTPATIENT
Start: 2025-05-19 | End: 2025-06-18

## 2025-05-19 RX ORDER — BUDESONIDE AND FORMOTEROL FUMARATE DIHYDRATE 160; 4.5 UG/1; UG/1
2 AEROSOL RESPIRATORY (INHALATION) 2 TIMES DAILY
Qty: 10.2 G | Refills: 3 | Status: SHIPPED | OUTPATIENT
Start: 2025-05-19

## 2025-05-19 NOTE — PATIENT INSTRUCTIONS
"Patient Education     Routine physical for adults   The Basics   Written by the doctors and editors at [facility]   What is a physical? -- A physical is a routine visit, or \"check-up,\" with your doctor. You might also hear it called a \"wellness visit\" or \"preventive visit.\"  During each visit, the doctor will:   Ask about your physical and mental health   Ask about your habits, behaviors, and lifestyle   Do an exam   Give you vaccines if needed   Talk to you about any medicines you take   Give advice about your health   Answer your questions  Getting regular check-ups is an important part of taking care of your health. It can help your doctor find and treat any problems you have. But it's also important for preventing health problems.  A routine physical is different from a \"sick visit.\" A sick visit is when you see a doctor because of a health concern or problem. Since physicals are scheduled ahead of time, you can think about what you want to ask the doctor.  How often should I get a physical? -- It depends on your age and health. In general, for people age 21 years and older:   If you are younger than 50 years, you might be able to get a physical every 3 years.   If you are 50 years or older, your doctor might recommend a physical every year.  If you have an ongoing health condition, like diabetes or high blood pressure, your doctor will probably want to see you more often.  What happens during a physical? -- In general, each visit will include:   Physical exam - The doctor or nurse will check your height, weight, heart rate, and blood pressure. They will also look at your eyes and ears. They will ask about how you are feeling and whether you have any symptoms that bother you.   Medicines - It's a good idea to bring a list of all the medicines you take to each doctor visit. Your doctor will talk to you about your medicines and answer any questions. Tell them if you are having any side effects that bother you. You " "should also tell them if you are having trouble paying for any of your medicines.   Habits and behaviors - This includes:   Your diet   Your exercise habits   Whether you smoke, drink alcohol, or use drugs   Whether you are sexually active   Whether you feel safe at home  Your doctor will talk to you about things you can do to improve your health and lower your risk of health problems. They will also offer help and support. For example, if you want to quit smoking, they can give you advice and might prescribe medicines. If you want to improve your diet or get more physical activity, they can help you with this, too.   Lab tests, if needed - The tests you get will depend on your age and situation. For example, your doctor might want to check your:   Cholesterol   Blood sugar   Iron level   Vaccines - The recommended vaccines will depend on your age, health, and what vaccines you already had. Vaccines are very important because they can prevent certain serious or deadly infections.   Discussion of screening - \"Screening\" means checking for diseases or other health problems before they cause symptoms. Your doctor can recommend screening based on your age, risk, and preferences. This might include tests to check for:   Cancer, such as breast, prostate, cervical, ovarian, colorectal, prostate, lung, or skin cancer   Sexually transmitted infections, such as chlamydia and gonorrhea   Mental health conditions like depression and anxiety  Your doctor will talk to you about the different types of screening tests. They can help you decide which screenings to have. They can also explain what the results might mean.   Answering questions - The physical is a good time to ask the doctor or nurse questions about your health. If needed, they can refer you to other doctors or specialists, too.  Adults older than 65 years often need other care, too. As you get older, your doctor will talk to you about:   How to prevent falling at " home   Hearing or vision tests   Memory testing   How to take your medicines safely   Making sure that you have the help and support you need at home  All topics are updated as new evidence becomes available and our peer review process is complete.  This topic retrieved from [employer] on: May 02, 2024.  Topic 040722 Version 1.0  Release: 32.4.3 - C32.122  © 2024 UpToDate, Inc. and/or its affiliates. All rights reserved.  Consumer Information Use and Disclaimer   Disclaimer: This generalized information is a limited summary of diagnosis, treatment, and/or medication information. It is not meant to be comprehensive and should be used as a tool to help the user understand and/or assess potential diagnostic and treatment options. It does NOT include all information about conditions, treatments, medications, side effects, or risks that may apply to a specific patient. It is not intended to be medical advice or a substitute for the medical advice, diagnosis, or treatment of a health care provider based on the health care provider's examination and assessment of a patient's specific and unique circumstances. Patients must speak with a health care provider for complete information about their health, medical questions, and treatment options, including any risks or benefits regarding use of medications. This information does not endorse any treatments or medications as safe, effective, or approved for treating a specific patient. UpToDate, Inc. and its affiliates disclaim any warranty or liability relating to this information or the use thereof.The use of this information is governed by the Terms of Use, available at https://www.woltersNextMediumuwer.com/en/know/clinical-effectiveness-terms. 2024© UpToDate, Inc. and its affiliates and/or licensors. All rights reserved.  Copyright   © 2024 Up[employer]Date, [employer]. and/or its affiliates. All rights reserved.

## 2025-05-19 NOTE — PROGRESS NOTES
Adult Annual Physical  Name: Tamy Kim      : 1965      MRN: [id number]  Encounter Provider: FRED Rodgers  Encounter Date: 2025   [facility]    :  Assessment & Plan  Annual physical exam         Seasonal allergies    Orders:  •  meclizine (ANTIVERT) 12.5 MG tablet; Take 1 tablet (12.5 mg total) by mouth every 12 (twelve) hours as needed for dizziness    Moderate persistent asthma without complication    Orders:  •  budesonide-formoterol (Symbicort) 160-4.5 mcg/act inhaler; Inhale 2 puffs 2 (two) times a day Rinse mouth after use    Colon cancer screening    Orders:  •  Ambulatory Referral to Gastroenterology; Future    Breast cancer screening by mammogram    Orders:  •  Mammo screening bilateral w 3d and cad; Future    Need for shingles vaccine    Orders:  •  Zoster Vac Recomb Adjuvanted (Shingrix) 50 MCG/0.5ML SUSR; Inject 0.5 mL into a muscle once for 1 dose Repeat dose in 2 to 6 months    Seasonal allergies         Moderate persistent asthma without complication         Annual physical exam               Preventive Screenings:  - Diabetes Screening: screening up-to-date  - Cholesterol Screening: has hyperlipidemia, risks/benefits discussed and orders placed   - Hepatitis C screening: screening up-to-date   - HIV screening: screening up-to-date   - Cervical cancer screening: risks/benefits discussed and orders placed   - Breast cancer screening: risks/benefits discussed and orders placed   - Colon cancer screening: orders placed and risks/benefits discussed   - Lung cancer screening: screening not indicated     Immunizations:  - Immunizations due: Prevnar 20 and Zoster (Shingrix)    Counseling/Anticipatory Guidance:  - Alcohol: discussed moderation in alcohol intake and recommendations for healthy alcohol use.   - Drug use: discussed harms of illicit drug use and how it can negatively impact mental/physical health.   - Tobacco use: discussed  harms of tobacco use and management options for quitting.   - Dental health: discussed importance of regular tooth brushing, flossing, and dental visits.   - Sexual health: discussed sexually transmitted diseases, partner selection, use of condoms, avoidance of unintended pregnancy, and contraceptive alternatives.   - Diet: discussed recommendations for a healthy/well-balanced diet.   - Exercise: the importance of regular exercise/physical activity was discussed. Recommend exercise 3-5 times per week for at least 30 minutes.   - Injury prevention: discussed safety/seat belts, safety helmets, smoke detectors, carbon monoxide detectors, and smoking near bedding or upholstery.          History of Present Illness     Adult Annual Physical:  Patient presents for annual physical.     Diet and Physical Activity:  - Diet/Nutrition: limited junk food and no special diet.  - Exercise: walking.    General Health:  - Sleep: sleeps well.  - Hearing: normal hearing bilateral ears.  - Vision: wears glasses and most recent eye exam < 1 year ago.  - Dental: no dental visits for > 1 year.    /GYN Health:  - Follows with GYN: yes.   - Menopause: postmenopausal.   Review of Systems   Constitutional:  Positive for fatigue. Negative for fever and unexpected weight change.   HENT:  Negative for trouble swallowing.    Respiratory:  Positive for cough. Negative for shortness of breath.    Cardiovascular:  Negative for chest pain, palpitations and leg swelling.   Gastrointestinal:  Negative for abdominal pain and blood in stool.   Endocrine: Negative.    Genitourinary:  Negative for difficulty urinating and hematuria.   Musculoskeletal:  Positive for arthralgias.   Skin:  Negative for pallor.   Neurological:  Negative for dizziness, tremors, seizures, syncope and weakness.   Psychiatric/Behavioral:  Negative for confusion and dysphoric mood. The patient is not nervous/anxious.        Objective   /88   Pulse 73   Temp (!) 97 °F  "(36.1 °C) (Temporal)   Resp 18   Ht 4' 11\" (1.499 m)   Wt 72.1 kg (159 lb)   LMP  (LMP Unknown)   SpO2 97%   BMI 32.11 kg/m²     Physical Exam  Vitals and nursing note reviewed.   Constitutional:       General: She is not in acute distress.     Appearance: Normal appearance.     Cardiovascular:      Rate and Rhythm: Normal rate and regular rhythm.      Pulses: Normal pulses.   Pulmonary:      Effort: Pulmonary effort is normal.      Breath sounds: Decreased breath sounds present.   Abdominal:      Palpations: Abdomen is soft.      Tenderness: There is no abdominal tenderness.     Musculoskeletal:      Right lower leg: No edema.      Left lower leg: No edema.   Lymphadenopathy:      Cervical: No cervical adenopathy.     Skin:     General: Skin is warm and dry.      Coloration: Skin is not pale.     Neurological:      General: No focal deficit present.      Mental Status: She is alert. Mental status is at baseline.      Motor: No weakness.      Gait: Gait normal.     Psychiatric:         Mood and Affect: Mood normal.         Behavior: Behavior is cooperative.     "

## 2025-05-19 NOTE — ASSESSMENT & PLAN NOTE
Orders:    budesonide-formoterol (Symbicort) 160-4.5 mcg/act inhaler; Inhale 2 puffs 2 (two) times a day Rinse mouth after use

## 2025-05-19 NOTE — ASSESSMENT & PLAN NOTE
Orders:    meclizine (ANTIVERT) 12.5 MG tablet; Take 1 tablet (12.5 mg total) by mouth every 12 (twelve) hours as needed for dizziness

## 2025-06-11 ENCOUNTER — HOSPITAL ENCOUNTER (OUTPATIENT)
Facility: HOSPITAL | Age: 60
Discharge: HOME | End: 2025-06-11
Attending: NURSE PRACTITIONER
Payer: MEDICARE

## 2025-06-11 DIAGNOSIS — Z12.31 BREAST CANCER SCREENING BY MAMMOGRAM: ICD-10-CM

## 2025-06-11 PROCEDURE — 77067 SCR MAMMO BI INCL CAD: CPT

## 2025-06-11 PROCEDURE — 77063 BREAST TOMOSYNTHESIS BI: CPT

## 2025-06-19 NOTE — ASSESSMENT & PLAN NOTE
Refill Routing Note   Medication(s) are not appropriate for processing by [facility] for the following reason(s):        Drug-drug interaction    [facility] action(s):  Defer      Medication Therapy Plan: pt is on plavix    Pharmacist review requested: Yes     Appointments  past 12m or future 3m with PCP    Date Provider   Last Visit   6/27/2024 Rafi Martin MD   Next Visit   6/25/2025 Rafi Martin MD   ED visits in past 90 days: 0        Note composed:3:37 PM 06/19/2025                 · Continue Decadron 6 mg IV Q 24 hour - Per pulm may decrease tomorrow if continues to improve  · Continue with vitamin D3, Pepcid 20 mg daily albuterol and Atrovent inhalers  She completed her vitamin-C and zinc and is on a multivitamin  · Continue Lovenox 1 milligram/kilogram q 12, will decide on transition to novel oral anticoagulant once more medically stable  · Wean off supplemental oxygen as tolerated to keep saturation over 88%  · Monitor intakes and outputs , consider p r n  Lasix to keep daily net negative 1 to 2 L  · Continue Xopenex and Atrovent  · Continue incentive spirometry  · consider addition of LABA/ICS at discharge per pulmonology recommendation  · Echocardiogram was obtained which showed normal EF and normal ventricular contraction and no evidence of right heart strain

## 2025-06-26 ENCOUNTER — RESULTS FOLLOW-UP (OUTPATIENT)
Dept: FAMILY MEDICINE CLINIC | Facility: CLINIC | Age: 60
End: 2025-06-26

## 2025-06-26 NOTE — TELEPHONE ENCOUNTER
Spoke to patient and made aware of normal result. Patient verbalized understanding and had no questions at this time.

## 2025-06-26 NOTE — TELEPHONE ENCOUNTER
----- Message from FRED Moseley sent at 6/26/2025  2:37 PM EDT -----  Please advise Tamy that her mammogram is normal. She should continue monthly breast checks. Repeat mammogram in 1 year.   thanks  ----- Message -----  From: Hudson Modi MD  Sent: 6/26/2025   8:37 AM EDT  To: FRED Moseley

## 2025-08-07 ENCOUNTER — TELEPHONE (OUTPATIENT)
Age: 60
End: 2025-08-07

## 2025-08-08 ENCOUNTER — OFFICE VISIT (OUTPATIENT)
Dept: DERMATOLOGY | Facility: CLINIC | Age: 60
End: 2025-08-08
Attending: PHYSICIAN ASSISTANT

## 2025-08-08 VITALS — HEIGHT: 59 IN | BODY MASS INDEX: 32.11 KG/M2

## 2025-08-08 DIAGNOSIS — L81.9 HYPERPIGMENTATION: Primary | ICD-10-CM

## 2025-08-11 ENCOUNTER — APPOINTMENT (OUTPATIENT)
Dept: LAB | Facility: CLINIC | Age: 60
End: 2025-08-11
Payer: MEDICARE

## 2025-08-11 ENCOUNTER — APPOINTMENT (OUTPATIENT)
Dept: LAB | Facility: AMBULARY SURGERY CENTER | Age: 60
End: 2025-08-11
Payer: MEDICARE

## 2025-08-11 ENCOUNTER — OFFICE VISIT (OUTPATIENT)
Dept: FAMILY MEDICINE CLINIC | Facility: CLINIC | Age: 60
End: 2025-08-11
Payer: MEDICARE

## 2025-08-12 ENCOUNTER — RESULTS FOLLOW-UP (OUTPATIENT)
Dept: FAMILY MEDICINE CLINIC | Facility: CLINIC | Age: 60
End: 2025-08-12

## (undated) DEVICE — GLOVE INDICATOR PI UNDERGLOVE SZ 7 BLUE

## (undated) DEVICE — SUT MONOCRYL 4-0 PS-2 18 IN Y496G

## (undated) DEVICE — STRL PENROSE DRAIN 18" X 1/4": Brand: CARDINAL HEALTH

## (undated) DEVICE — BINDER ABDOMINAL 46-62 IN

## (undated) DEVICE — 3M™ TEGADERM™ TRANSPARENT FILM DRESSING FRAME STYLE, 1626W, 4 IN X 4-3/4 IN (10 CM X 12 CM), 50/CT 4CT/CASE: Brand: 3M™ TEGADERM™

## (undated) DEVICE — GLOVE INDICATOR PI UNDERGLOVE SZ 7.5 BLUE

## (undated) DEVICE — BETHLEHEM UNIVERSAL MINOR GEN: Brand: CARDINAL HEALTH

## (undated) DEVICE — ADHESIVE SKIN HIGH VISCOSITY EXOFIN 1ML

## (undated) DEVICE — SYRINGE 50ML LL

## (undated) DEVICE — NEEDLE HYPO 22G X 1-1/2 IN

## (undated) DEVICE — GLOVE SRG BIOGEL 6.5

## (undated) DEVICE — GLOVE SRG BIOGEL 7.5

## (undated) DEVICE — POOLE SUCTION HANDLE: Brand: CARDINAL HEALTH

## (undated) DEVICE — SYRINGE 10ML LL CONTROL TOP

## (undated) DEVICE — NEPTUNE E-SEP SMOKE EVACUATION PENCIL, COATED, 70MM BLADE, PUSH BUTTON SWITCH: Brand: NEPTUNE E-SEP

## (undated) DEVICE — CHLORAPREP HI-LITE 26ML ORANGE